# Patient Record
Sex: FEMALE | Race: WHITE | NOT HISPANIC OR LATINO | Employment: FULL TIME | URBAN - METROPOLITAN AREA
[De-identification: names, ages, dates, MRNs, and addresses within clinical notes are randomized per-mention and may not be internally consistent; named-entity substitution may affect disease eponyms.]

---

## 2017-03-27 ENCOUNTER — OFFICE VISIT (OUTPATIENT)
Dept: FAMILY MEDICINE | Facility: CLINIC | Age: 47
End: 2017-03-27
Payer: COMMERCIAL

## 2017-03-27 VITALS
HEIGHT: 63 IN | WEIGHT: 211 LBS | DIASTOLIC BLOOD PRESSURE: 84 MMHG | BODY MASS INDEX: 37.39 KG/M2 | TEMPERATURE: 98 F | HEART RATE: 64 BPM | SYSTOLIC BLOOD PRESSURE: 112 MMHG

## 2017-03-27 DIAGNOSIS — N95.1 PERIMENOPAUSAL SYMPTOMS: ICD-10-CM

## 2017-03-27 DIAGNOSIS — R09.81 CONGESTION OF PARANASAL SINUS: ICD-10-CM

## 2017-03-27 DIAGNOSIS — Z00.00 ENCOUNTER FOR ROUTINE ADULT HEALTH EXAMINATION WITHOUT ABNORMAL FINDINGS: Primary | ICD-10-CM

## 2017-03-27 DIAGNOSIS — G89.29 CHRONIC RIGHT SHOULDER PAIN: ICD-10-CM

## 2017-03-27 DIAGNOSIS — Z13.6 CARDIOVASCULAR SCREENING; LDL GOAL LESS THAN 160: ICD-10-CM

## 2017-03-27 DIAGNOSIS — R45.4 IRRITABILITY: ICD-10-CM

## 2017-03-27 DIAGNOSIS — M25.511 CHRONIC RIGHT SHOULDER PAIN: ICD-10-CM

## 2017-03-27 DIAGNOSIS — T63.441D BEE STING REACTION, ACCIDENTAL OR UNINTENTIONAL, SUBSEQUENT ENCOUNTER: ICD-10-CM

## 2017-03-27 DIAGNOSIS — Z13.1 SCREENING FOR DIABETES MELLITUS: ICD-10-CM

## 2017-03-27 PROCEDURE — 99213 OFFICE O/P EST LOW 20 MIN: CPT | Mod: 25 | Performed by: FAMILY MEDICINE

## 2017-03-27 PROCEDURE — 99396 PREV VISIT EST AGE 40-64: CPT | Performed by: FAMILY MEDICINE

## 2017-03-27 RX ORDER — EPINEPHRINE 0.3 MG/.3ML
0.3 INJECTION SUBCUTANEOUS
Qty: 0.6 ML | Refills: 3 | Status: SHIPPED | OUTPATIENT
Start: 2017-03-27 | End: 2018-03-28

## 2017-03-27 RX ORDER — CETIRIZINE HYDROCHLORIDE, PSEUDOEPHEDRINE HYDROCHLORIDE 5; 120 MG/1; MG/1
1 TABLET, FILM COATED, EXTENDED RELEASE ORAL 2 TIMES DAILY
Qty: 60 TABLET | Refills: 5 | Status: SHIPPED | OUTPATIENT
Start: 2017-03-27 | End: 2018-07-06

## 2017-03-27 NOTE — PATIENT INSTRUCTIONS
Please make a fasting lab visit.    Please see sports medicine due to your chronic right shoulder pain    Thank you for choosing [facility].  You may be receiving a survey in the mail from Aniket Agudelo regarding your visit today.  Please take a few minutes to complete and return the survey to let us know how we are doing.      If you have questions or concerns, please contact us via [employer] or you can contact your care team at [phone].    Our Clinic hours are:  Monday 6:40 am  to 7:00 pm  Tuesday -Friday 6:40 am to 5:00 pm    The [place] outpatient lab hours are:  Monday - Friday 6:10 am to 4:45 pm  Saturdays 7:00 am to 11:00 am  Appointments are required, call [phone]    If you have clinical questions after hours or would like to schedule an appointment,  call the clinic at [phone].    Preventive Health Recommendations  Female Ages 40 to 49    Yearly exam:     See your health care provider every year in order to  1. Review health changes.   2. Discuss preventive care.    3. Review your medicines if your doctor prescribed any.      Get a Pap test every three years (unless you have an abnormal result and your provider advises testing more often).      If you get Pap tests with HPV test, you only need to test every 5 years, unless you have an abnormal result. You do not need a Pap test if your uterus was removed (hysterectomy) and you have not had cancer.      You should be tested each year for STDs (sexually transmitted diseases), if you're at risk.       Ask your doctor if you should have a mammogram.      Have a colonoscopy (test for colon cancer) if someone in your family has had colon cancer or polyps before age 50.       Have a cholesterol test every 5 years.       Have a diabetes test (fasting glucose) after age 45. If you are at risk for diabetes, you should have this test every 3 years.    Shots: Get a flu shot each year. Get a tetanus shot every 10 years.     Nutrition:     Eat at least 5  servings of fruits and vegetables each day.    Eat whole-grain bread, whole-wheat pasta and brown rice instead of white grains and rice.    Talk to your provider about Calcium and Vitamin D.     Lifestyle    Exercise at least 150 minutes a week (an average of 30 minutes a day, 5 days a week). This will help you control your weight and prevent disease.    Limit alcohol to one drink per day.    No smoking.     Wear sunscreen to prevent skin cancer.    See your dentist every six months for an exam and cleaning.

## 2017-03-27 NOTE — PROGRESS NOTES
SUBJECTIVE:     CC: Dakota Berkowitz is an 46 year old woman who presents for preventive health visit.     Healthy Habits:    Do you get at least three servings of calcium containing foods daily (dairy, green leafy vegetables, etc.)? NO    Amount of exercise or daily activities, outside of work: none    Problems taking medications regularly No    Medication side effects: No    Have you had an eye exam in the past two years? yes    Do you see a dentist twice per year? yes    Do you have sleep apnea, excessive snoring or daytime drowsiness?no            Today's PHQ-2 Score:   PHQ-2 ( 1999 [employer]) 3/27/2017 7/19/2016   Q1: Little interest or pleasure in doing things 0 -   Q2: Feeling down, depressed or hopeless 0 -   PHQ-2 Score 0 -   Little interest or pleasure in doing things - More than half the days   Feeling down, depressed or hopeless - Several days   PHQ-2 Score - 3       Abuse: Current or Past(Physical, Sexual or Emotional)- No  Do you feel safe in your environment - Yes    Social History   Substance Use Topics     Smoking status: Never Smoker     Smokeless tobacco: Never Used     Alcohol use Yes      Comment: occassionally on weekends     The patient does not drink >3 drinks per day nor >7 drinks per week.    Recent Labs   Lab Test  03/06/15   0603   CHOL  190   HDL  60   LDL  104   TRIG  129   CHOLHDLRATIO  3.2       Reviewed orders with patient.  Reviewed health maintenance and updated orders accordingly - Yes    Mammo Decision Support:  Mammogram not appropriate for this patient based on age.    Pertinent mammograms are reviewed under the imaging tab.  History of abnormal Pap smear: NO - age 30- 65 PAP every 3 years recommended    Reviewed and updated as needed this visit by clinical staff  Tobacco  Allergies  Meds  Med Hx  Surg Hx  Fam Hx  Soc Hx        Reviewed and updated as needed this visit by Provider            ROS:  Review Of Systems  Skin: negative  Eyes: negative  Ears/Nose/Throat:  "negative  Respiratory: No shortness of breath, dyspnea on exertion, cough, or hemoptysis  Cardiovascular: negative  Gastrointestinal: negative  Genitourinary: negative  Musculoskeletal: right shoulder pain, chronic, works as  for [employer], would like some one to take a closer look, just aches at times.  Neurologic: negative  Psychiatric: negative  Hematologic/Lymphatic/Immunologic: negative  Endocrine: having perimenopausal symptoms, off SSRI, recommend to restart prozac, if not better consider effexor XR.  She was concerned about her weight. Discussed not using pills, recommend diet, exercise, and counseling.  Also discussed if she wants a consult to the [facility] surgical to let me know and I will do referral, per her insurance they will consider if BMI is over 35.        Problem list, Medication list, Allergies, and Medical/Social/Surgical histories reviewed in EPIC and updated as appropriate.  OBJECTIVE:     /84 (Cuff Size: Adult Large)  Pulse 64  Temp 98  F (36.7  C) (Tympanic)  Ht 5' 3\" (1.6 m)  Wt 211 lb (95.7 kg)  BMI 37.38 kg/m2  EXAM:  GENERAL: healthy, alert and no distress  EYES: Eyes grossly normal to inspection, PERRL and conjunctivae and sclerae normal  HENT: ear canals and TM's normal, nose and mouth without ulcers or lesions  NECK: no adenopathy, no asymmetry, masses, or scars and thyroid normal to palpation  RESP: lungs clear to auscultation - no rales, rhonchi or wheezes  BREAST: not examined.  CV: regular rate and rhythm, normal S1 S2, no S3 or S4, no murmur, click or rub, no peripheral edema and peripheral pulses strong  ABDOMEN: soft, nontender, no hepatosplenomegaly, no masses and bowel sounds normal   (female): not examined  MS: no gross musculoskeletal defects noted, no edema  SKIN: no suspicious lesions or rashes  NEURO: Normal strength and tone, mentation intact and speech normal  PSYCH: mentation appears normal, affect normal/bright  LYMPH: anterior " "cervical: no adenopathy  posterior cervical: no adenopathy    ASSESSMENT/PLAN:     (Z00.00) Encounter for routine adult health examination without abnormal findings  (primary encounter diagnosis)  Comment: Discussed healthy lifestyle and preventative cares.    Plan:     (N95.1) Perimenopausal symptoms  Comment: as above and restart medication  Plan: FLUoxetine (PROZAC) 20 MG capsule            (R45.4) Irritability  Comment:   Plan: FLUoxetine (PROZAC) 20 MG capsule            (T63.441D) Bee sting reaction, accidental or unintentional, subsequent encounter  Comment: refilled med  Plan: EPINEPHrine 0.3 MG/0.3ML injection            (R09.81) Congestion of paranasal sinus  Comment: refilled and printed prescription  Plan: cetirizine-psuedoePHEDrine (ZYRTEC-D) 5-120 MG         per 12 hr tablet            (Z13.1) Screening for diabetes mellitus  Comment:   Plan: Glucose            (Z13.6) CARDIOVASCULAR SCREENING; LDL GOAL LESS THAN 160  Comment:   Plan: Lipid panel reflex to direct LDL            (M25.511,  G89.29) Chronic right shoulder pain  Comment: referral done  Plan: ORTHO  REFERRAL              COUNSELING:   Reviewed preventive health counseling, as reflected in patient instructions       Regular exercise       Healthy diet/nutrition       Vision screening       Hearing screening         reports that she has never smoked. She has never used smokeless tobacco.    Estimated body mass index is 37.38 kg/(m^2) as calculated from the following:    Height as of this encounter: 5' 3\" (1.6 m).    Weight as of this encounter: 211 lb (95.7 kg).   Weight management pladiscussed many options todaydiscussed many options today    Counseling Resources:  ATP IV Guidelines  Pooled Cohorts Equation Calculator  Breast Cancer Risk Calculator  FRAX Risk Assessment  ICSI Preventive Guidelines  Dietary Guidelines for Americans, 2010  USDA's MyPlate  ASA Prophylaxis  Lung CA Screening    Chino Rayo MD  [facility] " [place]

## 2017-03-27 NOTE — MR AVS SNAPSHOT
After Visit Summary   3/27/2017    Dakota Berkowitz    MRN: [id number]           Patient Information     Date Of Birth          1970        Visit Information        Provider Department      3/27/2017 1:40 PM Chino Rayo MD [facility]        Today's Diagnoses     Encounter for routine adult health examination without abnormal findings    -  1    Perimenopausal symptoms        Irritability        Bee sting reaction, accidental or unintentional, subsequent encounter        Congestion of paranasal sinus        Screening for diabetes mellitus        CARDIOVASCULAR SCREENING; LDL GOAL LESS THAN 160        Chronic right shoulder pain          Care Instructions    Please make a fasting lab visit.    Please see sports medicine due to your chronic right shoulder pain    Thank you for choosing [facility].  You may be receiving a survey in the mail from [email or web address] regarding your visit today.  Please take a few minutes to complete and return the survey to let us know how we are doing.      If you have questions or concerns, please contact us via [employer] or you can contact your care team at [phone].    Our Clinic hours are:  Monday 6:40 am  to 7:00 pm  Tuesday -Friday 6:40 am to 5:00 pm    The [place] outpatient lab hours are:  Monday - Friday 6:10 am to 4:45 pm  Saturdays 7:00 am to 11:00 am  Appointments are required, call [phone]    If you have clinical questions after hours or would like to schedule an appointment,  call the clinic at [phone].    Preventive Health Recommendations  Female Ages 40 to 49    Yearly exam:     See your health care provider every year in order to  1. Review health changes.   2. Discuss preventive care.    3. Review your medicines if your doctor prescribed any.      Get a Pap test every three years (unless you have an abnormal result and your provider advises testing more often).      If you get Pap tests with HPV test, you only need to  test every 5 years, unless you have an abnormal result. You do not need a Pap test if your uterus was removed (hysterectomy) and you have not had cancer.      You should be tested each year for STDs (sexually transmitted diseases), if you're at risk.       Ask your doctor if you should have a mammogram.      Have a colonoscopy (test for colon cancer) if someone in your family has had colon cancer or polyps before age 50.       Have a cholesterol test every 5 years.       Have a diabetes test (fasting glucose) after age 45. If you are at risk for diabetes, you should have this test every 3 years.    Shots: Get a flu shot each year. Get a tetanus shot every 10 years.     Nutrition:     Eat at least 5 servings of fruits and vegetables each day.    Eat whole-grain bread, whole-wheat pasta and brown rice instead of white grains and rice.    Talk to your provider about Calcium and Vitamin D.     Lifestyle    Exercise at least 150 minutes a week (an average of 30 minutes a day, 5 days a week). This will help you control your weight and prevent disease.    Limit alcohol to one drink per day.    No smoking.     Wear sunscreen to prevent skin cancer.    See your dentist every six months for an exam and cleaning.        Follow-ups after your visit        Additional Services     ORTHO  REFERRAL       [facility] is referring you to the Orthopedic  Services at [facility].       The  Representative will assist you in the coordination of your Orthopedic and Musculoskeletal Care as prescribed by your physician.    The  Representative will call you within 1 business day to help schedule your appointment, or you may contact the  Representative at:    All areas ~ [phone]     Type of Referral : Non Surgical       Timeframe requested: Routine    Coverage of these services is subject to the terms and limitations of your health insurance plan.   Please call member services at your health plan with any benefit or coverage questions.      If X-rays, CT or MRI's have been performed, please contact the facility where they were done to arrange for , prior to your scheduled appointment.  Please bring this referral request to your appointment and present it to your specialist.    During physical patient complained of shoulder pain, wants to see someone in ortho for consult eval and treat.                  Future tests that were ordered for you today     Open Future Orders        Priority Expected Expires Ordered    Glucose Routine  4/27/2017 3/27/2017    Lipid panel reflex to direct LDL Routine  4/27/2017 3/27/2017            Who to contact     If you have questions or need follow up information about today's clinic visit or your schedule please contact [facility] directly at [phone].  Normal or non-critical lab and imaging results will be communicated to you by [employer], letter or phone within 4 business days after the clinic has received the results. If you do not hear from us within 7 days, please contact the clinic through [employer] or phone. If you have a critical or abnormal lab result, we will notify you by phone as soon as possible.  Submit refill requests through [employer] or call your pharmacy and they will forward the refill request to us. Please allow 3 business days for your refill to be completed.          Additional Information About Your Visit        [employer] Information     [employer] gives you secure access to your electronic health record. If you see a primary care provider, you can also send messages to your care team and make appointments. If you have questions, please call your primary care clinic.  If you do not have a primary care provider, please call [phone] and they will assist you.        Care EveryWhere ID     This is your Care EveryWhere ID. This could be used by other organizations to access your [place]  "medical records  [id number]        Your Vitals Were     Pulse Temperature Height BMI (Body Mass Index)          64 98  F (36.7  C) (Tympanic) 5' 3\" (1.6 m) 37.38 kg/m2         Blood Pressure from Last 3 Encounters:   03/27/17 112/84   07/18/16 131/82   03/16/16 144/81    Weight from Last 3 Encounters:   03/27/17 211 lb (95.7 kg)   07/18/16 206 lb (93.4 kg)   03/16/16 212 lb 12.8 oz (96.5 kg)              We Performed the Following     ORTHO  REFERRAL          Today's Medication Changes          These changes are accurate as of: 3/27/17  2:36 PM.  If you have any questions, ask your nurse or doctor.               These medicines have changed or have updated prescriptions.        Dose/Directions    cetirizine-psuedoePHEDrine 5-120 MG per 12 hr tablet   Commonly known as:  zyrTEC-D   This may have changed:  additional instructions   Used for:  Congestion of paranasal sinus   Changed by:  Chino Rayo MD        Dose:  1 tablet   Take 1 tablet by mouth 2 times daily Hold on file until needed   Quantity:  60 tablet   Refills:  5       EPINEPHrine 0.3 MG/0.3ML injection   This may have changed:  additional instructions   Used for:  Bee sting reaction, accidental or unintentional, subsequent encounter   Changed by:  Chino Rayo MD        Dose:  0.3 mg   Inject 0.3 mLs (0.3 mg) into the muscle once as needed for anaphylaxis Hold on file until needed   Quantity:  0.6 mL   Refills:  3       FLUoxetine 20 MG capsule   Commonly known as:  PROzac   This may have changed:  additional instructions   Used for:  Perimenopausal symptoms, Irritability   Changed by:  Chino Rayo MD        Dose:  20 mg   Take 1 capsule (20 mg) by mouth daily Hold on file until needed   Quantity:  90 capsule   Refills:  3         Stop taking these medicines if you haven't already. Please contact your care team if you have questions.     amitriptyline 25 MG tablet   Commonly known as:  ELAVIL   Stopped by:  Girma" Chino DIAZ MD           amoxicillin-clavulanate 875-125 MG per tablet   Commonly known as:  AUGMENTIN   Stopped by:  Chino Rayo MD           Capsaicin 0.1 % Crea   Stopped by:  Chino Rayo MD           oxyCODONE-acetaminophen 5-325 MG per tablet   Commonly known as:  PERCOCET   Stopped by:  Chino Rayo MD           valACYclovir 1000 mg tablet   Commonly known as:  VALTREX   Stopped by:  Chino Rayo MD                Where to get your medicines      These medications were sent to [facility] [id number] [place], [place] [address] [facility] AT [facility] OF [address] [place] [address]  [address] [facility] [address]     Phone:  [phone]     EPINEPHrine 0.3 MG/0.3ML injection    FLUoxetine 20 MG capsule         Some of these will need a paper prescription and others can be bought over the counter.  Ask your nurse if you have questions.     Bring a paper prescription for each of these medications     cetirizine-psuedoePHEDrine 5-120 MG per 12 hr tablet                Primary Care Provider Office Phone # Fax #    Chino Rayo [phone]       [facility] [address] [facility] [address]        Thank you!     Thank you for choosing [facility]  for your care. Our goal is always to provide you with excellent care. Hearing back from our patients is one way we can continue to improve our services. Please take a few minutes to complete the written survey that you may receive in the mail after your visit with us. Thank you!             Your Updated Medication List - Protect others around you: Learn how to safely use, store and throw away your medicines at www.disposemymeds.org.          This list is accurate as of: 3/27/17  2:36 PM.  Always use your most recent med list.                   Brand Name Dispense Instructions for use    cetirizine-psuedoePHEDrine 5-120 MG per 12 hr tablet    zyrTEC-D    60 tablet    Take 1 tablet by mouth 2  times daily Hold on file until needed       EPINEPHrine 0.3 MG/0.3ML injection     0.6 mL    Inject 0.3 mLs (0.3 mg) into the muscle once as needed for anaphylaxis Hold on file until needed       FLUoxetine 20 MG capsule    PROzac    90 capsule    Take 1 capsule (20 mg) by mouth daily Hold on file until needed       ibuprofen 200 MG tablet    ADVIL/MOTRIN    120 tablet    Take 3 tablets (600 mg) by mouth every 6 hours as needed for mild pain       PREDNISONE PO      Take 5 mg by mouth as needed Takes as needed for exposure to Poison [place].

## 2017-04-10 ENCOUNTER — OFFICE VISIT (OUTPATIENT)
Dept: ORTHOPEDICS | Facility: CLINIC | Age: 47
End: 2017-04-10
Payer: COMMERCIAL

## 2017-04-10 ENCOUNTER — RADIANT APPOINTMENT (OUTPATIENT)
Dept: GENERAL RADIOLOGY | Facility: CLINIC | Age: 47
End: 2017-04-10
Attending: PHYSICIAN ASSISTANT
Payer: COMMERCIAL

## 2017-04-10 VITALS
WEIGHT: 211 LBS | DIASTOLIC BLOOD PRESSURE: 86 MMHG | SYSTOLIC BLOOD PRESSURE: 114 MMHG | HEIGHT: 63 IN | HEART RATE: 66 BPM | BODY MASS INDEX: 37.39 KG/M2

## 2017-04-10 DIAGNOSIS — S49.91XA SHOULDER INJURY, RIGHT, INITIAL ENCOUNTER: ICD-10-CM

## 2017-04-10 DIAGNOSIS — G89.29 CHRONIC RIGHT SHOULDER PAIN: Primary | ICD-10-CM

## 2017-04-10 DIAGNOSIS — M25.511 CHRONIC RIGHT SHOULDER PAIN: Primary | ICD-10-CM

## 2017-04-10 PROCEDURE — 99244 OFF/OP CNSLTJ NEW/EST MOD 40: CPT | Performed by: PHYSICIAN ASSISTANT

## 2017-04-10 PROCEDURE — 73030 X-RAY EXAM OF SHOULDER: CPT | Mod: RT

## 2017-04-10 RX ORDER — MELOXICAM 15 MG/1
15 TABLET ORAL DAILY
Qty: 30 TABLET | Refills: 1 | Status: SHIPPED | OUTPATIENT
Start: 2017-04-10 | End: 2017-06-01

## 2017-04-10 RX ORDER — CYCLOBENZAPRINE HCL 5 MG
5-10 TABLET ORAL 3 TIMES DAILY PRN
Qty: 45 TABLET | Refills: 1 | Status: SHIPPED | OUTPATIENT
Start: 2017-04-10 | End: 2018-02-27

## 2017-04-10 NOTE — NURSING NOTE
"Initial /86  Pulse 66  Ht 5' 3\" (1.6 m)  Wt 211 lb (95.7 kg)  BMI 37.38 kg/m2 Estimated body mass index is 37.38 kg/(m^2) as calculated from the following:    Height as of this encounter: 5' 3\" (1.6 m).    Weight as of this encounter: 211 lb (95.7 kg). .      " Maggie  [address]  [phone]               Thank you for choosing us for your health care visit with [address]   We are glad to serve you and happy to provide you with this summa Commonly known as:  PROSCAR           furosemide 40 MG Tabs   Take 40 mg by mouth 2 (two) times daily. Take 40 mg in the am and 20 mg in the afternoon.    Commonly known as:  LASIX           HYDROcodone-acetaminophen 5-325 MG Tabs   Take 1 tablet by mouth e Visit [facility] online at  [email or web address]

## 2017-04-10 NOTE — MR AVS SNAPSHOT
After Visit Summary   4/10/2017    Dakota Berkowitz    MRN: [id number]           Patient Information     Date Of Birth          1970        Visit Information        Provider Department      4/10/2017 8:40 AM Eneida Mane PA-C [facility] [place]        Today's Diagnoses     Chronic right shoulder pain    -  1    Shoulder injury, right, initial encounter           Follow-ups after your visit        Additional Services     PHYSICAL THERAPY REFERRAL       *This therapy referral will be filtered to a centralized scheduling office at [facility] and the patient will receive a call to schedule an appointment at a [facility] location most convenient for them. *     [facility] provides Physical Therapy evaluation and treatment and many specialty services across the [facility] system.  If requesting a specialty program, please choose from the list below.    If you have not heard from the scheduling office within 2 business days, please call [phone] for all locations, with the exception of [place], please call [phone].  Treatment: Evaluation & Treatment  Special Instructions/Modalities: As indicated by physical therapist - muscle energy, joint mobilization, myofacial release - Yassine Tobin    Special Programs: As indicated by physical therapist      Please be aware that coverage of these services is subject to the terms and limitations of your health insurance plan.  Call member services at your health plan with any benefit or coverage questions.      **Note to Provider:  If you are referring outside of [facility] for the therapy appointment, please list the name of the location in the  special instructions  above, print the referral and give to the patient to schedule the appointment.                  Who to contact     If you have questions or need follow up information about today's clinic visit or your schedule please contact  [facility] directly at [phone].  Normal or non-critical lab and imaging results will be communicated to you by MyChart, letter or phone within 4 business days after the clinic has received the results. If you do not hear from us within 7 days, please contact the clinic through [employer] or phone. If you have a critical or abnormal lab result, we will notify you by phone as soon as possible.  Submit refill requests through "[facility] or call your pharmacy and they will forward the refill request to us. Please allow 3 business days for your refill to be completed.          Additional Information About Your Visit        [employer] Information     "[facility] gives you secure access to your electronic health record. If you see a primary care provider, you can also send messages to your care team and make appointments. If you have questions, please call your primary care clinic.  If you do not have a primary care provider, please call [phone] and they will assist you.        Care EveryWhere ID     This is your Care EveryWhere ID. This could be used by other organizations to access your [facility] medical records  [id number]         Blood Pressure from Last 3 Encounters:   03/27/17 112/84   07/18/16 131/82   03/16/16 144/81    Weight from Last 3 Encounters:   03/27/17 211 lb (95.7 kg)   07/18/16 206 lb (93.4 kg)   03/16/16 212 lb 12.8 oz (96.5 kg)              We Performed the Following     PHYSICAL THERAPY REFERRAL     XR Shoulder Right G/E 3 Views          Today's Medication Changes          These changes are accurate as of: 4/10/17  9:18 AM.  If you have any questions, ask your nurse or doctor.               Start taking these medicines.        Dose/Directions    cyclobenzaprine 5 MG tablet   Commonly known as:  FLEXERIL   Used for:  Shoulder injury, right, initial encounter, Chronic right shoulder pain   Started by:  Eneida Mane PA-C        Dose:  5-10 mg   Take 1-2 tablets  (5-10 mg) by mouth 3 times daily as needed for muscle spasms   Quantity:  45 tablet   Refills:  1       meloxicam 15 MG tablet   Commonly known as:  MOBIC   Used for:  Shoulder injury, right, initial encounter, Chronic right shoulder pain   Started by:  Eneida Mane PA-C        Dose:  15 mg   Take 1 tablet (15 mg) by mouth daily   Quantity:  30 tablet   Refills:  1            Where to get your medicines      These medications were sent to [facility] [address] [place] [address] AT [address]  [address] [facility] [address]     Phone:  [phone]     cyclobenzaprine 5 MG tablet    meloxicam 15 MG tablet                Primary Care Provider Office Phone # Fax #    Chino Rayo [phone]       [facility] [address] [facility] [address]        Thank you!     Thank you for choosing [facility]  for your care. Our goal is always to provide you with excellent care. Hearing back from our patients is one way we can continue to improve our services. Please take a few minutes to complete the written survey that you may receive in the mail after your visit with us. Thank you!             Your Updated Medication List - Protect others around you: Learn how to safely use, store and throw away your medicines at www.disposemymeds.org.          This list is accurate as of: 4/10/17  9:18 AM.  Always use your most recent med list.                   Brand Name Dispense Instructions for use    cetirizine-psuedoePHEDrine 5-120 MG per 12 hr tablet    zyrTEC-D    60 tablet    Take 1 tablet by mouth 2 times daily Hold on file until needed       cyclobenzaprine 5 MG tablet    FLEXERIL    45 tablet    Take 1-2 tablets (5-10 mg) by mouth 3 times daily as needed for muscle spasms       EPINEPHrine 0.3 MG/0.3ML injection     0.6 mL    Inject 0.3 mLs (0.3 mg) into the muscle once as needed for anaphylaxis  Hold on file until needed       FLUoxetine 20 MG capsule    PROzac    90 capsule    Take 1 capsule (20 mg) by mouth daily Hold on file until needed       ibuprofen 200 MG tablet    ADVIL/MOTRIN    120 tablet    Take 3 tablets (600 mg) by mouth every 6 hours as needed for mild pain       meloxicam 15 MG tablet    MOBIC    30 tablet    Take 1 tablet (15 mg) by mouth daily       PREDNISONE PO      Take 5 mg by mouth as needed Takes as needed for exposure to Poison [place].

## 2017-04-10 NOTE — PROGRESS NOTES
Sports Medicine Clinic Visit    PCP: Chino Rayo    Dakota Berkowitz is a 46 year old female who is seen  in consultation at the request of Jessica Selby presenting with right shoulder pain.    Injury: Fell on shoulder in Feb 2016    Location of Pain: Right anterior shoulder into neck  Duration of Pain: Feb 2016   Rating of Pain at worst: 9/10  Rating of Pain Currently: 7/10  Symptoms are better with: None  Symptoms are worse with: Sleeping, lifting items, use of arm  Additional Features:   Positive: none  Other evaluation and/or treatments so far consists of: Chiropractor, taping  Prior History of related problems: none    Social History:     Review of Systems  Musculoskeletal: as above  Remainder of review of systems is negative including constitutional, CV, pulmonary, GI, Skin and Neurologic except as noted in HPI or medical history.    Family history, medical history and surgical history have all been discussed with patient and appended to medical chart below.    Past Medical History:   Diagnosis Date     NO ACTIVE PROBLEMS      Past Surgical History:   Procedure Laterality Date     C APPENDECTOMY       COSMETIC SURGERY      breast implants     CYSTOSCOPY, SLING TRANSVAGINAL N/A 2/8/2016    Procedure: CYSTOSCOPY, SLING TRANSVAGINAL;  Surgeon: Sterling Hill MD;  Location: [facility]      REPAIR OF NASAL SEPTUM       SURGICAL HISTORY OF -       laporoscopy due to endometriosis     SURGICAL HISTORY OF -       endometrial ablation     SURGICAL HISTORY OF -       breast augmentation     SURGICAL HISTORY OF -       benign tumor removed from her left thigh     TONSILLECTOMY       TUBAL LIGATION       Family History   Problem Relation Age of Onset     CANCER Mother      ovarian     CANCER Paternal Grandmother      lung     Gynecology Sister      having a partial hysterectomy due to abnormal cells, leaving ovaries and cervix     Gynecology Daughter      endometriosis     Gynecology Sister      " hysterectomy for endometriosis     Objective  /86  Pulse 66  Ht 5' 3\" (1.6 m)  Wt 211 lb (95.7 kg)  BMI 37.38 kg/m2  Constitutional:well-developed, well-nourished, and in no distress.   Cardiovascular: Intact distal pulses.    Neurological: alert. Gait normal  Skin: Skin is warm and dry.   Psychiatric: Mood and affect normal.   Respiratory: unlabored, speaks in full sentences  Hematologic/Lymphatic/Immunologic: neg lymphadenopathy, neg lymphedema    Exam:  Right Shoulder exam    ROM:        Full active and passive ROM with flexion, extension, abduction, internal and external rotation.    Tender:        subacromial space       Subclavius, scalenes, levator, anterior joint line    Non Tender:       remainder of shoulder    Strength:        abduction 5/5       internal rotation 5/5       external rotation 5/5       adduction 4/5    Impingement testing:        neg (-) Neer       neg (-) Murillo       neg (-) empty can       Negative Speed's test    Stability testing:       neg (-) apprehension       neg (-) posterior compression    Skin:        no visible deformities       well perfused       capillary refill brisk    Sensation:        normal sensation over shoulder and upper extremity         Radiology:  Study Result   XR SHOULDER RT G/E 3 VW 4/10/2017 8:51 AM      HISTORY: Pain in right shoulder         IMPRESSION: Negative exam.     SILVERIO MCGEE MD       I have personally reviewed images with patient    Assessment:  1. Chronic right shoulder pain    2. Shoulder injury, right, initial encounter        Plan:  Discussed the assessment with the patient.  Topical Treatments: Ice, Heat or Topical Analgesics  Over the counter medication: Acetaminophen (Tylenol) 1000mg every 6 hours with food (Maximum of 3000mg/day)  Prescription Medication as directed: mobic  MRI of the shoulder may need to be obtained in the future  Observe and monitor symptoms  Activity Modification: as tolerated  Rehab: Physical Therapy: " [facility] - [phone]  Follow up: 6 weeks            Eneida Mane PA-C  [facility]  [facility]      Disclaimer: This note consists of symbols derived from keyboarding, dictation and/or voice recognition software. As a result, there may be errors in the script that have gone undetected. Please consider this when interpreting information found in this chart.

## 2017-06-01 ENCOUNTER — OFFICE VISIT (OUTPATIENT)
Dept: FAMILY MEDICINE | Facility: CLINIC | Age: 47
End: 2017-06-01
Payer: COMMERCIAL

## 2017-06-01 VITALS
BODY MASS INDEX: 37.39 KG/M2 | SYSTOLIC BLOOD PRESSURE: 144 MMHG | WEIGHT: 211 LBS | HEART RATE: 76 BPM | HEIGHT: 63 IN | DIASTOLIC BLOOD PRESSURE: 89 MMHG | TEMPERATURE: 98 F

## 2017-06-01 DIAGNOSIS — H65.193 ACUTE MUCOID OTITIS MEDIA OF BOTH EARS: ICD-10-CM

## 2017-06-01 DIAGNOSIS — J01.90 ACUTE SINUSITIS WITH SYMPTOMS > 10 DAYS: Primary | ICD-10-CM

## 2017-06-01 PROCEDURE — 99214 OFFICE O/P EST MOD 30 MIN: CPT | Performed by: FAMILY MEDICINE

## 2017-06-01 RX ORDER — AZITHROMYCIN 250 MG/1
250 TABLET, FILM COATED ORAL DAILY
Qty: 6 TABLET | Refills: 1 | Status: SHIPPED | OUTPATIENT
Start: 2017-06-01 | End: 2018-02-27

## 2017-06-01 NOTE — PATIENT INSTRUCTIONS
Thank you for choosing [facility].  You may be receiving a survey in the mail from Aniket Agudelo regarding your visit today.  Please take a few minutes to complete and return the survey to let us know how we are doing.      If you have questions or concerns, please contact us via [employer] or you can contact your care team at [phone].    Our Clinic hours are:  Monday 6:40 am  to 7:00 pm  Tuesday -Friday 6:40 am to 5:00 pm    The [place] outpatient lab hours are:  Monday - Friday 6:10 am to 4:45 pm  Saturdays 7:00 am to 11:00 am  Appointments are required, call [phone]    If you have clinical questions after hours or would like to schedule an appointment,  call the clinic at [phone].   If symptoms resolve with the zithromax and then recur on day 6-8 repeat the zithromax.  If symptoms do not improve or do not recur don't repeat the zithromax.  Take only one tab a day if repeating the pac.  Zithromax is an antibiotic and works against bacteria.  If it didn't work then you don't have a bacterial infection, you have a viral infection and zpac or any other antibiotic won't work.  Recommend rest, fluids and other supportive cares so your immune system can clear the viral infection.  If your illness is getting worse see MD.    Symptomatic therapy suggested: push fluids, rest, gargle warm salt water, use vaporizer or mist needed , use acetaminophen, ibuprofen, decongestant of choice as needed and Return office visit if symptoms persist or worsen. Call or return to clinic prn if these symptoms worsen or fail to improve as anticipated.

## 2017-06-01 NOTE — NURSING NOTE
"Chief Complaint   Patient presents with     Sinus Problem     sinus pressure     Ear Problem     bilateral ear pain       Initial /89 (BP Location: Left arm, Patient Position: Chair, Cuff Size: Adult Regular)  Pulse 76  Temp 98  F (36.7  C) (Tympanic)  Ht 5' 3\" (1.6 m)  Wt 211 lb (95.7 kg)  BMI 37.38 kg/m2 Estimated body mass index is 37.38 kg/(m^2) as calculated from the following:    Height as of this encounter: 5' 3\" (1.6 m).    Weight as of this encounter: 211 lb (95.7 kg).  Medication Reconciliation: complete  "

## 2017-06-01 NOTE — MR AVS SNAPSHOT
After Visit Summary   6/1/2017    Dakota Berkowitz    MRN: [id number]           Patient Information     Date Of Birth          1970        Visit Information        Provider Department      6/1/2017 1:20 PM Maribel Cantu MD [facility]        Today's Diagnoses     Acute sinusitis with symptoms > 10 days    -  1    Acute mucoid otitis media of both ears          Care Instructions          Thank you for choosing [facility].  You may be receiving a survey in the mail from [facility] regarding your visit today.  Please take a few minutes to complete and return the survey to let us know how we are doing.      If you have questions or concerns, please contact us via [employer] or you can contact your care team at [phone].    Our Clinic hours are:  Monday 6:40 am  to 7:00 pm  Tuesday -Friday 6:40 am to 5:00 pm    The [place] outpatient lab hours are:  Monday - Friday 6:10 am to 4:45 pm  Saturdays 7:00 am to 11:00 am  Appointments are required, call [phone]    If you have clinical questions after hours or would like to schedule an appointment,  call the clinic at [phone].   If symptoms resolve with the zithromax and then recur on day 6-8 repeat the zithromax.  If symptoms do not improve or do not recur don't repeat the zithromax.  Take only one tab a day if repeating the pac.  Zithromax is an antibiotic and works against bacteria.  If it didn't work then you don't have a bacterial infection, you have a viral infection and zpac or any other antibiotic won't work.  Recommend rest, fluids and other supportive cares so your immune system can clear the viral infection.  If your illness is getting worse see MD.    Symptomatic therapy suggested: push fluids, rest, gargle warm salt water, use vaporizer or mist needed , use acetaminophen, ibuprofen, decongestant of choice as needed and Return office visit if symptoms persist or worsen. Call or return to clinic prn if these  "symptoms worsen or fail to improve as anticipated.            Follow-ups after your visit        Who to contact     If you have questions or need follow up information about today's clinic visit or your schedule please contact [facility] directly at [phone].  Normal or non-critical lab and imaging results will be communicated to you by [employer], letter or phone within 4 business days after the clinic has received the results. If you do not hear from us within 7 days, please contact the clinic through [employer] or phone. If you have a critical or abnormal lab result, we will notify you by phone as soon as possible.  Submit refill requests through [employer] or call your pharmacy and they will forward the refill request to us. Please allow 3 business days for your refill to be completed.          Additional Information About Your Visit        [employer] Information     [employer] gives you secure access to your electronic health record. If you see a primary care provider, you can also send messages to your care team and make appointments. If you have questions, please call your primary care clinic.  If you do not have a primary care provider, please call [phone] and they will assist you.        Care EveryWhere ID     This is your Care EveryWhere ID. This could be used by other organizations to access your [facility] medical records  [id number]        Your Vitals Were     Pulse Temperature Height BMI (Body Mass Index)          76 98  F (36.7  C) (Tympanic) 5' 3\" (1.6 m) 37.38 kg/m2         Blood Pressure from Last 3 Encounters:   06/01/17 144/89   04/10/17 114/86   03/27/17 112/84    Weight from Last 3 Encounters:   06/01/17 211 lb (95.7 kg)   04/10/17 211 lb (95.7 kg)   03/27/17 211 lb (95.7 kg)              Today, you had the following     No orders found for display         Today's Medication Changes          These changes are accurate as of: 6/1/17  1:37 PM.  If you have any questions, ask your nurse or " doctor.               Start taking these medicines.        Dose/Directions    azithromycin 250 MG tablet   Commonly known as:  ZITHROMAX Z-JESSY   Used for:  Acute sinusitis with symptoms > 10 days   Started by:  Maribel Cantu MD        Dose:  250 mg   Take 1 tablet (250 mg) by mouth daily Two tablets first day, then one tablet daily for four days   Quantity:  6 tablet   Refills:  1            Where to get your medicines      These medications were sent to [facility] [place], [place] - [address] [facility]  [address] [facility] [address]     Phone:  [phone]     azithromycin 250 MG tablet                Primary Care Provider Office Phone # Fax #    Chino Rayo [phone]       [facility] [address] [facility] [address]        Thank you!     Thank you for choosing [facility]  for your care. Our goal is always to provide you with excellent care. Hearing back from our patients is one way we can continue to improve our services. Please take a few minutes to complete the written survey that you may receive in the mail after your visit with us. Thank you!             Your Updated Medication List - Protect others around you: Learn how to safely use, store and throw away your medicines at www.disposemymeds.org.          This list is accurate as of: 6/1/17  1:37 PM.  Always use your most recent med list.                   Brand Name Dispense Instructions for use    azithromycin 250 MG tablet    ZITHROMAX Z-JESSY    6 tablet    Take 1 tablet (250 mg) by mouth daily Two tablets first day, then one tablet daily for four days       cetirizine-psuedoePHEDrine 5-120 MG per 12 hr tablet    zyrTEC-D    60 tablet    Take 1 tablet by mouth 2 times daily Hold on file until needed       cyclobenzaprine 5 MG tablet    FLEXERIL    45 tablet    Take 1-2 tablets (5-10 mg) by mouth 3 times daily as needed for muscle spasms       EPINEPHrine 0.3 MG/0.3ML  injection     0.6 mL    Inject 0.3 mLs (0.3 mg) into the muscle once as needed for anaphylaxis Hold on file until needed       ibuprofen 200 MG tablet    ADVIL/MOTRIN    120 tablet    Take 3 tablets (600 mg) by mouth every 6 hours as needed for mild pain

## 2017-06-01 NOTE — PROGRESS NOTES
a  SUBJECTIVE:                                                    Dakota Berkowitz is 46 year old female   Chief Complaint   Patient presents with     Sinus Problem     sinus pressure     Ear Problem     bilateral ear pain     ENT Symptoms             Symptoms: cc Present Absent Comment   Fever/Chills   x    Fatigue   x    Muscle Aches   x    Eye Irritation   x    Sneezing   x    Nasal Micah/Drg  x     Sinus Pressure/Pain x x  A lot of sinus pressure   Loss of smell  x     Dental pain   x    Sore Throat  x  Sore throat   Swollen Glands  x     Ear Pain/Fullness x x     Cough   x    Wheeze   x    Chest Pain   x    Shortness of breath   x    Rash   x    Other  x  headaches     Symptom duration:  2 weeks   Symptom severity:  moderate   Treatments tried:  many OTC medications   Contacts:  couple family members are sick too         Problem list and histories reviewed & adjusted, as indicated.  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    Patient Active Problem List   Diagnosis     Female pelvic pain     Endometriosis     Dysmenorrhea     CARDIOVASCULAR SCREENING; LDL GOAL LESS THAN 160     Breast implant rupture     Pain in breast     Obesity     Perimenopausal symptoms     Weight gain     Irritability     Past Surgical History:   Procedure Laterality Date     C APPENDECTOMY       COSMETIC SURGERY      breast implants     CYSTOSCOPY, SLING TRANSVAGINAL N/A 2/8/2016    Procedure: CYSTOSCOPY, SLING TRANSVAGINAL;  Surgeon: Sterling Hill MD;  Location: [facility]      REPAIR OF NASAL SEPTUM       SURGICAL HISTORY OF -       laporoscopy due to endometriosis     SURGICAL HISTORY OF -       endometrial ablation     SURGICAL HISTORY OF -       breast augmentation     SURGICAL HISTORY OF -       benign tumor removed from her left thigh     TONSILLECTOMY       TUBAL LIGATION         Social History   Substance Use Topics     Smoking status: Never Smoker     Smokeless  tobacco: Never Used     Alcohol use Yes      Comment: occassionally on weekends     Family History   Problem Relation Age of Onset     CANCER Mother      ovarian     CANCER Paternal Grandmother      lung     Gynecology Sister      having a partial hysterectomy due to abnormal cells, leaving ovaries and cervix     Gynecology Daughter      endometriosis     Gynecology Sister      hysterectomy for endometriosis         Current Outpatient Prescriptions   Medication Sig Dispense Refill     azithromycin (ZITHROMAX Z-JESSY) 250 MG tablet Take 1 tablet (250 mg) by mouth daily Two tablets first day, then one tablet daily for four days 6 tablet 1     cyclobenzaprine (FLEXERIL) 5 MG tablet Take 1-2 tablets (5-10 mg) by mouth 3 times daily as needed for muscle spasms 45 tablet 1     EPINEPHrine 0.3 MG/0.3ML injection Inject 0.3 mLs (0.3 mg) into the muscle once as needed for anaphylaxis Hold on file until needed 0.6 mL 3     cetirizine-psuedoePHEDrine (ZYRTEC-D) 5-120 MG per 12 hr tablet Take 1 tablet by mouth 2 times daily Hold on file until needed 60 tablet 5     ibuprofen (ADVIL,MOTRIN) 200 MG tablet Take 3 tablets (600 mg) by mouth every 6 hours as needed for mild pain 120 tablet      Allergies   Allergen Reactions     Clindamycin      Provera [Medroxyprogesterone Acetate]      Recent Labs   Lab Test  10/12/15   1544  03/06/15   0603  04/10/14   1633   LDL   --   104   --    HDL   --   60   --    TRIG   --   129   --    ALT   --   31  24   CR   --   0.82  0.86   GFRESTIMATED   --   75  71   GFRESTBLACK   --   >90   GFR Calc    86   POTASSIUM   --   3.9  4.3   TSH  3.72  3.33  2.03      BP Readings from Last 3 Encounters:   06/01/17 144/89   04/10/17 114/86   03/27/17 112/84    Wt Readings from Last 3 Encounters:   06/01/17 211 lb (95.7 kg)   04/10/17 211 lb (95.7 kg)   03/27/17 211 lb (95.7 kg)         ROS:  Constitutional, HEENT, cardiovascular, pulmonary, gi and gu systems are negative, except as otherwise  "noted.    OBJECTIVE:                                                    /89 (BP Location: Left arm, Patient Position: Chair, Cuff Size: Adult Regular)  Pulse 76  Temp 98  F (36.7  C) (Tympanic)  Ht 5' 3\" (1.6 m)  Wt 211 lb (95.7 kg)  BMI 37.38 kg/m2  GENERAL APPEARANCE ADULT: alert, appears ill, no distress  HENT: right TM abnormal, dull, left TM abnormal, dull, erythematous, throat/mouth:mild erythema, mucous membranes moist,  cobblestoning and thick mucous posterior pharynx.  NECK: No adenopathy,masses or thyromegaly  RESP: lungs clear to auscultation   CV: normal rate, regular rhythm, no murmur or gallop  Diagnostic Test Results:  none      ASSESSMENT/PLAN:                                                    1. Acute sinusitis with symptoms > 10 days  Viral vs bacterial.  Trial of antibiotics but if not effective viral infection and self limiting.  If effective and recurs will repeat, see patient instructions.  - azithromycin (ZITHROMAX Z-JESSY) 250 MG tablet; Take 1 tablet (250 mg) by mouth daily Two tablets first day, then one tablet daily for four days  Dispense: 6 tablet; Refill: 1    2. Acute mucoid otitis media of both ears  as listed above      Maribel Cantu MD  [facility]      "

## 2017-08-16 ENCOUNTER — E-VISIT (OUTPATIENT)
Dept: FAMILY MEDICINE | Facility: CLINIC | Age: 47
End: 2017-08-16
Payer: COMMERCIAL

## 2017-08-16 DIAGNOSIS — G25.81 RESTLESS LEGS SYNDROME (RLS): Primary | ICD-10-CM

## 2017-08-16 PROCEDURE — 99207 ZZC NO BILLABLE SERVICE THIS VISIT: CPT | Performed by: FAMILY MEDICINE

## 2017-08-16 NOTE — TELEPHONE ENCOUNTER
Sobia Duncan,  I have not prescribed the medication you are asking for or have diagnosed you with restless legs.  Please make an appointment.  If you want to I could see you at 11:00 on Friday, they could triple book me however you will likely be seen closer to 11:30 am.  To my nurse please check with her to see if this works.  Sincerely,  Chino Rayo MD

## 2017-08-16 NOTE — TELEPHONE ENCOUNTER
Patient is contacted.  She is sure now that [facility] prescribed the medication for her in the past and she will call them to ask for a refill. Swati SAUCEDA RN

## 2017-09-27 ENCOUNTER — MYC MEDICAL ADVICE (OUTPATIENT)
Dept: FAMILY MEDICINE | Facility: CLINIC | Age: 47
End: 2017-09-27

## 2018-01-15 ENCOUNTER — MYC MEDICAL ADVICE (OUTPATIENT)
Dept: FAMILY MEDICINE | Facility: CLINIC | Age: 48
End: 2018-01-15

## 2018-02-01 NOTE — TELEPHONE ENCOUNTER
APPT INFO    Date /Time: 2/17/18 at 10AM   Reason for Appt: NBS   Ref Provider/Clinic: [facility]   Are there internal records? Yes/No?  IF YES, list clinic names: [facility]   Are there outside records? Yes/No? No   Patient Contact (Y/N) & Call Details: No   Action: Chart reviewed

## 2018-02-17 ENCOUNTER — PRE VISIT (OUTPATIENT)
Dept: SURGERY | Facility: CLINIC | Age: 48
End: 2018-02-17

## 2018-02-27 ENCOUNTER — OFFICE VISIT (OUTPATIENT)
Dept: FAMILY MEDICINE | Facility: CLINIC | Age: 48
End: 2018-02-27
Payer: COMMERCIAL

## 2018-02-27 VITALS
TEMPERATURE: 97.7 F | HEART RATE: 64 BPM | BODY MASS INDEX: 37.21 KG/M2 | DIASTOLIC BLOOD PRESSURE: 72 MMHG | SYSTOLIC BLOOD PRESSURE: 108 MMHG | HEIGHT: 63 IN | WEIGHT: 210 LBS

## 2018-02-27 DIAGNOSIS — M62.89 MUSCLE TIGHTNESS: ICD-10-CM

## 2018-02-27 DIAGNOSIS — M54.50 ACUTE RIGHT-SIDED LOW BACK PAIN WITHOUT SCIATICA: Primary | ICD-10-CM

## 2018-02-27 PROCEDURE — 99213 OFFICE O/P EST LOW 20 MIN: CPT | Performed by: FAMILY MEDICINE

## 2018-02-27 RX ORDER — OXYCODONE AND ACETAMINOPHEN 5; 325 MG/1; MG/1
1-2 TABLET ORAL
Qty: 20 TABLET | Refills: 0 | Status: SHIPPED | OUTPATIENT
Start: 2018-02-27 | End: 2018-04-13

## 2018-02-27 NOTE — NURSING NOTE
"Chief Complaint   Patient presents with     Back Pain       Initial /72 (Cuff Size: Adult Large)  Pulse 64  Temp 97.7  F (36.5  C) (Tympanic)  Ht 5' 3.25\" (1.607 m)  Wt 210 lb (95.3 kg)  BMI 36.91 kg/m2 Estimated body mass index is 36.91 kg/(m^2) as calculated from the following:    Height as of this encounter: 5' 3.25\" (1.607 m).    Weight as of this encounter: 210 lb (95.3 kg).  Medication Reconciliation: complete  "

## 2018-02-27 NOTE — PATIENT INSTRUCTIONS
From you exam you have a right lower lumbar back strain.    Go to physical therapy.    Use the muscle relaxant tizanidine 4 mg tab every 8 hours as needed.    Use the percocet 1-2 tabs 5/325 at night only.    Use ibuprofen during the day.  Use ibuprofen 200mg 3-4 tabs every 8 hours as needed, take with food.    Use ice.    Thank you for choosing [facility].  You may be receiving a survey in the mail from [facility] regarding your visit today.  Please take a few minutes to complete and return the survey to let us know how we are doing.      If you have questions or concerns, please contact us via [employer] or you can contact your care team at [phone].    Our Clinic hours are:  Monday 6:40 am  to 7:00 pm  Tuesday -Friday 6:40 am to 5:00 pm    The [place] outpatient lab hours are:  Monday - Friday 6:10 am to 4:45 pm  Saturdays 7:00 am to 11:00 am  Appointments are required, call [phone]    If you have clinical questions after hours or would like to schedule an appointment,  call the clinic at [phone].

## 2018-02-27 NOTE — MR AVS SNAPSHOT
After Visit Summary   2/27/2018    Dakota Berkowitz    MRN: [id number]           Patient Information     Date Of Birth          1970        Visit Information        Provider Department      2/27/2018 8:00 AM Chino Rayo MD [facility]        Today's Diagnoses     Acute right-sided low back pain without sciatica    -  1    Muscle tightness          Care Instructions    From you exam you have a right lower lumbar back strain.    Go to physical therapy.    Use the muscle relaxant tizanidine 4 mg tab every 8 hours as needed.    Use the percocet 1-2 tabs 5/325 at night only.    Use ibuprofen during the day.  Use ibuprofen 200mg 3-4 tabs every 8 hours as needed, take with food.    Use ice.    Thank you for choosing [facility].  You may be receiving a survey in the mail from [facility] regarding your visit today.  Please take a few minutes to complete and return the survey to let us know how we are doing.      If you have questions or concerns, please contact us via [employer] or you can contact your care team at [phone].    Our Clinic hours are:  Monday 6:40 am  to 7:00 pm  Tuesday -Friday 6:40 am to 5:00 pm    The [place] outpatient lab hours are:  Monday - Friday 6:10 am to 4:45 pm  Saturdays 7:00 am to 11:00 am  Appointments are required, call [phone]    If you have clinical questions after hours or would like to schedule an appointment,  call the clinic at [phone].            Follow-ups after your visit        Additional Services     PHYSICAL THERAPY REFERRAL       *This therapy referral will be filtered to a centralized scheduling office at [facility] and the patient will receive a call to schedule an appointment at a [place] location most convenient for them. *     [facility] provides Physical Therapy evaluation and treatment and many specialty services across the [place] system.  If requesting a specialty  "program, please choose from the list below.    If you have not heard from the scheduling office within 2 business days, please call [phone] for all locations, with the exception of [place], please call [phone] and [facility] [place], please call [phone]  Treatment: Evaluation & Treatment  Special Instructions/Modalities:   Special Programs:     Please be aware that coverage of these services is subject to the terms and limitations of your health insurance plan.  Call member services at your health plan with any benefit or coverage questions.      **Note to Provider:  If you are referring outside of [place] for the therapy appointment, please list the name of the location in the \"special instructions\" above, print the referral and give to the patient to schedule the appointment.     Patient has right lower lumbar back pain that goes to the right buttock.  Please eval and treat                  Who to contact     If you have questions or need follow up information about today's clinic visit or your schedule please contact [facility] directly at [phone].  Normal or non-critical lab and imaging results will be communicated to you by [employer], letter or phone within 4 business days after the clinic has received the results. If you do not hear from us within 7 days, please contact the clinic through [employer] or phone. If you have a critical or abnormal lab result, we will notify you by phone as soon as possible.  Submit refill requests through [employer] or call your pharmacy and they will forward the refill request to us. Please allow 3 business days for your refill to be completed.          Additional Information About Your Visit        [employer] Information     [employer] gives you secure access to your electronic health record. If you see a primary care provider, you can also send messages to your care team and make appointments. If you have questions, please call your primary care clinic.  If you do " "not have a primary care provider, please call [phone] and they will assist you.        Care EveryWhere ID     This is your Care EveryWhere ID. This could be used by other organizations to access your [facility] medical records  [id number]        Your Vitals Were     Pulse Temperature Height BMI (Body Mass Index)          64 97.7  F (36.5  C) (Tympanic) 5' 3.25\" (1.607 m) 36.91 kg/m2         Blood Pressure from Last 3 Encounters:   02/27/18 108/72   06/01/17 144/89   04/10/17 114/86    Weight from Last 3 Encounters:   02/27/18 210 lb (95.3 kg)   06/01/17 211 lb (95.7 kg)   04/10/17 211 lb (95.7 kg)              We Performed the Following     PHYSICAL THERAPY REFERRAL          Today's Medication Changes          These changes are accurate as of 2/27/18  8:35 AM.  If you have any questions, ask your nurse or doctor.               Start taking these medicines.        Dose/Directions    oxyCODONE-acetaminophen 5-325 MG per tablet   Commonly known as:  PERCOCET   Used for:  Acute right-sided low back pain without sciatica, Muscle tightness        Dose:  1-2 tablet   Take 1-2 tablets by mouth nightly as needed for pain maximum 2 tablet(s) per day   Quantity:  20 tablet   Refills:  0       tiZANidine 4 MG tablet   Commonly known as:  ZANAFLEX   Used for:  Acute right-sided low back pain without sciatica, Muscle tightness        Dose:  4 mg   Take 1 tablet (4 mg) by mouth 3 times daily as needed for muscle spasms   Quantity:  30 tablet   Refills:  0            Where to get your medicines      These medications were sent to [facility] [place], [place] - [address]  [address] [address]     Phone:  [phone]     tiZANidine 4 MG tablet         Some of these will need a paper prescription and others can be bought over the counter.  Ask your nurse if you have questions.     Bring a paper prescription for each of these medications     oxyCODONE-acetaminophen 5-325 MG per tablet          "       Primary Care Provider Office Phone # Fax #    Chino Rayo [phone] [address] [address]        Equal Access to Services     COOKIE EARL : Lela young jeanneo Sobob, waaxda luqadaha, qaybta kaalmada tanisha, alison coppola arlenekristel bautista kirill morrow. So [facility] [phone].    ATENCIÓN: Si habla español, tiene a flores disposición servicios gratuitos de asistencia lingüística. Llame al [phone].    We comply with applicable federal civil rights laws and [place] laws. We do not discriminate on the basis of race, color, national origin, age, disability, sex, sexual orientation, or gender identity.            Thank you!     Thank you for choosing [facility]  for your care. Our goal is always to provide you with excellent care. Hearing back from our patients is one way we can continue to improve our services. Please take a few minutes to complete the written survey that you may receive in the mail after your visit with us. Thank you!             Your Updated Medication List - Protect others around you: Learn how to safely use, store and throw away your medicines at www.disposemymeds.org.          This list is accurate as of 2/27/18  8:35 AM.  Always use your most recent med list.                   Brand Name Dispense Instructions for use Diagnosis    cetirizine-psuedoePHEDrine 5-120 MG per 12 hr tablet    zyrTEC-D    60 tablet    Take 1 tablet by mouth 2 times daily Hold on file until needed    Congestion of paranasal sinus       cyclobenzaprine 5 MG tablet    FLEXERIL    45 tablet    Take 1-2 tablets (5-10 mg) by mouth 3 times daily as needed for muscle spasms    Shoulder injury, right, initial encounter, Chronic right shoulder pain       EPINEPHrine 0.3 MG/0.3ML injection 2-pack    EPIPEN/ADRENACLICK/or ANY BX GENERIC EQUIV    0.6 mL    Inject 0.3 mLs (0.3 mg) into the muscle once as needed for anaphylaxis Hold on file until needed    Bee sting  reaction, accidental or unintentional, subsequent encounter       ibuprofen 200 MG tablet    ADVIL/MOTRIN    120 tablet    Take 3 tablets (600 mg) by mouth every 6 hours as needed for mild pain    Urethral hypermobility, Urinary, incontinence, stress female       oxyCODONE-acetaminophen 5-325 MG per tablet    PERCOCET    20 tablet    Take 1-2 tablets by mouth nightly as needed for pain maximum 2 tablet(s) per day    Acute right-sided low back pain without sciatica, Muscle tightness       tiZANidine 4 MG tablet    ZANAFLEX    30 tablet    Take 1 tablet (4 mg) by mouth 3 times daily as needed for muscle spasms    Acute right-sided low back pain without sciatica, Muscle tightness

## 2018-02-27 NOTE — PROGRESS NOTES
SUBJECTIVE:   Dakota Berkowitz is a 47 year old female who presents to clinic today for the following health issues:    She woke up and it was hurting.  Seems to be a sore spot on back of right buttock. Seems to radiate to her side.  No going down the right lower extremity.      She was only walking.  No trauma.  No falls.  No trauma.  She has been using otc meds.    No blood in urine.  No leaking of stool.   No prior back injury.       Back Pain       Duration: 2 weeks        Specific cause: walking    Description:   Location of pain: low to mid back right and waist right  Character of pain: sharp and constant  Pain radiation:none and radiates to the front right abdomin, has an increase in bowels to 3 times a day, loose stools. Less urination noted  New numbness or weakness in legs, not attributed to pain:  no     Intensity: Currently 8/10, At its worst 10/10    History:   Pain interferes with job: YES,   History of back problems: no prior back problems  Any previous MRI or X-rays: None  Sees a specialist for back pain:  No  Therapies tried without relief: cold, heat, muscle relaxants and NSAIDs    Alleviating factors:   Improved by: nothing      Precipitating factors:  Worsened by: Lifting, Bending, Standing, Sitting, Lying Flat and Walking    Functional and Psychosocial Screen (Ricco STarT Back):      Not performed today          Accompanying Signs & Symptoms:  Risk of Fracture:  None  Risk of Cauda Equina:  None  Risk of Infection:  None  Risk of Cancer:  None  Risk of Ankylosing Spondylitis:  Onset at age <35, male, AND morning back stiffness. no                       Problem list and histories reviewed & adjusted, as indicated.  Additional history:         Reviewed and updated as needed this visit by clinical staff       Reviewed and updated as needed this visit by Provider         ROS:  CONSTITUTIONAL:NEGATIVE for fever, chills, change in weight  INTEGUMENTARY/SKIN: NEGATIVE for worrisome rashes, moles or  "lesions  RESP:little cold in upper resp  CV: NEGATIVE for chest pain, palpitations or peripheral edema  GI: as above  MUSCULOSKELETAL: as above  NEURO: as above  PSYCHIATRIC: NEGATIVE for changes in mood or affect    OBJECTIVE:                                                    /72 (Cuff Size: Adult Large)  Pulse 64  Temp 97.7  F (36.5  C) (Tympanic)  Ht 5' 3.25\" (1.607 m)  Wt 210 lb (95.3 kg)  BMI 36.91 kg/m2  Body mass index is 36.91 kg/(m^2).  GENERAL APPEARANCE: alert, no distress and cooperative  HENT: ear canals and TM's normal and nose and mouth without ulcers or lesions  NECK: no adenopathy, no asymmetry, masses, or scars and thyroid normal to palpation  RESP: lungs clear to auscultation - no rales, rhonchi or wheezes  CV: regular rates and rhythm, normal S1 S2, no S3 or S4 and no murmur, click or rub  Patient is pleasant, cooperative and in no acute distress.  Back:  Dakota Berkowitz had minimal difficulty moving from the chair to the exam table.  mild tenderness to palpation in the right lumbar region.  No skin changes to the area.    mild tenderness to the buttock area.  Straight leg raise was neg.  Flexion at hip was 30 degrees. Able to hyperextend and lean from side to side.  Muscle/Skeletal:  Strength was 5/5 of lower extremities.  Neuro: Reflexes were 2/4 bilaterally at patella and Achilles.       ASSESSMENT/PLAN:                                                    (M54.5) Acute right-sided low back pain without sciatica  (primary encounter diagnosis)  Comment: start medications, Symptomatic cares were discussed in details.  Go to pt  Plan: PHYSICAL THERAPY REFERRAL,         oxyCODONE-acetaminophen (PERCOCET) 5-325 MG per        tablet, tiZANidine (ZANAFLEX) 4 MG tablet            (M62.89) Muscle tightness  Comment: changed muscle tightness  Plan: PHYSICAL THERAPY REFERRAL,         oxyCODONE-acetaminophen (PERCOCET) 5-325 MG per        tablet, tiZANidine (ZANAFLEX) 4 MG tablet        "     Noted getting over a mild uri    See Patient Instructions    Chino Rayo MD  [facility]

## 2018-03-07 ENCOUNTER — OFFICE VISIT (OUTPATIENT)
Dept: FAMILY MEDICINE | Facility: CLINIC | Age: 48
End: 2018-03-07
Payer: COMMERCIAL

## 2018-03-07 VITALS
HEIGHT: 63 IN | HEART RATE: 96 BPM | TEMPERATURE: 99.3 F | BODY MASS INDEX: 37.21 KG/M2 | SYSTOLIC BLOOD PRESSURE: 132 MMHG | WEIGHT: 210 LBS | DIASTOLIC BLOOD PRESSURE: 70 MMHG

## 2018-03-07 DIAGNOSIS — J02.9 SORE THROAT: ICD-10-CM

## 2018-03-07 DIAGNOSIS — R05.9 COUGH: ICD-10-CM

## 2018-03-07 DIAGNOSIS — R06.2 WHEEZES: ICD-10-CM

## 2018-03-07 DIAGNOSIS — J10.1 INFLUENZA A: Primary | ICD-10-CM

## 2018-03-07 LAB
DEPRECATED S PYO AG THROAT QL EIA: NORMAL
FLUAV+FLUBV AG SPEC QL: NEGATIVE
FLUAV+FLUBV AG SPEC QL: POSITIVE
SPECIMEN SOURCE: ABNORMAL
SPECIMEN SOURCE: NORMAL

## 2018-03-07 PROCEDURE — 87804 INFLUENZA ASSAY W/OPTIC: CPT | Performed by: FAMILY MEDICINE

## 2018-03-07 PROCEDURE — 87880 STREP A ASSAY W/OPTIC: CPT | Performed by: FAMILY MEDICINE

## 2018-03-07 PROCEDURE — 99213 OFFICE O/P EST LOW 20 MIN: CPT | Performed by: FAMILY MEDICINE

## 2018-03-07 PROCEDURE — 87081 CULTURE SCREEN ONLY: CPT | Performed by: FAMILY MEDICINE

## 2018-03-07 RX ORDER — OSELTAMIVIR PHOSPHATE 75 MG/1
75 CAPSULE ORAL 2 TIMES DAILY
Qty: 10 CAPSULE | Refills: 0 | Status: SHIPPED | OUTPATIENT
Start: 2018-03-07 | End: 2018-03-28

## 2018-03-07 RX ORDER — ALBUTEROL SULFATE 90 UG/1
2 AEROSOL, METERED RESPIRATORY (INHALATION) EVERY 4 HOURS PRN
Qty: 1 INHALER | Refills: 0 | Status: SHIPPED | OUTPATIENT
Start: 2018-03-07 | End: 2018-07-06

## 2018-03-07 NOTE — PROGRESS NOTES
"  SUBJECTIVE:   Dakota Berkowitz is a 47 year old female who presents to clinic today for the following health issues:    Chief Complaint   Patient presents with     URI     hacking cough, tension headache, runny nose. Has been expose to strep and flu in her household.     Pain     insurance would not approve oxycodone for 20 tablets.         RESPIRATORY SYMPTOMS      Duration: 2 days    Description  rhinorrhea, sore throat, cough, wheezing, fever and nausea, no vomiting and occ loose stools    Severity: moderate to severe    Accompanying signs and symptoms: just low back pain worse from coughing    History (predisposing factors):  strep exposure and flu    Precipitating or alleviating factors: None    Therapies tried and outcome:  rest and fluids, OTC NSAIDs, sudafed    Reports intermittent wheeze at times.      Problem list and histories reviewed & adjusted, as indicated.  Additional history:         Reviewed and updated as needed this visit by clinical staff  Allergies  Meds       Reviewed and updated as needed this visit by Provider         ROS:  CONSTITUTIONAL:as above  INTEGUMENTARY/SKIN: clear  ENT/MOUTH: rhinorrhea  RESP:cough, harsh, dry  CV: chest wall pain due to coughing  GI: NEGATIVE for nausea, abdominal pain, heartburn, or change in bowel habits    OBJECTIVE:                                                    /70 (Cuff Size: Adult Large)  Pulse 96  Temp 99.3  F (37.4  C) (Tympanic)  Ht 5' 3.25\" (1.607 m)  Wt 210 lb (95.3 kg)  BMI 36.91 kg/m2  Body mass index is 36.91 kg/(m^2).  GENERAL APPEARANCE: alert, no distress, cooperative and fatigued  HENT: ear canals and TM's normal and nose and mouth without ulcers or lesions  NECK: no adenopathy, no asymmetry, masses, or scars and thyroid normal to palpation  RESP: dry, harsh cough.   CV: regular rates and rhythm, normal S1 S2, no S3 or S4 and no murmur, click or rub  SKIN: no suspicious lesions or rashes  NEURO: Normal strength and tone, " mentation intact and speech normal  PSYCH: mentation appears normal and affect normal/bright    Results for orders placed or performed in visit on 03/07/18   Strep, Rapid Screen   Result Value Ref Range    Specimen Description Throat     Rapid Strep A Screen       NEGATIVE: No Group A streptococcal antigen detected by immunoassay, await culture report.   Influenza A/B antigen   Result Value Ref Range    Influenza A/B Agn Specimen Nasal     Influenza A Positive (A) NEG^Negative    Influenza B Negative NEG^Negative          ASSESSMENT/PLAN:                                                    1. Pharyngitis  Checked for strep and it was negative  - Strep, Rapid Screen  - Beta strep group A culture    2. Cough  Cough is due to influenza A, instructed on med and staying home instructions  - Influenza A/B antigen  - Beta strep group A culture    3. Wheezes  Due to her h/o wheeze sent an inhaler  - albuterol (PROAIR HFA/PROVENTIL HFA/VENTOLIN HFA) 108 (90 BASE) MCG/ACT Inhaler; Inhale 2 puffs into the lungs every 4 hours as needed for shortness of breath / dyspnea  Dispense: 1 Inhaler; Refill: 0  - Beta strep group A culture    4. Influenza A  Treat since it is less than 48 hours.  - oseltamivir (TAMIFLU) 75 MG capsule; Take 1 capsule (75 mg) by mouth 2 times daily  Dispense: 10 capsule; Refill: 0      See Patient Instructions    Chino Raoy MD  [facility]

## 2018-03-07 NOTE — PATIENT INSTRUCTIONS
Results for orders placed or performed in visit on 03/07/18   Strep, Rapid Screen   Result Value Ref Range    Specimen Description Throat     Rapid Strep A Screen       NEGATIVE: No Group A streptococcal antigen detected by immunoassay, await culture report.   Influenza A/B antigen   Result Value Ref Range    Influenza A/B Agn Specimen Nasal     Influenza A Positive (A) NEG^Negative    Influenza B Negative NEG^Negative     You need to take tamiflu for treatment.    You need to stay home, until afebrile for 24 hours without using any medication for fever reduction.    Drink fluids.    Get rest.    If worse return to clinic.          Thank you for choosing [facility].  You may be receiving a survey in the mail from [employer] regarding your visit today.  Please take a few minutes to complete and return the survey to let us know how we are doing.      If you have questions or concerns, please contact us via [employer] or you can contact your care team at [phone].    Our Clinic hours are:  Monday 6:40 am  to 7:00 pm  Tuesday -Friday 6:40 am to 5:00 pm    The [place] outpatient lab hours are:  Monday - Friday 6:10 am to 4:45 pm  Saturdays 7:00 am to 11:00 am  Appointments are required, call [phone]    If you have clinical questions after hours or would like to schedule an appointment,  call the clinic at [phone].

## 2018-03-07 NOTE — NURSING NOTE
"Chief Complaint   Patient presents with     URI     hacking cough, tension headache, runny nose. Has been expose to strep and flu in her household.     Pain     insurance would not approve oxycodone for 20 tablets.       Initial /70 (Cuff Size: Adult Large)  Pulse 96  Temp 99.3  F (37.4  C) (Tympanic)  Ht 5' 3.25\" (1.607 m)  Wt 210 lb (95.3 kg)  BMI 36.91 kg/m2 Estimated body mass index is 36.91 kg/(m^2) as calculated from the following:    Height as of this encounter: 5' 3.25\" (1.607 m).    Weight as of this encounter: 210 lb (95.3 kg).  Medication Reconciliation: complete  "

## 2018-03-07 NOTE — MR AVS SNAPSHOT
After Visit Summary   3/7/2018    Dakota Berkowitz    MRN: [id number]           Patient Information     Date Of Birth          1970        Visit Information        Provider Department      3/7/2018 10:20 AM Chino Rayo MD [facility]        Today's Diagnoses     Pharyngitis    -  1    Cough        Wheezes        Influenza A          Care Instructions    Results for orders placed or performed in visit on 03/07/18   Strep, Rapid Screen   Result Value Ref Range    Specimen Description Throat     Rapid Strep A Screen       NEGATIVE: No Group A streptococcal antigen detected by immunoassay, await culture report.   Influenza A/B antigen   Result Value Ref Range    Influenza A/B Agn Specimen Nasal     Influenza A Positive (A) NEG^Negative    Influenza B Negative NEG^Negative     You need to take tamiflu for treatment.    You need to stay home, until afebrile for 24 hours without using any medication for fever reduction.    Drink fluids.    Get rest.    If worse return to clinic.          Thank you for choosing [facility].  You may be receiving a survey in the mail from [employer] [facility] regarding your visit today.  Please take a few minutes to complete and return the survey to let us know how we are doing.      If you have questions or concerns, please contact us via [employer] or you can contact your care team at [phone].    Our Clinic hours are:  Monday 6:40 am  to 7:00 pm  Tuesday -Friday 6:40 am to 5:00 pm    The [place] outpatient lab hours are:  Monday - Friday 6:10 am to 4:45 pm  Saturdays 7:00 am to 11:00 am  Appointments are required, call [phone]    If you have clinical questions after hours or would like to schedule an appointment,  call the clinic at [phone].            Follow-ups after your visit        Your next 10 appointments already scheduled     Mar 28, 2018  4:15 PM CDT   [facility] OB-GYN Annual Physical with Sterling Hill MD   [facility]  "[place] ([facility])    [address]  [facility] [address]   [phone]              Who to contact     If you have questions or need follow up information about today's clinic visit or your schedule please contact [facility] directly at [phone].  Normal or non-critical lab and imaging results will be communicated to you by MyChart, letter or phone within 4 business days after the clinic has received the results. If you do not hear from us within 7 days, please contact the clinic through [employer] or phone. If you have a critical or abnormal lab result, we will notify you by phone as soon as possible.  Submit refill requests through [employer] or call your pharmacy and they will forward the refill request to us. Please allow 3 business days for your refill to be completed.          Additional Information About Your Visit        [employer] Information     [employer] gives you secure access to your electronic health record. If you see a primary care provider, you can also send messages to your care team and make appointments. If you have questions, please call your primary care clinic.  If you do not have a primary care provider, please call [phone] and they will assist you.        Care EveryWhere ID     This is your Care EveryWhere ID. This could be used by other organizations to access your [place] medical records  [id number]        Your Vitals Were     Pulse Temperature Height BMI (Body Mass Index)          96 99.3  F (37.4  C) (Tympanic) 5' 3.25\" (1.607 m) 36.91 kg/m2         Blood Pressure from Last 3 Encounters:   03/07/18 132/70   02/27/18 108/72   06/01/17 144/89    Weight from Last 3 Encounters:   03/07/18 210 lb (95.3 kg)   02/27/18 210 lb (95.3 kg)   06/01/17 211 lb (95.7 kg)              We Performed the Following     Beta strep group A culture     Influenza A/B antigen     Strep, Rapid Screen          Today's Medication Changes          These changes are " accurate as of 3/7/18 11:28 AM.  If you have any questions, ask your nurse or doctor.               Start taking these medicines.        Dose/Directions    albuterol 108 (90 BASE) MCG/ACT Inhaler   Commonly known as:  PROAIR HFA/PROVENTIL HFA/VENTOLIN HFA   Used for:  Wheezes        Dose:  2 puff   Inhale 2 puffs into the lungs every 4 hours as needed for shortness of breath / dyspnea   Quantity:  1 Inhaler   Refills:  0       oseltamivir 75 MG capsule   Commonly known as:  TAMIFLU   Used for:  Influenza A        Dose:  75 mg   Take 1 capsule (75 mg) by mouth 2 times daily   Quantity:  10 capsule   Refills:  0            Where to get your medicines      These medications were sent to [facility] [address] [facility]  [address] [place] [address]     Phone:  [phone]     albuterol 108 (90 BASE) MCG/ACT Inhaler    oseltamivir 75 MG capsule                Primary Care Provider Office Phone # Fax #    Chino Rayo [phone] [address] [facility] [address]        Equal Access to Services     WILLIE EARL : Hadii romel ku hadasho Soomaali, waaxda luqadaha, qaybta kaalmada adekristelyajackeline, alison morrow. So [facility] [phone].    ATENCIÓN: Si habla español, tiene a flores disposición servicios gratuitos de asistencia lingüística. [facility] [phone].    We comply with applicable federal civil rights laws and [place] laws. We do not discriminate on the basis of race, color, national origin, age, disability, sex, sexual orientation, or gender identity.            Thank you!     Thank you for choosing [facility]  for your care. Our goal is always to provide you with excellent care. Hearing back from our patients is one way we can continue to improve our services. Please take a few minutes to complete the written survey that you may receive in the mail after your visit with us. Thank you!             Your Updated  Medication List - Protect others around you: Learn how to safely use, store and throw away your medicines at www.disposemymeds.org.          This list is accurate as of 3/7/18 11:28 AM.  Always use your most recent med list.                   Brand Name Dispense Instructions for use Diagnosis    albuterol 108 (90 BASE) MCG/ACT Inhaler    PROAIR HFA/PROVENTIL HFA/VENTOLIN HFA    1 Inhaler    Inhale 2 puffs into the lungs every 4 hours as needed for shortness of breath / dyspnea    Wheezes       cetirizine-psuedoePHEDrine 5-120 MG per 12 hr tablet    zyrTEC-D    60 tablet    Take 1 tablet by mouth 2 times daily Hold on file until needed    Congestion of paranasal sinus       EPINEPHrine 0.3 MG/0.3ML injection 2-pack    EPIPEN/ADRENACLICK/or ANY BX GENERIC EQUIV    0.6 mL    Inject 0.3 mLs (0.3 mg) into the muscle once as needed for anaphylaxis Hold on file until needed    Bee sting reaction, accidental or unintentional, subsequent encounter       ibuprofen 200 MG tablet    ADVIL/MOTRIN    120 tablet    Take 3 tablets (600 mg) by mouth every 6 hours as needed for mild pain    Urethral hypermobility, Urinary, incontinence, stress female       oseltamivir 75 MG capsule    TAMIFLU    10 capsule    Take 1 capsule (75 mg) by mouth 2 times daily    Influenza A       oxyCODONE-acetaminophen 5-325 MG per tablet    PERCOCET    20 tablet    Take 1-2 tablets by mouth nightly as needed for pain maximum 2 tablet(s) per day    Acute right-sided low back pain without sciatica, Muscle tightness       tiZANidine 4 MG tablet    ZANAFLEX    30 tablet    Take 1 tablet (4 mg) by mouth 3 times daily as needed for muscle spasms    Acute right-sided low back pain without sciatica, Muscle tightness

## 2018-03-08 LAB
BACTERIA SPEC CULT: NORMAL
SPECIMEN SOURCE: NORMAL

## 2018-03-23 ENCOUNTER — MYC MEDICAL ADVICE (OUTPATIENT)
Dept: FAMILY MEDICINE | Facility: CLINIC | Age: 48
End: 2018-03-23

## 2018-03-23 DIAGNOSIS — J10.1 INFLUENZA A: Primary | ICD-10-CM

## 2018-03-23 RX ORDER — BENZONATATE 200 MG/1
200 CAPSULE ORAL 3 TIMES DAILY PRN
Qty: 21 CAPSULE | Refills: 0 | Status: SHIPPED | OUTPATIENT
Start: 2018-03-23 | End: 2018-03-28

## 2018-03-23 NOTE — TELEPHONE ENCOUNTER
Please see [employer].  Requesting tessalon perles.    OV notes 3-7-18:  Cough  Cough is due to influenza A, instructed on med and staying home instructions  - Influenza A/B antigen  - Beta strep group A culture    Routing to provider.  Dafne MONTOYA RN

## 2018-03-28 ENCOUNTER — OFFICE VISIT (OUTPATIENT)
Dept: OBGYN | Facility: CLINIC | Age: 48
End: 2018-03-28
Payer: COMMERCIAL

## 2018-03-28 ENCOUNTER — MYC REFILL (OUTPATIENT)
Dept: FAMILY MEDICINE | Facility: CLINIC | Age: 48
End: 2018-03-28

## 2018-03-28 VITALS
DIASTOLIC BLOOD PRESSURE: 82 MMHG | HEIGHT: 63 IN | TEMPERATURE: 98.1 F | HEART RATE: 92 BPM | SYSTOLIC BLOOD PRESSURE: 138 MMHG | BODY MASS INDEX: 37.56 KG/M2 | RESPIRATION RATE: 16 BRPM | WEIGHT: 212 LBS

## 2018-03-28 DIAGNOSIS — M54.50 ACUTE RIGHT-SIDED LOW BACK PAIN WITHOUT SCIATICA: ICD-10-CM

## 2018-03-28 DIAGNOSIS — N95.1 PERIMENOPAUSAL SYMPTOMS: ICD-10-CM

## 2018-03-28 DIAGNOSIS — M62.89 MUSCLE TIGHTNESS: ICD-10-CM

## 2018-03-28 DIAGNOSIS — T63.441D BEE STING REACTION, ACCIDENTAL OR UNINTENTIONAL, SUBSEQUENT ENCOUNTER: ICD-10-CM

## 2018-03-28 DIAGNOSIS — Z01.419 ENCOUNTER FOR GYNECOLOGICAL EXAMINATION WITHOUT ABNORMAL FINDING: Primary | ICD-10-CM

## 2018-03-28 PROCEDURE — 99396 PREV VISIT EST AGE 40-64: CPT | Performed by: OBSTETRICS & GYNECOLOGY

## 2018-03-28 PROCEDURE — 87624 HPV HI-RISK TYP POOLED RSLT: CPT | Performed by: OBSTETRICS & GYNECOLOGY

## 2018-03-28 PROCEDURE — G0145 SCR C/V CYTO,THINLAYER,RESCR: HCPCS | Performed by: OBSTETRICS & GYNECOLOGY

## 2018-03-28 RX ORDER — EPINEPHRINE 0.3 MG/.3ML
0.3 INJECTION SUBCUTANEOUS
Qty: 0.6 ML | Refills: 3 | Status: SHIPPED | OUTPATIENT
Start: 2018-03-28 | End: 2018-07-06

## 2018-03-28 NOTE — NURSING NOTE
"Initial /82  Pulse 92  Temp 98.1  F (36.7  C)  Resp 16  Ht 5' 2.75\" (1.594 m)  Wt 212 lb (96.2 kg)  BMI 37.85 kg/m2 Estimated body mass index is 37.85 kg/(m^2) as calculated from the following:    Height as of this encounter: 5' 2.75\" (1.594 m).    Weight as of this encounter: 212 lb (96.2 kg). .      "

## 2018-03-28 NOTE — MR AVS SNAPSHOT
After Visit Summary   3/28/2018    Dakota Berkowitz    MRN: [id number]           Patient Information     Date Of Birth          1970        Visit Information        Provider Department      3/28/2018 4:15 PM Sterling Hill MD [facility]        Today's Diagnoses     Encounter for gynecological examination without abnormal finding    -  1    Perimenopausal symptoms          Care Instructions      Preventive Health Recommendations  Female Ages 40 to 49    Yearly exam:     See your health care provider every year in order to  1. Review health changes.   2. Discuss preventive care.    3. Review your medicines if your doctor prescribed any.      Get a Pap test every three years (unless you have an abnormal result and your provider advises testing more often).      If you get Pap tests with HPV test, you only need to test every 5 years, unless you have an abnormal result. You do not need a Pap test if your uterus was removed (hysterectomy) and you have not had cancer.      You should be tested each year for STDs (sexually transmitted diseases), if you're at risk.       Ask your doctor if you should have a mammogram.      Have a colonoscopy (test for colon cancer) if someone in your family has had colon cancer or polyps before age 50.       Have a cholesterol test every 5 years.       Have a diabetes test (fasting glucose) after age 45. If you are at risk for diabetes, you should have this test every 3 years.    Shots: Get a flu shot each year. Get a tetanus shot every 10 years.     Nutrition:     Eat at least 5 servings of fruits and vegetables each day.    Eat whole-grain bread, whole-wheat pasta and brown rice instead of white grains and rice.    Talk to your provider about Calcium and Vitamin D.     Lifestyle    Exercise at least 150 minutes a week (an average of 30 minutes a day, 5 days a week). This will help you control your weight and prevent disease.    Limit alcohol to one  "drink per day.    No smoking.     Wear sunscreen to prevent skin cancer.    See your dentist every six months for an exam and cleaning.          Follow-ups after your visit        Follow-up notes from your care team     Return in about 1 year (around 3/28/2019).      Future tests that were ordered for you today     Open Future Orders        Priority Expected Expires Ordered    MR Breast Bilateral w Contrast Routine  3/28/2019 3/28/2018            Who to contact     If you have questions or need follow up information about today's clinic visit or your schedule please contact [facility] directly at [phone].  Normal or non-critical lab and imaging results will be communicated to you by [employer], letter or phone within 4 business days after the clinic has received the results. If you do not hear from us within 7 days, please contact the clinic through [employer] or phone. If you have a critical or abnormal lab result, we will notify you by phone as soon as possible.  Submit refill requests through [employer] or call your pharmacy and they will forward the refill request to us. Please allow 3 business days for your refill to be completed.          Additional Information About Your Visit        MyChart Information     [employer] gives you secure access to your electronic health record. If you see a primary care provider, you can also send messages to your care team and make appointments. If you have questions, please call your primary care clinic.  If you do not have a primary care provider, please call [phone] and they will assist you.        Care EveryWhere ID     This is your Care EveryWhere ID. This could be used by other organizations to access your [place] medical records  [id number]        Your Vitals Were     Pulse Temperature Respirations Height BMI (Body Mass Index)       92 98.1  F (36.7  C) 16 5' 2.75\" (1.594 m) 37.85 kg/m2        Blood Pressure from Last 3 Encounters:   03/28/18 138/82 "   03/07/18 132/70   02/27/18 108/72    Weight from Last 3 Encounters:   03/28/18 212 lb (96.2 kg)   03/07/18 210 lb (95.3 kg)   02/27/18 210 lb (95.3 kg)              We Performed the Following     HPV High Risk Types DNA Cervical     Pap imaged thin layer screen with HPV - recommended age 30 - 65 years (select HPV order below)          Today's Medication Changes          These changes are accurate as of 3/28/18  5:05 PM.  If you have any questions, ask your nurse or doctor.               Stop taking these medicines if you haven't already. Please contact your care team if you have questions.     benzonatate 200 MG capsule   Commonly known as:  TESSALON   Stopped by:  Sterling Hill MD           oseltamivir 75 MG capsule   Commonly known as:  TAMIFLU   Stopped by:  Sterling Hill MD                Where to get your medicines      These medications were sent to [facility] [address] [place] [address] AT [facility] OF [address] [place] [id number]     Phone:  [phone]     EPINEPHrine 0.3 MG/0.3ML injection 2-pack    tiZANidine 4 MG tablet                Primary Care Provider Office Phone # Fax #    Chino Rayo [phone] [address] [facility] [address]        Equal Access to Services     COOKIE EARL AH: Hadtanya angelo Sobob, waaxda luqadaha, qaybta kaalmada tanisha, alison morrow. So [facility] [phone].    ATENCIÓN: Si habla español, tiene a flores disposición servicios gratuitos de asistencia lingüística. Anthony dennison [phone].    We comply with applicable federal civil rights laws and [place] laws. We do not discriminate on the basis of race, color, national origin, age, disability, sex, sexual orientation, or gender identity.            Thank you!     Thank you for choosing [facility]  for your care. Our goal is always to provide you with excellent care. Hearing  back from our patients is one way we can continue to improve our services. Please take a few minutes to complete the written survey that you may receive in the mail after your visit with us. Thank you!             Your Updated Medication List - Protect others around you: Learn how to safely use, store and throw away your medicines at www.disposemymeds.org.          This list is accurate as of 3/28/18  5:05 PM.  Always use your most recent med list.                   Brand Name Dispense Instructions for use Diagnosis    albuterol 108 (90 BASE) MCG/ACT Inhaler    PROAIR HFA/PROVENTIL HFA/VENTOLIN HFA    1 Inhaler    Inhale 2 puffs into the lungs every 4 hours as needed for shortness of breath / dyspnea    Wheezes       cetirizine-psuedoePHEDrine 5-120 MG per 12 hr tablet    zyrTEC-D    60 tablet    Take 1 tablet by mouth 2 times daily Hold on file until needed    Congestion of paranasal sinus       EPINEPHrine 0.3 MG/0.3ML injection 2-pack    EPIPEN/ADRENACLICK/or ANY BX GENERIC EQUIV    0.6 mL    Inject 0.3 mLs (0.3 mg) into the muscle once as needed for anaphylaxis Hold on file until needed    Bee sting reaction, accidental or unintentional, subsequent encounter       ibuprofen 200 MG tablet    ADVIL/MOTRIN    120 tablet    Take 3 tablets (600 mg) by mouth every 6 hours as needed for mild pain    Urethral hypermobility, Urinary, incontinence, stress female       oxyCODONE-acetaminophen 5-325 MG per tablet    PERCOCET    20 tablet    Take 1-2 tablets by mouth nightly as needed for pain maximum 2 tablet(s) per day    Acute right-sided low back pain without sciatica, Muscle tightness       tiZANidine 4 MG tablet    ZANAFLEX    30 tablet    Take 1 tablet (4 mg) by mouth 3 times daily as needed for muscle spasms    Acute right-sided low back pain without sciatica, Muscle tightness

## 2018-03-28 NOTE — TELEPHONE ENCOUNTER
Message from MyChart:  Original authorizing provider: MD Dakota Pryor would like a refill of the following medications:  EPINEPHrine 0.3 MG/0.3ML injection [Chino Rayo MD]  tiZANidine (ZANAFLEX) 4 MG tablet [Chino Rayo MD]    Preferred pharmacy: [facility] [address] [place] [address] AT [facility] OF [address] [place] [address]    Comment:  Please call into [facility]- Would like to  today Thanks

## 2018-03-28 NOTE — PROGRESS NOTES
SUBJECTIVE:   CC: Dakota Berkowitz is an 47 year old woman who presents for preventive health visit.     Healthy Habits:    Do you get at least three servings of calcium containing foods daily (dairy, green leafy vegetables, etc.)? yes    Amount of exercise or daily activities, outside of work:  day(s) per week    Problems taking medications regularly No    Medication side effects: No    Have you had an eye exam in the past two years? yes    Do you see a dentist twice per year? yes    Do you have sleep apnea, excessive snoring or daytime drowsiness?yes          Today's PHQ-2 Score:   PHQ-2 ( 1999 [employer]) 3/28/2018 3/27/2017   Q1: Little interest or pleasure in doing things 1 0   Q2: Feeling down, depressed or hopeless 1 0   PHQ-2 Score 2 0   Q1: Little interest or pleasure in doing things - -   Q2: Feeling down, depressed or hopeless - -   PHQ-2 Score - -       Abuse: Current or Past(Physical, Sexual or Emotional)- No  Do you feel safe in your environment - Yes    Social History   Substance Use Topics     Smoking status: Never Smoker     Smokeless tobacco: Never Used     Alcohol use Yes      Comment: occassionally on weekends     If you drink alcohol do you typically have >3 drinks per day or >7 drinks per week? No                     Reviewed orders with patient.  Reviewed health maintenance and updated orders accordingly - Yes  BP Readings from Last 3 Encounters:   03/28/18 138/82   03/07/18 132/70   02/27/18 108/72    Wt Readings from Last 3 Encounters:   03/28/18 212 lb (96.2 kg)   03/07/18 210 lb (95.3 kg)   02/27/18 210 lb (95.3 kg)                  Patient Active Problem List   Diagnosis     Female pelvic pain     CARDIOVASCULAR SCREENING; LDL GOAL LESS THAN 160     Breast implant rupture     Pain in breast     Obesity     Perimenopausal symptoms     Weight gain     Irritability     Past Surgical History:   Procedure Laterality Date     C APPENDECTOMY       COSMETIC SURGERY      breast implants      CYSTOSCOPY, SLING TRANSVAGINAL N/A 2/8/2016    Procedure: CYSTOSCOPY, SLING TRANSVAGINAL;  Surgeon: Sterling Hill MD;  Location: [facility]      REPAIR OF NASAL SEPTUM       SURGICAL HISTORY OF -       laporoscopy due to endometriosis     SURGICAL HISTORY OF -       endometrial ablation     SURGICAL HISTORY OF -       breast augmentation     SURGICAL HISTORY OF -       benign tumor removed from her left thigh     TONSILLECTOMY       TUBAL LIGATION         Social History   Substance Use Topics     Smoking status: Never Smoker     Smokeless tobacco: Never Used     Alcohol use Yes      Comment: occassionally on weekends     Family History   Problem Relation Age of Onset     CANCER Mother      ovarian     CANCER Paternal Grandmother      lung     Gynecology Sister      having a partial hysterectomy due to abnormal cells, leaving ovaries and cervix     Gynecology Daughter      endometriosis     Gynecology Sister      hysterectomy for endometriosis         Current Outpatient Prescriptions   Medication Sig Dispense Refill     cetirizine-psuedoePHEDrine (ZYRTEC-D) 5-120 MG per 12 hr tablet Take 1 tablet by mouth 2 times daily Hold on file until needed 60 tablet 5     ibuprofen (ADVIL,MOTRIN) 200 MG tablet Take 3 tablets (600 mg) by mouth every 6 hours as needed for mild pain 120 tablet      EPINEPHrine (EPIPEN/ADRENACLICK/OR ANY BX GENERIC EQUIV) 0.3 MG/0.3ML injection 2-pack Inject 0.3 mLs (0.3 mg) into the muscle once as needed for anaphylaxis Hold on file until needed 0.6 mL 3     tiZANidine (ZANAFLEX) 4 MG tablet Take 1 tablet (4 mg) by mouth 3 times daily as needed for muscle spasms 30 tablet 0     albuterol (PROAIR HFA/PROVENTIL HFA/VENTOLIN HFA) 108 (90 BASE) MCG/ACT Inhaler Inhale 2 puffs into the lungs every 4 hours as needed for shortness of breath / dyspnea (Patient not taking: Reported on 3/28/2018) 1 Inhaler 0     oxyCODONE-acetaminophen (PERCOCET) 5-325 MG per tablet Take 1-2 tablets by mouth nightly as  needed for pain maximum 2 tablet(s) per day (Patient not taking: Reported on 3/28/2018) 20 tablet 0     Allergies   Allergen Reactions     Clindamycin      Provera [Medroxyprogesterone Acetate]        Patient under age 50, mutual decision reflected in health maintenance.      Pertinent mammograms are reviewed under the imaging tab.  Mammograms are intolerably painful  Alternative Mammogram options are being sought  History of abnormal Pap smear: NO - age 30- 65 PAP every 3 years recommended    Reviewed and updated as needed this visit by clinical staff  Tobacco  Allergies  Meds  Med Hx  Surg Hx  Fam Hx  Soc Hx        Reviewed and updated as needed this visit by Provider        Past Medical History:   Diagnosis Date     NO ACTIVE PROBLEMS       Past Surgical History:   Procedure Laterality Date     C APPENDECTOMY       COSMETIC SURGERY      breast implants     CYSTOSCOPY, SLING TRANSVAGINAL N/A 2/8/2016    Procedure: CYSTOSCOPY, SLING TRANSVAGINAL;  Surgeon: Sterling Hill MD;  Location: [facility]      REPAIR OF NASAL SEPTUM       SURGICAL HISTORY OF -       laporoscopy due to endometriosis     SURGICAL HISTORY OF -       endometrial ablation     SURGICAL HISTORY OF -       breast augmentation     SURGICAL HISTORY OF -       benign tumor removed from her left thigh     TONSILLECTOMY       TUBAL LIGATION       Obstetric History     No data available          ROS:  C: NEGATIVE for fever, chills, change in weight  I: NEGATIVE for worrisome rashes, moles or lesions  E: NEGATIVE for vision changes or irritation  ENT: NEGATIVE for ear, mouth and throat problems  R: NEGATIVE for significant cough or SOB  B: NEGATIVE for masses, tenderness or discharge  CV: NEGATIVE for chest pain, palpitations or peripheral edema  GI: NEGATIVE for nausea, abdominal pain, heartburn, or change in bowel habits  : NEGATIVE for unusual urinary or vaginal symptoms. No vaginal bleeding.  M: NEGATIVE for significant arthralgias or  "myalgia  N: NEGATIVE for weakness, dizziness or paresthesias  E: NEGATIVE for temperature intolerance, skin/hair changes  H: NEGATIVE for bleeding problems  P: NEGATIVE for changes in mood or affect     OBJECTIVE:   /82  Pulse 92  Temp 98.1  F (36.7  C)  Resp 16  Ht 5' 2.75\" (1.594 m)  Wt 212 lb (96.2 kg)  BMI 37.85 kg/m2  EXAM:  GENERAL APPEARANCE: healthy, alert and no distress  EYES: Eyes grossly normal to inspection, PERRL and conjunctivae and sclerae normal  HENT: ear canals and TM's normal, nose and mouth without ulcers or lesions, oropharynx clear and oral mucous membranes moist  NECK: no adenopathy, no asymmetry, masses, or scars and thyroid normal to palpation  RESP: lungs clear to auscultation - no rales, rhonchi or wheezes  BREAST: normal without masses, tenderness or nipple discharge and no palpable axillary masses or adenopathy  CV: regular rate and rhythm, normal S1 S2, no S3 or S4, no murmur, click or rub, no peripheral edema and peripheral pulses strong  ABDOMEN: soft, nontender, no hepatosplenomegaly, no masses and bowel sounds normal   (female): normal female external genitalia, normal urethral meatus, vaginal mucosal atrophy noted, normal cervix, adnexae, and uterus without masses or abnormal discharge  Cervix - friable  MS: no musculoskeletal defects are noted and gait is age appropriate without ataxia  SKIN: no suspicious lesions or rashes  NEURO: Normal strength and tone, sensory exam grossly normal, mentation intact and speech normal  PSYCH: mentation appears normal and affect normal/bright    ASSESSMENT/PLAN:   1. Encounter for gynecological examination without abnormal finding  Friable cervix  - Pap imaged thin layer screen with HPV - recommended age 30 - 65 years (select HPV order below)  - HPV High Risk Types DNA Cervical  - MR Breast Bilateral w Contrast; Future    2. Perimenopausal symptoms  Tolerating the nightsweats      COUNSELING:   Reviewed preventive health " "counseling, as reflected in patient instructions       Regular exercise       Healthy diet/nutrition       Colon cancer screening         reports that she has never smoked. She has never used smokeless tobacco.    Estimated body mass index is 37.85 kg/(m^2) as calculated from the following:    Height as of this encounter: 5' 2.75\" (1.594 m).    Weight as of this encounter: 212 lb (96.2 kg).       Counseling Resources:  ATP IV Guidelines  Pooled Cohorts Equation Calculator  Breast Cancer Risk Calculator  FRAX Risk Assessment  ICSI Preventive Guidelines  Dietary Guidelines for Americans, 2010  USDA's MyPlate  ASA Prophylaxis  Lung CA Screening    Sterling Hill MD  [facility]  "

## 2018-03-30 ENCOUNTER — TELEPHONE (OUTPATIENT)
Dept: FAMILY MEDICINE | Facility: CLINIC | Age: 48
End: 2018-03-30

## 2018-03-30 LAB
COPATH REPORT: NORMAL
PAP: NORMAL

## 2018-03-30 NOTE — TELEPHONE ENCOUNTER
She was seen 3 weeks ago for cough and diagnosed with influenza, she should get better by now. Recommend evaluation by urgent care to rule out possible pneumonia.   I don't feel comfortable just signing prescription for cough medicine without evaluating patient.     FORTINO Lai CNP

## 2018-03-30 NOTE — TELEPHONE ENCOUNTER
Reason for Call:  Other prescription    Detailed comments: pt calling to see if she can get cough syrup called in to the pharmacy. She states she hasn't been sleeping well because of this cough. She has tried other things but nothing helps.    Phone Number Patient can be reached at: Home number on file [phone] (home)    Best Time: any    Can we leave a detailed message on this number? YES    Call taken on 3/30/2018 at 3:54 PM by Susie Christie

## 2018-03-30 NOTE — TELEPHONE ENCOUNTER
Neither the tessalon nor the albuterol helped at all. She gets a headache and abdomen area aches from coughing. Denies sob or wheezing. She has tried everything over the counter. She feels ok . No mucous no blood being coughed up. Denies being tight or heavy in the chest. No congestion in head or chest. Denies fever. No breathing difficulties. Cough comes from throat more than her chest. Elham Kaur RN

## 2018-04-02 ENCOUNTER — HOSPITAL ENCOUNTER (EMERGENCY)
Facility: CLINIC | Age: 48
Discharge: HOME | End: 2018-04-02
Attending: EMERGENCY MEDICINE | Admitting: EMERGENCY MEDICINE
Payer: COMMERCIAL

## 2018-04-02 ENCOUNTER — APPOINTMENT (OUTPATIENT)
Dept: GENERAL RADIOLOGY | Facility: CLINIC | Age: 48
End: 2018-04-02
Attending: EMERGENCY MEDICINE
Payer: COMMERCIAL

## 2018-04-02 VITALS
OXYGEN SATURATION: 96 % | BODY MASS INDEX: 37.87 KG/M2 | RESPIRATION RATE: 16 BRPM | DIASTOLIC BLOOD PRESSURE: 85 MMHG | SYSTOLIC BLOOD PRESSURE: 135 MMHG | TEMPERATURE: 98.2 F | WEIGHT: 212.08 LBS

## 2018-04-02 DIAGNOSIS — J18.9 COMMUNITY ACQUIRED PNEUMONIA OF RIGHT UPPER LOBE OF LUNG: ICD-10-CM

## 2018-04-02 LAB
FINAL DIAGNOSIS: NORMAL
HPV HR 12 DNA CVX QL NAA+PROBE: NEGATIVE
HPV16 DNA SPEC QL NAA+PROBE: NEGATIVE
HPV18 DNA SPEC QL NAA+PROBE: NEGATIVE
SPECIMEN DESCRIPTION: NORMAL
SPECIMEN SOURCE CVX/VAG CYTO: NORMAL

## 2018-04-02 PROCEDURE — 99284 EMERGENCY DEPT VISIT MOD MDM: CPT | Mod: 25 | Performed by: EMERGENCY MEDICINE

## 2018-04-02 PROCEDURE — 93005 ELECTROCARDIOGRAM TRACING: CPT | Performed by: EMERGENCY MEDICINE

## 2018-04-02 PROCEDURE — 71046 X-RAY EXAM CHEST 2 VIEWS: CPT

## 2018-04-02 PROCEDURE — 93010 ELECTROCARDIOGRAM REPORT: CPT | Mod: Z6 | Performed by: EMERGENCY MEDICINE

## 2018-04-02 RX ORDER — FLUCONAZOLE 150 MG/1
TABLET ORAL
Qty: 1 TABLET | Refills: 0 | Status: SHIPPED | OUTPATIENT
Start: 2018-04-02 | End: 2018-04-05

## 2018-04-02 RX ORDER — AZITHROMYCIN 250 MG/1
TABLET, FILM COATED ORAL
Qty: 6 TABLET | Refills: 0 | Status: SHIPPED | OUTPATIENT
Start: 2018-04-02 | End: 2018-04-06

## 2018-04-02 RX ORDER — CODEINE PHOSPHATE AND GUAIFENESIN 10; 100 MG/5ML; MG/5ML
1-2 SOLUTION ORAL EVERY 4 HOURS PRN
Qty: 240 ML | Refills: 0 | Status: SHIPPED | OUTPATIENT
Start: 2018-04-02 | End: 2018-05-30

## 2018-04-02 ASSESSMENT — ENCOUNTER SYMPTOMS
CONSTITUTIONAL NEGATIVE: 1
MUSCULOSKELETAL NEGATIVE: 1
CHEST TIGHTNESS: 1
ABDOMINAL PAIN: 1
SLEEP DISTURBANCE: 1

## 2018-04-02 NOTE — LETTER
April 2, 2018      To Whom It May Concern:      Dakota Berkowitz was seen in our Emergency Department today, Monday 04/02/18.  I expect her condition to improve over the next several days.  She may return to work/school when improved.    Sincerely,        CARMINA Portillo MD

## 2018-04-02 NOTE — ED AVS SNAPSHOT
[facility]    [address] [address]    Phone:  [phone]    Fax:  [phone]                                       Dakota Berkowitz   MRN: [id number]    Department:  [facility]   Date of Visit:  4/2/2018           After Visit Summary Signature Page     I have received my discharge instructions, and my questions have been answered. I have discussed any challenges I see with this plan with the nurse or doctor.    ..........................................................................................................................................  Patient/Patient Representative Signature      ..........................................................................................................................................  Patient Representative Print Name and Relationship to Patient    ..................................................               ................................................  Date                                            Time    ..........................................................................................................................................  Reviewed by Signature/Title    ...................................................              ..............................................  Date                                                            Time

## 2018-04-02 NOTE — DISCHARGE INSTRUCTIONS
What is Pneumonia?    Pneumonia is a serious lung infection. Many cases of pneumonia are caused by bacteria or viruses. Fungi may also cause pneumonia, but this is less common.  You may also get pneumonia after another illness, such as a cold, flu, or bronchitis. Those most at risk include older adults, smokers, and people with long-term (chronic) health problems or weak immune systems.  Healthy lungs    Air travels in and out of the lungs through tubes called airways.    The tubes branch into smaller passages called bronchioles. These end in tiny sacs called alveoli.    Blood vessels surrounding the alveoli take oxygen into the bloodstream. At the same time, the alveoli remove carbon dioxide (a waste gas) from the blood. The carbon dioxide is then exhaled.  When you have pneumonia    Pneumonia causes the bronchioles and the alveoli to fill with excess mucus and become inflamed.    Your body s response may be to cough. This can help clear out the fluid.    The fluid (or mucus) you cough up may appear green or dark yellow.    The excess mucus may make you feel short of breath.    The inflammation and infection may give you a fever.  What are the symptoms?  Symptoms of pneumonia can come without warning. At first, you may think you have a cold or flu. But symptoms may get worse quickly, turning into pneumonia. Symptoms can be different for bacterial and viral pneumonia. Common symptoms may include the following:    Severe cough with green or yellow mucus that doesn't improve or that gets worse    Fever and chills    Nausea, vomiting or diarrhea    Shortness of breath with normal daily activities    Increased heart rate    Chest pain or discomfort when breathing in or coughing    Headache    Excessive sweating and clammy skin  Date Last Reviewed: 12/1/2016 2000-2017 [employer]. [address] [place] [address], [place] [address]. All rights reserved. This information is not intended as a substitute for  professional medical care. Always follow your healthcare professional's instructions.          When You Have Pneumonia  You have been diagnosed with pneumonia. This is a serious lung infection. Most cases of pneumonia are caused by bacteria. Pneumonia most often occurs in older adults, young children, and people with chronic health problems.  Home care    Take your medicine exactly as directed. Don t skip doses. Continue taking your antibiotics as until they are all gone, even if you start to feel better. This will prevent the pneumonia from coming back.    Drink at least 8 glasses of water daily, unless directed otherwise. This helps to loosen and thin secretions so that you can cough them up.    Use a cool-mist humidifier in your bedroom. Be sure to clean the humidifier daily.    Don t use medicines to suppress your cough unless your cough is dry, painful, or interferes with your sleep. Coughing up mucus is normal. You may use an expectorant if your healthcare provider says it s okay.    You can use warm compresses or a heating pad on the lowest setting to relieve chest discomfort. Use several times a day for 15-20 minutes at a time. To prevent injury to your skin, set the temperature to warm, not hot. Don t put the compress or pad directly on your skin. Make certain it has a cover or wrap it in a towel. This is to prevent skin burns.    Get plenty of rest until your fever, shortness of breath, and chest pain go away.    Plan to get a flu shot every year. The flu is a common cause of pneumonia. Getting a flu shot every year can help prevent both the flu and pneumonia.  Getting the pneumococcal vaccine  Talk with your healthcare provider about getting the pneumococcalvaccine. Pneumococcal pneumonia is caused by bacteria that spread from person to person. It can cause minor problems, such as ear infections. But it can also turn into life-threatening illnesses of the lungs (pneumonia), the covering of the brain and  spinal cord (meningitis), and the blood (bacteremia).  Children under 2 years of age, adults over age 65, people with certain health conditions, and smokers are at the highest risk of pneumococcal disease. This vaccine can help prevent pneumococcal disease in both adults and children. Some people should not have the vaccine. Make sure to ask your healthcare provider if you should have the vaccine.   Follow-up care  Make a follow-up appointment as directed by our staff.  When to call your healthcare provider  Call your healthcare provider if you have any of the following:    Fever above 100.4 F (38 C), or as directed by your healthcare provider    Mucus from the lungs (sputum) that s yellow, green, bloody, or smells bad    A large amount of sputum    Vomiting    Symptoms that get worse  When to call 911  Call 911 right away if you have any of the following:    Chest pain    Trouble breathing    Blue lips or fingernails   Date Last Reviewed: 11/1/2016 2000-2017 [employer]. [address] [place] [address]. All rights reserved. This information is not intended as a substitute for professional medical care. Always follow your healthcare professional's instructions.          Pneumonia (Adult)  Pneumonia is an infection deep within the lungs. It is in the small air sacs (alveoli). Pneumonia may be caused by a virus or bacteria. Pneumonia caused by bacteria is usually treated with an antibiotic. Severe cases may need to be treated in the hospital. Milder cases can be treated at home. Symptoms usually start to get better during the first 2 days of treatment.    Home care  Follow these guidelines when caring for yourself at home:    Rest at home for the first 2 to 3 days, or until you feel stronger. Don t let yourself get overly tired when you go back to your activities.    Stay away from cigarette smoke - yours or other people s.    You may use acetaminophen or ibuprofen to control fever or pain,  unless another medicine was prescribed. If you have chronic liver or kidney disease, talk with your healthcare provider before using these medicines. Also talk with your provider if you ve had a stomach ulcer or gastrointestinal bleeding. Don t give aspirin to anyone younger than 18 years of age who is ill with a fever. It may cause severe liver damage.    Your appetite may be poor, so a light diet is fine.    Drink 6 to 8 glasses of fluids every day to make sure you are getting enough fluids. Beverages can include water, sport drinks, sodas without caffeine, juices, tea, or soup. Fluids will help loosen secretions in the lung. This will make it easier for you to cough up the phlegm (sputum). If you also have heart or kidney disease, check with your healthcare provider before you drink extra fluids.    Take antibiotic medicine prescribed until it is all gone, even if you are feeling better after a few days.  Follow-up care  Follow up with your healthcare provider in the next 2 to 3 days, or as advised. This is to be sure the medicine is helping you get better.  If you are 65 or older, you should get a pneumococcal vaccine and a yearly flu (influenza) shot. You should also get these vaccines if you have chronic lung disease like asthma, emphysema, or COPD. Recently, a second type of pneumonia vaccine has become available for everyone over 65 years old. This is in addition to the previous vaccine. Ask your provider about this.  When to seek medical advice  Call your healthcare provider right away if any of these occur:    You don t get better within the first 48 hours of treatment    Shortness of breath gets worse    Rapid breathing (more than 25 breaths per minute)    Coughing up blood    Chest pain gets worse with breathing    Fever of 100.4 F (38 C) or higher that doesn t get better with fever medicine    Weakness, dizziness, or fainting that gets worse    Thirst or dry mouth that gets worse    Sinus pain,  headache, or a stiff neck    Chest pain not caused by coughing  Date Last Reviewed: 1/1/2017 2000-2017 [employer]. [address] [place] [address], [place] [address]. All rights reserved. This information is not intended as a substitute for professional medical care. Always follow your healthcare professional's instructions.

## 2018-04-02 NOTE — ED PROVIDER NOTES
History     Chief Complaint   Patient presents with     Cough     center chest pain. has had flu dx March 6th, coughhas not left,  spastic cough. wheezing     Chest Pain     HPI   History per patient and review of past medical records.  Dakota Berkowitz is a 47 year old female who presents emergency department for persistent cough and central chest discomfort with wheezing which has persistence in she was diagnosed with influenza A on 3/7/18, ~ 1 month ago.  She was treated with Tamiflu and then a short time later she was prescribed an albuterol inhaler by her primary care provider.  Cough has persisted since that time and has caused her to have difficulty sleeping and has been refractory to OTC cough meds.  The cough is nonproductive, dry, hacking, spastic and wheezy.  She has associated anterior chest pain/tightness and lower abdominal pain with coughing, sharp and clearly reproduced with coughing paroxysms.  The chest pain is located in the anterior lower neck and central upper chest and is associated with a sensation of tightness and not being able to get a deep breath with deep inspiration when coughing.  No fever or chills.  She has no leg pain, leg swelling or risk factors for DVT/PE and is a non-smoker and does not use OCPs.  No history of RAD/asthma.  No other acute complaints or concerns or pertinent history.    Problem List:    Patient Active Problem List    Diagnosis Date Noted     Irritability 03/02/2015     Priority: Medium     Perimenopausal symptoms 06/17/2013     Priority: Medium     Weight gain 06/17/2013     Priority: Medium     Obesity 04/25/2011     Priority: Medium     Breast implant rupture 02/08/2011     Priority: Medium     Pain in breast 02/08/2011     Priority: Medium     due to rupture of breast implant       CARDIOVASCULAR SCREENING; LDL GOAL LESS THAN 160 10/31/2010     Priority: Medium     Female pelvic pain 10/13/2009     Priority: Medium     (Problem list name updated by automated  process. Provider to review and confirm.)          Past Medical History:    Past Medical History:   Diagnosis Date     NO ACTIVE PROBLEMS        Past Surgical History:    Past Surgical History:   Procedure Laterality Date     C APPENDECTOMY       COSMETIC SURGERY      breast implants     CYSTOSCOPY, SLING TRANSVAGINAL N/A 2/8/2016    Procedure: CYSTOSCOPY, SLING TRANSVAGINAL;  Surgeon: Sterling Hill MD;  Location: [facility]      REPAIR OF NASAL SEPTUM       SURGICAL HISTORY OF -       laporoscopy due to endometriosis     SURGICAL HISTORY OF -       endometrial ablation     SURGICAL HISTORY OF -       breast augmentation     SURGICAL HISTORY OF -       benign tumor removed from her left thigh     TONSILLECTOMY       TUBAL LIGATION         Family History:    Family History   Problem Relation Age of Onset     CANCER Mother      ovarian     CANCER Paternal Grandmother      lung     Gynecology Sister      having a partial hysterectomy due to abnormal cells, leaving ovaries and cervix     Gynecology Daughter      endometriosis     Gynecology Sister      hysterectomy for endometriosis       Social History:  Marital Status:   [2]  Social History   Substance Use Topics     Smoking status: Never Smoker     Smokeless tobacco: Never Used     Alcohol use Yes      Comment: occassionally on weekends        Medications:      azithromycin (ZITHROMAX Z-JESSY) 250 MG tablet   guaiFENesin-codeine (ROBITUSSIN AC) 100-10 MG/5ML SOLN solution   amoxicillin-clavulanate (AUGMENTIN) 875-125 MG per tablet   fluconazole (DIFLUCAN) 150 MG tablet   tiZANidine (ZANAFLEX) 4 MG tablet   albuterol (PROAIR HFA/PROVENTIL HFA/VENTOLIN HFA) 108 (90 BASE) MCG/ACT Inhaler   oxyCODONE-acetaminophen (PERCOCET) 5-325 MG per tablet   EPINEPHrine (EPIPEN/ADRENACLICK/OR ANY BX GENERIC EQUIV) 0.3 MG/0.3ML injection 2-pack   cetirizine-psuedoePHEDrine (ZYRTEC-D) 5-120 MG per 12 hr tablet   ibuprofen (ADVIL,MOTRIN) 200 MG tablet        Allergies    Allergen Reactions     Clindamycin Rash     Provera [Medroxyprogesterone Acetate] Other (See Comments)     edema       Review of Systems   Constitutional: Negative.    HENT: Negative.    Respiratory: Positive for chest tightness ( Anterior, upper, central.  No pleuritic or peripheral chest pain or thoracic back pain.).    Cardiovascular: Positive for chest pain ( As above). Negative for leg swelling.   Gastrointestinal: Positive for abdominal pain ( lower abd wall tenderness wioth coughing).   Musculoskeletal: Negative.    Skin: Negative.    Psychiatric/Behavioral: Positive for sleep disturbance ( Due to coughing spells).       Physical Exam   BP: (!) 158/104  Heart Rate: 93  Temp: 98.2  F (36.8  C)  Resp: 16  Weight: 96.2 kg (212 lb 1.3 oz)  SpO2: 96 %      Physical Exam   Constitutional: She is oriented to person, place, and time. She appears well-developed and well-nourished. No distress.   HENT:   Head: Normocephalic and atraumatic.   Mouth/Throat: Oropharynx is clear and moist.   Eyes: Conjunctivae and EOM are normal. No scleral icterus.   Neck: Normal range of motion. Neck supple. No JVD present. No tracheal deviation present. No thyromegaly present.   Cardiovascular: Normal rate, regular rhythm and normal heart sounds.  Exam reveals no gallop and no friction rub.    No murmur heard.  Pulmonary/Chest: Effort normal and breath sounds normal. No respiratory distress. She has no wheezes. She has no rales.   Abdominal: Soft. Bowel sounds are normal. She exhibits no distension and no mass. There is no tenderness. There is no rebound and no guarding.   Musculoskeletal: Normal range of motion. She exhibits no edema or tenderness.   No palpable lower extremity cords, edema or tenderness.   Neurological: She is alert and oriented to person, place, and time.   Skin: Skin is warm and dry. No rash noted. She is not diaphoretic. No erythema. No pallor.   Psychiatric: She has a normal mood and affect. Her behavior is  normal.   Nursing note and vitals reviewed.      ED Course     ED Course     Procedures               EKG Interpretation:      Interpreted by Gordon Portillo MD  Time reviewed: 1525 PM  Symptoms at time of EKG: Chest pain  Rhythm: normal sinus   Rate: normal  Axis: normal  Ectopy: none  Conduction: normal  ST Segments/ T Waves: No ST-T wave changes  Q Waves: none  Comparison to prior: Unchanged/no significant change from 2/13/16  Clinical Impression: Unremarkable EKG       I independently reviewed the X-rays: Agree with the Radiologist's interpretation.    Results for orders placed or performed during the hospital encounter of 04/02/18 (from the past 24 hour(s))   XR Chest 2 Views    Narrative    XR CHEST 2 VW 4/2/2018 4:01 PM    HISTORY: Chest pain and shortness of breath.    COMPARISON: None.    FINDINGS: Streaky airspace opacity in the right upper lung. Left lung  is clear. No pleural effusion or pneumothorax. Normal heart size.      Impression    IMPRESSION: Streaky right upper lobe opacity is compatible with  pneumonia.    MARK MORGAN MD       Medications - No data to display    We discussed evaluation for PE and patient elected to defer laboratory evaluation or CT scanning, which is clinically acceptable and appropriate at this time. She appears to be low risk and I doubt PE/DVT.  We discussed signs and symptoms of PE/DVT and indications emergent return for reevaluation if failing to improve with antibiotic therapy over the next 48 hours.    Assessments & Plan (with Medical Decision Making)   Patient with influenza A treated with Tamiflu approximately 1 month ago with persistent dry hacking cough and anterior chest tightness which is probably of benign inflammatory nature from an infectious process.  Doubt atypical ACS, PE/DVT, aneurysm/dissection or emergent GI/ disease process.  Chest x-ray showed streaky right upper lobe opacity compatible with pneumonia.  We discussed performing a CT study and  laboratory evaluation to further evaluate for PE/DVT, but she elected to defer this which is clinically appropriate.  My clinical gestalt for these are very low. Patient was provided instructions for supportive care and will return as needed for worsened condition or worsening symptoms, or new problems or concerns.  Carefully discussed signs and symptoms of DVT/PE and signs and symptoms indicate need for emergent reevaluation.  I will Rx Azithromycin and Augmentin, and prescribed Robitussin-AC cough syrup to use if needed (and diflucan to use if needed).  I recommend a recheck in primary care clinic if not improving over the next 48 hours, or in several weeks for repeat chest x-ray to ensure resolution.      I have reviewed the nursing notes.    I have reviewed the findings, diagnosis, plan and need for follow up with the patient.      New Prescriptions    AMOXICILLIN-CLAVULANATE (AUGMENTIN) 875-125 MG PER TABLET    Take 1 tablet by mouth 2 times daily for 10 days    AZITHROMYCIN (ZITHROMAX Z-JESSY) 250 MG TABLET    Two tablets on the first day, then one tablet daily for the next 4 days    FLUCONAZOLE (DIFLUCAN) 150 MG TABLET    Take one tablet as a single dose for yeast infection    GUAIFENESIN-CODEINE (ROBITUSSIN AC) 100-10 MG/5ML SOLN SOLUTION    Take 5-10 mLs by mouth every 4 hours as needed for cough (cough)       Final diagnoses:   Community acquired pneumonia of right upper lobe of lung (H)       4/2/2018   [facility] EMERGENCY DEPARTMENT            Gordon Portillo MD  04/02/18 [id number]

## 2018-04-02 NOTE — ED AVS SNAPSHOT
[facility] Emergency Department    [address] [place] [address]    Phone:  [phone]    Fax:  [phone]                                       Dakota Berkowitz   MRN: [id number]    Department:  [facility] Emergency Department   Date of Visit:  4/2/2018           Patient Information     Date Of Birth          1970        Your diagnoses for this visit were:     Community acquired pneumonia of right upper lobe of lung (H)        You were seen by Gordon Portillo MD.      Follow-up Information     Schedule an appointment as soon as possible for a visit with Chino Rayo MD.    Specialty:  Family Practice    Why:  For re-evaluation if symptoms not resolving in the next 48 hours, or in several weeks to make sure that this has resolved.    Contact information:    [address] [place] [address]  [phone]          Follow up with [facility] Emergency Department.    Specialty:  EMERGENCY MEDICINE    Why:  If symptoms worsen    Contact information:    [address] [place] [id number] [phone]    Additional information:    The medical center is located at   [address] [place] [address] (between [facility] and   [place] [address] in [place], four miles north   of [place]).        Discharge Instructions         What is Pneumonia?    Pneumonia is a serious lung infection. Many cases of pneumonia are caused by bacteria or viruses. Fungi may also cause pneumonia, but this is less common.  You may also get pneumonia after another illness, such as a cold, flu, or bronchitis. Those most at risk include older adults, smokers, and people with long-term (chronic) health problems or weak immune systems.  Healthy lungs    Air travels in and out of the lungs through tubes called airways.    The tubes branch into smaller passages called bronchioles. These end in tiny sacs called alveoli.    Blood vessels surrounding the alveoli take oxygen into the bloodstream. At the same time, the  alveoli remove carbon dioxide (a waste gas) from the blood. The carbon dioxide is then exhaled.  When you have pneumonia    Pneumonia causes the bronchioles and the alveoli to fill with excess mucus and become inflamed.    Your body s response may be to cough. This can help clear out the fluid.    The fluid (or mucus) you cough up may appear green or dark yellow.    The excess mucus may make you feel short of breath.    The inflammation and infection may give you a fever.  What are the symptoms?  Symptoms of pneumonia can come without warning. At first, you may think you have a cold or flu. But symptoms may get worse quickly, turning into pneumonia. Symptoms can be different for bacterial and viral pneumonia. Common symptoms may include the following:    Severe cough with green or yellow mucus that doesn't improve or that gets worse    Fever and chills    Nausea, vomiting or diarrhea    Shortness of breath with normal daily activities    Increased heart rate    Chest pain or discomfort when breathing in or coughing    Headache    Excessive sweating and clammy skin  Date Last Reviewed: 12/1/2016 2000-2017 [employer]. [address] [place] [address]. All rights reserved. This information is not intended as a substitute for professional medical care. Always follow your healthcare professional's instructions.          When You Have Pneumonia  You have been diagnosed with pneumonia. This is a serious lung infection. Most cases of pneumonia are caused by bacteria. Pneumonia most often occurs in older adults, young children, and people with chronic health problems.  Home care    Take your medicine exactly as directed. Don t skip doses. Continue taking your antibiotics as until they are all gone, even if you start to feel better. This will prevent the pneumonia from coming back.    Drink at least 8 glasses of water daily, unless directed otherwise. This helps to loosen and thin secretions so that  you can cough them up.    Use a cool-mist humidifier in your bedroom. Be sure to clean the humidifier daily.    Don t use medicines to suppress your cough unless your cough is dry, painful, or interferes with your sleep. Coughing up mucus is normal. You may use an expectorant if your healthcare provider says it s okay.    You can use warm compresses or a heating pad on the lowest setting to relieve chest discomfort. Use several times a day for 15-20 minutes at a time. To prevent injury to your skin, set the temperature to warm, not hot. Don t put the compress or pad directly on your skin. Make certain it has a cover or wrap it in a towel. This is to prevent skin burns.    Get plenty of rest until your fever, shortness of breath, and chest pain go away.    Plan to get a flu shot every year. The flu is a common cause of pneumonia. Getting a flu shot every year can help prevent both the flu and pneumonia.  Getting the pneumococcal vaccine  Talk with your healthcare provider about getting the pneumococcalvaccine. Pneumococcal pneumonia is caused by bacteria that spread from person to person. It can cause minor problems, such as ear infections. But it can also turn into life-threatening illnesses of the lungs (pneumonia), the covering of the brain and spinal cord (meningitis), and the blood (bacteremia).  Children under 2 years of age, adults over age 65, people with certain health conditions, and smokers are at the highest risk of pneumococcal disease. This vaccine can help prevent pneumococcal disease in both adults and children. Some people should not have the vaccine. Make sure to ask your healthcare provider if you should have the vaccine.   Follow-up care  Make a follow-up appointment as directed by our staff.  When to call your healthcare provider  Call your healthcare provider if you have any of the following:    Fever above 100.4 F (38 C), or as directed by your healthcare provider    Mucus from the lungs  (sputum) that s yellow, green, bloody, or smells bad    A large amount of sputum    Vomiting    Symptoms that get worse  When to call 911  Call 911 right away if you have any of the following:    Chest pain    Trouble breathing    Blue lips or fingernails   Date Last Reviewed: 11/1/2016 2000-2017 [employer]. [address] [place] [address]. All rights reserved. This information is not intended as a substitute for professional medical care. Always follow your healthcare professional's instructions.          Pneumonia (Adult)  Pneumonia is an infection deep within the lungs. It is in the small air sacs (alveoli). Pneumonia may be caused by a virus or bacteria. Pneumonia caused by bacteria is usually treated with an antibiotic. Severe cases may need to be treated in the hospital. Milder cases can be treated at home. Symptoms usually start to get better during the first 2 days of treatment.    Home care  Follow these guidelines when caring for yourself at home:    Rest at home for the first 2 to 3 days, or until you feel stronger. Don t let yourself get overly tired when you go back to your activities.    Stay away from cigarette smoke - yours or other people s.    You may use acetaminophen or ibuprofen to control fever or pain, unless another medicine was prescribed. If you have chronic liver or kidney disease, talk with your healthcare provider before using these medicines. Also talk with your provider if you ve had a stomach ulcer or gastrointestinal bleeding. Don t give aspirin to anyone younger than 18 years of age who is ill with a fever. It may cause severe liver damage.    Your appetite may be poor, so a light diet is fine.    Drink 6 to 8 glasses of fluids every day to make sure you are getting enough fluids. Beverages can include water, sport drinks, sodas without caffeine, juices, tea, or soup. Fluids will help loosen secretions in the lung. This will make it easier for you to  cough up the phlegm (sputum). If you also have heart or kidney disease, check with your healthcare provider before you drink extra fluids.    Take antibiotic medicine prescribed until it is all gone, even if you are feeling better after a few days.  Follow-up care  Follow up with your healthcare provider in the next 2 to 3 days, or as advised. This is to be sure the medicine is helping you get better.  If you are 65 or older, you should get a pneumococcal vaccine and a yearly flu (influenza) shot. You should also get these vaccines if you have chronic lung disease like asthma, emphysema, or COPD. Recently, a second type of pneumonia vaccine has become available for everyone over 65 years old. This is in addition to the previous vaccine. Ask your provider about this.  When to seek medical advice  Call your healthcare provider right away if any of these occur:    You don t get better within the first 48 hours of treatment    Shortness of breath gets worse    Rapid breathing (more than 25 breaths per minute)    Coughing up blood    Chest pain gets worse with breathing    Fever of 100.4 F (38 C) or higher that doesn t get better with fever medicine    Weakness, dizziness, or fainting that gets worse    Thirst or dry mouth that gets worse    Sinus pain, headache, or a stiff neck    Chest pain not caused by coughing  Date Last Reviewed: 1/1/2017 2000-2017 [employer]. [address] [place] [address]. All rights reserved. This information is not intended as a substitute for professional medical care. Always follow your healthcare professional's instructions.          Your next 10 appointments already scheduled     Apr 03, 2018  7:00 AM CDT   MyChart Short with FORTINO Luna CNP   [facility]    [address] [facility] [address]   [phone]              24 Hour Appointment Hotline       To make an appointment at any  [facility], call [phone] ([phone]). If you don't have a family doctor or clinic, we will help you find one. [facility] clinics are conveniently located to serve the needs of you and your family.             Review of your medicines      START taking        Dose / Directions Last dose taken    azithromycin 250 MG tablet   Commonly known as:  ZITHROMAX Z-JESSY   Quantity:  6 tablet        Two tablets on the first day, then one tablet daily for the next 4 days   Refills:  0        guaiFENesin-codeine 100-10 MG/5ML Soln solution   Commonly known as:  ROBITUSSIN AC   Dose:  1-2 tsp.   Quantity:  240 mL        Take 5-10 mLs by mouth every 4 hours as needed for cough (cough)   Refills:  0          Our records show that you are taking the medicines listed below. If these are incorrect, please call your family doctor or clinic.        Dose / Directions Last dose taken    albuterol 108 (90 BASE) MCG/ACT Inhaler   Commonly known as:  PROAIR HFA/PROVENTIL HFA/VENTOLIN HFA   Dose:  2 puff   Quantity:  1 Inhaler        Inhale 2 puffs into the lungs every 4 hours as needed for shortness of breath / dyspnea   Refills:  0        cetirizine-psuedoePHEDrine 5-120 MG per 12 hr tablet   Commonly known as:  zyrTEC-D   Dose:  1 tablet   Quantity:  60 tablet        Take 1 tablet by mouth 2 times daily Hold on file until needed   Refills:  5        EPINEPHrine 0.3 MG/0.3ML injection 2-pack   Commonly known as:  EPIPEN/ADRENACLICK/or ANY BX GENERIC EQUIV   Dose:  0.3 mg   Quantity:  0.6 mL        Inject 0.3 mLs (0.3 mg) into the muscle once as needed for anaphylaxis Hold on file until needed   Refills:  3        ibuprofen 200 MG tablet   Commonly known as:  ADVIL/MOTRIN   Dose:  600 mg   Quantity:  120 tablet        Take 3 tablets (600 mg) by mouth every 6 hours as needed for mild pain   Refills:  0        oxyCODONE-acetaminophen 5-325 MG per tablet   Commonly known as:  PERCOCET   Dose:  1-2 tablet   Quantity:  20 tablet         Take 1-2 tablets by mouth nightly as needed for pain maximum 2 tablet(s) per day   Refills:  0        tiZANidine 4 MG tablet   Commonly known as:  ZANAFLEX   Dose:  4 mg   Quantity:  30 tablet        Take 1 tablet (4 mg) by mouth 3 times daily as needed for muscle spasms   Refills:  0                Prescriptions were sent or printed at these locations (2 Prescriptions)                   Other Prescriptions                Printed at Department/Unit printer (2 of 2)         azithromycin (ZITHROMAX Z-JESSY) 250 MG tablet               guaiFENesin-codeine (ROBITUSSIN AC) 100-10 MG/5ML SOLN solution                Procedures and tests performed during your visit     EKG 12-lead, tracing only    XR Chest 2 Views      Orders Needing Specimen Collection     None      Pending Results     No orders found from 3/31/2018 to 4/3/2018.            Pending Culture Results     No orders found from 3/31/2018 to 4/3/2018.            Pending Results Instructions     If you had any lab results that were not finalized at the time of your Discharge, you can call the ED Lab Result RN at [phone]. You will be contacted by this team for any positive Lab results or changes in treatment. The nurses are available 7 days a week from 10A to 6:30P.  You can leave a message 24 hours per day and they will return your call.        Test Results From Your Hospital Stay        4/2/2018  4:10 PM      Narrative     XR CHEST 2 VW 4/2/2018 4:01 PM    HISTORY: Chest pain and shortness of breath.    COMPARISON: None.    FINDINGS: Streaky airspace opacity in the right upper lung. Left lung  is clear. No pleural effusion or pneumothorax. Normal heart size.        Impression     IMPRESSION: Streaky right upper lobe opacity is compatible with  pneumonia.    MARK MORGAN MD                Thank you for choosing [facility]       Thank you for choosing [facility] for your care. Our goal is always to provide you with excellent care. Hearing back from our patients  is one way we can continue to improve our services. Please take a few minutes to complete the written survey that you may receive in the mail after you visit with us. Thank you!        [employer] Information     [employer] ([employer]) gives you secure access to your electronic health record. If you see a primary care provider, you can also send messages to your care team and make appointments. If you have questions, please call your primary care clinic.  If you do not have a primary care provider, please call [phone] and they will assist you.        Care EveryWhere ID     This is your Care EveryWhere ID. This could be used by other organizations to access your [facility] medical records  [id number]        Equal Access to Services     COKOIE EARL : Lela Lobato, kelvin sanabria, roberta garay, alison morrow. So [facility] [phone].    ATENCIÓN: Si habla español, tiene a flores disposición servicios gratuitos de asistencia lingüística. Llame al [phone].    We comply with applicable federal civil rights laws and [place] laws. We do not discriminate on the basis of race, color, national origin, age, disability, sex, sexual orientation, or gender identity.            After Visit Summary       This is your record. Keep this with you and show to your community pharmacist(s) and doctor(s) at your next visit.

## 2018-04-06 ENCOUNTER — OFFICE VISIT (OUTPATIENT)
Dept: FAMILY MEDICINE | Facility: CLINIC | Age: 48
End: 2018-04-06
Payer: COMMERCIAL

## 2018-04-06 ENCOUNTER — MYC MEDICAL ADVICE (OUTPATIENT)
Dept: FAMILY MEDICINE | Facility: CLINIC | Age: 48
End: 2018-04-06

## 2018-04-06 ENCOUNTER — HOSPITAL ENCOUNTER (OUTPATIENT)
Dept: CT IMAGING | Facility: CLINIC | Age: 48
Discharge: HOME | End: 2018-04-06
Attending: INTERNAL MEDICINE | Admitting: INTERNAL MEDICINE
Payer: COMMERCIAL

## 2018-04-06 VITALS
HEART RATE: 86 BPM | TEMPERATURE: 98.8 F | DIASTOLIC BLOOD PRESSURE: 88 MMHG | SYSTOLIC BLOOD PRESSURE: 136 MMHG | OXYGEN SATURATION: 98 %

## 2018-04-06 DIAGNOSIS — R05.9 COUGH: ICD-10-CM

## 2018-04-06 DIAGNOSIS — J18.9 PNEUMONIA OF BOTH LUNGS DUE TO INFECTIOUS ORGANISM, UNSPECIFIED PART OF LUNG: Primary | ICD-10-CM

## 2018-04-06 DIAGNOSIS — Z12.31 VISIT FOR SCREENING MAMMOGRAM: ICD-10-CM

## 2018-04-06 LAB
FLUAV+FLUBV AG SPEC QL: NEGATIVE
FLUAV+FLUBV AG SPEC QL: NEGATIVE
SPECIMEN SOURCE: NORMAL

## 2018-04-06 PROCEDURE — 87804 INFLUENZA ASSAY W/OPTIC: CPT | Performed by: INTERNAL MEDICINE

## 2018-04-06 PROCEDURE — 71260 CT THORAX DX C+: CPT

## 2018-04-06 PROCEDURE — 25000128 H RX IP 250 OP 636: Performed by: INTERNAL MEDICINE

## 2018-04-06 PROCEDURE — 87801 DETECT AGNT MULT DNA AMPLI: CPT | Performed by: INTERNAL MEDICINE

## 2018-04-06 PROCEDURE — 25000125 ZZHC RX 250: Performed by: INTERNAL MEDICINE

## 2018-04-06 PROCEDURE — 99214 OFFICE O/P EST MOD 30 MIN: CPT | Performed by: INTERNAL MEDICINE

## 2018-04-06 RX ORDER — DOXYCYCLINE HYCLATE 100 MG
100 TABLET ORAL 2 TIMES DAILY
Qty: 14 TABLET | Refills: 0 | Status: SHIPPED | OUTPATIENT
Start: 2018-04-06 | End: 2018-04-13

## 2018-04-06 RX ORDER — LEVOFLOXACIN 750 MG/1
750 TABLET, FILM COATED ORAL DAILY
Qty: 7 TABLET | Refills: 0 | Status: SHIPPED | OUTPATIENT
Start: 2018-04-06 | End: 2018-04-13

## 2018-04-06 RX ORDER — IOPAMIDOL 755 MG/ML
80 INJECTION, SOLUTION INTRAVASCULAR ONCE
Status: COMPLETED | OUTPATIENT
Start: 2018-04-06 | End: 2018-04-06

## 2018-04-06 RX ADMIN — SODIUM CHLORIDE 80 ML: 9 INJECTION, SOLUTION INTRAVENOUS at 16:01

## 2018-04-06 RX ADMIN — IOPAMIDOL 80 ML: 755 INJECTION, SOLUTION INTRAVENOUS at 16:00

## 2018-04-06 NOTE — PATIENT INSTRUCTIONS
1. You are due for a mammogram.  Please call [phone] to schedule.  2. You have pneumonia in both lungs.  3. Stop current antibiotic  4. Start new antibiotic.  X 1 week  5. Take Probiotic pill at lunch.  Keep eating yogurt, consider Activa  6. Continue with 'self care' as you have been doing  7. If not any improved over the weekend, may need hospitalization?  Further testing.

## 2018-04-06 NOTE — PROGRESS NOTES
SUBJECTIVE:   Dakota Berkowitz is a 47 year old female who presents to clinic today for the following health issues:    Pt was dx with Infuenza A on 3/7/2018      ED/UC Followup:    Facility:  [facility]  Date of visit: 4/2/2018  Reason for visit: Community acquired pneumonia of right upper lobe of lung (H)   Current Status: Still coughing - not getting better       Acute Illness   Acute illness concerns: Community acquired pneumonia of right upper lobe of lung (H)   Onset: Follow up E.D above    Fever: no    Chills/Sweats: YES- Sweats    Headache (location?): YES    Sinus Pressure:no    Conjunctivitis:  no    Ear Pain: no    Rhinorrhea: no    Congestion: no    Sore Throat: no     Cough: YES    Wheeze: YES    Decreased Appetite: no    Nausea: no    Vomiting: no    Diarrhea:  YES    Dysuria/Freq.: no     Fatigue/Achiness: YES    Sick/Strep Exposure: no     Therapies Tried and outcome: na      3/7 in clinic - influenza A positive, given Tamiflu. Also albuterol inhaler.  In ER on 4/2 and CXR showed RUL pneumonia.  Given zpak and augmentin, robutissen with codeine.    Reports cough is still severe.  Talking, deep breathing makes cough worse.  She has been resting, increased fluids.  She doesn't feel like she is any better since starting antibiotic.  About 1-2 weeks after diagnosis of flu, the cough markedly improved, but never totally resolved.  Then thinks cough got markedly worse about 1 week ago.  Reports breaking out in 'cold sweats' with walking across the room.  Has constant substernal chest pain, worsens with coughing, not with exertion.  No pleuritic chest pain. Cough is non-productive, no hemoptysis.    No recent travel, long car rides.        Current Outpatient Prescriptions   Medication Sig Dispense Refill     azithromycin (ZITHROMAX Z-JESSY) 250 MG tablet Two tablets on the first day, then one tablet daily for the next 4 days 6 tablet 0     guaiFENesin-codeine (ROBITUSSIN AC) 100-10 MG/5ML SOLN solution  Take 5-10 mLs by mouth every 4 hours as needed for cough (cough) 240 mL 0     amoxicillin-clavulanate (AUGMENTIN) 875-125 MG per tablet Take 1 tablet by mouth 2 times daily for 10 days 20 tablet 0     tiZANidine (ZANAFLEX) 4 MG tablet Take 1 tablet (4 mg) by mouth 3 times daily as needed for muscle spasms 30 tablet 0     albuterol (PROAIR HFA/PROVENTIL HFA/VENTOLIN HFA) 108 (90 BASE) MCG/ACT Inhaler Inhale 2 puffs into the lungs every 4 hours as needed for shortness of breath / dyspnea 1 Inhaler 0     ibuprofen (ADVIL,MOTRIN) 200 MG tablet Take 3 tablets (600 mg) by mouth every 6 hours as needed for mild pain 120 tablet      EPINEPHrine (EPIPEN/ADRENACLICK/OR ANY BX GENERIC EQUIV) 0.3 MG/0.3ML injection 2-pack Inject 0.3 mLs (0.3 mg) into the muscle once as needed for anaphylaxis Hold on file until needed (Patient not taking: Reported on 4/6/2018) 0.6 mL 3     oxyCODONE-acetaminophen (PERCOCET) 5-325 MG per tablet Take 1-2 tablets by mouth nightly as needed for pain maximum 2 tablet(s) per day (Patient not taking: Reported on 4/6/2018) 20 tablet 0     cetirizine-psuedoePHEDrine (ZYRTEC-D) 5-120 MG per 12 hr tablet Take 1 tablet by mouth 2 times daily Hold on file until needed (Patient not taking: Reported on 4/6/2018) 60 tablet 5       Reviewed and updated as needed this visit by clinical staff  Tobacco  Allergies  Meds  Problems  Med Hx  Surg Hx  Fam Hx  Soc Hx        Reviewed and updated as needed this visit by Provider  Allergies  Meds  Problems         ROS:  Constitutional, HEENT, cardiovascular, pulmonary, gi and gu systems are negative, except as otherwise noted.    OBJECTIVE:     /88 (BP Location: Right arm, Patient Position: Chair, Cuff Size: Adult Large)  Pulse 86  Temp 98.8  F (37.1  C) (Tympanic)  SpO2 98%  Breastfeeding? No  There is no height or weight on file to calculate BMI.  GENERAL APPEARANCE: alert, no distress, fatigued and mildly ill appearing  EYES: Eyes grossly normal to  inspection, PERRL and conjunctivae and sclerae normal  HENT: ear canals and TM's normal and nose and mouth without ulcers or lesions  NECK: no adenopathy, no asymmetry, masses, or scars and thyroid normal to palpation  RESP: severe dry hacking cough with audible wheezing.  Rhonchi in right mid lung field  CV: regular rates and rhythm, normal S1 S2, no S3 or S4 and no murmur, click or rub    Diagnostic Test Results:  Results for orders placed or performed in visit on 04/06/18 (from the past 24 hour(s))   Influenza A/B antigen   Result Value Ref Range    Influenza A/B Agn Specimen Nasal     Influenza A Negative NEG^Negative    Influenza B Negative NEG^Negative   CT Chest w Contrast    Narrative    CT CHEST W CONTRAST 4/6/2018 4:26 PM    HISTORY: Cough.    CONTRAST:  80 mL Isovue-370.    TECHNIQUE: Routine CT of the chest is performed with IV contrast.    Routine assessed structures include the lungs, mediastinal structures,  pleura, chest wall, and upper visualized abdomen.    Radiation dose for this scan is reduced using automated exposure  control, adjustment of the mA and/or kV according to patient size, or  iterative reconstructive technique.    COMPARISON: 2/3/2011.    FINDINGS: There is new patchy and nodular infiltrate in the right  upper lobe. Similar findings are seen medially in right lower lobe.  There also is a small area in the left lower lobe on image #37.  Subcentimeter mediastinal lymph nodes are not discretely enlarged by  CT criteria. The thoracic aorta is normal in caliber. The upper  visualized portions of the abdomen show fatty infiltration of the  liver. This was seen before.       Impression    IMPRESSION:  New bilateral lung abnormalities are consistent with an  inflammatory/infectious etiology.    AGUEDA FREY MD     Results for orders placed or performed during the hospital encounter of 04/02/18   XR Chest 2 Views    Narrative    XR CHEST 2 VW 4/2/2018 4:01 PM    HISTORY: Chest pain and  shortness of breath.    COMPARISON: None.    FINDINGS: Streaky airspace opacity in the right upper lung. Left lung  is clear. No pleural effusion or pneumothorax. Normal heart size.      Impression    IMPRESSION: Streaky right upper lobe opacity is compatible with  pneumonia.    MARK MORGAN MD       ASSESSMENT/PLAN:         ICD-10-CM    1. Pneumonia of both lungs due to infectious organism, unspecified part of lung J18.9 doxycycline (VIBRA-TABS) 100 MG tablet     levofloxacin (LEVAQUIN) 750 MG tablet   2. Cough R05 Bordetella pert parapert DNA pcr     CT Chest w Contrast     Influenza A/B antigen     CT Chest w Contrast     CANCELED: Influenza A/B antigen   3. Visit for screening mammogram Z12.31 [place] Screen Bilateral w/Louie     Bilateral pneumonia after influenza - concern for MRSA, which is known to occur particularly after influenza.  And recent antibiotic did not improve her symptoms at all.      1. You are due for a mammogram.  Please call [phone] to schedule.  2. You have pneumonia in both lungs.  3. Stop current antibiotic  4. Start new antibiotic.  X 1 week  5. Take Probiotic pill at lunch.  Keep eating yogurt, consider Activa  6. Continue with 'self care' as you have been doing  7. If not any improved over the weekend, may need hospitalization?  Further testing.      Lisa Lee, DO  [facility]

## 2018-04-06 NOTE — LETTER
April 6, 2018      Dakota Berkowitz  [address] [place] [address]        To Whom It May Concern:    Dakota Berkowitz was seen in our clinic today. She has an acute illness that has worsened and is required to stay home from work.  She can return to work on 4/11/18 or sooner if she is feeling better.    Sincerely,        Lisa Lee, DO

## 2018-04-06 NOTE — MR AVS SNAPSHOT
After Visit Summary   4/6/2018    Dakota Berkowitz    MRN: [id number]           Patient Information     Date Of Birth          1970        Visit Information        Provider Department      4/6/2018 3:20 PM Lisa Lee, DO [facility]        Today's Diagnoses     Pneumonia of both lungs due to infectious organism, unspecified part of lung    -  1    Cough        Visit for screening mammogram          Care Instructions    1. You are due for a mammogram.  Please call [phone] to schedule.  2. You have pneumonia in both lungs.  3. Stop current antibiotic  4. Start new antibiotic.  X 1 week  5. Take Probiotic pill at lunch.  Keep eating yogurt, consider Activa  6. Continue with 'self care' as you have been doing  7. If not any improved over the weekend, may need hospitalization?  Further testing.          Follow-ups after your visit        Future tests that were ordered for you today     Open Future Orders        Priority Expected Expires Ordered    MA Screen Bilateral w/Louie Routine  4/6/2019 4/6/2018            Who to contact     If you have questions or need follow up information about today's clinic visit or your schedule please contact [facility] directly at [phone].  Normal or non-critical lab and imaging results will be communicated to you by Guanrihart, letter or phone within 4 business days after the clinic has received the results. If you do not hear from us within 7 days, please contact the clinic through Guanrihart or phone. If you have a critical or abnormal lab result, we will notify you by phone as soon as possible.  Submit refill requests through Limundo or call your pharmacy and they will forward the refill request to us. Please allow 3 business days for your refill to be completed.          Additional Information About Your Visit        Guanrihart Information     Limundo gives you secure access to your electronic health record. If you see a primary  care provider, you can also send messages to your care team and make appointments. If you have questions, please call your primary care clinic.  If you do not have a primary care provider, please call [phone] and they will assist you.        Care EveryWhere ID     This is your Care EveryWhere ID. This could be used by other organizations to access your [place] medical records  [id number]        Your Vitals Were     Pulse Temperature Pulse Oximetry Breastfeeding?          86 98.8  F (37.1  C) (Tympanic) 98% No         Blood Pressure from Last 3 Encounters:   04/06/18 136/88   04/02/18 135/85   03/28/18 138/82    Weight from Last 3 Encounters:   04/02/18 212 lb 1.3 oz (96.2 kg)   03/28/18 212 lb (96.2 kg)   03/07/18 210 lb (95.3 kg)              We Performed the Following     Bordetella pert parapert DNA pcr     CT Chest w Contrast     CT Chest w Contrast     Influenza A/B antigen          Today's Medication Changes          These changes are accurate as of 4/6/18  4:56 PM.  If you have any questions, ask your nurse or doctor.               Start taking these medicines.        Dose/Directions    doxycycline 100 MG tablet   Commonly known as:  VIBRA-TABS   Used for:  Pneumonia of both lungs due to infectious organism, unspecified part of lung   Started by:  Lisa Lee DO        Dose:  100 mg   Take 1 tablet (100 mg) by mouth 2 times daily   Quantity:  14 tablet   Refills:  0       levofloxacin 750 MG tablet   Commonly known as:  LEVAQUIN   Used for:  Pneumonia of both lungs due to infectious organism, unspecified part of lung   Started by:  Lisa Lee DO        Dose:  750 mg   Take 1 tablet (750 mg) by mouth daily   Quantity:  7 tablet   Refills:  0         Stop taking these medicines if you haven't already. Please contact your care team if you have questions.     amoxicillin-clavulanate 875-125 MG per tablet   Commonly known as:  AUGMENTIN   Stopped by:  Lisa Lee DO            azithromycin 250 MG tablet   Commonly known as:  ZITHROMAX Z-JESSY   Stopped by:  Lisa Lee, DO                Where to get your medicines      These medications were sent to [facility] - [place], [place] - [address]  [address] [address]     Phone:  [phone]     doxycycline 100 MG tablet    levofloxacin 750 MG tablet                Primary Care Provider Office Phone # Fax #    Chino Rayo [phone] [address] [facility] [address]        Equal Access to Services     [facility]: Hadii aad ku hadasho Soomaali, waaxda luqadaha, qaybta kaalmada adeegyada, waxay mauricioin hayaan nathan roy . So [facility] [phone].    ATENCIÓN: Si habla español, tiene a flores disposición servicios gratuitos de asistencia lingüística. [facility] [phone].    We comply with applicable federal civil rights laws and [place] laws. We do not discriminate on the basis of race, color, national origin, age, disability, sex, sexual orientation, or gender identity.            Thank you!     Thank you for choosing [facility]  for your care. Our goal is always to provide you with excellent care. Hearing back from our patients is one way we can continue to improve our services. Please take a few minutes to complete the written survey that you may receive in the mail after your visit with us. Thank you!             Your Updated Medication List - Protect others around you: Learn how to safely use, store and throw away your medicines at www.disposemymeds.org.          This list is accurate as of 4/6/18  4:56 PM.  Always use your most recent med list.                   Brand Name Dispense Instructions for use Diagnosis    albuterol 108 (90 BASE) MCG/ACT Inhaler    PROAIR HFA/PROVENTIL HFA/VENTOLIN HFA    1 Inhaler    Inhale 2 puffs into the lungs every 4 hours as needed for shortness of breath / dyspnea    Wheezes       cetirizine-psuedoePHEDrine  5-120 MG per 12 hr tablet    zyrTEC-D    60 tablet    Take 1 tablet by mouth 2 times daily Hold on file until needed    Congestion of paranasal sinus       doxycycline 100 MG tablet    VIBRA-TABS    14 tablet    Take 1 tablet (100 mg) by mouth 2 times daily    Pneumonia of both lungs due to infectious organism, unspecified part of lung       EPINEPHrine 0.3 MG/0.3ML injection 2-pack    EPIPEN/ADRENACLICK/or ANY BX GENERIC EQUIV    0.6 mL    Inject 0.3 mLs (0.3 mg) into the muscle once as needed for anaphylaxis Hold on file until needed    Bee sting reaction, accidental or unintentional, subsequent encounter       guaiFENesin-codeine 100-10 MG/5ML Soln solution    ROBITUSSIN AC    240 mL    Take 5-10 mLs by mouth every 4 hours as needed for cough (cough)        ibuprofen 200 MG tablet    ADVIL/MOTRIN    120 tablet    Take 3 tablets (600 mg) by mouth every 6 hours as needed for mild pain    Urethral hypermobility, Urinary, incontinence, stress female       levofloxacin 750 MG tablet    LEVAQUIN    7 tablet    Take 1 tablet (750 mg) by mouth daily    Pneumonia of both lungs due to infectious organism, unspecified part of lung       oxyCODONE-acetaminophen 5-325 MG per tablet    PERCOCET    20 tablet    Take 1-2 tablets by mouth nightly as needed for pain maximum 2 tablet(s) per day    Acute right-sided low back pain without sciatica, Muscle tightness       tiZANidine 4 MG tablet    ZANAFLEX    30 tablet    Take 1 tablet (4 mg) by mouth 3 times daily as needed for muscle spasms    Acute right-sided low back pain without sciatica, Muscle tightness

## 2018-04-06 NOTE — NURSING NOTE
"Chief Complaint   Patient presents with     ER F/U     [facility] - 4/2/2018 - Reason:  Community acquired pneumonia of right upper lobe of lung (H)      URI     Follow up E.D.- Above       Initial /88 (BP Location: Right arm, Patient Position: Chair, Cuff Size: Adult Large)  Pulse 86  Temp 98.8  F (37.1  C) (Tympanic)  SpO2 98%  Breastfeeding? No Estimated body mass index is 37.87 kg/(m^2) as calculated from the following:    Height as of 3/28/18: 5' 2.75\" (1.594 m).    Weight as of 4/2/18: 212 lb 1.3 oz (96.2 kg).  Medication Reconciliation: complete   Kristen Joiner CMA (BECKIE)   (aka: Betsy Joiner)      "

## 2018-04-06 NOTE — NURSING NOTE
"Chief Complaint   Patient presents with     ER F/U     [facility] - 4/2/2018 - Reason:  Community acquired pneumonia of right upper lobe of lung (H)      URI     Follow up E.D.- Above       Initial /88 (BP Location: Right arm, Patient Position: Chair, Cuff Size: Adult Large)  Pulse 86  Temp 98.8  F (37.1  C) (Tympanic)  SpO2 98%  Breastfeeding? No Estimated body mass index is 37.87 kg/(m^2) as calculated from the following:    Height as of 3/28/18: 5' 2.75\" (1.594 m).    Weight as of 4/2/18: 212 lb 1.3 oz (96.2 kg).  Medication Reconciliation: complete   Kristen Joiner CMA (BECKIE)   (aka: Betsy Joiner)      "

## 2018-04-06 NOTE — NURSING NOTE
Pt returned to clinic after her CT.  Peripheral IV removed without difficulty and intact.  Pt tolerated well.  Mayela Reyes RN

## 2018-04-07 LAB
B PERT+PARAPERT DNA PNL SPEC NAA+PROBE: NEGATIVE
SPECIMEN SOURCE: NORMAL

## 2018-04-10 ENCOUNTER — MYC MEDICAL ADVICE (OUTPATIENT)
Dept: FAMILY MEDICINE | Facility: CLINIC | Age: 48
End: 2018-04-10

## 2018-04-10 ENCOUNTER — TELEPHONE (OUTPATIENT)
Dept: FAMILY MEDICINE | Facility: CLINIC | Age: 48
End: 2018-04-10

## 2018-04-10 DIAGNOSIS — J18.9 PNEUMONIA OF BOTH LUNGS DUE TO INFECTIOUS ORGANISM, UNSPECIFIED PART OF LUNG: Primary | ICD-10-CM

## 2018-04-10 RX ORDER — ALBUTEROL SULFATE 0.83 MG/ML
1 SOLUTION RESPIRATORY (INHALATION) EVERY 6 HOURS PRN
Qty: 25 VIAL | Refills: 0 | Status: SHIPPED | OUTPATIENT
Start: 2018-04-10 | End: 2019-11-26

## 2018-04-10 RX ORDER — ALBUTEROL SULFATE 0.83 MG/ML
1 SOLUTION RESPIRATORY (INHALATION) EVERY 6 HOURS PRN
Qty: 25 VIAL | Refills: 0 | Status: SHIPPED | OUTPATIENT
Start: 2018-04-10 | End: 2018-04-10

## 2018-04-10 NOTE — TELEPHONE ENCOUNTER
"  Fever: \"I don't think I have fever\"    Chills/Sweats: \"hot flashes\"    Headache (location?): yes, thinks it's from coughing    Sinus Pressure:no    Ear Pain: More popping than pain, sounds like congestion     Rhinorrhea: Occasionally but not consistent     Congestion: yes    Sore Throat: yes  -likely due to all the coughing. Cough drops don't help. Has tried    Cough: YES    Wheeze: Occasionally     Decreased Appetite: not hungry, I eat mor for substance than because I want to.\"    Nausea: no    Vomiting: no    Diarrhea: \"looser than normal but not to that extent\"     Fatigue/Achiness: YES    Sick/Strep Exposure: no    Chest pain, \"sometimes\" a \"sharp stabbing on left side\" sporadic, not always when she is coughing. \"heavy, I feel like someone is sitting on me.\"       S-(situation): Pt sent [employer] message requesting nebulizer. Writer called to assess.    B-(background): Seen 4/6/18 - bilateral pneumonia after influenza. Currently on levofloxacin and doxycycline.    A-(assessment): See above.   \"I think it's not getting any worse.\"   Gets \"worn down really quickly.\"  Robitussin-AC does help at night.      R-(recommendations): Routing to DANIKA Mcbride CNP for review/recommendation.     Autumn OBREGON RN        "

## 2018-04-10 NOTE — TELEPHONE ENCOUNTER
Order placed for albuterol nebulizer medicine. Nebs aren't always helpful for pneumonia.  I would expect at least some improvement with cough and breathing by Thursday, if not she should be seen

## 2018-04-10 NOTE — TELEPHONE ENCOUNTER
Please forward to Dr. Lee- appears she saw patient on Friday 4/6/18- I never saw patient for this.

## 2018-04-10 NOTE — TELEPHONE ENCOUNTER
Rerouted nebulizer medication to [facility] in [place].  Reprinted DME order and signed.  Faxed to [facility] per pt request.      Pt aware of need to be seen in clinic if not improved by Thursday.      Jenn DIAZ RN

## 2018-04-11 NOTE — TELEPHONE ENCOUNTER
Prescription for DME faxed to pharmacy. DANIEL for pt.    [facility]

## 2018-04-12 ENCOUNTER — MYC MEDICAL ADVICE (OUTPATIENT)
Dept: FAMILY MEDICINE | Facility: CLINIC | Age: 48
End: 2018-04-12

## 2018-04-12 NOTE — TELEPHONE ENCOUNTER
Spoke with patient. Discussed that due to lack of improvement on 2 courses of 2 broad spectrum antibiotic, she will likely need hospital admission.  Her symptoms are worse with cough, fatigue.  Shortness of breath with coughing fits.  Advised ER visit with likely hospitalization but she wants to give it 1 more night.  Discussed risk/benefits w/pt.

## 2018-04-13 ENCOUNTER — APPOINTMENT (OUTPATIENT)
Dept: CT IMAGING | Facility: CLINIC | Age: 48
End: 2018-04-13
Attending: FAMILY MEDICINE
Payer: COMMERCIAL

## 2018-04-13 ENCOUNTER — MYC MEDICAL ADVICE (OUTPATIENT)
Dept: FAMILY MEDICINE | Facility: CLINIC | Age: 48
End: 2018-04-13

## 2018-04-13 ENCOUNTER — HOSPITAL ENCOUNTER (EMERGENCY)
Facility: CLINIC | Age: 48
Discharge: HOME | End: 2018-04-13
Attending: FAMILY MEDICINE | Admitting: FAMILY MEDICINE
Payer: COMMERCIAL

## 2018-04-13 VITALS
BODY MASS INDEX: 37.68 KG/M2 | TEMPERATURE: 97.7 F | DIASTOLIC BLOOD PRESSURE: 96 MMHG | HEART RATE: 81 BPM | OXYGEN SATURATION: 100 % | WEIGHT: 211 LBS | SYSTOLIC BLOOD PRESSURE: 159 MMHG | RESPIRATION RATE: 16 BRPM

## 2018-04-13 DIAGNOSIS — J18.9 COMMUNITY ACQUIRED PNEUMONIA OF RIGHT LUNG, UNSPECIFIED PART OF LUNG: ICD-10-CM

## 2018-04-13 LAB
ANION GAP SERPL CALCULATED.3IONS-SCNC: 6 MMOL/L (ref 3–14)
BASOPHILS # BLD AUTO: 0 10E9/L (ref 0–0.2)
BASOPHILS NFR BLD AUTO: 0.3 %
BUN SERPL-MCNC: 10 MG/DL (ref 7–30)
CALCIUM SERPL-MCNC: 8.4 MG/DL (ref 8.5–10.1)
CHLORIDE SERPL-SCNC: 110 MMOL/L (ref 94–109)
CO2 SERPL-SCNC: 25 MMOL/L (ref 20–32)
CREAT SERPL-MCNC: 0.66 MG/DL (ref 0.52–1.04)
CRP SERPL-MCNC: 4.8 MG/L (ref 0–8)
DIFFERENTIAL METHOD BLD: NORMAL
EOSINOPHIL # BLD AUTO: 0.1 10E9/L (ref 0–0.7)
EOSINOPHIL NFR BLD AUTO: 1.2 %
ERYTHROCYTE [DISTWIDTH] IN BLOOD BY AUTOMATED COUNT: 13.2 % (ref 10–15)
GFR SERPL CREATININE-BSD FRML MDRD: >90 ML/MIN/1.7M2
GLUCOSE SERPL-MCNC: 82 MG/DL (ref 70–99)
HCT VFR BLD AUTO: 42.2 % (ref 35–47)
HGB BLD-MCNC: 14.4 G/DL (ref 11.7–15.7)
IMM GRANULOCYTES # BLD: 0 10E9/L (ref 0–0.4)
IMM GRANULOCYTES NFR BLD: 0.1 %
LYMPHOCYTES # BLD AUTO: 3.3 10E9/L (ref 0.8–5.3)
LYMPHOCYTES NFR BLD AUTO: 35.1 %
MCH RBC QN AUTO: 31.6 PG (ref 26.5–33)
MCHC RBC AUTO-ENTMCNC: 34.1 G/DL (ref 31.5–36.5)
MCV RBC AUTO: 93 FL (ref 78–100)
MONOCYTES # BLD AUTO: 0.5 10E9/L (ref 0–1.3)
MONOCYTES NFR BLD AUTO: 5.3 %
NEUTROPHILS # BLD AUTO: 5.4 10E9/L (ref 1.6–8.3)
NEUTROPHILS NFR BLD AUTO: 58 %
PLATELET # BLD AUTO: 191 10E9/L (ref 150–450)
POTASSIUM SERPL-SCNC: 3.8 MMOL/L (ref 3.4–5.3)
RBC # BLD AUTO: 4.55 10E12/L (ref 3.8–5.2)
SODIUM SERPL-SCNC: 141 MMOL/L (ref 133–144)
WBC # BLD AUTO: 9.3 10E9/L (ref 4–11)

## 2018-04-13 PROCEDURE — 86140 C-REACTIVE PROTEIN: CPT | Performed by: FAMILY MEDICINE

## 2018-04-13 PROCEDURE — 71250 CT THORAX DX C-: CPT

## 2018-04-13 PROCEDURE — 86606 ASPERGILLUS ANTIBODY: CPT | Performed by: FAMILY MEDICINE

## 2018-04-13 PROCEDURE — 86698 HISTOPLASMA ANTIBODY: CPT | Performed by: FAMILY MEDICINE

## 2018-04-13 PROCEDURE — 85025 COMPLETE CBC W/AUTO DIFF WBC: CPT | Performed by: FAMILY MEDICINE

## 2018-04-13 PROCEDURE — 86612 BLASTOMYCES ANTIBODY: CPT | Performed by: FAMILY MEDICINE

## 2018-04-13 PROCEDURE — 86635 COCCIDIOIDES ANTIBODY: CPT | Performed by: FAMILY MEDICINE

## 2018-04-13 PROCEDURE — 80048 BASIC METABOLIC PNL TOTAL CA: CPT | Performed by: FAMILY MEDICINE

## 2018-04-13 PROCEDURE — 25000132 ZZH RX MED GY IP 250 OP 250 PS 637: Performed by: FAMILY MEDICINE

## 2018-04-13 PROCEDURE — 99284 EMERGENCY DEPT VISIT MOD MDM: CPT | Mod: 25 | Performed by: FAMILY MEDICINE

## 2018-04-13 PROCEDURE — 99284 EMERGENCY DEPT VISIT MOD MDM: CPT | Mod: Z6 | Performed by: FAMILY MEDICINE

## 2018-04-13 RX ORDER — KETOROLAC TROMETHAMINE 30 MG/ML
15 INJECTION, SOLUTION INTRAMUSCULAR; INTRAVENOUS ONCE
Status: DISCONTINUED | OUTPATIENT
Start: 2018-04-13 | End: 2018-04-13 | Stop reason: HOSPADM

## 2018-04-13 RX ORDER — PREDNISONE 20 MG/1
30 TABLET ORAL 2 TIMES DAILY
Qty: 21 TABLET | Refills: 0 | Status: SHIPPED | OUTPATIENT
Start: 2018-04-13 | End: 2018-04-20

## 2018-04-13 RX ORDER — IPRATROPIUM BROMIDE AND ALBUTEROL SULFATE 2.5; .5 MG/3ML; MG/3ML
3 SOLUTION RESPIRATORY (INHALATION) ONCE
Status: DISCONTINUED | OUTPATIENT
Start: 2018-04-13 | End: 2018-04-13 | Stop reason: HOSPADM

## 2018-04-13 RX ORDER — ACETAMINOPHEN 500 MG
1000 TABLET ORAL ONCE
Status: COMPLETED | OUTPATIENT
Start: 2018-04-13 | End: 2018-04-13

## 2018-04-13 RX ADMIN — ACETAMINOPHEN 1000 MG: 500 TABLET ORAL at 19:08

## 2018-04-13 NOTE — ED AVS SNAPSHOT
[facility] Emergency Department    [address] [address]    Phone:  [phone]    Fax:  [phone]                                       Dakota Berkowitz   MRN: [id number]    Department:  [facility] Emergency Department   Date of Visit:  4/13/2018           Patient Information     Date Of Birth          1970        Your diagnoses for this visit were:     Community acquired pneumonia of right lung, unspecified part of lung        You were seen by Nile Alvarenga MD.      Follow-up Information     Follow up with Pulmonology, [facility].        Discharge Instructions       Return to the Emergency Room if the following occurs:     Worsened breathing, dehydration, or for any concern at anytime.    Or, follow-up with the following provider as we discussed:     Return to your primary doctor in 7-10 days for reevaluation.  If you are not improved at that time, the recommendation is that you follow up with pulmonology.  I would call the pulmonology service at the [facility] and schedule an appointment to have it in place.  I would try to make both phone calls (your primary doctor and the pulmonologist) on Monday.  See contact information for pulmonology.    Medications discussed:    Prednisone 30 mg twice a day for 7 days.    If you received pain-relieving or sedating medication during your time in the ER, avoid alcohol, driving automobiles, or working with machinery.  Also, a responsible adult must stay with you.        Call the Nurse Advice Line at [phone] or [phone] for any concern at anytime.      24 Hour Appointment Hotline       To make an appointment at any [facility], call [phone] ([phone]). If you don't have a family doctor or clinic, we will help you find one. [place] clinics are conveniently located to serve the needs of you and your family.             Review of your medicines      START taking        Dose / Directions Last dose  taken    predniSONE 20 MG tablet   Commonly known as:  DELTASONE   Dose:  30 mg   Quantity:  21 tablet        Take 1.5 tablets (30 mg) by mouth 2 times daily for 7 days   Refills:  0          Our records show that you are taking the medicines listed below. If these are incorrect, please call your family doctor or clinic.        Dose / Directions Last dose taken    * albuterol 108 (90 Base) MCG/ACT Inhaler   Commonly known as:  PROAIR HFA/PROVENTIL HFA/VENTOLIN HFA   Dose:  2 puff   Quantity:  1 Inhaler        Inhale 2 puffs into the lungs every 4 hours as needed for shortness of breath / dyspnea   Refills:  0        * albuterol (2.5 MG/3ML) 0.083% neb solution   Dose:  1 vial   Quantity:  25 vial        Take 1 vial (2.5 mg) by nebulization every 6 hours as needed for shortness of breath / dyspnea or wheezing   Refills:  0        cetirizine-psuedoePHEDrine 5-120 MG per 12 hr tablet   Commonly known as:  zyrTEC-D   Dose:  1 tablet   Quantity:  60 tablet        Take 1 tablet by mouth 2 times daily Hold on file until needed   Refills:  5        EPINEPHrine 0.3 MG/0.3ML injection 2-pack   Commonly known as:  EPIPEN/ADRENACLICK/or ANY BX GENERIC EQUIV   Dose:  0.3 mg   Quantity:  0.6 mL        Inject 0.3 mLs (0.3 mg) into the muscle once as needed for anaphylaxis Hold on file until needed   Refills:  3        guaiFENesin-codeine 100-10 MG/5ML Soln solution   Commonly known as:  ROBITUSSIN AC   Dose:  1-2 tsp.   Quantity:  240 mL        Take 5-10 mLs by mouth every 4 hours as needed for cough (cough)   Refills:  0        ibuprofen 200 MG tablet   Commonly known as:  ADVIL/MOTRIN   Dose:  600 mg   Quantity:  120 tablet        Take 3 tablets (600 mg) by mouth every 6 hours as needed for mild pain   Refills:  0        order for DME   Quantity:  1 each        Equipment being ordered: Nebulizer   Refills:  0        tiZANidine 4 MG tablet   Commonly known as:  ZANAFLEX   Dose:  4 mg   Quantity:  30 tablet        Take 1 tablet (4  mg) by mouth 3 times daily as needed for muscle spasms   Refills:  0        * Notice:  This list has 2 medication(s) that are the same as other medications prescribed for you. Read the directions carefully, and ask your doctor or other care provider to review them with you.            Prescriptions were sent or printed at these locations (1 Prescription)                   [facility] - [place], [place] - [address] AT [facility] [address] [facility] [address]   [address] [address]    Telephone:  [phone]   Fax:  [phone]   Hours:                  Printed at Department/Unit printer (1 of 1)         predniSONE (DELTASONE) 20 MG tablet                Procedures and tests performed during your visit     Basic metabolic panel    CBC with platelets, differential    CRP Inflammation    CT Chest w/o Contrast    Fungal antibodies      Orders Needing Specimen Collection     None      Pending Results     Date and Time Order Name Status Description    4/13/2018 1830 Fungal antibodies In process     4/13/2018 1830 Basic metabolic panel In process     4/13/2018 1830 CRP Inflammation In process     4/13/2018 1830 CBC with platelets, differential In process     4/13/2018 1527 CT Chest w/o Contrast Preliminary             Pending Culture Results     No orders found from 4/11/2018 to 4/14/2018.            Pending Results Instructions     If you had any lab results that were not finalized at the time of your Discharge, you can call the ED Lab Result RN at [phone]. You will be contacted by this team for any positive Lab results or changes in treatment. The nurses are available 7 days a week from 10A to 6:30P.  You can leave a message 24 hours per day and they will return your call.        Test Results From Your Hospital Stay        4/13/2018  5:13 PM      Narrative     CT CHEST WITHOUT CONTRAST 4/13/2018 4:59 PM     HISTORY: Persistent cough after treated with antibiotics  for  pneumonia.    TECHNIQUE: Helical axial scans from lung apices through lung bases  without contrast. Radiation dose for this scan was reduced using  automated exposure control, adjustment of the mA and/or kV according  to patient size, or iterative reconstruction technique.    COMPARISON: 4/6/2018.     FINDINGS: The mediastinal and hilar structures without contrast are  unremarkable and unchanged. The previously seen ill-defined nodular  infiltrate in the right upper lobe has mildly improved but has not  resolved. Minimal nodular infiltrate in the medial right lower lobe on  the prior exam has almost completely resolved. A small focus of  possible infiltrate or atelectasis in the left lower lobe  posterolaterally on the prior study has completely resolved. There are  no new infiltrates or mass lesions. Visualized upper abdomen shows no  acute abnormality or significant changes.        Impression     IMPRESSION:  1. Some improvement of right upper lobe and right lower lobe mild  patchy nodular infiltrates and resolution of minimal infiltrate or  atelectasis in the left lower lobe since the prior exam. No new  infiltrates.  2. No other significant abnormalities or changes.         4/13/2018  6:58 PM         4/13/2018  6:58 PM         4/13/2018  6:58 PM         4/13/2018  6:58 PM                Thank you for choosing [facility]       Thank you for choosing [facility] for your care. Our goal is always to provide you with excellent care. Hearing back from our patients is one way we can continue to improve our services. Please take a few minutes to complete the written survey that you may receive in the mail after you visit with us. Thank you!        ZEturfhart Information     [employer] gives you secure access to your electronic health record. If you see a primary care provider, you can also send messages to your care team and make appointments. If you have questions, please call your primary care clinic.  If you do not have a  primary care provider, please call [phone] and they will assist you.        Care EveryWhere ID     This is your Care EveryWhere ID. This could be used by other organizations to access your [place] medical records  [id number]        Equal Access to Services     COOKIE EARL : Lela Lobato, kelvin sanabria, roberta kaalsantino garay, alison morrow. So [facility] [phone].    ATENCIÓN: Si habla español, tiene a flores disposición servicios gratuitos de asistencia lingüística. Llame al [phone].    We comply with applicable federal civil rights laws and [place] laws. We do not discriminate on the basis of race, color, national origin, age, disability, sex, sexual orientation, or gender identity.            After Visit Summary       This is your record. Keep this with you and show to your community pharmacist(s) and doctor(s) at your next visit.

## 2018-04-13 NOTE — ED NOTES
Pt has been on 4 different abx without resolution. Pt present to ED for admission per primary care. Pt having coughing, SOA, and mild WOB noted at rest. Hx in the last 2 mos of influenza. Pt denies taking steroids.

## 2018-04-13 NOTE — ED PROVIDER NOTES
"HPI  Is a 47-year-old female presenting with persistent cough and chest pressure.  She was seen on 4/2 in the ED and diagnosed with right-sided pneumonia by chest x-ray.  She was given azithromycin and fluconazole and Augmentin.  She was seen again on 4/6 and CT scan without contrast was performed.  There was worsened bilateral pneumonia seen.  She started Levaquin and doxycycline.  She finished these 2 antibiotics about 2 days ago.  She has been using beta agonist neb at night.  She has not received steroid medication.  No history of asthma or COPD.  Known history of influenza diagnosed on 3/7.  Not a smoker.  No drugs.  Occasional alcohol, none recently.  No recent travel.    The patient describes a persistent, dry cough.  She has midline chest pressure.  She has a fever occasionally, most recently on Wednesday 2 days ago.  She describes shortness of breath with walking.  She describes wheezing.  No headache.  No eye pain.  No visual changes.  No neck pain.  No sore throat.  No nasal congestion or rhinorrhea.  No facial pressure or pain.  No back pain.  No abdominal pain.  No nausea.  She did throw up with coughing.  Loose stools described but \"not diarrhea.\"  No hematochezia or melena.  No urinary symptoms.  No vaginal symptoms.  No skin rash.  No trauma or injury.    ROS: All other review of systems are negative other than that noted above.     Past Medical History:   Diagnosis Date     NO ACTIVE PROBLEMS      Past Surgical History:   Procedure Laterality Date     C APPENDECTOMY       COSMETIC SURGERY      breast implants     CYSTOSCOPY, SLING TRANSVAGINAL N/A 2/8/2016    Procedure: CYSTOSCOPY, SLING TRANSVAGINAL;  Surgeon: Sterling Hill MD;  Location: [facility]     EYE SURGERY       [facility] REPAIR OF NASAL SEPTUM       SURGICAL HISTORY OF -       laporoscopy due to endometriosis     SURGICAL HISTORY OF -       endometrial ablation     SURGICAL HISTORY OF -       breast augmentation     SURGICAL HISTORY OF -    "    benign tumor removed from her left thigh     TONSILLECTOMY       TUBAL LIGATION       Family History   Problem Relation Age of Onset     CANCER Mother      ovarian     CANCER Paternal Grandmother      lung     Gynecology Sister      having a partial hysterectomy due to abnormal cells, leaving ovaries and cervix     Gynecology Daughter      endometriosis     Gynecology Sister      hysterectomy for endometriosis     Social History   Substance Use Topics     Smoking status: Never Smoker     Smokeless tobacco: Never Used     Alcohol use Yes      Comment: occassionally on weekends         PHYSICAL  BP (!) 159/96  Pulse 81  Temp 97.7  F (36.5  C) (Oral)  Resp 16  Wt 95.7 kg (211 lb)  SpO2 100%  BMI 37.68 kg/m2  General: Patient is alert and in moderate to severe distress.  Coughing incessantly.  She has difficulty talking because of it.  Dry, hacking cough.  Wheeze with coughing.  Neurological: Alert.  Moving upper and lower extremities equally, bilaterally.  Head / Neck: Atraumatic.  Ears: Not done.  Eyes: Pupils are equal, round, and reactive.  Normal conjunctiva.  Nose: Midline.  No epistaxis.  Mouth / Throat: No ulcerations or lesions.  Upper pharynx is not erythematous.  Moist.  Respiratory: No respiratory distress septum with coughing. Wheeze.  Cardiovascular: Regular rhythm.  Peripheral extremities are warm.  No edema.  No calf tenderness.  Abdomen / Pelvis: Not tender.  No distention.  Soft throughout.  Genitalia: Not done.  Musculoskeletal: No tenderness over major muscles and joints.  Skin: No evidence of rash or trauma.        ED COURSE  1531.  Patient has been on 5 antibiotics since 4/2.  She recently finished Levaquin and doxycycline.  Repeat CT scan today.  IV lock established.  Lab values pending.    Labs Ordered and Resulted from Time of ED Arrival Up to the Time of Departure from the ED - No data to display    IMAGING  Images reviewed by me.  Radiology report also reviewed.  CT Chest w/o Contrast    Preliminary Result   IMPRESSION:   1. Some improvement of right upper lobe and right lower lobe mild   patchy nodular infiltrates and resolution of minimal infiltrate or   atelectasis in the left lower lobe since the prior exam. No new   infiltrates.   2. No other significant abnormalities or changes.        1639.  Patient has not had her scan performed yet.  We are quite busy in the department and I updated the patient on this situation.  She seems frustrated but excepting.  Toradol ordered.    1833.  I spoke with Dr. Spence who is with pulmonology at the [facility].  I reviewed the patient's history, physical, and CT imaging.  She was able to look at the imaging herself as well.  Recommendation was that we not to provide further antibiotics at this time.  She believes the patient will improve best with steroid medication.  Prednisone 30 mg twice a day for 7 days was prescribed.  Close follow up with her primary doctor in 7-10 days recommended.  If not improving at that time, follow-up with pulmonology would be warranted.  I did provide contact information for this.  Also, the pulmonologist recommended no repeat CT imaging until about 1 month after today's visit.  I reviewed all of this with the patient and she agrees with the plan.  Also, basic lab values including fungal antibodies were ordered prior to discharge.    Medications   ipratropium - albuterol 0.5 mg/2.5 mg/3 mL (DUONEB) neb solution 3 mL (not administered)   ketorolac (TORADOL) injection 15 mg (not administered)   acetaminophen (TYLENOL) tablet 1,000 mg (not administered)       IMPRESSION    ICD-10-CM    1. Community acquired pneumonia of right lung, unspecified part of lung J18.9            Critical Care time:  none                    Nile Alvarenga MD  04/13/18 [id number]

## 2018-04-13 NOTE — TELEPHONE ENCOUNTER
"\"When I talked to her yesterday, she said I had to go to the ER. If I come in and then admit me, will I have to be transferred? They said they had no beds yesterday and I don't want to be transferred..\"     \"I have Pneumonia and have been on 4 different antibiotics, so she wants me to be admitted for IV antibiotcis. If they send me to the ER, am I going to have to start over with a Dr who will have to start from the beginning?..\"     \"I don't want to be in the ER for 3 hours, get IV antibiotics and then they tell me I can go home..\"     I did call Med Surg and charge Nurse stated we do have at least 5 open beds as of now and ICU overflow can accommodate patients as well.     l did check with Dr Lee who stated she cannot do a Direct admission, so yes, patient will have to tell her whole story to ER MD, but this would be benificial as Provider can look at her case with a fresh set of eyes and may think of something that hasn't been checked.     Patient verbalized understanding. I also called ER Charge Nurse to let her know of patient's concerns and if possible, expect patient around 3:30 pm as that is what time patient felt she could get to ER. If ER MD's have time, patient requested ER review her chart prior to coming in. Yanira Lagos RN    "

## 2018-04-13 NOTE — ED AVS SNAPSHOT
[facility] Emergency Department    [address] [address]    Phone:  [phone]    Fax:  [phone]                                       Dakota Berkowitz   MRN: [id number]    Department:  [facility] Emergency Department   Date of Visit:  4/13/2018           After Visit Summary Signature Page     I have received my discharge instructions, and my questions have been answered. I have discussed any challenges I see with this plan with the nurse or doctor.    ..........................................................................................................................................  Patient/Patient Representative Signature      ..........................................................................................................................................  Patient Representative Print Name and Relationship to Patient    ..................................................               ................................................  Date                                            Time    ..........................................................................................................................................  Reviewed by Signature/Title    ...................................................              ..............................................  Date                                                            Time

## 2018-04-13 NOTE — DISCHARGE INSTRUCTIONS
Return to the Emergency Room if the following occurs:     Worsened breathing, dehydration, or for any concern at anytime.    Or, follow-up with the following provider as we discussed:     Return to your primary doctor in 7-10 days for reevaluation.  If you are not improved at that time, the recommendation is that you follow up with pulmonology.  I would call the pulmonology service at the [facility] and schedule an appointment to have it in place.  I would try to make both phone calls (your primary doctor and the pulmonologist) on Monday.  See contact information for pulmonology.    Medications discussed:    Prednisone 30 mg twice a day for 7 days.    If you received pain-relieving or sedating medication during your time in the ER, avoid alcohol, driving automobiles, or working with machinery.  Also, a responsible adult must stay with you.        Call the Nurse Advice Line at [phone] or [phone] for any concern at anytime.

## 2018-04-17 ENCOUNTER — MYC MEDICAL ADVICE (OUTPATIENT)
Dept: FAMILY MEDICINE | Facility: CLINIC | Age: 48
End: 2018-04-17

## 2018-04-17 DIAGNOSIS — K13.70 ORAL LESION: Primary | ICD-10-CM

## 2018-04-20 ENCOUNTER — MYC MEDICAL ADVICE (OUTPATIENT)
Dept: FAMILY MEDICINE | Facility: CLINIC | Age: 48
End: 2018-04-20

## 2018-04-20 NOTE — TELEPHONE ENCOUNTER
Dr. Rayo,    Please review patient's MyChart message. It looks like the CT chest has final results and the fungal antibodies is still in process at this time. Thank you.     Padma Kauffman RN

## 2018-04-20 NOTE — TELEPHONE ENCOUNTER
The best thing for Dakota to do is follow up with me, 10-14 days after being in the ED per the discharge note which was recommended.  The imaging for the CT scan is not released on EPIC until after 30 days.  We can review when I see her in clinic.    This was the result:  1. Some improvement of right upper lobe and right lower lobe mild  patchy nodular infiltrates and resolution of minimal infiltrate or  atelectasis in the left lower lobe since the prior exam. No new  infiltrates.  2. No other significant abnormalities or changes.

## 2018-05-04 ENCOUNTER — OFFICE VISIT (OUTPATIENT)
Dept: FAMILY MEDICINE | Facility: CLINIC | Age: 48
End: 2018-05-04
Payer: COMMERCIAL

## 2018-05-04 ENCOUNTER — HOSPITAL ENCOUNTER (OUTPATIENT)
Dept: MRI IMAGING | Facility: CLINIC | Age: 48
Discharge: HOME | End: 2018-05-04
Attending: OBSTETRICS & GYNECOLOGY | Admitting: OBSTETRICS & GYNECOLOGY
Payer: COMMERCIAL

## 2018-05-04 VITALS
DIASTOLIC BLOOD PRESSURE: 77 MMHG | OXYGEN SATURATION: 97 % | WEIGHT: 212.8 LBS | HEIGHT: 63 IN | TEMPERATURE: 97.9 F | BODY MASS INDEX: 37.7 KG/M2 | SYSTOLIC BLOOD PRESSURE: 111 MMHG | HEART RATE: 76 BPM

## 2018-05-04 DIAGNOSIS — Z01.419 ENCOUNTER FOR GYNECOLOGICAL EXAMINATION WITHOUT ABNORMAL FINDING: ICD-10-CM

## 2018-05-04 DIAGNOSIS — R09.81 NASAL CONGESTION: Primary | ICD-10-CM

## 2018-05-04 PROCEDURE — 77059 MR BREAST BILATERAL W/O & W CONTRAST: CPT

## 2018-05-04 PROCEDURE — A9585 GADOBUTROL INJECTION: HCPCS | Performed by: OBSTETRICS & GYNECOLOGY

## 2018-05-04 PROCEDURE — 25000128 H RX IP 250 OP 636: Performed by: OBSTETRICS & GYNECOLOGY

## 2018-05-04 PROCEDURE — 99213 OFFICE O/P EST LOW 20 MIN: CPT | Performed by: FAMILY MEDICINE

## 2018-05-04 RX ORDER — FLUTICASONE PROPIONATE 50 MCG
2 SPRAY, SUSPENSION (ML) NASAL DAILY
Qty: 1 BOTTLE | Refills: 5 | Status: SHIPPED | OUTPATIENT
Start: 2018-05-04 | End: 2019-06-21

## 2018-05-04 RX ORDER — GADOBUTROL 604.72 MG/ML
10 INJECTION INTRAVENOUS ONCE
Status: COMPLETED | OUTPATIENT
Start: 2018-05-04 | End: 2018-05-04

## 2018-05-04 RX ORDER — ACYCLOVIR 200 MG/1
60 CAPSULE ORAL ONCE
Status: DISCONTINUED | OUTPATIENT
Start: 2018-05-04 | End: 2018-05-04 | Stop reason: DRUGHIGH

## 2018-05-04 RX ADMIN — GADOBUTROL 8 ML: 604.72 INJECTION INTRAVENOUS at 09:36

## 2018-05-04 NOTE — MR AVS SNAPSHOT
After Visit Summary   5/4/2018    Dakota Berkowitz    MRN: [id number]           Patient Information     Date Of Birth          1970        Visit Information        Provider Department      5/4/2018 9:20 AM Chino Rayo MD [facility]        Today's Diagnoses     Nasal congestion    -  1      Care Instructions    Please try the nasal steroid, Flonase taking 2 sprays each nostril once a day.    Drink fluids.          Thank you for choosing [facility]  You may be receiving a survey in the mail from [employer]ud[employer] regarding your visit today.  Please take a few minutes to complete and return the survey to let us know how we are doing.      If you have questions or concerns, please contact us via [employer] or you can contact your care team at [phone].    Our Clinic hours are:  Monday 6:40 am  to 7:00 pm  Tuesday -Friday 6:40 am to 5:00 pm    The [place] outpatient lab hours are:  Monday - Friday 6:10 am to 4:45 pm  Saturdays 7:00 am to 11:00 am  Appointments are required, call [phone]    If you have clinical questions after hours or would like to schedule an appointment,  call the clinic at [phone].            Follow-ups after your visit        Your next 10 appointments already scheduled     May 15, 2018  4:20 PM CDT   New Visit with Socorro Way PA-C   [facility]    [address] [address] [phone]              Future tests that were ordered for you today     Open Future Orders        Priority Expected Expires Ordered    MR Breast Bilateral w/o & w Contrast Routine  3/28/2019 3/28/2018            Who to contact     If you have questions or need follow up information about today's clinic visit or your schedule please contact [facility] directly at [phone].  Normal or non-critical lab and imaging results will be communicated to you by MyChart, letter or phone within 4  "business days after the clinic has received the results. If you do not hear from us within 7 days, please contact the clinic through [employer] or phone. If you have a critical or abnormal lab result, we will notify you by phone as soon as possible.  Submit refill requests through [employer] or call your pharmacy and they will forward the refill request to us. Please allow 3 business days for your refill to be completed.          Additional Information About Your Visit        [employer] Information     [employer] gives you secure access to your electronic health record. If you see a primary care provider, you can also send messages to your care team and make appointments. If you have questions, please call your primary care clinic.  If you do not have a primary care provider, please call [phone] and they will assist you.        Care EveryWhere ID     This is your Care EveryWhere ID. This could be used by other organizations to access your [facility] medical records  [id number]        Your Vitals Were     Pulse Temperature Height Pulse Oximetry BMI (Body Mass Index)       76 97.9  F (36.6  C) (Tympanic) 5' 2.75\" (1.594 m) 97% 38 kg/m2        Blood Pressure from Last 3 Encounters:   05/04/18 111/77   04/13/18 (!) 159/96   04/06/18 136/88    Weight from Last 3 Encounters:   05/04/18 212 lb 12.8 oz (96.5 kg)   04/13/18 211 lb (95.7 kg)   04/02/18 212 lb 1.3 oz (96.2 kg)              Today, you had the following     No orders found for display         Today's Medication Changes          These changes are accurate as of 5/4/18 10:09 AM.  If you have any questions, ask your nurse or doctor.               Start taking these medicines.        Dose/Directions    fluticasone 50 MCG/ACT spray   Commonly known as:  FLONASE   Used for:  Nasal congestion   Started by:  Chino Rayo MD        Dose:  2 spray   Spray 2 sprays into both nostrils daily   Quantity:  1 Bottle   Refills:  5            Where to get your medicines    "   These medications were sent to [id number] - [place], [place] - [address]  [address], [facility] [address]     Phone:  [phone]     fluticasone 50 MCG/ACT spray                Primary Care Provider Office Phone # Fax #    Chino Rayo [phone] [address] [address]        Equal Access to Services     COOKIE EARL : Hadii aad ku hadasho Soomaali, waaxda luqadaha, qaybta kaalmada adeegyada, waxay idiin hayaan adeeg kharash lajustice morrow. So [facility] [phone].    ATENCIÓN: Si habla español, tiene a flores disposición servicios gratuitos de asistencia lingüística. [facility] [phone].    We comply with applicable federal civil rights laws and [place] laws. We do not discriminate on the basis of race, color, national origin, age, disability, sex, sexual orientation, or gender identity.            Thank you!     Thank you for choosing [facility]  for your care. Our goal is always to provide you with excellent care. Hearing back from our patients is one way we can continue to improve our services. Please take a few minutes to complete the written survey that you may receive in the mail after your visit with us. Thank you!             Your Updated Medication List - Protect others around you: Learn how to safely use, store and throw away your medicines at www.disposemymeds.org.          This list is accurate as of 5/4/18 10:09 AM.  Always use your most recent med list.                   Brand Name Dispense Instructions for use Diagnosis    * albuterol 108 (90 Base) MCG/ACT Inhaler    PROAIR HFA/PROVENTIL HFA/VENTOLIN HFA    1 Inhaler    Inhale 2 puffs into the lungs every 4 hours as needed for shortness of breath / dyspnea    Wheezes       * albuterol (2.5 MG/3ML) 0.083% neb solution     25 vial    Take 1 vial (2.5 mg) by nebulization every 6 hours as needed for shortness of breath / dyspnea or  wheezing    Pneumonia of both lungs due to infectious organism, unspecified part of lung       cetirizine-psuedoePHEDrine 5-120 MG per 12 hr tablet    zyrTEC-D    60 tablet    Take 1 tablet by mouth 2 times daily Hold on file until needed    Congestion of paranasal sinus       EPINEPHrine 0.3 MG/0.3ML injection 2-pack    EPIPEN/ADRENACLICK/or ANY BX GENERIC EQUIV    0.6 mL    Inject 0.3 mLs (0.3 mg) into the muscle once as needed for anaphylaxis Hold on file until needed    Bee sting reaction, accidental or unintentional, subsequent encounter       fluticasone 50 MCG/ACT spray    FLONASE    1 Bottle    Spray 2 sprays into both nostrils daily    Nasal congestion       guaiFENesin-codeine 100-10 MG/5ML Soln solution    ROBITUSSIN AC    240 mL    Take 5-10 mLs by mouth every 4 hours as needed for cough (cough)        ibuprofen 200 MG tablet    ADVIL/MOTRIN    120 tablet    Take 3 tablets (600 mg) by mouth every 6 hours as needed for mild pain    Urethral hypermobility, Urinary, incontinence, stress female       order for DME     1 each    Equipment being ordered: Nebulizer    Pneumonia of both lungs due to infectious organism, unspecified part of lung       tiZANidine 4 MG tablet    ZANAFLEX    30 tablet    Take 1 tablet (4 mg) by mouth 3 times daily as needed for muscle spasms    Acute right-sided low back pain without sciatica, Muscle tightness       * Notice:  This list has 2 medication(s) that are the same as other medications prescribed for you. Read the directions carefully, and ask your doctor or other care provider to review them with you.

## 2018-05-04 NOTE — PROGRESS NOTES
"  SUBJECTIVE:   Dakota Berkowitz is a 47 year old female who presents to clinic today for the following health issues:    Chief Complaint   Patient presents with     Hospital F/U     E/D follow up      Health Maintenance     Mammogram was done today          ED/UC Followup:    Facility:  [facility]   Date of visit: 4/2 and 4/13   Reason for visit: pneumonia of right upper lobe of lung   Current Status: Feeling congested, shortness of breath if she over  exerts herself      Has no fever or chills.  Has congestion in the face.  occasional  Cough. She is wondering about her office causing her respiratory illness. Wondering about mold.  She has in the office since August 2017.  She seems to have recurrent respiratory problems.  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          Problem list and histories reviewed & adjusted, as indicated.  Additional history: as documented        Reviewed and updated as needed this visit by clinical staff  Tobacco  Allergies  Med Hx  Surg Hx  Fam Hx  Soc Hx      Reviewed and updated as needed this visit by Provider         ROS:  CONSTITUTIONAL:NEGATIVE for fever, chills, change in weight  INTEGUMENTARY/SKIN: NEGATIVE for worrisome rashes, moles or lesions  ENT/MOUTH: as above  RESP:NEGATIVE for significant cough or SOB  CV: NEGATIVE for chest pain, palpitations or peripheral edema    OBJECTIVE:                                                    /77  Pulse 76  Temp 97.9  F (36.6  C) (Tympanic)  Ht 5' 2.75\" (1.594 m)  Wt 212 lb 12.8 oz (96.5 kg)  SpO2 97%  BMI 38 kg/m2  Body mass index is 38 kg/(m^2).  GENERAL APPEARANCE: healthy, alert and no distress  HENT: sounds nasally congestion, some clear pnd noted otherwise negative  NECK: no adenopathy, no asymmetry, masses, or scars and thyroid normal to palpation  RESP: lungs clear to auscultation - no rales, rhonchi or wheezes  CV: regular rates and rhythm, normal S1 S2, no S3 or S4 and no murmur, click or rub  SKIN: no suspicious lesions " or rashes  PSYCH: mentation appears normal and affect normal/bright         ASSESSMENT/PLAN:                                                    1. Nasal congestion  Could be due to allergies, trial of nasal steroid, monitor her symptoms regarding when she is in the office, if worse at work, talk with employer for further eval.  - fluticasone (FLONASE) 50 MCG/ACT spray; Spray 2 sprays into both nostrils daily  Dispense: 1 Bottle; Refill: 5      See Patient Instructions    Chino Rayo MD  [facility]

## 2018-05-04 NOTE — PATIENT INSTRUCTIONS
Please try the nasal steroid, Flonase taking 2 sprays each nostril once a day.    Drink fluids.          Thank you for choosing [facility].  You may be receiving a survey in the mail from [employer]ud[employer] regarding your visit today.  Please take a few minutes to complete and return the survey to let us know how we are doing.      If you have questions or concerns, please contact us via [employer] or you can contact your care team at [phone].    Our Clinic hours are:  Monday 6:40 am  to 7:00 pm  Tuesday -Friday 6:40 am to 5:00 pm    The [place] outpatient lab hours are:  Monday - Friday 6:10 am to 4:45 pm  Saturdays 7:00 am to 11:00 am  Appointments are required, call [phone]    If you have clinical questions after hours or would like to schedule an appointment,  call the clinic at [phone].

## 2018-05-08 ENCOUNTER — TELEPHONE (OUTPATIENT)
Dept: OBGYN | Facility: CLINIC | Age: 48
End: 2018-05-08

## 2018-05-08 NOTE — TELEPHONE ENCOUNTER
Emily from [facility] called asking that a RN/MD call the patient to let her know the results from her mammogram on 5/4/18 and to let her know that she needs additional imaging.  She can call [phone] to schedule.    Please advise - Dakota's # [phone]      [facility]  Clinic Station

## 2018-05-09 ENCOUNTER — MYC MEDICAL ADVICE (OUTPATIENT)
Dept: OBGYN | Facility: CLINIC | Age: 48
End: 2018-05-09

## 2018-05-10 NOTE — TELEPHONE ENCOUNTER
Reason for Call:  Other call back    Detailed comments: Pt states she received a call from Dr. Hill and she missed his call yesterday.  Wondering if he can call her back? Wants to know why she needs more imagining.    Phone Number Patient can be reached at: Home number on file [phone] (home)    Best Time: Will have her phone on her all day    Can we leave a detailed message on this number? YES    Call taken on 5/10/2018 at 11:21 AM by Raegan Hoang

## 2018-05-10 NOTE — TELEPHONE ENCOUNTER
Return call to patient.  Spoke with patient on the phone.  Patient advised of very small lesion noted on the MRI which radiologist is requesting additional images of.  Patient reports understanding.  Patient agreeable to to Breast US, does not want the Diagnostic Mammogram which is why she had the MRI to begin with.  Patient did schedule appointment.  Patient appreciative and thankful for the information.  Patient will still speak with Dr. Hill if he will call her back tomorrow.    Vanessa Fernández   Ob[facility]Gyn Clinic  RN

## 2018-05-15 ENCOUNTER — OFFICE VISIT (OUTPATIENT)
Dept: DERMATOLOGY | Facility: CLINIC | Age: 48
End: 2018-05-15
Payer: COMMERCIAL

## 2018-05-15 VITALS — OXYGEN SATURATION: 100 % | DIASTOLIC BLOOD PRESSURE: 82 MMHG | HEART RATE: 92 BPM | SYSTOLIC BLOOD PRESSURE: 132 MMHG

## 2018-05-15 DIAGNOSIS — D48.5 NEOPLASM OF UNCERTAIN BEHAVIOR OF SKIN: Primary | ICD-10-CM

## 2018-05-15 PROCEDURE — 88305 TISSUE EXAM BY PATHOLOGIST: CPT | Performed by: PHYSICIAN ASSISTANT

## 2018-05-15 PROCEDURE — 40490 BIOPSY OF LIP: CPT | Mod: 25 | Performed by: PHYSICIAN ASSISTANT

## 2018-05-15 NOTE — MR AVS SNAPSHOT
"              After Visit Summary   5/15/2018    Dakota Berkowitz    MRN: [id number]           Patient Information     Date Of Birth          1970        Visit Information        Provider Department      5/15/2018 4:20 PM [address] PA-C [facility]         Follow-ups after your visit        Your next 10 appointments already scheduled     May 24, 2018  8:00 AM CDT   MA DIAGNOSTIC BILATERAL W/ ANA with [facility]   [facility] ([facility])    [address] [address]   [phone]           Do not use any powder, lotion or deodorant under your arms or on your breast. If you do, we will ask you to remove it before your exam.  Wear comfortable, two-piece clothing.  If you have any allergies, tell your care team.  Bring any previous mammograms from other facilities or have them mailed to the breast center.  Three-dimensional (3D) mammograms are available at [place] locations in [facility], [facility], [place], [place], and [place]. [facility] locations include [place] and [facility] in [place]. Benefits of 3D mammograms include: - Improved rate of cancer detection - Decreases your chance of having to go back for more tests, which means fewer: - \"False-positive\" results (This means that there is an abnormal area but it isn't cancer.) - Invasive testing procedures, such as a biopsy or surgery - Can provide clearer images of the breast if you have dense breast tissue. 3D mammography is an optional exam that anyone can have with a 2D mammogram. It doesn't replace or take the place of a 2D mammogram. 2D mammograms remain an effective screening test for all women.  Not all insurance companies cover the cost of a 3D mammogram. Check with your insurance.            May 24, 2018  8:30 AM CDT   US BREAST RIGHT LIMITED 1-3 QUAD with [facility]   [facility] ([facility])    [address] [place]  [facility] [address]   [phone]           Please bring a list of your medicines (including vitamins, minerals and over-the-counter drugs). Also, tell your doctor about any allergies you may have. Wear comfortable clothes and leave your valuables at home.  You do not need to do anything special to prepare for your exam.  Please call the Imaging Department at your exam site with any questions.              Who to contact     If you have questions or need follow up information about today's clinic visit or your schedule please contact [facility] directly at [phone].  Normal or non-critical lab and imaging results will be communicated to you by Rainhart, letter or phone within 4 business days after the clinic has received the results. If you do not hear from us within 7 days, please contact the clinic through Inquirlyt or phone. If you have a critical or abnormal lab result, we will notify you by phone as soon as possible.  Submit refill requests through LigerTail or call your pharmacy and they will forward the refill request to us. Please allow 3 business days for your refill to be completed.          Additional Information About Your Visit        MyChart Information     LigerTail gives you secure access to your electronic health record. If you see a primary care provider, you can also send messages to your care team and make appointments. If you have questions, please call your primary care clinic.  If you do not have a primary care provider, please call [phone] and they will assist you.        Care EveryWhere ID     This is your Care EveryWhere ID. This could be used by other organizations to access your [facility] medical records  [id number]        Your Vitals Were     Pulse Pulse Oximetry                92 100%           Blood Pressure from Last 3 Encounters:   05/15/18 132/82   05/04/18 111/77   04/13/18 (!) 159/96    Weight from Last 3 Encounters:   05/04/18 96.5 kg (212  lb 12.8 oz)   04/13/18 95.7 kg (211 lb)   04/02/18 96.2 kg (212 lb 1.3 oz)              Today, you had the following     No orders found for display       Primary Care Provider Office Phone # Fax #    Chino Rayo [phone] [address] [address]        Equal Access to Services     COOKIE EARL : Hadii aad ku hadasho Soomaali, waaxda luqadaha, qaybta kaalmada adeegyada, waxay idiin hayaan adeeg kharash la'aan ah. So [facility] [phone].    ATENCIÓN: Si habla español, tiene a flores disposición servicios gratuitos de asistencia lingüística. Llame al [phone].    We comply with applicable federal civil rights laws and [place] laws. We do not discriminate on the basis of race, color, national origin, age, disability, sex, sexual orientation, or gender identity.            Thank you!     Thank you for choosing [facility]  for your care. Our goal is always to provide you with excellent care. Hearing back from our patients is one way we can continue to improve our services. Please take a few minutes to complete the written survey that you may receive in the mail after your visit with us. Thank you!             Your Updated Medication List - Protect others around you: Learn how to safely use, store and throw away your medicines at www.disposemymeds.org.          This list is accurate as of 5/15/18  4:43 PM.  Always use your most recent med list.                   Brand Name Dispense Instructions for use Diagnosis    * albuterol 108 (90 Base) MCG/ACT Inhaler    PROAIR HFA/PROVENTIL HFA/VENTOLIN HFA    1 Inhaler    Inhale 2 puffs into the lungs every 4 hours as needed for shortness of breath / dyspnea    Wheezes       * albuterol (2.5 MG/3ML) 0.083% neb solution     25 vial    Take 1 vial (2.5 mg) by nebulization every 6 hours as needed for shortness of breath / dyspnea or wheezing    Pneumonia of both lungs due to infectious organism, unspecified part of lung        cetirizine-psuedoePHEDrine 5-120 MG per 12 hr tablet    zyrTEC-D    60 tablet    Take 1 tablet by mouth 2 times daily Hold on file until needed    Congestion of paranasal sinus       EPINEPHrine 0.3 MG/0.3ML injection 2-pack    EPIPEN/ADRENACLICK/or ANY BX GENERIC EQUIV    0.6 mL    Inject 0.3 mLs (0.3 mg) into the muscle once as needed for anaphylaxis Hold on file until needed    Bee sting reaction, accidental or unintentional, subsequent encounter       fluticasone 50 MCG/ACT spray    FLONASE    1 Bottle    Spray 2 sprays into both nostrils daily    Nasal congestion       guaiFENesin-codeine 100-10 MG/5ML Soln solution    ROBITUSSIN AC    240 mL    Take 5-10 mLs by mouth every 4 hours as needed for cough (cough)        ibuprofen 200 MG tablet    ADVIL/MOTRIN    120 tablet    Take 3 tablets (600 mg) by mouth every 6 hours as needed for mild pain    Urethral hypermobility, Urinary, incontinence, stress female       order for DME     1 each    Equipment being ordered: Nebulizer    Pneumonia of both lungs due to infectious organism, unspecified part of lung       tiZANidine 4 MG tablet    ZANAFLEX    30 tablet    Take 1 tablet (4 mg) by mouth 3 times daily as needed for muscle spasms    Acute right-sided low back pain without sciatica, Muscle tightness       * Notice:  This list has 2 medication(s) that are the same as other medications prescribed for you. Read the directions carefully, and ask your doctor or other care provider to review them with you.

## 2018-05-15 NOTE — LETTER
5/15/2018         RE: Dakota Berkowitz  [address] [place] [address]        Dear Colleague,    Thank you for referring your patient, Dakota Berkowitz, to the [facility]. Please see a copy of my visit note below.    Dakota Berkowitz is a 47 year old year old female patient here today for  Consult of growth on lip by Dr. Rayo.  Patient states this has been present for 10-15 years.  Patient reports the following symptoms:  Painful when she bites on spot .  Patient reports the following previous treatments none.  Patient reports the following modifying factors none.  Associated symptoms: none.  Patient has no other skin complaints today.  Remainder of the HPI, Meds, PMH, Allergies, FH, and SH was reviewed in chart.   Past Medical History:   Diagnosis Date     NO ACTIVE PROBLEMS        Past Surgical History:   Procedure Laterality Date     C APPENDECTOMY       COSMETIC SURGERY      breast implants     CYSTOSCOPY, SLING TRANSVAGINAL N/A 2/8/2016    Procedure: CYSTOSCOPY, SLING TRANSVAGINAL;  Surgeon: Sterling Hill MD;  Location: [facility] OR     EYE SURGERY       HC REPAIR OF NASAL SEPTUM       SURGICAL HISTORY OF -       laporoscopy due to endometriosis     SURGICAL HISTORY OF -       endometrial ablation     SURGICAL HISTORY OF -       breast augmentation     SURGICAL HISTORY OF -       benign tumor removed from her left thigh     TONSILLECTOMY       TUBAL LIGATION          Family History   Problem Relation Age of Onset     CANCER Mother      ovarian     CANCER Paternal Grandmother      lung     Gynecology Sister      having a partial hysterectomy due to abnormal cells, leaving ovaries and cervix     Gynecology Daughter      endometriosis     Gynecology Sister      hysterectomy for endometriosis       Social History     Social History     Marital status:      Spouse name: N/A     Number of children: 1     Years of education: N/A     Occupational History      [employer]      Social History Main Topics     Smoking status: Never Smoker     Smokeless tobacco: Never Used     Alcohol use Yes      Comment: occassionally on weekends     Drug use: No     Sexual activity: Yes     Partners: Male     Birth control/ protection: Female Surgical      Comment: Tubal     Other Topics Concern     Parent/Sibling W/ Cabg, Mi Or Angioplasty Before 65f 55m? No     Social History Narrative       Outpatient Encounter Prescriptions as of 5/15/2018   Medication Sig Dispense Refill     cetirizine-psuedoePHEDrine (ZYRTEC-D) 5-120 MG per 12 hr tablet Take 1 tablet by mouth 2 times daily Hold on file until needed 60 tablet 5     fluticasone (FLONASE) 50 MCG/ACT spray Spray 2 sprays into both nostrils daily 1 Bottle 5     guaiFENesin-codeine (ROBITUSSIN AC) 100-10 MG/5ML SOLN solution Take 5-10 mLs by mouth every 4 hours as needed for cough (cough) 240 mL 0     order for DME Equipment being ordered: Nebulizer 1 each 0     tiZANidine (ZANAFLEX) 4 MG tablet Take 1 tablet (4 mg) by mouth 3 times daily as needed for muscle spasms 30 tablet 0     albuterol (2.5 MG/3ML) 0.083% neb solution Take 1 vial (2.5 mg) by nebulization every 6 hours as needed for shortness of breath / dyspnea or wheezing (Patient not taking: Reported on 5/4/2018) 25 vial 0     albuterol (PROAIR HFA/PROVENTIL HFA/VENTOLIN HFA) 108 (90 BASE) MCG/ACT Inhaler Inhale 2 puffs into the lungs every 4 hours as needed for shortness of breath / dyspnea (Patient not taking: Reported on 5/4/2018) 1 Inhaler 0     EPINEPHrine (EPIPEN/ADRENACLICK/OR ANY BX GENERIC EQUIV) 0.3 MG/0.3ML injection 2-pack Inject 0.3 mLs (0.3 mg) into the muscle once as needed for anaphylaxis Hold on file until needed (Patient not taking: Reported on 5/4/2018) 0.6 mL 3     ibuprofen (ADVIL,MOTRIN) 200 MG tablet Take 3 tablets (600 mg) by mouth every 6 hours as needed for mild pain (Patient not taking: Reported on 5/4/2018) 120 tablet      No facility-administered encounter  medications on file as of 5/15/2018.              Review Of Systems  Skin: As above  Eyes: negative  Ears/Nose/Throat: negative  Respiratory: No shortness of breath, dyspnea on exertion, cough, or hemoptysis  Cardiovascular: negative  Gastrointestinal: negative  Genitourinary: negative  Musculoskeletal: negative  Neurologic: negative  Psychiatric: negative  Hematologic/Lymphatic/Immunologic: negative  Endocrine: negative      O:   NAD, WDWN, Alert & Oriented, Mood & Affect wnl, Vitals stable   Here today alone   /82  Pulse 92  SpO2 100%   General appearance normal   Vitals stable   Alert, oriented and in no acute distress     0.4 cm pink papule on lower mucosal lip       Eyes: Conjunctivae/lids:Normal     ENT: Lips: as above     MSK:Normal    Pulm: Breathing Normal    Neuro/Psych: Orientation:Normal; Mood/Affect:Normal  A/P:  1. R/O Traumatic Fibroma   TANGENTIAL BIOPSY SENT OUT:  After consent, anesthesia with LEC and prep, tangential excision performed, cauterized and specimen sent out for permanent section histology.  No complications and routine wound care. Patient told to call our office in 1-2 weeks for result.          Again, thank you for allowing me to participate in the care of your patient.        Sincerely,        Socorro Guerrero PA-C

## 2018-05-15 NOTE — PROGRESS NOTES
Dakota Berkowitz is a 47 year old year old female patient here today for  Consult of growth on lip by Dr. Rayo.  Patient states this has been present for 10-15 years.  Patient reports the following symptoms:  Painful when she bites on spot .  Patient reports the following previous treatments none.  Patient reports the following modifying factors none.  Associated symptoms: none.  Patient has no other skin complaints today.  Remainder of the HPI, Meds, PMH, Allergies, FH, and SH was reviewed in chart.   Past Medical History:   Diagnosis Date     NO ACTIVE PROBLEMS        Past Surgical History:   Procedure Laterality Date     C APPENDECTOMY       COSMETIC SURGERY      breast implants     CYSTOSCOPY, SLING TRANSVAGINAL N/A 2/8/2016    Procedure: CYSTOSCOPY, SLING TRANSVAGINAL;  Surgeon: Sterling Hill MD;  Location: [facility] OR     EYE SURGERY       HC REPAIR OF NASAL SEPTUM       SURGICAL HISTORY OF -       laporoscopy due to endometriosis     SURGICAL HISTORY OF -       endometrial ablation     SURGICAL HISTORY OF -       breast augmentation     SURGICAL HISTORY OF -       benign tumor removed from her left thigh     TONSILLECTOMY       TUBAL LIGATION          Family History   Problem Relation Age of Onset     CANCER Mother      ovarian     CANCER Paternal Grandmother      lung     Gynecology Sister      having a partial hysterectomy due to abnormal cells, leaving ovaries and cervix     Gynecology Daughter      endometriosis     Gynecology Sister      hysterectomy for endometriosis       Social History     Social History     Marital status:      Spouse name: N/A     Number of children: 1     Years of education: N/A     Occupational History      [employer]     Social History Main Topics     Smoking status: Never Smoker     Smokeless tobacco: Never Used     Alcohol use Yes      Comment: occassionally on weekends     Drug use: No     Sexual activity: Yes     Partners: Male     Birth control/ protection:  Female Surgical      Comment: Tubal     Other Topics Concern     Parent/Sibling W/ Cabg, Mi Or Angioplasty Before 65f 55m? No     Social History Narrative       Outpatient Encounter Prescriptions as of 5/15/2018   Medication Sig Dispense Refill     cetirizine-psuedoePHEDrine (ZYRTEC-D) 5-120 MG per 12 hr tablet Take 1 tablet by mouth 2 times daily Hold on file until needed 60 tablet 5     fluticasone (FLONASE) 50 MCG/ACT spray Spray 2 sprays into both nostrils daily 1 Bottle 5     guaiFENesin-codeine (ROBITUSSIN AC) 100-10 MG/5ML SOLN solution Take 5-10 mLs by mouth every 4 hours as needed for cough (cough) 240 mL 0     order for DME Equipment being ordered: Nebulizer 1 each 0     tiZANidine (ZANAFLEX) 4 MG tablet Take 1 tablet (4 mg) by mouth 3 times daily as needed for muscle spasms 30 tablet 0     albuterol (2.5 MG/3ML) 0.083% neb solution Take 1 vial (2.5 mg) by nebulization every 6 hours as needed for shortness of breath / dyspnea or wheezing (Patient not taking: Reported on 5/4/2018) 25 vial 0     albuterol (PROAIR HFA/PROVENTIL HFA/VENTOLIN HFA) 108 (90 BASE) MCG/ACT Inhaler Inhale 2 puffs into the lungs every 4 hours as needed for shortness of breath / dyspnea (Patient not taking: Reported on 5/4/2018) 1 Inhaler 0     EPINEPHrine (EPIPEN/ADRENACLICK/OR ANY BX GENERIC EQUIV) 0.3 MG/0.3ML injection 2-pack Inject 0.3 mLs (0.3 mg) into the muscle once as needed for anaphylaxis Hold on file until needed (Patient not taking: Reported on 5/4/2018) 0.6 mL 3     ibuprofen (ADVIL,MOTRIN) 200 MG tablet Take 3 tablets (600 mg) by mouth every 6 hours as needed for mild pain (Patient not taking: Reported on 5/4/2018) 120 tablet      No facility-administered encounter medications on file as of 5/15/2018.              Review Of Systems  Skin: As above  Eyes: negative  Ears/Nose/Throat: negative  Respiratory: No shortness of breath, dyspnea on exertion, cough, or hemoptysis  Cardiovascular: negative  Gastrointestinal:  negative  Genitourinary: negative  Musculoskeletal: negative  Neurologic: negative  Psychiatric: negative  Hematologic/Lymphatic/Immunologic: negative  Endocrine: negative      O:   NAD, WDWN, Alert & Oriented, Mood & Affect wnl, Vitals stable   Here today alone   /82  Pulse 92  SpO2 100%   General appearance normal   Vitals stable   Alert, oriented and in no acute distress     0.4 cm pink papule on lower mucosal lip       Eyes: Conjunctivae/lids:Normal     ENT: Lips: as above     MSK:Normal    Pulm: Breathing Normal    Neuro/Psych: Orientation:Normal; Mood/Affect:Normal  A/P:  1. R/O Traumatic Fibroma   TANGENTIAL BIOPSY SENT OUT:  After consent, anesthesia with LEC and prep, tangential excision performed, cauterized and specimen sent out for permanent section histology.  No complications and routine wound care. Patient told to call our office in 1-2 weeks for result.

## 2018-05-20 LAB — COPATH REPORT: NORMAL

## 2018-05-24 ENCOUNTER — HOSPITAL ENCOUNTER (OUTPATIENT)
Dept: ULTRASOUND IMAGING | Facility: CLINIC | Age: 48
End: 2018-05-24
Attending: OBSTETRICS & GYNECOLOGY
Payer: COMMERCIAL

## 2018-05-24 DIAGNOSIS — N64.9 BREAST LESION: Primary | ICD-10-CM

## 2018-05-24 DIAGNOSIS — R92.8 ABNORMAL MAMMOGRAM: ICD-10-CM

## 2018-05-24 PROCEDURE — 76642 ULTRASOUND BREAST LIMITED: CPT | Mod: RT

## 2018-05-29 ENCOUNTER — TELEPHONE (OUTPATIENT)
Dept: OBGYN | Facility: CLINIC | Age: 48
End: 2018-05-29

## 2018-05-29 DIAGNOSIS — N64.9 DISORDER OF BREAST: ICD-10-CM

## 2018-05-29 DIAGNOSIS — N64.9 BREAST LESION: ICD-10-CM

## 2018-05-29 DIAGNOSIS — D23.5 BENIGN NEOPLASM OF SKIN OF TRUNK, EXCEPT SCROTUM: Primary | ICD-10-CM

## 2018-05-29 NOTE — TELEPHONE ENCOUNTER
Reason for Call: Request for an order or referral:    Order or referral being requested:  MR Breast Percutaneous Needle Core Bx    Date needed: as soon as possible    Has the patient been seen by the PCP for this problem? Not Applicable    Additional comments: imaging called stating this order needs to be signed by the MD not by an MA. Please have order redone as pt is scheduled on 6/1    Phone number Patient can be reached at:      Best Time:      Can we leave a detailed message on this number?      Call taken on 5/29/2018 at 2:05 PM by Mary Lanier

## 2018-05-30 ENCOUNTER — HOSPITAL ENCOUNTER (EMERGENCY)
Facility: CLINIC | Age: 48
Discharge: HOME | End: 2018-05-31
Attending: EMERGENCY MEDICINE | Admitting: EMERGENCY MEDICINE
Payer: COMMERCIAL

## 2018-05-30 ENCOUNTER — APPOINTMENT (OUTPATIENT)
Dept: ULTRASOUND IMAGING | Facility: CLINIC | Age: 48
End: 2018-05-30
Attending: EMERGENCY MEDICINE
Payer: COMMERCIAL

## 2018-05-30 ENCOUNTER — APPOINTMENT (OUTPATIENT)
Dept: CT IMAGING | Facility: CLINIC | Age: 48
End: 2018-05-30
Attending: EMERGENCY MEDICINE
Payer: COMMERCIAL

## 2018-05-30 DIAGNOSIS — R10.13 ABDOMINAL PAIN, EPIGASTRIC: ICD-10-CM

## 2018-05-30 LAB
ALBUMIN SERPL-MCNC: 3.1 G/DL (ref 3.4–5)
ALBUMIN UR-MCNC: NEGATIVE MG/DL
ALP SERPL-CCNC: 79 U/L (ref 40–150)
ALT SERPL W P-5'-P-CCNC: 36 U/L (ref 0–50)
ANION GAP SERPL CALCULATED.3IONS-SCNC: 6 MMOL/L (ref 3–14)
APPEARANCE UR: ABNORMAL
AST SERPL W P-5'-P-CCNC: 16 U/L (ref 0–45)
BASOPHILS # BLD AUTO: 0 10E9/L (ref 0–0.2)
BASOPHILS NFR BLD AUTO: 0.2 %
BILIRUB SERPL-MCNC: 0.5 MG/DL (ref 0.2–1.3)
BILIRUB UR QL STRIP: NEGATIVE
BUN SERPL-MCNC: 9 MG/DL (ref 7–30)
CALCIUM SERPL-MCNC: 7.6 MG/DL (ref 8.5–10.1)
CHLORIDE SERPL-SCNC: 112 MMOL/L (ref 94–109)
CO2 SERPL-SCNC: 23 MMOL/L (ref 20–32)
COLOR UR AUTO: YELLOW
CREAT SERPL-MCNC: 0.72 MG/DL (ref 0.52–1.04)
DIFFERENTIAL METHOD BLD: NORMAL
EOSINOPHIL # BLD AUTO: 0.1 10E9/L (ref 0–0.7)
EOSINOPHIL NFR BLD AUTO: 1.7 %
ERYTHROCYTE [DISTWIDTH] IN BLOOD BY AUTOMATED COUNT: 13.1 % (ref 10–15)
GFR SERPL CREATININE-BSD FRML MDRD: 85 ML/MIN/1.7M2
GLUCOSE SERPL-MCNC: 99 MG/DL (ref 70–99)
GLUCOSE UR STRIP-MCNC: NEGATIVE MG/DL
HCT VFR BLD AUTO: 41.4 % (ref 35–47)
HGB BLD-MCNC: 14 G/DL (ref 11.7–15.7)
HGB UR QL STRIP: ABNORMAL
IMM GRANULOCYTES # BLD: 0 10E9/L (ref 0–0.4)
IMM GRANULOCYTES NFR BLD: 0 %
KETONES UR STRIP-MCNC: NEGATIVE MG/DL
LEUKOCYTE ESTERASE UR QL STRIP: ABNORMAL
LIPASE SERPL-CCNC: 122 U/L (ref 73–393)
LYMPHOCYTES # BLD AUTO: 1.5 10E9/L (ref 0.8–5.3)
LYMPHOCYTES NFR BLD AUTO: 26.4 %
MCH RBC QN AUTO: 31.3 PG (ref 26.5–33)
MCHC RBC AUTO-ENTMCNC: 33.8 G/DL (ref 31.5–36.5)
MCV RBC AUTO: 93 FL (ref 78–100)
MONOCYTES # BLD AUTO: 0.6 10E9/L (ref 0–1.3)
MONOCYTES NFR BLD AUTO: 9.9 %
MUCOUS THREADS #/AREA URNS LPF: PRESENT /LPF
NEUTROPHILS # BLD AUTO: 3.6 10E9/L (ref 1.6–8.3)
NEUTROPHILS NFR BLD AUTO: 61.8 %
NITRATE UR QL: NEGATIVE
PH UR STRIP: 5 PH (ref 5–7)
PLATELET # BLD AUTO: 163 10E9/L (ref 150–450)
POTASSIUM SERPL-SCNC: 3.6 MMOL/L (ref 3.4–5.3)
PROT SERPL-MCNC: 6.6 G/DL (ref 6.8–8.8)
RBC # BLD AUTO: 4.47 10E12/L (ref 3.8–5.2)
RBC #/AREA URNS AUTO: 1 /HPF (ref 0–2)
SODIUM SERPL-SCNC: 141 MMOL/L (ref 133–144)
SOURCE: ABNORMAL
SP GR UR STRIP: 1.01 (ref 1–1.03)
SQUAMOUS #/AREA URNS AUTO: 6 /HPF (ref 0–1)
UROBILINOGEN UR STRIP-MCNC: 0 MG/DL (ref 0–2)
WBC # BLD AUTO: 5.8 10E9/L (ref 4–11)
WBC #/AREA URNS AUTO: 2 /HPF (ref 0–5)

## 2018-05-30 PROCEDURE — 83690 ASSAY OF LIPASE: CPT | Performed by: EMERGENCY MEDICINE

## 2018-05-30 PROCEDURE — 85025 COMPLETE CBC W/AUTO DIFF WBC: CPT | Performed by: EMERGENCY MEDICINE

## 2018-05-30 PROCEDURE — 99285 EMERGENCY DEPT VISIT HI MDM: CPT | Mod: 25 | Performed by: EMERGENCY MEDICINE

## 2018-05-30 PROCEDURE — 96374 THER/PROPH/DIAG INJ IV PUSH: CPT | Performed by: EMERGENCY MEDICINE

## 2018-05-30 PROCEDURE — 74176 CT ABD & PELVIS W/O CONTRAST: CPT

## 2018-05-30 PROCEDURE — 25000128 H RX IP 250 OP 636: Performed by: EMERGENCY MEDICINE

## 2018-05-30 PROCEDURE — 96361 HYDRATE IV INFUSION ADD-ON: CPT | Performed by: EMERGENCY MEDICINE

## 2018-05-30 PROCEDURE — 81001 URINALYSIS AUTO W/SCOPE: CPT | Performed by: EMERGENCY MEDICINE

## 2018-05-30 PROCEDURE — 80053 COMPREHEN METABOLIC PANEL: CPT | Performed by: EMERGENCY MEDICINE

## 2018-05-30 PROCEDURE — 96375 TX/PRO/DX INJ NEW DRUG ADDON: CPT | Performed by: EMERGENCY MEDICINE

## 2018-05-30 PROCEDURE — 96376 TX/PRO/DX INJ SAME DRUG ADON: CPT | Performed by: EMERGENCY MEDICINE

## 2018-05-30 PROCEDURE — 99285 EMERGENCY DEPT VISIT HI MDM: CPT | Mod: Z6 | Performed by: EMERGENCY MEDICINE

## 2018-05-30 PROCEDURE — 76705 ECHO EXAM OF ABDOMEN: CPT

## 2018-05-30 RX ORDER — HYDROMORPHONE HYDROCHLORIDE 1 MG/ML
0.5 INJECTION, SOLUTION INTRAMUSCULAR; INTRAVENOUS; SUBCUTANEOUS ONCE
Status: COMPLETED | OUTPATIENT
Start: 2018-05-30 | End: 2018-05-30

## 2018-05-30 RX ORDER — ONDANSETRON 4 MG/1
4 TABLET, ORALLY DISINTEGRATING ORAL EVERY 6 HOURS PRN
Qty: 10 TABLET | Refills: 0 | Status: SHIPPED | OUTPATIENT
Start: 2018-05-30 | End: 2018-06-02

## 2018-05-30 RX ORDER — KETOROLAC TROMETHAMINE 15 MG/ML
15 INJECTION, SOLUTION INTRAMUSCULAR; INTRAVENOUS ONCE
Status: COMPLETED | OUTPATIENT
Start: 2018-05-30 | End: 2018-05-30

## 2018-05-30 RX ORDER — SODIUM CHLORIDE, SODIUM LACTATE, POTASSIUM CHLORIDE, CALCIUM CHLORIDE 600; 310; 30; 20 MG/100ML; MG/100ML; MG/100ML; MG/100ML
1000 INJECTION, SOLUTION INTRAVENOUS CONTINUOUS
Status: DISCONTINUED | OUTPATIENT
Start: 2018-05-30 | End: 2018-05-31 | Stop reason: HOSPADM

## 2018-05-30 RX ORDER — ONDANSETRON 2 MG/ML
4 INJECTION INTRAMUSCULAR; INTRAVENOUS EVERY 30 MIN PRN
Status: DISCONTINUED | OUTPATIENT
Start: 2018-05-30 | End: 2018-05-31 | Stop reason: HOSPADM

## 2018-05-30 RX ADMIN — ONDANSETRON 4 MG: 2 INJECTION INTRAMUSCULAR; INTRAVENOUS at 19:07

## 2018-05-30 RX ADMIN — SODIUM CHLORIDE, POTASSIUM CHLORIDE, SODIUM LACTATE AND CALCIUM CHLORIDE 1000 ML: 600; 310; 30; 20 INJECTION, SOLUTION INTRAVENOUS at 19:08

## 2018-05-30 RX ADMIN — KETOROLAC TROMETHAMINE 15 MG: 15 INJECTION, SOLUTION INTRAMUSCULAR; INTRAVENOUS at 19:09

## 2018-05-30 RX ADMIN — HYDROMORPHONE HYDROCHLORIDE 0.5 MG: 1 INJECTION, SOLUTION INTRAMUSCULAR; INTRAVENOUS; SUBCUTANEOUS at 20:27

## 2018-05-30 RX ADMIN — HYDROMORPHONE HYDROCHLORIDE 0.5 MG: 1 INJECTION, SOLUTION INTRAMUSCULAR; INTRAVENOUS; SUBCUTANEOUS at 21:38

## 2018-05-30 RX ADMIN — SODIUM CHLORIDE, POTASSIUM CHLORIDE, SODIUM LACTATE AND CALCIUM CHLORIDE 1000 ML: 600; 310; 30; 20 INJECTION, SOLUTION INTRAVENOUS at 20:54

## 2018-05-30 NOTE — ED PROVIDER NOTES
History     Chief Complaint   Patient presents with     Abdominal Pain     upper abdominal pain that started yesterday at 11am. Into right flank area. Dull ache, with flares of pain. Decreased appetitie.      HPI  Dakota Berkowitz is a 47 year old female who has a history of kidney stones, obesity, ovarian cysts, and endometriosis who presents to the ED for evaluation of abdominal pain. Patient reports onset of upper abdominal pain that radiates into her ribs at of 11:00 in the morning yesterday. Her pain is constant with intermittent sharp pains. She reports nausea, vomiting, and loose stools. She has had 2 loose stools today and 2 episodes of vomiting. Her pain is aggravated by laying on her right side. Motrin does not alleviate her pain. She does not take ibuprofen often. Patient is unsure of family history of gallbladder disease.    She has a significant surgical history of appendectomy, laparoscopy for endometriosis, tubal ligation, and unilateral oophorectomy. She has no history of ulcer, liver disease, gallbladder disease, or pancreatitis. She does not have daily medications. She does not use alcohol or smoking. She denies recent illness, spoiled food intake, fever, sore throat, cough, trouble breathing, dysuria, and hematuria.    Problem List:    Patient Active Problem List    Diagnosis Date Noted     Irritability 03/02/2015     Priority: Medium     Perimenopausal symptoms 06/17/2013     Priority: Medium     Weight gain 06/17/2013     Priority: Medium     Obesity 04/25/2011     Priority: Medium     Breast implant rupture 02/08/2011     Priority: Medium     Pain in breast 02/08/2011     Priority: Medium     due to rupture of breast implant       CARDIOVASCULAR SCREENING; LDL GOAL LESS THAN 160 10/31/2010     Priority: Medium     Female pelvic pain 10/13/2009     Priority: Medium     (Problem list name updated by automated process. Provider to review and confirm.)          Past Medical History:    Past  Medical History:   Diagnosis Date     NO ACTIVE PROBLEMS        Past Surgical History:    Past Surgical History:   Procedure Laterality Date     C APPENDECTOMY       COSMETIC SURGERY      breast implants     CYSTOSCOPY, SLING TRANSVAGINAL N/A 2/8/2016    Procedure: CYSTOSCOPY, SLING TRANSVAGINAL;  Surgeon: Sterling Hill MD;  Location: [facility]     EYE SURGERY       HC REPAIR OF NASAL SEPTUM       SURGICAL HISTORY OF -       laporoscopy due to endometriosis     SURGICAL HISTORY OF -       endometrial ablation     SURGICAL HISTORY OF -       breast augmentation     SURGICAL HISTORY OF -       benign tumor removed from her left thigh     TONSILLECTOMY       TUBAL LIGATION         Family History:    Family History   Problem Relation Age of Onset     CANCER Mother      ovarian     CANCER Paternal Grandmother      lung     Gynecology Sister      having a partial hysterectomy due to abnormal cells, leaving ovaries and cervix     Gynecology Daughter      endometriosis     Gynecology Sister      hysterectomy for endometriosis       Social History:  Marital Status:   [2]  Social History   Substance Use Topics     Smoking status: Never Smoker     Smokeless tobacco: Never Used     Alcohol use Yes      Comment: occassionally on weekends        Medications:      fluticasone (FLONASE) 50 MCG/ACT spray   ibuprofen (ADVIL,MOTRIN) 200 MG tablet   ondansetron (ZOFRAN ODT) 4 MG ODT tab   tiZANidine (ZANAFLEX) 4 MG tablet   albuterol (2.5 MG/3ML) 0.083% neb solution   albuterol (PROAIR HFA/PROVENTIL HFA/VENTOLIN HFA) 108 (90 BASE) MCG/ACT Inhaler   cetirizine-psuedoePHEDrine (ZYRTEC-D) 5-120 MG per 12 hr tablet   EPINEPHrine (EPIPEN/ADRENACLICK/OR ANY BX GENERIC EQUIV) 0.3 MG/0.3ML injection 2-pack   order for DME         Review of Systems    All other systems are reviewed and are negative.    Physical Exam   BP: 124/81 (Simultaneous filing. User may not have seen previous data.)  Pulse: 89  Temp: 98.6  F (37  C)  Resp:  16  Weight: 95.3 kg (210 lb)  SpO2: 96 %      Physical Exam  Nontoxic appearing no respiratory distress alert and oriented ×3,   Head atraumatic normocephalic  TMs/EACs unremarkable, conjunctiva noninjected, oropharynx moist without lesions or erythema  No cervical adenopathy   Neck supple full active painless range of motion  Lungs clear to auscultation  Heart regular no murmur  Abdomen soft moderate epigastric/RUQ tenderness without guarding or rebound bowel sounds positive no masses or HSM  Strength and sensation grossly intact throughout the extremities, gait and station normal  Speech is fluent, good eye contact, thought processes are rational  Lower extremities without swelling, redness or tenderness  Pedal pulses symmetrical and strong    ED Course     ED Course     Procedures               Critical Care time:  none               Results for orders placed or performed during the hospital encounter of 05/30/18 (from the past 24 hour(s))   CBC with platelets differential   Result Value Ref Range    WBC 5.8 4.0 - 11.0 10e9/L    RBC Count 4.47 3.8 - 5.2 10e12/L    Hemoglobin 14.0 11.7 - 15.7 g/dL    Hematocrit 41.4 35.0 - 47.0 %    MCV 93 78 - 100 fl    MCH 31.3 26.5 - 33.0 pg    MCHC 33.8 31.5 - 36.5 g/dL    RDW 13.1 10.0 - 15.0 %    Platelet Count 163 150 - 450 10e9/L    Diff Method Automated Method     % Neutrophils 61.8 %    % Lymphocytes 26.4 %    % Monocytes 9.9 %    % Eosinophils 1.7 %    % Basophils 0.2 %    % Immature Granulocytes 0.0 %    Absolute Neutrophil 3.6 1.6 - 8.3 10e9/L    Absolute Lymphocytes 1.5 0.8 - 5.3 10e9/L    Absolute Monocytes 0.6 0.0 - 1.3 10e9/L    Absolute Eosinophils 0.1 0.0 - 0.7 10e9/L    Absolute Basophils 0.0 0.0 - 0.2 10e9/L    Abs Immature Granulocytes 0.0 0 - 0.4 10e9/L   Comprehensive metabolic panel   Result Value Ref Range    Sodium 141 133 - 144 mmol/L    Potassium 3.6 3.4 - 5.3 mmol/L    Chloride 112 (H) 94 - 109 mmol/L    Carbon Dioxide 23 20 - 32 mmol/L    Anion Gap 6  3 - 14 mmol/L    Glucose 99 70 - 99 mg/dL    Urea Nitrogen 9 7 - 30 mg/dL    Creatinine 0.72 0.52 - 1.04 mg/dL    GFR Estimate 85 >60 mL/min/1.7m2    GFR Estimate If Black >90 >60 mL/min/1.7m2    Calcium 7.6 (L) 8.5 - 10.1 mg/dL    Bilirubin Total 0.5 0.2 - 1.3 mg/dL    Albumin 3.1 (L) 3.4 - 5.0 g/dL    Protein Total 6.6 (L) 6.8 - 8.8 g/dL    Alkaline Phosphatase 79 40 - 150 U/L    ALT 36 0 - 50 U/L    AST 16 0 - 45 U/L   Lipase   Result Value Ref Range    Lipase 122 73 - 393 U/L   Abdomen US, limited (RUQ only)    Narrative    LIMITED ABDOMINAL (RIGHT UPPER QUADRANT) ULTRASOUND  5/30/2018 7:38 PM    HISTORY: Right upper quadrant pain.    COMPARISON: None.    FINDINGS: The liver is normal in size. It demonstrates diffuse  increased echogenicity without focal lesions. The gallbladder is  normal without stones or sludge. No gallbladder wall thickening or  pericholecystic fluid is seen. The common bile duct is normal  measuring 0.32 cm in diameter. The visualized portion of the pancreas  is normal. The right kidney is normal with no hydronephrosis or  abnormal mass.      Impression    IMPRESSION: Diffuse fatty infiltration of the liver.    STALIN GOMES MD   UA reflex to Microscopic   Result Value Ref Range    Color Urine Yellow     Appearance Urine Slightly Cloudy     Glucose Urine Negative NEG^Negative mg/dL    Bilirubin Urine Negative NEG^Negative    Ketones Urine Negative NEG^Negative mg/dL    Specific Gravity Urine 1.014 1.003 - 1.035    Blood Urine Small (A) NEG^Negative    pH Urine 5.0 5.0 - 7.0 pH    Protein Albumin Urine Negative NEG^Negative mg/dL    Urobilinogen mg/dL 0.0 0.0 - 2.0 mg/dL    Nitrite Urine Negative NEG^Negative    Leukocyte Esterase Urine Trace (A) NEG^Negative    Source Midstream Urine     RBC Urine 1 0 - 2 /HPF    WBC Urine 2 0 - 5 /HPF    Squamous Epithelial /HPF Urine 6 (H) 0 - 1 /HPF    Mucous Urine Present (A) NEG^Negative /LPF   Abd/pelvis CT - no contrast - Stone Protocol     Narrative    CT ABDOMEN AND PELVIS WITHOUT CONTRAST  5/30/2018 9:49 PM    HISTORY: Right upper quadrant pain. The concern is for a urinary tract  calculus.    COMPARISON: A CT on 9/17/2009. Also, a right upper quadrant ultrasound  earlier this evening.    TECHNIQUE: Routine transverse CT imaging of the abdomen and pelvis was  performed without contrast. Radiation dose for this scan was reduced  using automated exposure control, adjustment of the mA and/or kV  according to patient size, or iterative reconstruction technique.    FINDINGS: There is mild atelectasis in both lung bases. The kidneys,  ureters, and bladder are unremarkable. Specifically, no calculus,  hydronephrosis, or other evidence of obstruction is demonstrated. The  liver, spleen, pancreas, gallbladder, and adrenal glands are normal  allowing for the absence of contrast. No enlarged lymph node or other  abnormal mass is demonstrated. No free fluid is seen. No free  intraperitoneal gas is identified. The gastrointestinal tract is  unremarkable. The appendix is not identified. There is no additional  evidence of appendicitis. Again seen are small areas of marked  increased density adjacent to the cecum. This may be due to previous  surgery. Chronic calcification could also have this appearance. This  is unchanged. No vascular abnormality is seen. The osseous structures  are unremarkable. No abdominal or pelvic wall pathology is  demonstrated.       Impression    IMPRESSION: Unchanged examination with no acute abnormality seen.  Specifically, no urinary tract calculus or evidence of obstruction is  demonstrated.    STALIN GOMES MD       Medications   lactated ringers BOLUS 1,000 mL (0 mLs Intravenous Stopped 5/30/18 2028)     Followed by   lactated ringers infusion (0 mLs Intravenous Stopped 5/30/18 2245)   ondansetron (ZOFRAN) injection 4 mg (4 mg Intravenous Given 5/30/18 1907)   ketorolac (TORADOL) injection 15 mg (15 mg Intravenous Given  5/30/18 1909)   HYDROmorphone (PF) (DILAUDID) injection 0.5 mg (0.5 mg Intravenous Given 5/30/18 2027)   HYDROmorphone (PF) (DILAUDID) injection 0.5 mg (0.5 mg Intravenous Given 5/30/18 2138)     Patient is reevaluated 3 times during her prolonged stay in the emergency department.  She has persistent epigastric right upper quadrant tenderness.  Moderate relief with Toradol and 2 doses Dilaudid.  No vomiting during stay.  No diarrhea.    6:46 PM Patient assessed.    Assessments & Plan (with Medical Decision Making)  47-year-old female epigastric right upper quadrant pain and tenderness.  Details per HPI.  Broad differential considered including but not limited to peptic ulcer disease, reflux, gastritis, pancreatitis, biliary colic, bowel obstruction, perforated bowel, inflammatory bowel disease, kidney stone, pyelonephritis versus other.  Workup is essentially unremarkable including CT scan abdomen pelvis, right upper quadrant ultrasound, CBC, CMP and urinalysis and lipase.  Ultimately this likely represents a gastroenteritis.  Discussed admission, patient preferred to be admitted here, no beds available, she declined transfer to another hospital.  She is discharged home with prescription for Zofran.  Return criteria reviewed.  Patient expressed understanding and agreement.  She requested pain medication for home.  I declined to prescribe further pain medication, recommend Tylenol ibuprofen.  Return here if pain is worsening.     I have reviewed the nursing notes.    I have reviewed the findings, diagnosis, plan and need for follow up with the patient.       New Prescriptions    ONDANSETRON (ZOFRAN ODT) 4 MG ODT TAB    Take 1 tablet (4 mg) by mouth every 6 hours as needed for nausea       Final diagnoses:   Abdominal pain, epigastric     This document serves as a record of the services and decisions personally performed and made by Reggie Contreras MD. It was created on HIS/HER behalf by   kleber Chaidez  trained medical scribe. The creation of this document is based the provider's statements to the medical scribe.  Nicki Gianluca 6:46 PM 5/30/2018    Provider:   The information in this document, created by the medical scribe for me, accurately reflects the services I personally performed and the decisions made by me. I have reviewed and approved this document for accuracy prior to leaving the patient care area.  Reggie Contreras MD 6:46 PM 5/30/2018 5/30/2018   [facility] EMERGENCY DEPARTMENT     Reggie Contreras MD  05/31/18 0012

## 2018-05-30 NOTE — ED AVS SNAPSHOT
[facility] Emergency Department    [address] [address]    Phone:  [phone]    Fax:  [phone]                                       Dakota Berkowitz   MRN: [id number]    Department:  [facility] Emergency Department   Date of Visit:  5/30/2018           After Visit Summary Signature Page     I have received my discharge instructions, and my questions have been answered. I have discussed any challenges I see with this plan with the nurse or doctor.    ..........................................................................................................................................  Patient/Patient Representative Signature      ..........................................................................................................................................  Patient Representative Print Name and Relationship to Patient    ..................................................               ................................................  Date                                            Time    ..........................................................................................................................................  Reviewed by Signature/Title    ...................................................              ..............................................  Date                                                            Time

## 2018-05-30 NOTE — TELEPHONE ENCOUNTER
Dr Hill, per 1 st note below. Order cued needs to be signed by an MD per Imaging dept...    Thanks, Yanira Lagos RN

## 2018-05-30 NOTE — ED NOTES
"Pt arrived via triage, in acute pain.  Pt states pain in upper abd started on Monday and has stayed \"constant\" for over 24 hrs.  Pt states she vomited yesterday and last night and again this afternoon.  Pt is not eating since first time vomiting, she is able to drink water and keep it down.       Pt laying on cot, on L side, states pain in abd is worse is she lays on back or R side.  Pt rates pain as 7/10 at rest.  She has taken ibuprofen at home which \"knocks it down a bit\".        PMH + for appy, and a \" couple of  Laparoscopic\" surgeries.  Pt denies h/o gallbladder, pancrease, small bowl, or bowel issues in the past.    Abd: Obese, soft.  + pain at rest epigastric area, wrapping around to back.  Increased greatly with slight pressure in the epigastric area.  PLAN:  Will start IV and draw labs.  Pt informed of time for lab return, and wait for MD.  Discuss potential XR's or CT's after MD assessment. Pt verbalizes understanding.    "

## 2018-05-30 NOTE — TELEPHONE ENCOUNTER
I checked with our Diagnostics clinic as I was not sure who called us for the order and there is an order signed by Dr Hill in the chart and the patient is scheduled at the correct Diagnostics clinic for said procedure on 6-1-18.     Yanira Lagos RN

## 2018-05-30 NOTE — ED AVS SNAPSHOT
[facility] Emergency Department    [address] [facility] [address]    Phone:  [phone]    Fax:  [phone]                                       Dakota Berkowitz   MRN: [id number]    Department:  [facility] Emergency Department   Date of Visit:  5/30/2018           Patient Information     Date Of Birth          1970        Your diagnoses for this visit were:     Abdominal pain, epigastric        You were seen by Reggie Contreras MD.      Follow-up Information     Follow up with Chino Rayo MD.    Specialty:  Family Practice    Contact information:    [address] [facility] [address]  [phone]          Discharge Instructions         Epigastric Pain (Uncertain Cause)     Epigastric pain can be a sign of disease in the upper abdomen. Common causes include:    Acid reflux (stomach acid flowing up into the esophagus)    Gastritis (irritation of the stomach lining)    Peptic Ulcer Disease    Inflammation of the pancreas    Gallstone    Infection in the gallbladder  Pain may be dull or burning. It may spread upward to the chest or to the back. There may be other symptoms such as belching, bloating, cramps or hunger pains. There may be weight loss or poor appetite, nausea or vomiting.  Since the diagnosis of your pain is not certain yet, further tests may sometimes be needed. Sometimes the doctor will treat you for the most likely condition to see if there is improvement before doing further tests.  Home care  Medicines    Antacids help neutralize the normal acids in your stomach. Examples are Maalox, Mylanta, Rolaids, and Tums. If you don t like the liquid, you can also try a chewable one. You may find one works better than another for you. Overuse can cause diarrhea or constipation.    Acid blockers (H2 blockers) decrease acid production. Examples are cimetidine (Tagamet), famotidine (Pepcid) and ranitidine (Zantac).    Acid inhibitors (PPIs) decrease acid production in a  different way than the blockers. You may find they work better, but can take a little longer to take effect.  Examples are omeprazole (Prilosec), lansoprazole (Prevacid), pantoprazole (Protonix), rabeprazole (Aciphex), and esomeprazole (Nexium).    Take an antacid 30-60 minutes after eating and at bedtime, but not at the same time as an acid blocker.    Try not to take NSAIDs. Aspirin may also cause problems, but if taking it for your heart or other medical reasons, talk to your doctor before stopping it; you do not want to cause a worse problem, like a heart attack or stroke.  Diet    If certain foods seem to cause your spasm, try to avoid them.     Eat slowly and chew food well before swallowing. Symptoms of gastritis can be worsened by certain foods. Limit or avoid fatty, fried, and spicy foods, as well as coffee, chocolate, mint, and foods with high acid content such as tomatoes and citrus fruit and juices (orange, grapefruit, lemon).    Avoid alcohol, caffeine, and tobacco, which can delay healing and worsen your problem.    Try eating smaller meals with snacks in between  Follow-up care  Follow up with your healthcare provider or as advised.  When to seek medical advice  Call your healthcare provider right away if any of the following occur:    Stomach pain worsens or moves to the right lower part of the abdomen    Chest pain appears, or if it worsens or spreads to the chest, back, neck, shoulder, or arm    Frequent vomiting (can t keep down liquids)    Blood in the stool or vomit (red or black color)    Feeling weak or dizzy, fainting, or having trouble breathing    Fever of 100.4 F (38 C) or higher, or as directed by your healthcare provider    Abdominal swelling  Date Last Reviewed: 9/25/2015 2000-2017 [employer]. [address] [place] [address], [place] [address]. All rights reserved. This information is not intended as a substitute for professional medical care. Always follow your healthcare  professional's instructions.          Your next 10 appointments already scheduled     Jun 01, 2018  9:30 AM CDT   MR BREAST PERCUTANEOUS NEEDLE CORE BX with HARSH, DANIELLE BREAST NURSE, DANIELLE BREAST RAD   [facility] MRI ([facility])    [address] [place] [address] [phone]           Take your medicines as usual, unless your doctor tells you not to. Bring a list of your current medicines to your exam (including vitamins, minerals and over-the-counter drugs).  Have a blood test (creatinine test) within 30 days of your exam. Go to your clinic or Diagnostic Imaging Department for this test.  The MRI machine uses a strong magnet. Please wear clothes without metal (snaps, zippers). A sweatsuit works well, or we may give you a hospital gown.  Please remove any body piercings and hair extensions before you arrive. You will also remove watches, jewelry, hairpins, wallets, dentures, partial dental plates and hearing aids. You may wear contact lenses, and you may be able to wear your rings. We have a safe place to keep your personal items, but it is safer to leave them at home.  **IMPORTANT** THE INSTRUCTIONS BELOW ARE ONLY FOR THOSE PATIENTS WHO HAVE BEEN PRESCRIBED SEDATION OR GENERAL ANESTHESIA DURING THEIR MRI PROCEDURE:  IF YOUR DOCTOR PRESCRIBED ORAL SEDATION (take medicine to help you relax during your exam):   You must get the medicine from your doctor (oral medication) before you arrive. Bring the medicine to the exam. Do not take it at home. You ll be told when to take it upon arriving for your exam.   Arrive one hour early. Bring someone who can take you home after the test. Your medicine will make you sleepy. After the exam, you may not drive, take a bus or take a taxi by yourself.  IF YOUR DOCTOR PRESCRIBED IV SEDATION:   Arrive one hour early. Bring someone who can take you home after the test. Your medicine will make you sleepy. After the exam, you may not drive, take a  bus or take a taxi by yourself.   No eating 6 hours before your exam. You may have clear liquids up until 4 hours before your exam. (Clear liquids include water, clear tea, black coffee and fruit juice without pulp.)  IF YOUR DOCTOR PRESCRIBED ANESTHESIA (be asleep for your exam):   Arrive 1 1/2 hours early. Bring someone who can take you home after the test. You may not drive, take a bus or take a taxi by yourself.   No eating 8 hours before your exam. You may have clear liquids up until 4 hours before your exam. (Clear liquids include water, clear tea, black coffee and fruit juice without pulp.)   You will spend four to five hours in the recovery room.  If you have any questions, please contact your Imaging Department exam site.            Jun 01, 2018 11:10 AM CDT   MA POST PROCEDURE RIGHT with [id number]   [facility] [place] ([facility])    [address] [place] [address], [address] [place] [address] [phone]           Do not use any powder, lotion or deodorant under your arms or on your breast. If you do, we will ask you to remove it before your exam.  Wear comfortable, two-piece clothing.  If you have any allergies, tell your care team.  Bring any previous mammograms from other facilities or have them mailed to the breast center.              24 Hour Appointment Hotline       To make an appointment at any [facility] clinic, call [phone] ([phone]). If you don't have a family doctor or clinic, we will help you find one. [facility] clinics are conveniently located to serve the needs of you and your family.             Review of your medicines      START taking        Dose / Directions Last dose taken    ondansetron 4 MG ODT tab   Commonly known as:  ZOFRAN ODT   Dose:  4 mg   Quantity:  10 tablet        Take 1 tablet (4 mg) by mouth every 6 hours as needed for nausea   Refills:  0          Our records show that you are taking the medicines listed below. If these are  incorrect, please call your family doctor or clinic.        Dose / Directions Last dose taken    * albuterol 108 (90 Base) MCG/ACT Inhaler   Commonly known as:  PROAIR HFA/PROVENTIL HFA/VENTOLIN HFA   Dose:  2 puff   Quantity:  1 Inhaler        Inhale 2 puffs into the lungs every 4 hours as needed for shortness of breath / dyspnea   Refills:  0        * albuterol (2.5 MG/3ML) 0.083% neb solution   Dose:  1 vial   Quantity:  25 vial        Take 1 vial (2.5 mg) by nebulization every 6 hours as needed for shortness of breath / dyspnea or wheezing   Refills:  0        cetirizine-psuedoePHEDrine 5-120 MG per 12 hr tablet   Commonly known as:  zyrTEC-D   Dose:  1 tablet   Quantity:  60 tablet        Take 1 tablet by mouth 2 times daily Hold on file until needed   Refills:  5        EPINEPHrine 0.3 MG/0.3ML injection 2-pack   Commonly known as:  EPIPEN/ADRENACLICK/or ANY BX GENERIC EQUIV   Dose:  0.3 mg   Quantity:  0.6 mL        Inject 0.3 mLs (0.3 mg) into the muscle once as needed for anaphylaxis Hold on file until needed   Refills:  3        fluticasone 50 MCG/ACT spray   Commonly known as:  FLONASE   Dose:  2 spray   Quantity:  1 Bottle        Spray 2 sprays into both nostrils daily   Refills:  5        ibuprofen 200 MG tablet   Commonly known as:  ADVIL/MOTRIN   Dose:  600 mg   Quantity:  120 tablet        Take 3 tablets (600 mg) by mouth every 6 hours as needed for mild pain   Refills:  0        order for DME   Quantity:  1 each        Equipment being ordered: Nebulizer   Refills:  0        tiZANidine 4 MG tablet   Commonly known as:  ZANAFLEX   Dose:  4 mg   Quantity:  30 tablet        Take 1 tablet (4 mg) by mouth 3 times daily as needed for muscle spasms   Refills:  0        * Notice:  This list has 2 medication(s) that are the same as other medications prescribed for you. Read the directions carefully, and ask your doctor or other care provider to review them with you.            Prescriptions were sent or  printed at these locations (1 Prescription)                   [facility] [place] - [address]   [address] [facility], [place] [address]    Telephone:  [phone]   Fax:  [phone]   Hours:                  Printed at Department/Unit printer (1 of 1)         ondansetron (ZOFRAN ODT) 4 MG ODT tab                Procedures and tests performed during your visit     Abd/pelvis CT - no contrast - Stone Protocol    Abdomen US, limited (RUQ only)    CBC with platelets differential    Comprehensive metabolic panel    Lipase    UA reflex to Microscopic      Orders Needing Specimen Collection     None      Pending Results     No orders found for last 3 day(s).            Pending Culture Results     No orders found for last 3 day(s).            Pending Results Instructions     If you had any lab results that were not finalized at the time of your Discharge, you can call the ED Lab Result RN at [phone]. You will be contacted by this team for any positive Lab results or changes in treatment. The nurses are available 7 days a week from 10A to 6:30P.  You can leave a message 24 hours per day and they will return your call.        Test Results From Your Hospital Stay        5/30/2018  7:12 PM      Component Results     Component Value Ref Range & Units Status    WBC 5.8 4.0 - 11.0 10e9/L Final    RBC Count 4.47 3.8 - 5.2 10e12/L Final    Hemoglobin 14.0 11.7 - 15.7 g/dL Final    Hematocrit 41.4 35.0 - 47.0 % Final    MCV 93 78 - 100 fl Final    MCH 31.3 26.5 - 33.0 pg Final    MCHC 33.8 31.5 - 36.5 g/dL Final    RDW 13.1 10.0 - 15.0 % Final    Platelet Count 163 150 - 450 10e9/L Final    Diff Method Automated Method  Final    % Neutrophils 61.8 % Final    % Lymphocytes 26.4 % Final    % Monocytes 9.9 % Final    % Eosinophils 1.7 % Final    % Basophils 0.2 % Final    % Immature Granulocytes 0.0 % Final    Absolute Neutrophil 3.6 1.6 - 8.3 10e9/L Final    Absolute Lymphocytes 1.5 0.8 - 5.3 10e9/L  Final    Absolute Monocytes 0.6 0.0 - 1.3 10e9/L Final    Absolute Eosinophils 0.1 0.0 - 0.7 10e9/L Final    Absolute Basophils 0.0 0.0 - 0.2 10e9/L Final    Abs Immature Granulocytes 0.0 0 - 0.4 10e9/L Final         5/30/2018  7:26 PM      Component Results     Component Value Ref Range & Units Status    Sodium 141 133 - 144 mmol/L Final    Potassium 3.6 3.4 - 5.3 mmol/L Final    Chloride 112 (H) 94 - 109 mmol/L Final    Carbon Dioxide 23 20 - 32 mmol/L Final    Anion Gap 6 3 - 14 mmol/L Final    Glucose 99 70 - 99 mg/dL Final    Urea Nitrogen 9 7 - 30 mg/dL Final    Creatinine 0.72 0.52 - 1.04 mg/dL Final    GFR Estimate 85 >60 mL/min/1.7m2 Final    Non  GFR Calc    GFR Estimate If Black >90 >60 mL/min/1.7m2 Final    African American GFR Calc    Calcium 7.6 (L) 8.5 - 10.1 mg/dL Final    Bilirubin Total 0.5 0.2 - 1.3 mg/dL Final    Albumin 3.1 (L) 3.4 - 5.0 g/dL Final    Protein Total 6.6 (L) 6.8 - 8.8 g/dL Final    Alkaline Phosphatase 79 40 - 150 U/L Final    ALT 36 0 - 50 U/L Final    AST 16 0 - 45 U/L Final         5/30/2018  7:25 PM      Component Results     Component Value Ref Range & Units Status    Lipase 122 73 - 393 U/L Final         5/30/2018  8:51 PM      Component Results     Component Value Ref Range & Units Status    Color Urine Yellow  Final    Appearance Urine Slightly Cloudy  Final    Glucose Urine Negative NEG^Negative mg/dL Final    Bilirubin Urine Negative NEG^Negative Final    Ketones Urine Negative NEG^Negative mg/dL Final    Specific Gravity Urine 1.014 1.003 - 1.035 Final    Blood Urine Small (A) NEG^Negative Final    pH Urine 5.0 5.0 - 7.0 pH Final    Protein Albumin Urine Negative NEG^Negative mg/dL Final    Urobilinogen mg/dL 0.0 0.0 - 2.0 mg/dL Final    Nitrite Urine Negative NEG^Negative Final    Leukocyte Esterase Urine Trace (A) NEG^Negative Final    Source Midstream Urine  Final    RBC Urine 1 0 - 2 /HPF Final    WBC Urine 2 0 - 5 /HPF Final    Squamous Epithelial  /HPF Urine 6 (H) 0 - 1 /HPF Final    Mucous Urine Present (A) NEG^Negative /LPF Final         5/30/2018  7:55 PM      Narrative     LIMITED ABDOMINAL (RIGHT UPPER QUADRANT) ULTRASOUND  5/30/2018 7:38 PM    HISTORY: Right upper quadrant pain.    COMPARISON: None.    FINDINGS: The liver is normal in size. It demonstrates diffuse  increased echogenicity without focal lesions. The gallbladder is  normal without stones or sludge. No gallbladder wall thickening or  pericholecystic fluid is seen. The common bile duct is normal  measuring 0.32 cm in diameter. The visualized portion of the pancreas  is normal. The right kidney is normal with no hydronephrosis or  abnormal mass.        Impression     IMPRESSION: Diffuse fatty infiltration of the liver.    STALIN GOMES MD         5/30/2018 10:11 PM      Narrative     CT ABDOMEN AND PELVIS WITHOUT CONTRAST  5/30/2018 9:49 PM    HISTORY: Right upper quadrant pain. The concern is for a urinary tract  calculus.    COMPARISON: A CT on 9/17/2009. Also, a right upper quadrant ultrasound  earlier this evening.    TECHNIQUE: Routine transverse CT imaging of the abdomen and pelvis was  performed without contrast. Radiation dose for this scan was reduced  using automated exposure control, adjustment of the mA and/or kV  according to patient size, or iterative reconstruction technique.    FINDINGS: There is mild atelectasis in both lung bases. The kidneys,  ureters, and bladder are unremarkable. Specifically, no calculus,  hydronephrosis, or other evidence of obstruction is demonstrated. The  liver, spleen, pancreas, gallbladder, and adrenal glands are normal  allowing for the absence of contrast. No enlarged lymph node or other  abnormal mass is demonstrated. No free fluid is seen. No free  intraperitoneal gas is identified. The gastrointestinal tract is  unremarkable. The appendix is not identified. There is no additional  evidence of appendicitis. Again seen are small areas of  marked  increased density adjacent to the cecum. This may be due to previous  surgery. Chronic calcification could also have this appearance. This  is unchanged. No vascular abnormality is seen. The osseous structures  are unremarkable. No abdominal or pelvic wall pathology is  demonstrated.         Impression     IMPRESSION: Unchanged examination with no acute abnormality seen.  Specifically, no urinary tract calculus or evidence of obstruction is  demonstrated.    STALIN GOMES MD                Thank you for choosing [facility]       Thank you for choosing [facility] for your care. Our goal is always to provide you with excellent care. Hearing back from our patients is one way we can continue to improve our services. Please take a few minutes to complete the written survey that you may receive in the mail after you visit with us. Thank you!        [employer] Information     [employer] gives you secure access to your electronic health record. If you see a primary care provider, you can also send messages to your care team and make appointments. If you have questions, please call your primary care clinic.  If you do not have a primary care provider, please call [phone] and they will assist you.        Care EveryWhere ID     This is your Care EveryWhere ID. This could be used by other organizations to access your [facility] medical records  [id number]        Equal Access to Services     COOKIE EARL : Lela Lobato, kelvin sanabria, roberta garay, alison morrow. So [facility] [phone].    ATENCIÓN: Si habla español, tiene a flores disposición servicios gratuitos de asistencia lingüística. Anthony al [phone].    We comply with applicable federal civil rights laws and [place] laws. We do not discriminate on the basis of race, color, national origin, age, disability, sex, sexual orientation, or gender identity.            After Visit Summary       This is your  record. Keep this with you and show to your community pharmacist(s) and doctor(s) at your next visit.

## 2018-05-31 ENCOUNTER — MYC MEDICAL ADVICE (OUTPATIENT)
Dept: FAMILY MEDICINE | Facility: CLINIC | Age: 48
End: 2018-05-31

## 2018-05-31 ENCOUNTER — TELEPHONE (OUTPATIENT)
Dept: MAMMOGRAPHY | Facility: CLINIC | Age: 48
End: 2018-05-31

## 2018-05-31 VITALS
TEMPERATURE: 98.6 F | BODY MASS INDEX: 37.5 KG/M2 | OXYGEN SATURATION: 97 % | RESPIRATION RATE: 16 BRPM | DIASTOLIC BLOOD PRESSURE: 77 MMHG | WEIGHT: 210 LBS | HEART RATE: 89 BPM | SYSTOLIC BLOOD PRESSURE: 127 MMHG

## 2018-05-31 DIAGNOSIS — F40.240 CLAUSTROPHOBIA: Primary | ICD-10-CM

## 2018-05-31 RX ORDER — DIAZEPAM 5 MG
TABLET ORAL
Qty: 1 TABLET | Refills: 0 | Status: SHIPPED | OUTPATIENT
Start: 2018-05-31 | End: 2018-07-06

## 2018-05-31 NOTE — TELEPHONE ENCOUNTER
Ms. Berkowitz was called and given upcoming 6/1/2018 Right MRI guided Breast Biopsy procedural information. Appointment time and location were verified. Ms. Bekrowitz was concerned about MRI Biopsy.  She reports that for her Breast MRI, she felt nausea, anxious and had significant back pain while lying in prone position for Breast MRI.  I encouraged her to call her PMD and discuss the possibility of getting something oral to take for tomorrow's MRI Breast Biopsy. If she is given an RX, she will have a  and wait to take the medication until after she has signed the procedural consent at the [facility].

## 2018-05-31 NOTE — DISCHARGE INSTRUCTIONS
Epigastric Pain (Uncertain Cause)     Epigastric pain can be a sign of disease in the upper abdomen. Common causes include:    Acid reflux (stomach acid flowing up into the esophagus)    Gastritis (irritation of the stomach lining)    Peptic Ulcer Disease    Inflammation of the pancreas    Gallstone    Infection in the gallbladder  Pain may be dull or burning. It may spread upward to the chest or to the back. There may be other symptoms such as belching, bloating, cramps or hunger pains. There may be weight loss or poor appetite, nausea or vomiting.  Since the diagnosis of your pain is not certain yet, further tests may sometimes be needed. Sometimes the doctor will treat you for the most likely condition to see if there is improvement before doing further tests.  Home care  Medicines    Antacids help neutralize the normal acids in your stomach. Examples are Maalox, Mylanta, Rolaids, and Tums. If you don t like the liquid, you can also try a chewable one. You may find one works better than another for you. Overuse can cause diarrhea or constipation.    Acid blockers (H2 blockers) decrease acid production. Examples are cimetidine (Tagamet), famotidine (Pepcid) and ranitidine (Zantac).    Acid inhibitors (PPIs) decrease acid production in a different way than the blockers. You may find they work better, but can take a little longer to take effect.  Examples are omeprazole (Prilosec), lansoprazole (Prevacid), pantoprazole (Protonix), rabeprazole (Aciphex), and esomeprazole (Nexium).    Take an antacid 30-60 minutes after eating and at bedtime, but not at the same time as an acid blocker.    Try not to take NSAIDs. Aspirin may also cause problems, but if taking it for your heart or other medical reasons, talk to your doctor before stopping it; you do not want to cause a worse problem, like a heart attack or stroke.  Diet    If certain foods seem to cause your spasm, try to avoid them.     Eat slowly and chew food well  before swallowing. Symptoms of gastritis can be worsened by certain foods. Limit or avoid fatty, fried, and spicy foods, as well as coffee, chocolate, mint, and foods with high acid content such as tomatoes and citrus fruit and juices (orange, grapefruit, lemon).    Avoid alcohol, caffeine, and tobacco, which can delay healing and worsen your problem.    Try eating smaller meals with snacks in between  Follow-up care  Follow up with your healthcare provider or as advised.  When to seek medical advice  Call your healthcare provider right away if any of the following occur:    Stomach pain worsens or moves to the right lower part of the abdomen    Chest pain appears, or if it worsens or spreads to the chest, back, neck, shoulder, or arm    Frequent vomiting (can t keep down liquids)    Blood in the stool or vomit (red or black color)    Feeling weak or dizzy, fainting, or having trouble breathing    Fever of 100.4 F (38 C) or higher, or as directed by your healthcare provider    Abdominal swelling  Date Last Reviewed: 9/25/2015 2000-2017 [employer]. [address] [place] [address], [place] [address]. All rights reserved. This information is not intended as a substitute for professional medical care. Always follow your healthcare professional's instructions.

## 2018-06-01 ENCOUNTER — HOSPITAL ENCOUNTER (OUTPATIENT)
Dept: MRI IMAGING | Facility: CLINIC | Age: 48
Discharge: HOME | End: 2018-06-01
Attending: OBSTETRICS & GYNECOLOGY | Admitting: OBSTETRICS & GYNECOLOGY
Payer: COMMERCIAL

## 2018-06-01 ENCOUNTER — HOSPITAL ENCOUNTER (OUTPATIENT)
Dept: MAMMOGRAPHY | Facility: CLINIC | Age: 48
End: 2018-06-01
Attending: OBSTETRICS & GYNECOLOGY
Payer: COMMERCIAL

## 2018-06-01 DIAGNOSIS — N64.9 BREAST LESION: ICD-10-CM

## 2018-06-01 DIAGNOSIS — D23.5 BENIGN NEOPLASM OF SKIN OF TRUNK, EXCEPT SCROTUM: ICD-10-CM

## 2018-06-01 PROCEDURE — 88305 TISSUE EXAM BY PATHOLOGIST: CPT | Mod: 26 | Performed by: OBSTETRICS & GYNECOLOGY

## 2018-06-01 PROCEDURE — 88305 TISSUE EXAM BY PATHOLOGIST: CPT | Performed by: OBSTETRICS & GYNECOLOGY

## 2018-06-01 PROCEDURE — 25000128 H RX IP 250 OP 636: Performed by: OBSTETRICS & GYNECOLOGY

## 2018-06-01 PROCEDURE — 27210135 MR BREAST PERCUTANEOUS CORE NEEDLE BX, FIRST LESION

## 2018-06-01 PROCEDURE — A9585 GADOBUTROL INJECTION: HCPCS | Performed by: OBSTETRICS & GYNECOLOGY

## 2018-06-01 PROCEDURE — 40000986 MA POST PROCEDURE RIGHT

## 2018-06-01 PROCEDURE — 27210995 ZZH RX 272: Performed by: OBSTETRICS & GYNECOLOGY

## 2018-06-01 RX ORDER — GADOBUTROL 604.72 MG/ML
10 INJECTION INTRAVENOUS ONCE
Status: COMPLETED | OUTPATIENT
Start: 2018-06-01 | End: 2018-06-01

## 2018-06-01 RX ORDER — ACYCLOVIR 200 MG/1
30 CAPSULE ORAL ONCE
Status: COMPLETED | OUTPATIENT
Start: 2018-06-01 | End: 2018-06-01

## 2018-06-01 RX ADMIN — SODIUM CHLORIDE 30 ML: 9 INJECTION, SOLUTION INTRAMUSCULAR; INTRAVENOUS; SUBCUTANEOUS at 11:02

## 2018-06-01 RX ADMIN — GADOBUTROL 10 ML: 604.72 INJECTION INTRAVENOUS at 11:02

## 2018-06-01 NOTE — DISCHARGE INSTRUCTIONS
After Your Breast Biopsy    Bleeding or bruising: Slight bruising is normal.  If you bleed through the bandage, put direct pressure on the breast.  If you are still bleeding after 20 minutes, call the doctor who ordered the exam.    Bandages: Keep your bandage in place until tomorrow morning.  Do not get it wet.  Leave the tape in place for two days.  On the second day, cover it with a Band-Aid.    Activity: You may shower the morning after the exam.  No heavy activity (lifting, vacuuming) for 24 hours.    Discomfort: Wear your bra overnight to support the breast.  You may take Tylenol (acetaminophen) for pain.  If you had a stereotactic of MR-directed biopsy, you may take aspirin or ibuprofen (Advil, Motrin) the morning after your biopsy, unless your doctor tells you not to.    Infection: Infection is rare.  Symptoms include fever, redness, increasing pain and fluid draining from the biopsy site.  If you have any of these symptoms, please call the doctor who ordered your exam.    Results: Results may take up to three business days.  If you have not heard your results in three days, call the Breast Center Nurse at [phone] or [phone].  In rare cases, we may need to do another biopsy.    Call the doctor who ordered your exam if:    You have bleeding that lasts more than 20 minutes.    You have pain that cannot be controlled.    You have signs of infection (fever, redness, drainage or other signs).    You have not had your results within three days.    Nurse navigator: Our nurse navigator is here to answer your questions and help you set up future clinic visits.  Please call [phone].    Thank you for choosing [facility].  Please call us if you have questions or concerns about your biopsy.

## 2018-06-04 LAB — COPATH REPORT: NORMAL

## 2018-06-05 NOTE — PROGRESS NOTES
After PATHOLOGY review by Breast Center Radiologist, Dr. Dmitri Rao, Ms. 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  Biopsy site is without issues.  I encouraged her to perform monthly breast self exams and to contact her doctor with any further breast changes or concerns.

## 2018-06-05 NOTE — ADDENDUM NOTE
Encounter addended by: Lisa Roldan RN on: 6/5/2018  1:06 PM<BR>     Actions taken: Visit Navigator Flowsheet section accepted, Sign clinical note

## 2018-06-12 LAB — LAB SCANNED RESULT: NORMAL

## 2018-07-06 ENCOUNTER — OFFICE VISIT (OUTPATIENT)
Dept: FAMILY MEDICINE | Facility: CLINIC | Age: 48
End: 2018-07-06
Payer: COMMERCIAL

## 2018-07-06 VITALS
OXYGEN SATURATION: 96 % | WEIGHT: 209 LBS | DIASTOLIC BLOOD PRESSURE: 88 MMHG | RESPIRATION RATE: 16 BRPM | HEIGHT: 63 IN | TEMPERATURE: 97.9 F | SYSTOLIC BLOOD PRESSURE: 120 MMHG | HEART RATE: 76 BPM | BODY MASS INDEX: 37.03 KG/M2

## 2018-07-06 DIAGNOSIS — Z13.1 SCREENING FOR DIABETES MELLITUS: ICD-10-CM

## 2018-07-06 DIAGNOSIS — Z12.11 SPECIAL SCREENING FOR MALIGNANT NEOPLASMS, COLON: ICD-10-CM

## 2018-07-06 DIAGNOSIS — Z00.00 ENCOUNTER FOR ROUTINE ADULT HEALTH EXAMINATION WITHOUT ABNORMAL FINDINGS: Primary | ICD-10-CM

## 2018-07-06 DIAGNOSIS — E66.09 CLASS 2 OBESITY DUE TO EXCESS CALORIES WITHOUT SERIOUS COMORBIDITY WITH BODY MASS INDEX (BMI) OF 37.0 TO 37.9 IN ADULT: ICD-10-CM

## 2018-07-06 DIAGNOSIS — T63.441D BEE STING REACTION, ACCIDENTAL OR UNINTENTIONAL, SUBSEQUENT ENCOUNTER: ICD-10-CM

## 2018-07-06 DIAGNOSIS — Z13.6 CARDIOVASCULAR SCREENING; LDL GOAL LESS THAN 100: ICD-10-CM

## 2018-07-06 DIAGNOSIS — R06.2 WHEEZES: ICD-10-CM

## 2018-07-06 DIAGNOSIS — E66.812 CLASS 2 OBESITY DUE TO EXCESS CALORIES WITHOUT SERIOUS COMORBIDITY WITH BODY MASS INDEX (BMI) OF 37.0 TO 37.9 IN ADULT: ICD-10-CM

## 2018-07-06 LAB
CHOLEST SERPL-MCNC: 192 MG/DL
GLUCOSE SERPL-MCNC: 89 MG/DL (ref 70–99)
HDLC SERPL-MCNC: 50 MG/DL
LDLC SERPL CALC-MCNC: 117 MG/DL
NONHDLC SERPL-MCNC: 142 MG/DL
TRIGL SERPL-MCNC: 125 MG/DL

## 2018-07-06 PROCEDURE — 82947 ASSAY GLUCOSE BLOOD QUANT: CPT | Performed by: FAMILY MEDICINE

## 2018-07-06 PROCEDURE — 99396 PREV VISIT EST AGE 40-64: CPT | Performed by: FAMILY MEDICINE

## 2018-07-06 PROCEDURE — 80061 LIPID PANEL: CPT | Performed by: FAMILY MEDICINE

## 2018-07-06 PROCEDURE — 36415 COLL VENOUS BLD VENIPUNCTURE: CPT | Performed by: FAMILY MEDICINE

## 2018-07-06 RX ORDER — ALBUTEROL SULFATE 90 UG/1
2 AEROSOL, METERED RESPIRATORY (INHALATION) EVERY 4 HOURS PRN
Qty: 1 INHALER | Refills: 0 | Status: SHIPPED | OUTPATIENT
Start: 2018-07-06 | End: 2019-06-21

## 2018-07-06 RX ORDER — EPINEPHRINE 0.3 MG/.3ML
0.3 INJECTION SUBCUTANEOUS
Qty: 0.6 ML | Refills: 3 | Status: SHIPPED | OUTPATIENT
Start: 2018-07-06 | End: 2019-06-21

## 2018-07-06 NOTE — PROGRESS NOTES
SUBJECTIVE:   CC: Dakota Berkowitz is an 47 year old woman who presents for preventive health visit.     Healthy Habits:    Do you get at least three servings of calcium containing foods daily (dairy, green leafy vegetables, etc.)? yes    Amount of exercise or daily activities, outside of work: 2-3 day(s) per week    Problems taking medications regularly No    Medication side effects: No    Have you had an eye exam in the past two years? yes    Do you see a dentist twice per year? yes    Do you have sleep apnea, excessive snoring or daytime drowsiness?no          Today's PHQ-2 Score:   PHQ-2 ( 1999 [employer]) 7/6/2018 3/28/2018   Q1: Little interest or pleasure in doing things 0 1   Q2: Feeling down, depressed or hopeless 0 1   PHQ-2 Score 0 2   Q1: Little interest or pleasure in doing things - -   Q2: Feeling down, depressed or hopeless - -   PHQ-2 Score - -       Abuse: Current or Past(Physical, Sexual or Emotional)- No  Do you feel safe in your environment - Yes    Social History   Substance Use Topics     Smoking status: Never Smoker     Smokeless tobacco: Never Used     Alcohol use Yes      Comment: occassionally on weekends     If you drink alcohol do you typically have >3 drinks per day or >7 drinks per week? No                     Reviewed orders with patient.  Reviewed health maintenance and updated orders accordingly - Yes      Patient over age 50, mutual decision to screen reflected in health maintenance.    Pertinent mammograms are reviewed under the imaging tab.  History of abnormal Pap smear: NO - age 30- 65 PAP every 3 years recommended  PAP / HPV Latest Ref Rng & Units 3/28/2018 3/2/2015 9/28/2009   PAP - NIL NIL NIL   HPV 16 DNA NEG:Negative Negative - -   HPV 18 DNA NEG:Negative Negative - -   OTHER HR HPV NEG:Negative Negative - -     Reviewed and updated as needed this visit by clinical staff  Tobacco  Allergies  Meds  Med Hx  Surg Hx  Fam Hx  Soc Hx        Reviewed and updated as needed  "this visit by Provider            ROS:  Review Of Systems  Skin: negative  Eyes: negative  Ears/Nose/Throat: negative  Respiratory: No shortness of breath, dyspnea on exertion, cough, or hemoptysis  Cardiovascular: negative  Gastrointestinal: negative  Genitourinary: negative  Musculoskeletal: negative  Neurologic: negative  Psychiatric: negative  Hematologic/Lymphatic/Immunologic: negative  Endocrine: negative      OBJECTIVE:   /88 (BP Location: Right arm, Patient Position: Chair, Cuff Size: Adult Large)  Pulse 76  Temp 97.9  F (36.6  C) (Tympanic)  Resp 16  Ht 5' 3\" (1.6 m)  Wt 209 lb (94.8 kg)  SpO2 96%  BMI 37.02 kg/m2  EXAM:  GENERAL: healthy, alert and no distress  EYES: Eyes grossly normal to inspection, PERRL and conjunctivae and sclerae normal  HENT: ear canals and TM's normal, nose and mouth without ulcers or lesions  NECK: no adenopathy, no asymmetry, masses, or scars and thyroid normal to palpation  RESP: lungs clear to auscultation - no rales, rhonchi or wheezes  BREAST: NE, seeing the breast center at [facility], has implants  CV: regular rate and rhythm, normal S1 S2, no S3 or S4, no murmur, click or rub, no peripheral edema and peripheral pulses strong  ABDOMEN: soft, nontender, no hepatosplenomegaly, no masses and bowel sounds normal   (female): NE seeing GYN  MS: no gross musculoskeletal defects noted, no edema  SKIN: no suspicious lesions or rashes  NEURO: Normal strength and tone, mentation intact and speech normal  PSYCH: mentation appears normal, affect normal/bright  LYMPH: anterior cervical: no adenopathy  posterior cervical: no adenopathy        ASSESSMENT/PLAN:   (Z00.00) Encounter for routine adult health examination without abnormal findings  (primary encounter diagnosis)  Comment: Discussed healthy lifestyle and preventative cares.    Plan:     (Z13.6) CARDIOVASCULAR SCREENING; LDL GOAL LESS THAN 100  Comment:   Plan: Lipid panel reflex to direct LDL Fasting        " "    (Z13.1) Screening for diabetes mellitus  Comment:   Plan: Glucose            (T63.441D) Bee sting reaction, accidental or unintentional, subsequent encounter  Comment:   Plan: EPINEPHrine (EPIPEN/ADRENACLICK/OR ANY BX         GENERIC EQUIV) 0.3 MG/0.3ML injection 2-pack        Refilled med    (R06.2) Wheezes  Comment: refilled med incase  Plan: albuterol (PROAIR HFA/PROVENTIL HFA/VENTOLIN         HFA) 108 (90 Base) MCG/ACT Inhaler            (Z12.11) Special screening for malignant neoplasms, colon  Comment:   Plan: GASTROENTEROLOGY ADULT REF PROCEDURE ONLY        Recommend to get done    (E66.09,  Z68.37) Class 2 obesity due to excess calories without serious comorbidity with body mass index (BMI) of 37.0 to 37.9 in adult  Comment: work on weight  Plan:     COUNSELING:   Reviewed preventive health counseling, as reflected in patient instructions       Regular exercise       Healthy diet/nutrition       Vision screening       Hearing screening       Colon cancer screening    BP Readings from Last 1 Encounters:   07/06/18 120/88     Estimated body mass index is 37.02 kg/(m^2) as calculated from the following:    Height as of this encounter: 5' 3\" (1.6 m).    Weight as of this encounter: 209 lb (94.8 kg).      Weight management plan: diet     reports that she has never smoked. She has never used smokeless tobacco.      Counseling Resources:  ATP IV Guidelines  Pooled Cohorts Equation Calculator  Breast Cancer Risk Calculator  FRAX Risk Assessment  ICSI Preventive Guidelines  Dietary Guidelines for Americans, 2010  USDA's MyPlate  ASA Prophylaxis  Lung CA Screening    Chino Rayo MD  [facility]  "

## 2018-07-06 NOTE — PATIENT INSTRUCTIONS
Please go to lab.    Please get your colonoscopy done.    Refilled your epi pens.          Thank you for choosing [facility].  You may be receiving a survey in the mail from Aniket Agudelo regarding your visit today.  Please take a few minutes to complete and return the survey to let us know how we are doing.      If you have questions or concerns, please contact us via Spotted or you can contact your care team at [phone].    Our Clinic hours are:  Monday 6:40 am  to 7:00 pm  Tuesday -Friday 6:40 am to 5:00 pm    The [place] outpatient lab hours are:  Monday - Friday 6:10 am to 4:45 pm  Saturdays 7:00 am to 11:00 am  Appointments are required, call [phone]    If you have clinical questions after hours or would like to schedule an appointment,  call the clinic at [phone].    Preventive Health Recommendations  Female Ages 40 to 49    Yearly exam:     See your health care provider every year in order to  1. Review health changes.   2. Discuss preventive care.    3. Review your medicines if your doctor prescribed any.      Get a Pap test every three years (unless you have an abnormal result and your provider advises testing more often).      If you get Pap tests with HPV test, you only need to test every 5 years, unless you have an abnormal result. You do not need a Pap test if your uterus was removed (hysterectomy) and you have not had cancer.      You should be tested each year for STDs (sexually transmitted diseases), if you're at risk.     Ask your doctor if you should have a mammogram.      Have a colonoscopy (test for colon cancer) if someone in your family has had colon cancer or polyps before age 50.       Have a cholesterol test every 5 years.       Have a diabetes test (fasting glucose) after age 45. If you are at risk for diabetes, you should have this test every 3 years.    Shots: Get a flu shot each year. Get a tetanus shot every 10 years.     Nutrition:     Eat at least 5 servings of  fruits and vegetables each day.    Eat whole-grain bread, whole-wheat pasta and brown rice instead of white grains and rice.    Get adequate Calcium and Vitamin D.      Lifestyle    Exercise at least 150 minutes a week (an average of 30 minutes a day, 5 days a week). This will help you control your weight and prevent disease.    Limit alcohol to one drink per day.    No smoking.     Wear sunscreen to prevent skin cancer.    See your dentist every six months for an exam and cleaning.

## 2018-07-06 NOTE — MR AVS SNAPSHOT
After Visit Summary   7/6/2018    Dakota Berkowitz    MRN: [id number]           Patient Information     Date Of Birth          1970        Visit Information        Provider Department      7/6/2018 9:40 AM Chino Rayo MD [facility]        Today's Diagnoses     Encounter for routine adult health examination without abnormal findings    -  1    CARDIOVASCULAR SCREENING; LDL GOAL LESS THAN 100        Screening for diabetes mellitus        Bee sting reaction, accidental or unintentional, subsequent encounter        Wheezes        Special screening for malignant neoplasms, colon          Care Instructions    Please go to lab.    Please get your colonoscopy done.    Refilled your epi pens.          Thank you for choosing [facility].  You may be receiving a survey in the mail from [place] regarding your visit today.  Please take a few minutes to complete and return the survey to let us know how we are doing.      If you have questions or concerns, please contact us via DishOpinion or you can contact your care team at [phone].    Our Clinic hours are:  Monday 6:40 am  to 7:00 pm  Tuesday -Friday 6:40 am to 5:00 pm    The [place] outpatient lab hours are:  Monday - Friday 6:10 am to 4:45 pm  Saturdays 7:00 am to 11:00 am  Appointments are required, call [phone]    If you have clinical questions after hours or would like to schedule an appointment,  call the clinic at [phone].    Preventive Health Recommendations  Female Ages 40 to 49    Yearly exam:     See your health care provider every year in order to  1. Review health changes.   2. Discuss preventive care.    3. Review your medicines if your doctor prescribed any.      Get a Pap test every three years (unless you have an abnormal result and your provider advises testing more often).      If you get Pap tests with HPV test, you only need to test every 5 years, unless you have an abnormal result. You do not  need a Pap test if your uterus was removed (hysterectomy) and you have not had cancer.      You should be tested each year for STDs (sexually transmitted diseases), if you're at risk.     Ask your doctor if you should have a mammogram.      Have a colonoscopy (test for colon cancer) if someone in your family has had colon cancer or polyps before age 50.       Have a cholesterol test every 5 years.       Have a diabetes test (fasting glucose) after age 45. If you are at risk for diabetes, you should have this test every 3 years.    Shots: Get a flu shot each year. Get a tetanus shot every 10 years.     Nutrition:     Eat at least 5 servings of fruits and vegetables each day.    Eat whole-grain bread, whole-wheat pasta and brown rice instead of white grains and rice.    Get adequate Calcium and Vitamin D.      Lifestyle    Exercise at least 150 minutes a week (an average of 30 minutes a day, 5 days a week). This will help you control your weight and prevent disease.    Limit alcohol to one drink per day.    No smoking.     Wear sunscreen to prevent skin cancer.    See your dentist every six months for an exam and cleaning.          Follow-ups after your visit        Additional Services     GASTROENTEROLOGY ADULT REF PROCEDURE ONLY       Last Lab Result: Creatinine (mg/dL)       Date                     Value                 05/30/2018               0.72             ----------  Body mass index is 37.02 kg/(m^2).     Needed:  No  Language:  English    Patient will be contacted to schedule procedure.     Please be aware that coverage of these services is subject to the terms and limitations of your health insurance plan.  Call member services at your health plan with any benefit or coverage questions.  Any procedures must be performed at a [place] facility OR coordinated by your clinic's referral office.    Please bring the following with you to your appointment:    (1) Any X-Rays, CTs or MRIs which have been  "performed.  Contact the facility where they were done to arrange for  prior to your scheduled appointment.    (2) List of current medications   (3) This referral request   (4) Any documents/labs given to you for this referral                  Who to contact     If you have questions or need follow up information about today's clinic visit or your schedule please contact [facility] directly at [phone].  Normal or non-critical lab and imaging results will be communicated to you by Oshiboreehart, letter or phone within 4 business days after the clinic has received the results. If you do not hear from us within 7 days, please contact the clinic through PlayyOnt or phone. If you have a critical or abnormal lab result, we will notify you by phone as soon as possible.  Submit refill requests through [email or web address] or call your pharmacy and they will forward the refill request to us. Please allow 3 business days for your refill to be completed.          Additional Information About Your Visit        OshiboreeharEndosee Information     [email or web address] gives you secure access to your electronic health record. If you see a primary care provider, you can also send messages to your care team and make appointments. If you have questions, please call your primary care clinic.  If you do not have a primary care provider, please call [phone] and they will assist you.        Care EveryWhere ID     This is your Care EveryWhere ID. This could be used by other organizations to access your [place] medical records  [id number]        Your Vitals Were     Pulse Temperature Respirations Height Pulse Oximetry BMI (Body Mass Index)    76 97.9  F (36.6  C) (Tympanic) 16 5' 3\" (1.6 m) 96% 37.02 kg/m2       Blood Pressure from Last 3 Encounters:   07/06/18 120/88   05/31/18 127/77   05/15/18 132/82    Weight from Last 3 Encounters:   07/06/18 209 lb (94.8 kg)   05/30/18 210 lb (95.3 kg)   05/04/18 212 lb 12.8 oz (96.5 kg)              We " Performed the Following     GASTROENTEROLOGY ADULT REF PROCEDURE ONLY     Glucose     Lipid panel reflex to direct LDL Fasting          Today's Medication Changes          These changes are accurate as of 7/6/18 10:11 AM.  If you have any questions, ask your nurse or doctor.               These medicines have changed or have updated prescriptions.        Dose/Directions    * albuterol (2.5 MG/3ML) 0.083% neb solution   This may have changed:  Another medication with the same name was changed. Make sure you understand how and when to take each.   Used for:  Pneumonia of both lungs due to infectious organism, unspecified part of lung   Changed by:  Chino Rayo MD        Dose:  1 vial   Take 1 vial (2.5 mg) by nebulization every 6 hours as needed for shortness of breath / dyspnea or wheezing   Quantity:  25 vial   Refills:  0       * albuterol 108 (90 Base) MCG/ACT Inhaler   Commonly known as:  PROAIR HFA/PROVENTIL HFA/VENTOLIN HFA   This may have changed:  additional instructions   Used for:  Wheezes   Changed by:  Chino Rayo MD        Dose:  2 puff   Inhale 2 puffs into the lungs every 4 hours as needed for shortness of breath / dyspnea Hold on file until needed   Quantity:  1 Inhaler   Refills:  0       EPINEPHrine 0.3 MG/0.3ML injection 2-pack   Commonly known as:  EPIPEN/ADRENACLICK/or ANY BX GENERIC EQUIV   This may have changed:  additional instructions   Used for:  Bee sting reaction, accidental or unintentional, subsequent encounter   Changed by:  Chino Rayo MD        Dose:  0.3 mg   Inject 0.3 mLs (0.3 mg) into the muscle once as needed for anaphylaxis   Quantity:  0.6 mL   Refills:  3       fluticasone 50 MCG/ACT spray   Commonly known as:  FLONASE   This may have changed:    - when to take this  - reasons to take this   Used for:  Nasal congestion        Dose:  2 spray   Spray 2 sprays into both nostrils daily   Quantity:  1 Bottle   Refills:  5       * Notice:  This list has 2  medication(s) that are the same as other medications prescribed for you. Read the directions carefully, and ask your doctor or other care provider to review them with you.         Where to get your medicines      These medications were sent to [id number] - [facility] [address] [facility] [address]  [address] [address]     Phone:  [phone]     albuterol 108 (90 Base) MCG/ACT Inhaler    EPINEPHrine 0.3 MG/0.3ML injection 2-pack                Primary Care Provider Office Phone # Fax #    Chino Rayo [phone] [address] [facility] [address]        Equal Access to Services     COOKIE EARL : Hadii romel angelo Sobob, waaxda luqadaha, qaybta kaalmada adeegyada, alison roy . So [facility] [phone].    ATENCIÓN: Si habla español, tiene a flores disposición servicios gratuitos de asistencia lingüística. [facility] [phone].    We comply with applicable federal civil rights laws and [place] laws. We do not discriminate on the basis of race, color, national origin, age, disability, sex, sexual orientation, or gender identity.            Thank you!     Thank you for choosing [facility]  for your care. Our goal is always to provide you with excellent care. Hearing back from our patients is one way we can continue to improve our services. Please take a few minutes to complete the written survey that you may receive in the mail after your visit with us. Thank you!             Your Updated Medication List - Protect others around you: Learn how to safely use, store and throw away your medicines at www.disposemymeds.org.          This list is accurate as of 7/6/18 10:11 AM.  Always use your most recent med list.                   Brand Name Dispense Instructions for use Diagnosis    * albuterol (2.5 MG/3ML) 0.083% neb solution     25 vial    Take 1 vial (2.5 mg) by nebulization  every 6 hours as needed for shortness of breath / dyspnea or wheezing    Pneumonia of both lungs due to infectious organism, unspecified part of lung       * albuterol 108 (90 Base) MCG/ACT Inhaler    PROAIR HFA/PROVENTIL HFA/VENTOLIN HFA    1 Inhaler    Inhale 2 puffs into the lungs every 4 hours as needed for shortness of breath / dyspnea Hold on file until needed    Wheezes       EPINEPHrine 0.3 MG/0.3ML injection 2-pack    EPIPEN/ADRENACLICK/or ANY BX GENERIC EQUIV    0.6 mL    Inject 0.3 mLs (0.3 mg) into the muscle once as needed for anaphylaxis    Bee sting reaction, accidental or unintentional, subsequent encounter       fluticasone 50 MCG/ACT spray    FLONASE    1 Bottle    Spray 2 sprays into both nostrils daily    Nasal congestion       ibuprofen 200 MG tablet    ADVIL/MOTRIN    120 tablet    Take 3 tablets (600 mg) by mouth every 6 hours as needed for mild pain    Urethral hypermobility, Urinary, incontinence, stress female       order for DME     1 each    Equipment being ordered: Nebulizer    Pneumonia of both lungs due to infectious organism, unspecified part of lung       tiZANidine 4 MG tablet    ZANAFLEX    30 tablet    Take 1 tablet (4 mg) by mouth 3 times daily as needed for muscle spasms    Acute right-sided low back pain without sciatica, Muscle tightness       * Notice:  This list has 2 medication(s) that are the same as other medications prescribed for you. Read the directions carefully, and ask your doctor or other care provider to review them with you.

## 2018-08-23 ENCOUNTER — MYC MEDICAL ADVICE (OUTPATIENT)
Dept: OBGYN | Facility: CLINIC | Age: 48
End: 2018-08-23

## 2018-08-23 NOTE — LETTER
[facility]  [address] [place] [address]  [phone]    2018    Fax # [phone],     RE: Claim number [id number]   DATE OF SERVICE 2018    Member Id  [id number]          To Whom It May Concern:      My patient Dakota Berkowitz ( 1970) was in need of a screening test for breast cancer.  She indicated to me that she did not tolerate mammograms due to the pain of the procedure.  At her urging, I ordered a MRI of the breast for screening.  At that exam, she was found to have a lesion that was subsequently biopsied (with MRI guidance).  She has been recommended to have a follow up MRI of the breast in 6 months.  I hope that this clarifies the situation.        Sincerely,      Sterling Hill MD

## 2018-08-23 NOTE — TELEPHONE ENCOUNTER
Dr. Hill,    Please provide letter or statement that I can copy and paste into a letter with medical necissity for MRI.  We can try for an appeal for this pt.    Thanks-    -Raegan Hoang  [facility]       To whom it may concern:  My patient Dakota Berkowitz was in need of a screening test for breast cancer.  She indicated to me that she did not tolerate mammograms due to the pain of the procedure.  At her urging, I ordered a MRI of the breast for screening.  At that exam, she was found to have a lesion that was subsequently biopsied (with MRI guidance).  She has been recommended to have a follow up MRI of the breast in 6 months.  I hope that this clarifies the situation.  Sterling Hill

## 2018-10-31 ENCOUNTER — OFFICE VISIT (OUTPATIENT)
Dept: FAMILY MEDICINE | Facility: CLINIC | Age: 48
End: 2018-10-31
Payer: COMMERCIAL

## 2018-10-31 VITALS
BODY MASS INDEX: 38.98 KG/M2 | WEIGHT: 220 LBS | HEART RATE: 80 BPM | RESPIRATION RATE: 16 BRPM | DIASTOLIC BLOOD PRESSURE: 80 MMHG | OXYGEN SATURATION: 98 % | HEIGHT: 63 IN | SYSTOLIC BLOOD PRESSURE: 128 MMHG | TEMPERATURE: 98.7 F

## 2018-10-31 DIAGNOSIS — G89.29 CHRONIC RIGHT-SIDED LOW BACK PAIN WITH RIGHT-SIDED SCIATICA: Primary | ICD-10-CM

## 2018-10-31 DIAGNOSIS — M25.50 POLYARTHRALGIA: ICD-10-CM

## 2018-10-31 DIAGNOSIS — R63.5 WEIGHT GAIN: ICD-10-CM

## 2018-10-31 DIAGNOSIS — M54.41 CHRONIC RIGHT-SIDED LOW BACK PAIN WITH RIGHT-SIDED SCIATICA: Primary | ICD-10-CM

## 2018-10-31 PROCEDURE — 99214 OFFICE O/P EST MOD 30 MIN: CPT | Performed by: FAMILY MEDICINE

## 2018-10-31 RX ORDER — PREDNISOLONE ACETATE 1 %
1-2 SUSPENSION, DROPS(FINAL DOSAGE FORM)(ML) OPHTHALMIC (EYE) 3 TIMES DAILY
COMMUNITY

## 2018-10-31 NOTE — PATIENT INSTRUCTIONS
Please see go to physical therapy.    Please use the muscle relaxant at night at a higher dose.    Get your labs done to check thyroid and other autoimmune diseases.          Thank you for choosing [facility].  You may be receiving a survey in the mail from Aniket Agudelo regarding your visit today.  Please take a few minutes to complete and return the survey to let us know how we are doing.      If you have questions or concerns, please contact us via [employer] or you can contact your care team at [phone].    Our Clinic hours are:  Monday 6:40 am  to 7:00 pm  Tuesday -Friday 6:40 am to 5:00 pm    The [place] outpatient lab hours are:  Monday - Friday 6:10 am to 4:45 pm  Saturdays 7:00 am to 11:00 am  Appointments are required, call [phone]    If you have clinical questions after hours or would like to schedule an appointment,  call the clinic at [phone].

## 2018-10-31 NOTE — PROGRESS NOTES
SUBJECTIVE:   Dakota Berkowitz is a 48 year old female who presents to clinic today for the following health issues:  Chief Complaint   Patient presents with     Hip Pain     Health Maintenance     is due for colonoscopy         Musculoskeletal problem/pain      Duration: since last February. Had walked about 8 miles and then sat through a convention. Pain is worsening    Description  Location: right hip    Intensity:  variable    Accompanying signs and symptoms: knee pain and ankle pain    History  Previous similar problem: no   Previous evaluation:  none    Precipitating or alleviating factors:  Trauma or overuse: YES- see comments above  Aggravating factors include: prolonged sitting    Therapies tried and outcome: heat helps      Patient has been having right hip pain. She points to right buttock and goes into right thigh.  Sitting makes it worse.  She has not done any PT.  She went to massage and chiropractic care.  No help.  She uses a muscle relaxant at night PRN which is tizanidine 4 mg.  She also has intermittent right knee pain medially and some right ankle pain intermittently.  She reports that she thought it would get better but no real change.  No back pain.  No GI or  symptoms.  No radicular symptoms to her right foot.       She also brought up the fact she has had weight gain.  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  Her eye doctor was going to get a consult with an eye specialist when she was having a flare up.  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     Problem list and histories reviewed & adjusted, as indicated.  Additional history: as documented        Reviewed and updated as needed this visit by clinical staff  Tobacco  Allergies  Meds  Problems  Med Hx  Surg Hx  Fam Hx  Soc Hx        Reviewed and updated as needed this visit by Provider  Allergies  Meds  Problems         ROS:  CONSTITUTIONAL:NEGATIVE for fever, chills,  "change in weight  INTEGUMENTARY/SKIN: NEGATIVE for worrisome rashes, moles or lesions  RESP:NEGATIVE for significant cough or SOB  CV: NEGATIVE for chest pain, palpitations or peripheral edema  GI: as above  : as above  MUSCULOSKELETAL: as above  NEURO: as above    OBJECTIVE:                                                    /80 (Cuff Size: Adult Large)  Pulse 80  Temp 98.7  F (37.1  C) (Tympanic)  Resp 16  Ht 5' 3\" (1.6 m)  Wt 220 lb (99.8 kg)  LMP 03/02/2018  SpO2 98%  BMI 38.97 kg/m2  Body mass index is 38.97 kg/(m^2).  Patient is pleasant, cooperative and in no acute distress.  Back:  Dakota Berkowitz had mild difficulty moving from the chair to the exam table.  no tenderness to palpation in the lumbar region.  No skin changes to the area.    mild tenderness to the right buttock area.  Straight leg raise was neg.  Flexion at hip was 80 degrees. Able to hyperextend and lean from side to side.  Muscle/Skeletal:  Strength was 5/5 of lower extremities.  Neuro: Reflexes were 2/4 bilaterally at patella and Achilles.           ASSESSMENT/PLAN:                                                    1. Chronic right-sided low back pain with right-sided sciatica  Will do PT, increase muscle relaxant, if not better will get hip xray and possible lumbar back MRI. I am holding off right now since conservative therapy has not been done  - tiZANidine (ZANAFLEX) 4 MG tablet; Take 2 tablets (8 mg) by mouth every evening  Dispense: 40 tablet; Refill: 2  - PHYSICAL THERAPY REFERRAL; Future    2. Weight gain  Get lab  - TSH with free T4 reflex; Future    3. Polyarthralgia  Unclear, recheck some labs from 2 years ago, had RF at 22 with normal less than 20.   - CBC with platelets differential; Future  - Anti Nuclear Candace IgG by IFA with Reflex; Future  - Rheumatoid factor; Future  - Erythrocyte sedimentation rate auto; Future  - CRP inflammation; Future      See Patient Instructions    Chino Rayo MD  [facility]

## 2018-10-31 NOTE — MR AVS SNAPSHOT
After Visit Summary   10/31/2018    Dakota Berkowitz    MRN: [id number]           Patient Information     Date Of Birth          1970        Visit Information        Provider Department      10/31/2018 4:00 PM Chino Rayo MD [facility]        Today's Diagnoses     Chronic right-sided low back pain with right-sided sciatica    -  1    Weight gain        Polyarthralgia          Care Instructions    Please see go to physical therapy.    Please use the muscle relaxant at night at a higher dose.    Get your labs done to check thyroid and other autoimmune diseases.          Thank you for choosing [facility].  You may be receiving a survey in the mail from [employer] regarding your visit today.  Please take a few minutes to complete and return the survey to let us know how we are doing.      If you have questions or concerns, please contact us via [employer] or you can contact your care team at [phone].    Our Clinic hours are:  Monday 6:40 am  to 7:00 pm  Tuesday -Friday 6:40 am to 5:00 pm    The [place] outpatient lab hours are:  Monday - Friday 6:10 am to 4:45 pm  Saturdays 7:00 am to 11:00 am  Appointments are required, call [phone]    If you have clinical questions after hours or would like to schedule an appointment,  call the clinic at [phone].            Follow-ups after your visit        Additional Services     PHYSICAL THERAPY REFERRAL       If you have not heard from the scheduling office within 2 business days, please call [phone] for all locations, with the exception of [place], please call [phone] and [place], please call [phone].    Please be aware that coverage of these services is subject to the terms and limitations of your health insurance plan.  Call member services at your health plan with any benefit or coverage questions.                  Follow-up notes from your care team     Return in about 4 weeks (around 11/28/2018).       Future tests that were ordered for you today     Open Future Orders        Priority Expected Expires Ordered    CBC with platelets differential Routine  1/31/2019 10/31/2018    TSH with free T4 reflex Routine  1/31/2019 10/31/2018    Anti Nuclear Candace IgG by IFA with Reflex Routine  1/31/2019 10/31/2018    Rheumatoid factor Routine  1/31/2019 10/31/2018    Erythrocyte sedimentation rate auto Routine  1/31/2019 10/31/2018    CRP inflammation Routine  1/31/2019 10/31/2018    PHYSICAL THERAPY REFERRAL Routine  10/31/2019 10/31/2018            Who to contact     If you have questions or need follow up information about today's clinic visit or your schedule please contact [facility] directly at [phone].  Normal or non-critical lab and imaging results will be communicated to you by MyChart, letter or phone within 4 business days after the clinic has received the results. If you do not hear from us within 7 days, please contact the clinic through [employer] or phone. If you have a critical or abnormal lab result, we will notify you by phone as soon as possible.  Submit refill requests through [employer] or call your pharmacy and they will forward the refill request to us. Please allow 3 business days for your refill to be completed.          Additional Information About Your Visit        [employer] Information     [employer] gives you secure access to your electronic health record. If you see a primary care provider, you can also send messages to your care team and make appointments. If you have questions, please call your primary care clinic.  If you do not have a primary care provider, please call [phone] and they will assist you.        Care EveryWhere ID     This is your Care EveryWhere ID. This could be used by other organizations to access your [place] medical records  [id number]        Your Vitals Were     Pulse Temperature Respirations Height Last Period Pulse Oximetry    80 98.7  F (37.1  C)  "(Tympanic) 16 5' 3\" (1.6 m) 03/02/2018 98%    BMI (Body Mass Index)                   38.97 kg/m2            Blood Pressure from Last 3 Encounters:   10/31/18 128/80   07/06/18 120/88   05/31/18 127/77    Weight from Last 3 Encounters:   10/31/18 220 lb (99.8 kg)   07/06/18 209 lb (94.8 kg)   05/30/18 210 lb (95.3 kg)                 Today's Medication Changes          These changes are accurate as of 10/31/18  5:08 PM.  If you have any questions, ask your nurse or doctor.               These medicines have changed or have updated prescriptions.        Dose/Directions    fluticasone 50 MCG/ACT spray   Commonly known as:  FLONASE   This may have changed:    - when to take this  - reasons to take this   Used for:  Nasal congestion        Dose:  2 spray   Spray 2 sprays into both nostrils daily   Quantity:  1 Bottle   Refills:  5       * tiZANidine 4 MG tablet   Commonly known as:  ZANAFLEX   This may have changed:  Another medication with the same name was added. Make sure you understand how and when to take each.   Used for:  Acute right-sided low back pain without sciatica, Muscle tightness   Changed by:  Chino Rayo MD        Dose:  4 mg   Take 1 tablet (4 mg) by mouth 3 times daily as needed for muscle spasms   Quantity:  30 tablet   Refills:  0       * tiZANidine 4 MG tablet   Commonly known as:  ZANAFLEX   This may have changed:  You were already taking a medication with the same name, and this prescription was added. Make sure you understand how and when to take each.   Used for:  Chronic right-sided low back pain with right-sided sciatica   Changed by:  Chino Rayo MD        Dose:  8 mg   Take 2 tablets (8 mg) by mouth every evening   Quantity:  40 tablet   Refills:  2       * Notice:  This list has 2 medication(s) that are the same as other medications prescribed for you. Read the directions carefully, and ask your doctor or other care provider to review them with you.         Where to get " your medicines      [id number] - [place], [place] - [address]  [address] [place] [address]     Phone:  [phone]     tiZANidine 4 MG tablet                Primary Care Provider Office Phone # Fax #    Chino Rayo [phone] [address] [facility] [address]        Equal Access to Services     COOKIE EARL : Hadii aad ku hadasho Soomaali, waaxda luqadaha, qaybta kaalmada adeegyada, waxay idiin hayaan adeeg kharash lajustice morrow. So [facility] [phone].    ATENCIÓN: Si habla español, tiene a flores disposición servicios gratuitos de asistencia lingüística. [facility] [phone].    We comply with applicable federal civil rights laws and [place] laws. We do not discriminate on the basis of race, color, national origin, age, disability, sex, sexual orientation, or gender identity.            Thank you!     Thank you for choosing [facility]  for your care. Our goal is always to provide you with excellent care. Hearing back from our patients is one way we can continue to improve our services. Please take a few minutes to complete the written survey that you may receive in the mail after your visit with us. Thank you!             Your Updated Medication List - Protect others around you: Learn how to safely use, store and throw away your medicines at www.disposemymeds.org.          This list is accurate as of 10/31/18  5:08 PM.  Always use your most recent med list.                   Brand Name Dispense Instructions for use Diagnosis    * albuterol (2.5 MG/3ML) 0.083% neb solution     25 vial    Take 1 vial (2.5 mg) by nebulization every 6 hours as needed for shortness of breath / dyspnea or wheezing    Pneumonia of both lungs due to infectious organism, unspecified part of lung       * albuterol 108 (90 Base) MCG/ACT inhaler    PROAIR HFA/PROVENTIL HFA/VENTOLIN HFA    1 Inhaler     Inhale 2 puffs into the lungs every 4 hours as needed for shortness of breath / dyspnea Hold on file until needed    Wheezes       EPINEPHrine 0.3 MG/0.3ML injection 2-pack    EPIPEN/ADRENACLICK/or ANY BX GENERIC EQUIV    0.6 mL    Inject 0.3 mLs (0.3 mg) into the muscle once as needed for anaphylaxis    Bee sting reaction, accidental or unintentional, subsequent encounter       fluticasone 50 MCG/ACT spray    FLONASE    1 Bottle    Spray 2 sprays into both nostrils daily    Nasal congestion       ibuprofen 200 MG tablet    ADVIL/MOTRIN    120 tablet    Take 3 tablets (600 mg) by mouth every 6 hours as needed for mild pain    Urethral hypermobility, Urinary, incontinence, stress female       order for DME     1 each    Equipment being ordered: Nebulizer    Pneumonia of both lungs due to infectious organism, unspecified part of lung       study - prednisoLONE acetate ([facility]) 1 % ophthalmic susp    IDS# [id number]     Place 1-2 drops into both eyes 3 times daily        * tiZANidine 4 MG tablet    ZANAFLEX    30 tablet    Take 1 tablet (4 mg) by mouth 3 times daily as needed for muscle spasms    Acute right-sided low back pain without sciatica, Muscle tightness       * tiZANidine 4 MG tablet    ZANAFLEX    40 tablet    Take 2 tablets (8 mg) by mouth every evening    Chronic right-sided low back pain with right-sided sciatica       * Notice:  This list has 4 medication(s) that are the same as other medications prescribed for you. Read the directions carefully, and ask your doctor or other care provider to review them with you.

## 2018-11-02 DIAGNOSIS — M25.50 POLYARTHRALGIA: ICD-10-CM

## 2018-11-02 DIAGNOSIS — R63.5 WEIGHT GAIN: ICD-10-CM

## 2018-11-02 LAB
BASOPHILS # BLD AUTO: 0 10E9/L (ref 0–0.2)
BASOPHILS NFR BLD AUTO: 0.4 %
CRP SERPL-MCNC: 8.7 MG/L (ref 0–8)
DIFFERENTIAL METHOD BLD: NORMAL
EOSINOPHIL # BLD AUTO: 0.1 10E9/L (ref 0–0.7)
EOSINOPHIL NFR BLD AUTO: 1.3 %
ERYTHROCYTE [DISTWIDTH] IN BLOOD BY AUTOMATED COUNT: 12.8 % (ref 10–15)
ERYTHROCYTE [SEDIMENTATION RATE] IN BLOOD BY WESTERGREN METHOD: 9 MM/H (ref 0–30)
HCT VFR BLD AUTO: 43.3 % (ref 35–47)
HGB BLD-MCNC: 14.1 G/DL (ref 11.7–15.7)
LYMPHOCYTES # BLD AUTO: 2 10E9/L (ref 0.8–5.3)
LYMPHOCYTES NFR BLD AUTO: 26.4 %
MCH RBC QN AUTO: 31.9 PG (ref 26.5–33)
MCHC RBC AUTO-ENTMCNC: 32.6 G/DL (ref 31.5–36.5)
MCV RBC AUTO: 98 FL (ref 78–100)
MONOCYTES # BLD AUTO: 0.5 10E9/L (ref 0–1.3)
MONOCYTES NFR BLD AUTO: 6.6 %
NEUTROPHILS # BLD AUTO: 4.9 10E9/L (ref 1.6–8.3)
NEUTROPHILS NFR BLD AUTO: 65.3 %
PLATELET # BLD AUTO: 171 10E9/L (ref 150–450)
RBC # BLD AUTO: 4.42 10E12/L (ref 3.8–5.2)
TSH SERPL DL<=0.005 MIU/L-ACNC: 1.84 MU/L (ref 0.4–4)
WBC # BLD AUTO: 7.5 10E9/L (ref 4–11)

## 2018-11-02 PROCEDURE — 36415 COLL VENOUS BLD VENIPUNCTURE: CPT | Performed by: FAMILY MEDICINE

## 2018-11-02 PROCEDURE — 85652 RBC SED RATE AUTOMATED: CPT | Performed by: FAMILY MEDICINE

## 2018-11-02 PROCEDURE — 86140 C-REACTIVE PROTEIN: CPT | Performed by: FAMILY MEDICINE

## 2018-11-02 PROCEDURE — 85025 COMPLETE CBC W/AUTO DIFF WBC: CPT | Performed by: FAMILY MEDICINE

## 2018-11-02 PROCEDURE — 84443 ASSAY THYROID STIM HORMONE: CPT | Performed by: FAMILY MEDICINE

## 2018-11-02 PROCEDURE — 86038 ANTINUCLEAR ANTIBODIES: CPT | Performed by: FAMILY MEDICINE

## 2018-11-02 PROCEDURE — 86431 RHEUMATOID FACTOR QUANT: CPT | Performed by: FAMILY MEDICINE

## 2018-11-05 LAB — RHEUMATOID FACT SER NEPH-ACNC: 20 IU/ML (ref 0–20)

## 2018-11-05 NOTE — MR AVS SNAPSHOT
After Visit Summary   11/5/2018    Gerard Manuel    MRN: [id number]           Patient Information     Date Of Birth          1938        Visit Information        Provider Department      11/5/2018 7:30 AM Hiren Serrano MD [facility] Dermatologic Surgery        Care Instructions    For the First 48 hours After Surgery:  1. Leave the pressure bandage on and keep it dry. If it should come loose, you may retape it, but do not take it off.  2. Relax and take it easy. Do not do any vigorous exercise, heavy lifting, or bending forward. This could cause the wound to bleed.  3. Post-operative pain is usually mild. You may take plain or extra strength Tylenol every 4 hours as needed (do not take more than 4,000mg in one day). Do not take any medicine that contains aspirin, ibuprofen or motrin unless you have been recommended these by a doctor.  Avoid alcohol and vitamin E as these may increase your tendency to bleed.  4. You may put an ice pack around the bandaged area for 20 minutes every 2-3 hours. This may help reduce swelling, bruising, and pain. Make sure the ice pack is waterproof so that the pressure bandage does not get wet.   5. You may see a small amount of drainage or blood on your pressure bandage. This is normal. However, if drainage or bleeding continues or saturates the bandage, you will need to apply firm pressure over the bandage with a washcloth for 15 minutes. If bleeding continues after applying pressure for 15 minutes then go to the nearest emergency room.  48 Hours After Surgery  Carefully remove the bandage and start daily wound care and dressing changes. You may also now shower and get the wound wet. Wash wound with a mild soap and water.  Use caution when washing the wound. Be gentle and do not let the forceful shower stream hit the wound directly.  PAT dry.  Daily Wound Care:  1. Wash wound with a mild soap and water.  Use caution when washing the wound, be gentle and do not let the                After Visit Summary   8/16/2017    Dakota Berkowitz    MRN: [id number]           Patient Information     Date Of Birth          1970        Visit Information        Provider Department      8/16/2017 2:35 PM Chino Rayo MD [facility]        Today's Diagnoses     Restless legs syndrome (RLS)    -  1       Follow-ups after your visit        Who to contact     If you have questions or need follow up information about today's clinic visit or your schedule please contact [facility] directly at [phone].  Normal or non-critical lab and imaging results will be communicated to you by [employer], letter or phone within 4 business days after the clinic has received the results. If you do not hear from us within 7 days, please contact the clinic through [employer] or phone. If you have a critical or abnormal lab result, we will notify you by phone as soon as possible.  Submit refill requests through [employer] or call your pharmacy and they will forward the refill request to us. Please allow 3 business days for your refill to be completed.          Additional Information About Your Visit        MyChart Information     [employer] gives you secure access to your electronic health record. If you see a primary care provider, you can also send messages to your care team and make appointments. If you have questions, please call your primary care clinic.  If you do not have a primary care provider, please call [phone] and they will assist you.        Care EveryWhere ID     This is your Care EveryWhere ID. This could be used by other organizations to access your [place] medical records  [id number]         Blood Pressure from Last 3 Encounters:   06/01/17 144/89   04/10/17 114/86   03/27/17 112/84    Weight from Last 3 Encounters:   06/01/17 211 lb (95.7 kg)   04/10/17 211 lb (95.7 kg)   03/27/17 211 lb (95.7 kg)              Today, you had the following     No orders found for  display       Primary Care Provider Office Phone # Fax #    Chino Rayo [phone] [address] [facility] [address]        Equal Access to Services     COOKIE EARL : Hadii aad ku hadnel Lobato, waaxda luqadaha, qaybta kaalmada tanisha, alison bautista laSoltoshia morrow. So [facility] [phone].    ATENCIÓN: Si habla español, tiene a flores disposición servicios gratuitos de asistencia lingüística. Llame al [phone].    We comply with applicable federal civil rights laws and [place] laws. We do not discriminate on the basis of race, color, national origin, age, disability sex, sexual orientation or gender identity.            Thank you!     Thank you for choosing [facility]  for your care. Our goal is always to provide you with excellent care. Hearing back from our patients is one way we can continue to improve our services. Please take a few minutes to complete the written survey that you may receive in the mail after your visit with us. Thank you!             Your Updated Medication List - Protect others around you: Learn how to safely use, store and throw away your medicines at www.disposemymeds.org.          This list is accurate as of: 8/16/17  3:31 PM.  Always use your most recent med list.                   Brand Name Dispense Instructions for use Diagnosis    azithromycin 250 MG tablet    ZITHROMAX Z-JESSY    6 tablet    Take 1 tablet (250 mg) by mouth daily Two tablets first day, then one tablet daily for four days    Acute sinusitis with symptoms > 10 days       cetirizine-psuedoePHEDrine 5-120 MG per 12 hr tablet    zyrTEC-D    60 tablet    Take 1 tablet by mouth 2 times daily Hold on file until needed    Congestion of paranasal sinus       cyclobenzaprine 5 MG tablet    FLEXERIL    45 tablet    Take 1-2 tablets (5-10 mg) by mouth 3 times daily as needed for muscle spasms    Shoulder injury, right, initial encounter, Chronic right shoulder pain     forceful shower stream hit the wound directly.  2. PAT DRY.  3. Apply Vaseline (from a new container or tube) over the suture line with a Q-tip. It is very important to keep the wound continuously moist, as wounds heal best in a moist environment.  4.  Keep the site covered until sutures are removed, you can cover it with a Telfa (non-stick) dressing and tape or a band-aid.    5. If you are unable to keep wound covered, you must apply Vaseline every 2 - 3 hours (while awake) to ensure it is being kept moist for optimal healing. A dressing overnight is recommended to keep the area moist.   Call Us If:  1. You have pain that is not controlled with Tylenol.  2. You have signs or symptoms of an infection, such as: fever over 100 degrees F, redness, warmth, or foul-smelling or yellow/creamy drainage from the wound.  Who should I call with questions?    [facility]: [phone]     [facility]: [phone]    For urgent needs outside of business hours call the [facility] at [phone] and ask for the dermatology resident on call              Follow-ups after your visit        Your next 10 appointments already scheduled     Feb 06, 2019  1:00 PM CST   Nurse Visit with  Derm Surg Nurse   [facility] Dermatologic Surgery ([facility] [place])    [address] [place] [id number] [phone]              Who to contact     Please call your clinic at [phone] to:    Ask questions about your health    Make or cancel appointments    Discuss your medicines    Learn about your test results    Speak to your doctor            Additional Information About Your Visit        MyChart Information     [employer] gives you secure access to your electronic health record. If you see a primary care provider, you can also send messages to your care team and make appointments. If you have questions, please call your primary care     EPINEPHrine 0.3 MG/0.3ML injection 2-pack    EPIPEN/ADRENACLICK/or ANY BX GENERIC EQUIV    0.6 mL    Inject 0.3 mLs (0.3 mg) into the muscle once as needed for anaphylaxis Hold on file until needed    Bee sting reaction, accidental or unintentional, subsequent encounter       ibuprofen 200 MG tablet    ADVIL/MOTRIN    120 tablet    Take 3 tablets (600 mg) by mouth every 6 hours as needed for mild pain    Urethral hypermobility, Urinary, incontinence, stress female          clinic.  If you do not have a primary care provider, please call [phone] and they will assist you.      [employer] is an electronic gateway that provides easy, online access to your medical records. With [employer], you can request a clinic appointment, read your test results, renew a prescription or communicate with your care team.     To access your existing account, please contact your [facility] or call [phone] for assistance.        Care EveryWhere ID     This is your Care EveryWhere ID. This could be used by other organizations to access your [place] medical records  [id number]        Your Vitals Were     Pulse                   93            Blood Pressure from Last 3 Encounters:   11/05/18 136/88   07/23/18 130/77   07/09/18 113/69    Weight from Last 3 Encounters:   07/02/18 77.6 kg (171 lb)              Today, you had the following     No orders found for display       Primary Care Provider Office Phone # Fax #    Shelton Palomino [phone]       [facility] [address] [facility] [address]        Equal Access to Services     JOYA RED : Hadii aad ku hadasho Soomaali, waaxda luqadaha, qaybta kaalmada adeegyada, waxay idiin haymauro manley . So [facility] [phone].    ATENCIÓN: Si habla español, tiene a clark disposición servicios gratuitos de asistencia lingüística. [facility] [phone].    We comply with applicable federal civil rights laws and [place] laws. We do not discriminate on the basis of race, color, national origin, age, disability, sex, sexual orientation, or gender identity.            Thank you!     Thank you for choosing [facility] DERMATOLOGIC SURGERY  for your care. Our goal is always to provide you with excellent care. Hearing back from our patients is one way we can continue to improve our services. Please take a few minutes to complete the written survey that you may receive in the mail after your visit with  us. Thank you!             Your Updated Medication List - Protect others around you: Learn how to safely use, store and throw away your medicines at www.disposemymeds.org.          This list is accurate as of 11/5/18  8:26 AM.  Always use your most recent med list.                   Brand Name Dispense Instructions for use Diagnosis    aspirin 325 MG tablet      Take 81 mg by mouth daily        atenolol 25 MG tablet    TENORMIN     Take 50 mg by mouth daily        econazole nitrate 1 % cream     85 g    Apply thin layer to bottom of feet twice daily after washing the feet. Don't forget to apply between the toes.    Neoplasm of uncertain behavior of skin       ketorolac 0.5 % ophthalmic solution    ACULAR    1 Bottle    Place 1 drop Into the left eye 4 times daily    Nuclear senile cataract of left eye       ofloxacin 0.3 % ophthalmic solution    OCUFLOX    1 Bottle    Place 1 drop Into the left eye 4 times daily    Nuclear senile cataract of left eye       prednisoLONE acetate 1 % ophthalmic susp    PRED FORTE    1 Bottle    Place 1 drop Into the left eye 4 times daily    Nuclear senile cataract of left eye       rivaroxaban ANTICOAGULANT 20 MG Tabs tablet    XARELTO     Take 20 mg by mouth daily (with dinner)        simvastatin 40 MG tablet    ZOCOR     Take by mouth At Bedtime

## 2018-11-06 LAB — ANA SER QL IF: NEGATIVE

## 2018-11-15 ENCOUNTER — OFFICE VISIT (OUTPATIENT)
Dept: FAMILY MEDICINE | Facility: CLINIC | Age: 48
End: 2018-11-15
Payer: COMMERCIAL

## 2018-11-15 ENCOUNTER — RADIANT APPOINTMENT (OUTPATIENT)
Dept: GENERAL RADIOLOGY | Facility: CLINIC | Age: 48
End: 2018-11-15
Attending: FAMILY MEDICINE
Payer: COMMERCIAL

## 2018-11-15 VITALS
SYSTOLIC BLOOD PRESSURE: 120 MMHG | DIASTOLIC BLOOD PRESSURE: 80 MMHG | WEIGHT: 220 LBS | TEMPERATURE: 99.6 F | OXYGEN SATURATION: 96 % | HEIGHT: 63 IN | BODY MASS INDEX: 38.98 KG/M2 | HEART RATE: 80 BPM

## 2018-11-15 DIAGNOSIS — B96.89 ACUTE BACTERIAL BRONCHITIS: Primary | ICD-10-CM

## 2018-11-15 DIAGNOSIS — R05.9 COUGH: ICD-10-CM

## 2018-11-15 DIAGNOSIS — J20.8 ACUTE BACTERIAL BRONCHITIS: Primary | ICD-10-CM

## 2018-11-15 PROCEDURE — 99213 OFFICE O/P EST LOW 20 MIN: CPT | Performed by: FAMILY MEDICINE

## 2018-11-15 PROCEDURE — 71046 X-RAY EXAM CHEST 2 VIEWS: CPT | Mod: FY

## 2018-11-15 RX ORDER — AZITHROMYCIN 250 MG/1
TABLET, FILM COATED ORAL
Qty: 6 TABLET | Refills: 0 | Status: SHIPPED | OUTPATIENT
Start: 2018-11-15 | End: 2019-03-19

## 2018-11-15 RX ORDER — GUAIFENESIN/DEXTROMETHORPHAN 100-10MG/5
5 SYRUP ORAL EVERY 4 HOURS PRN
Qty: 560 ML | COMMUNITY
Start: 2018-11-15 | End: 2019-03-19

## 2018-11-15 NOTE — PATIENT INSTRUCTIONS
Bronchitis, Antibiotic Treatment (Adult)    Bronchitis is an infection of the air passages (bronchial tubes) in your lungs. It often occurs when you have a cold. This illness is contagious during the first few days and is spread through the air by coughing and sneezing, or by direct contact (touching the sick person and then touching your own eyes, nose, or mouth).  Symptoms of bronchitis include cough with mucus (phlegm) and low-grade fever. Bronchitis usually lasts 7 to 14 days. Mild cases can be treated with simple home remedies. More severe infection is treated with an antibiotic.  Home care  Follow these guidelines when caring for yourself at home:    If your symptoms are severe, rest at home for the first 2 to 3 days. When you go back to your usual activities, don't let yourself get too tired.    Don't smoke. Also stay away from secondhand smoke.    You may use over-the-counter medicines to control fever or pain, unless another medicine was prescribed. If you have chronic liver or kidney disease or have ever had a stomach ulcer or gastrointestinal bleeding, talk with your healthcare provider before using these medicines. Also talk to your provider if you are taking medicine to prevent blood clots. Aspirin should never be given to anyone younger than 18 who is ill with a viral infection or fever. It may cause severe liver or brain damage.    Your appetite may be low, so a light diet is fine. Stay well hydrated by drinking 6 to 8 glasses of fluids per day. This includes water, soft drinks, sports drinks, juices, tea, or soup. Extra fluids will help loosen mucus in your nose and lungs.    Over-the-counter cough, cold, and sore-throat medicines will not shorten the length of the illness, but they may be helpful to reduce your symptoms. Don't use decongestants if you have high blood pressure.    Finish all antibiotic medicine. Do this even if you are feeling better after only a few days.  Follow-up care  Follow  up with your healthcare provider, or as advised. If you had an X-ray or ECG (electrocardiogram), a specialist will review it. You will be told of any new test results that may affect your care.  If you are age 65 or older, if you smoke, or if you have a chronic lung disease or condition that affects your immune system, ask your healthcare provider about getting a pneumococcal vaccine and a yearly flu shot (influenza vaccine).  When to seek medical advice  Call your healthcare provider right away if any of these occur:    Fever of 100.4 F (38 C) or higher, or as directed by your healthcare provider    Coughing up more sputum    Weakness, drowsiness, headache, facial pain, ear pain, or a stiff neck     Call 911  Call 911 if any of these occur.    Coughing up blood    Weakness, drowsiness, headache, or stiff neck that get worse    Trouble breathing, wheezing, or pain with breathing   Date Last Reviewed: 6/1/2018 2000-2018 [employer]. [address] [place] [address], [place] [id number]. All rights reserved. This information is not intended as a substitute for professional medical care. Always follow your healthcare professional's instructions.

## 2018-11-15 NOTE — PROGRESS NOTES
SUBJECTIVE:   Dakota Berkowitz is a 48 year old female who presents to clinic today for the following health issues:  Chief Complaint   Patient presents with     Cough     Pt here for bad cough, sinus, ears.     ENT Symptoms             Symptoms: cc Present Absent Comment   Fever/Chills  x     Fatigue   x    Muscle Aches   x    Eye Irritation   x    Sneezing  x     Nasal Micah/Drg  x  both   Sinus Pressure/Pain  x  Just started this morning   Loss of smell  x     Dental pain   x    Sore Throat  x     Swollen Glands   x    Ear Pain/Fullness  x  crackling   Cough x      Wheeze  x  little   Chest Pain  x  pressure   Shortness of breath  x     Rash   x    Other  x  Headache from cough     Symptom duration:  4 days   Symptom severity:  severe with cough   Treatments tried:  mucinex, cough meds, ibuprofen (taken 1.5 hrs ago)   Contacts:  grandson, daughter both sick     Verified above history with patient.    Tobacco: denies use or exposure  Seasonal allergies: denies  Travel: denies recent   Occupation: customer service desk  Pulm hx: denies asthma or chronic lung disease    Problem list and histories reviewed & adjusted, as indicated.  Additional history: as documented    Patient Active Problem List   Diagnosis     Female pelvic pain     Breast implant rupture     Pain in breast     Obesity     Perimenopausal symptoms     Weight gain     Irritability     CARDIOVASCULAR SCREENING; LDL GOAL LESS THAN 100     Past Surgical History:   Procedure Laterality Date     C APPENDECTOMY       COSMETIC SURGERY      breast implants     CYSTOSCOPY, SLING TRANSVAGINAL N/A 2/8/2016    Procedure: CYSTOSCOPY, SLING TRANSVAGINAL;  Surgeon: Sterling Hill MD;  Location: [facility]     EYE SURGERY       HC REPAIR OF NASAL SEPTUM       SURGICAL HISTORY OF -       laporoscopy due to endometriosis     SURGICAL HISTORY OF -       endometrial ablation     SURGICAL HISTORY OF -       breast augmentation     SURGICAL HISTORY OF -       benign  tumor removed from her left thigh     TONSILLECTOMY       TUBAL LIGATION         Social History   Substance Use Topics     Smoking status: Never Smoker     Smokeless tobacco: Never Used     Alcohol use Yes      Comment: occassionally on weekends     Family History   Problem Relation Age of Onset     Cancer Mother      ovarian     Cancer Paternal Grandmother      lung     Gynecology Sister      having a partial hysterectomy due to abnormal cells, leaving ovaries and cervix     Gynecology Daughter      endometriosis     Gynecology Sister      hysterectomy for endometriosis         Current Outpatient Prescriptions   Medication Sig Dispense Refill     albuterol (PROAIR HFA/PROVENTIL HFA/VENTOLIN HFA) 108 (90 Base) MCG/ACT Inhaler Inhale 2 puffs into the lungs every 4 hours as needed for shortness of breath / dyspnea Hold on file until needed 1 Inhaler 0     azithromycin (ZITHROMAX) 250 MG tablet Two tablets orally once a day on first day, then one tablet daily for four days. 6 tablet 0     guaiFENesin-dextromethorphan (ROBITUSSIN DM) 100-10 MG/5ML syrup Take 5 mLs by mouth every 4 hours as needed for cough 560 mL      tiZANidine (ZANAFLEX) 4 MG tablet Take 2 tablets (8 mg) by mouth every evening 40 tablet 2     albuterol (2.5 MG/3ML) 0.083% neb solution Take 1 vial (2.5 mg) by nebulization every 6 hours as needed for shortness of breath / dyspnea or wheezing (Patient not taking: Reported on 11/15/2018) 25 vial 0     EPINEPHrine (EPIPEN/ADRENACLICK/OR ANY BX GENERIC EQUIV) 0.3 MG/0.3ML injection 2-pack Inject 0.3 mLs (0.3 mg) into the muscle once as needed for anaphylaxis 0.6 mL 3     fluticasone (FLONASE) 50 MCG/ACT spray Spray 2 sprays into both nostrils daily (Patient not taking: Reported on 11/15/2018) 1 Bottle 5     ibuprofen (ADVIL,MOTRIN) 200 MG tablet Take 3 tablets (600 mg) by mouth every 6 hours as needed for mild pain 120 tablet      order for DME Equipment being ordered: Nebulizer 1 each 0     study -  "[facility]) 1 % ophthalmic susp Place 1-2 drops into both eyes 3 times daily       Allergies   Allergen Reactions     Bees Anaphylaxis     Onion Anaphylaxis     Clindamycin Rash     Provera [Medroxyprogesterone Acetate] Other (See Comments)     edema       Reviewed and updated as needed this visit by clinical staff  Tobacco  Allergies  Meds  Problems  Med Hx  Surg Hx  Fam Hx  Soc Hx        Reviewed and updated as needed this visit by Provider  Allergies  Problems         ROS:  C: NEGATIVE for fever, chills, change in weight  I: NEGATIVE for worrisome rashes, moles or lesions  E: NEGATIVE for vision changes or irritation  ENT/MOUTH: see above  RESP:as above  CV: NEGATIVE for chest pain, palpitations or peripheral edema  GI: NEGATIVE for nausea, abdominal pain, heartburn, or change in bowel habits  M: NEGATIVE for significant arthralgias or myalgia    OBJECTIVE:                                                    /80  Pulse 80  Temp 99.6  F (37.6  C) (Tympanic)  Ht 5' 3\" (1.6 m)  Wt 220 lb (99.8 kg)  SpO2 96%  BMI 38.97 kg/m2  Body mass index is 38.97 kg/(m^2).  GENERAL:  alert and no distress  EYES: pink conjunctivae, no icterus  NECK: mild cervical adenopathy, nontender  HEENT: tympanic membrane intact and pearly bilaterally, nose with mild congestion, no sinus tenderness, throat mildly erythematous, no exudates, no oral ulcers  RESP: fiar to good air entry; bronchial breath sounds extend to right peripheral middle to lower lungs; no rales, no crackles; no rhonchi, no wheezes  CV: regular rates and rhythm, normal S1 S2, no S3 or S4 and no murmur  SKIN:  Good turgor, no rashes    Diagnostic test results:  Diagnostic Test Results:  No results found for this or any previous visit (from the past 24 hour(s)).     ASSESSMENT/PLAN:                                                        ICD-10-CM    1. Acute bacterial bronchitis J20.8 azithromycin (ZITHROMAX) 250 MG tablet "    B96.89 No distress.  Due to severity of symptoms and findings on exam, antibiotics given. Patient is suspect for fever - she just took an antipyretic less than 2 hours to visit and her temp is as above.  Symptomatic measures: push oral fluids, antitussives, humidify air  Return precautions given.     2. Cough R05 XR Chest 2 Views - obtained due to bronchial breath sounds - rule out pneumonia     guaiFENesin-dextromethorphan (ROBITUSSIN DM) 100-10 MG/5ML syrup  Patient was advised of CXR images; final report pending.         Follow up with Provider - 2-3 days if worsening   Patient Instructions       Bronchitis, Antibiotic Treatment (Adult)    Bronchitis is an infection of the air passages (bronchial tubes) in your lungs. It often occurs when you have a cold. This illness is contagious during the first few days and is spread through the air by coughing and sneezing, or by direct contact (touching the sick person and then touching your own eyes, nose, or mouth).  Symptoms of bronchitis include cough with mucus (phlegm) and low-grade fever. Bronchitis usually lasts 7 to 14 days. Mild cases can be treated with simple home remedies. More severe infection is treated with an antibiotic.  Home care  Follow these guidelines when caring for yourself at home:    If your symptoms are severe, rest at home for the first 2 to 3 days. When you go back to your usual activities, don't let yourself get too tired.    Don't smoke. Also stay away from secondhand smoke.    You may use over-the-counter medicines to control fever or pain, unless another medicine was prescribed. If you have chronic liver or kidney disease or have ever had a stomach ulcer or gastrointestinal bleeding, talk with your healthcare provider before using these medicines. Also talk to your provider if you are taking medicine to prevent blood clots. Aspirin should never be given to anyone younger than 18 who is ill with a viral infection or fever. It may cause  severe liver or brain damage.    Your appetite may be low, so a light diet is fine. Stay well hydrated by drinking 6 to 8 glasses of fluids per day. This includes water, soft drinks, sports drinks, juices, tea, or soup. Extra fluids will help loosen mucus in your nose and lungs.    Over-the-counter cough, cold, and sore-throat medicines will not shorten the length of the illness, but they may be helpful to reduce your symptoms. Don't use decongestants if you have high blood pressure.    Finish all antibiotic medicine. Do this even if you are feeling better after only a few days.  Follow-up care  Follow up with your healthcare provider, or as advised. If you had an X-ray or ECG (electrocardiogram), a specialist will review it. You will be told of any new test results that may affect your care.  If you are age 65 or older, if you smoke, or if you have a chronic lung disease or condition that affects your immune system, ask your healthcare provider about getting a pneumococcal vaccine and a yearly flu shot (influenza vaccine).  When to seek medical advice  Call your healthcare provider right away if any of these occur:    Fever of 100.4 F (38 C) or higher, or as directed by your healthcare provider    Coughing up more sputum    Weakness, drowsiness, headache, facial pain, ear pain, or a stiff neck     Call 911  Call 911 if any of these occur.    Coughing up blood    Weakness, drowsiness, headache, or stiff neck that get worse    Trouble breathing, wheezing, or pain with breathing   Date Last Reviewed: 6/1/2018 2000-2018 [employer]. [address] [place] [address], [place] [address]. All rights reserved. This information is not intended as a substitute for professional medical care. Always follow your healthcare professional's instructions.            Martínez Tavares MD  [facility]   EOAE (evoked otoacoustic emission)

## 2018-11-15 NOTE — MR AVS SNAPSHOT
After Visit Summary   11/15/2018    Dakota Berkowitz    MRN: [id number]           Patient Information     Date Of Birth          1970        Visit Information        Provider Department      11/15/2018 9:40 AM Martínez Tavares MD [facility]        Today's Diagnoses     Acute bacterial bronchitis    -  1    Cough          Care Instructions      Bronchitis, Antibiotic Treatment (Adult)    Bronchitis is an infection of the air passages (bronchial tubes) in your lungs. It often occurs when you have a cold. This illness is contagious during the first few days and is spread through the air by coughing and sneezing, or by direct contact (touching the sick person and then touching your own eyes, nose, or mouth).  Symptoms of bronchitis include cough with mucus (phlegm) and low-grade fever. Bronchitis usually lasts 7 to 14 days. Mild cases can be treated with simple home remedies. More severe infection is treated with an antibiotic.  Home care  Follow these guidelines when caring for yourself at home:    If your symptoms are severe, rest at home for the first 2 to 3 days. When you go back to your usual activities, don't let yourself get too tired.    Don't smoke. Also stay away from secondhand smoke.    You may use over-the-counter medicines to control fever or pain, unless another medicine was prescribed. If you have chronic liver or kidney disease or have ever had a stomach ulcer or gastrointestinal bleeding, talk with your healthcare provider before using these medicines. Also talk to your provider if you are taking medicine to prevent blood clots. Aspirin should never be given to anyone younger than 18 who is ill with a viral infection or fever. It may cause severe liver or brain damage.    Your appetite may be low, so a light diet is fine. Stay well hydrated by drinking 6 to 8 glasses of fluids per day. This includes water, soft drinks, sports drinks, juices, tea, or soup.  Extra fluids will help loosen mucus in your nose and lungs.    Over-the-counter cough, cold, and sore-throat medicines will not shorten the length of the illness, but they may be helpful to reduce your symptoms. Don't use decongestants if you have high blood pressure.    Finish all antibiotic medicine. Do this even if you are feeling better after only a few days.  Follow-up care  Follow up with your healthcare provider, or as advised. If you had an X-ray or ECG (electrocardiogram), a specialist will review it. You will be told of any new test results that may affect your care.  If you are age 65 or older, if you smoke, or if you have a chronic lung disease or condition that affects your immune system, ask your healthcare provider about getting a pneumococcal vaccine and a yearly flu shot (influenza vaccine).  When to seek medical advice  Call your healthcare provider right away if any of these occur:    Fever of 100.4 F (38 C) or higher, or as directed by your healthcare provider    Coughing up more sputum    Weakness, drowsiness, headache, facial pain, ear pain, or a stiff neck     Call 911  Call 911 if any of these occur.    Coughing up blood    Weakness, drowsiness, headache, or stiff neck that get worse    Trouble breathing, wheezing, or pain with breathing   Date Last Reviewed: 6/1/2018 2000-2018 [employer]. [address] [place] [address]. All rights reserved. This information is not intended as a substitute for professional medical care. Always follow your healthcare professional's instructions.                Follow-ups after your visit        Follow-up notes from your care team     Return in about 3 days (around 11/18/2018), or if symptoms worsen or fail to improve.      Who to contact     If you have questions or need follow up information about today's clinic visit or your schedule please contact [facility] directly at [phone].  Normal or non-critical lab and  "imaging results will be communicated to you by MyChart, letter or phone within 4 business days after the clinic has received the results. If you do not hear from us within 7 days, please contact the clinic through [employer] or phone. If you have a critical or abnormal lab result, we will notify you by phone as soon as possible.  Submit refill requests through [employer] or call your pharmacy and they will forward the refill request to us. Please allow 3 business days for your refill to be completed.          Additional Information About Your Visit        [employer] Information     [employer] gives you secure access to your electronic health record. If you see a primary care provider, you can also send messages to your care team and make appointments. If you have questions, please call your primary care clinic.  If you do not have a primary care provider, please call [phone] and they will assist you.        Care EveryWhere ID     This is your Care EveryWhere ID. This could be used by other organizations to access your [facility] medical records  [id number]        Your Vitals Were     Pulse Temperature Height Pulse Oximetry BMI (Body Mass Index)       80 99.6  F (37.6  C) (Tympanic) 5' 3\" (1.6 m) 96% 38.97 kg/m2        Blood Pressure from Last 3 Encounters:   11/15/18 120/80   10/31/18 128/80   07/06/18 120/88    Weight from Last 3 Encounters:   11/15/18 220 lb (99.8 kg)   10/31/18 220 lb (99.8 kg)   07/06/18 209 lb (94.8 kg)              We Performed the Following     XR Chest 2 Views          Today's Medication Changes          These changes are accurate as of 11/15/18 10:20 AM.  If you have any questions, ask your nurse or doctor.               Start taking these medicines.        Dose/Directions    azithromycin 250 MG tablet   Commonly known as:  ZITHROMAX   Used for:  Acute bacterial bronchitis   Started by:  Martínez Tavares MD        Two tablets orally once a day on first day, then one tablet daily for four " days.   Quantity:  6 tablet   Refills:  0            Where to get your medicines      These medications were sent to [id number] - [facility] [address] AT [facility] OF [facility] & [place]  [address] [facility] [address]     Phone:  [phone]     azithromycin 250 MG tablet                Primary Care Provider Office Phone # Fax #    Chino Rayo [phone] [address] [facility] [address]        Equal Access to Services     COOKIE EARL : Hadii aad ku hadasho Soomaali, waaxda luqadaha, qaybta kaalmada adeegyada, waxay idiin hayfelixn nathan roy . So [facility] [phone].    ATENCIÓN: Si habla español, tiene a flores disposición servicios gratuitos de asistencia lingüística. Llame al [phone].    We comply with applicable federal civil rights laws and [place] laws. We do not discriminate on the basis of race, color, national origin, age, disability, sex, sexual orientation, or gender identity.            Thank you!     Thank you for choosing [facility]  for your care. Our goal is always to provide you with excellent care. Hearing back from our patients is one way we can continue to improve our services. Please take a few minutes to complete the written survey that you may receive in the mail after your visit with us. Thank you!             Your Updated Medication List - Protect others around you: Learn how to safely use, store and throw away your medicines at www.disposemymeds.org.          This list is accurate as of 11/15/18 10:20 AM.  Always use your most recent med list.                   Brand Name Dispense Instructions for use Diagnosis    * albuterol (2.5 MG/3ML) 0.083% neb solution     25 vial    Take 1 vial (2.5 mg) by nebulization every 6 hours as needed for shortness of breath / dyspnea or wheezing    Pneumonia of both lungs due to infectious organism, unspecified part of lung       * albuterol 108 (90 Base)  MCG/ACT inhaler    PROAIR HFA/PROVENTIL HFA/VENTOLIN HFA    1 Inhaler    Inhale 2 puffs into the lungs every 4 hours as needed for shortness of breath / dyspnea Hold on file until needed    Wheezes       azithromycin 250 MG tablet    ZITHROMAX    6 tablet    Two tablets orally once a day on first day, then one tablet daily for four days.    Acute bacterial bronchitis       EPINEPHrine 0.3 MG/0.3ML injection 2-pack    EPIPEN/ADRENACLICK/or ANY BX GENERIC EQUIV    0.6 mL    Inject 0.3 mLs (0.3 mg) into the muscle once as needed for anaphylaxis    Bee sting reaction, accidental or unintentional, subsequent encounter       fluticasone 50 MCG/ACT spray    FLONASE    1 Bottle    Spray 2 sprays into both nostrils daily    Nasal congestion       guaiFENesin-dextromethorphan 100-10 MG/5ML syrup    ROBITUSSIN DM    560 mL    Take 5 mLs by mouth every 4 hours as needed for cough    Cough       ibuprofen 200 MG tablet    ADVIL/MOTRIN    120 tablet    Take 3 tablets (600 mg) by mouth every 6 hours as needed for mild pain    Urethral hypermobility, Urinary, incontinence, stress female       order for DME     1 each    Equipment being ordered: Nebulizer    Pneumonia of both lungs due to infectious organism, unspecified part of lung       study - prednisoLONE acetate ([facility]) 1 % ophthalmic susp    IDS# 5081     Place 1-2 drops into both eyes 3 times daily        tiZANidine 4 MG tablet    ZANAFLEX    40 tablet    Take 2 tablets (8 mg) by mouth every evening    Chronic right-sided low back pain with right-sided sciatica       * Notice:  This list has 2 medication(s) that are the same as other medications prescribed for you. Read the directions carefully, and ask your doctor or other care provider to review them with you.

## 2018-12-03 NOTE — TELEPHONE ENCOUNTER
Patient is requesting medication for Valium for MRI.     Padma MINA RN    
Please fax prescription for Valium, take 1 tablet 30 minutes before procedure.    FORTINO Lai CNP, covering for PCP  
Rx for Val[facility] faxed to [facility].  Message left for patient.  
pain, knee/c/o pain in his left knee

## 2019-01-07 ENCOUNTER — MYC MEDICAL ADVICE (OUTPATIENT)
Dept: FAMILY MEDICINE | Facility: CLINIC | Age: 49
End: 2019-01-07

## 2019-01-07 DIAGNOSIS — E66.812 OBESITY, CLASS II, BMI 35-39.9, ISOLATED (SEE ACTUAL BMI): Primary | ICD-10-CM

## 2019-01-07 NOTE — TELEPHONE ENCOUNTER
Please see patient's MyChart note regarding referral for lap band surgery. Bariatric surgery referral pended for review.     ALBERT DuffN, RN

## 2019-01-07 NOTE — TELEPHONE ENCOUNTER
Referral done.  All of the bariatric programs are under the same program so I referred her to the closest, former [facility] location in [place].  Chino Rayo MD  Family Medicine

## 2019-02-26 ENCOUNTER — COMMUNICATION - HEALTHEAST (OUTPATIENT)
Dept: SURGERY | Facility: CLINIC | Age: 49
End: 2019-02-26

## 2019-03-19 ENCOUNTER — OFFICE VISIT (OUTPATIENT)
Dept: FAMILY MEDICINE | Facility: CLINIC | Age: 49
End: 2019-03-19
Payer: COMMERCIAL

## 2019-03-19 VITALS
SYSTOLIC BLOOD PRESSURE: 118 MMHG | TEMPERATURE: 97.2 F | HEIGHT: 63 IN | WEIGHT: 225 LBS | OXYGEN SATURATION: 98 % | BODY MASS INDEX: 39.87 KG/M2 | HEART RATE: 71 BPM | DIASTOLIC BLOOD PRESSURE: 86 MMHG

## 2019-03-19 DIAGNOSIS — H60.393 INFECTIVE OTITIS EXTERNA, BILATERAL: Primary | ICD-10-CM

## 2019-03-19 PROCEDURE — 99213 OFFICE O/P EST LOW 20 MIN: CPT | Performed by: NURSE PRACTITIONER

## 2019-03-19 RX ORDER — CIPROFLOXACIN AND DEXAMETHASONE 3; 1 MG/ML; MG/ML
4 SUSPENSION/ DROPS AURICULAR (OTIC) 2 TIMES DAILY
Qty: 2.8 ML | Refills: 0 | Status: SHIPPED | OUTPATIENT
Start: 2019-03-19 | End: 2019-06-21

## 2019-03-19 ASSESSMENT — MIFFLIN-ST. JEOR: SCORE: 1609.72

## 2019-03-19 NOTE — PROGRESS NOTES
SUBJECTIVE:   Dakota Berkowitz is a 48 year old female who presents to clinic today for the following health issues:    ENT Symptoms             Symptoms: cc Present Absent Comment   Fever/Chills   x    Fatigue  x     Muscle Aches   x    Eye Irritation  x  Dry eyes    Sneezing   x    Nasal Micah/Drg   x    Sinus Pressure/Pain   x    Loss of smell   x    Dental pain  x  Jaw pain on the left side    Sore Throat   x    Swollen Glands  x  Left side    Ear Pain/Fullness x x  Left ear, fullness, pain, pressure   Cough   x    Wheeze   x    Chest Pain   x    Shortness of breath   x    Rash   x    Other   x      Symptom duration:  3 days    Symptom severity:  moderate   Treatments tried:  heating pad on ear and jaw   Contacts:  None     Patient has history of otitis externa and feels that this is the symptoms that are occurring now.  Denies any symptoms in right ear.  Does use q-tips to clean ears out.    Problem list and histories reviewed & adjusted, as indicated.  Additional history: as documented    Patient Active Problem List   Diagnosis     Female pelvic pain     Breast implant rupture     Pain in breast     Obesity     Perimenopausal symptoms     Weight gain     Irritability     CARDIOVASCULAR SCREENING; LDL GOAL LESS THAN 100     Past Surgical History:   Procedure Laterality Date     C APPENDECTOMY       COSMETIC SURGERY      breast implants     CYSTOSCOPY, SLING TRANSVAGINAL N/A 2/8/2016    Procedure: CYSTOSCOPY, SLING TRANSVAGINAL;  Surgeon: Sterling Hill MD;  Location: [facility]     EYE SURGERY       HC REPAIR OF NASAL SEPTUM       SURGICAL HISTORY OF -       laporoscopy due to endometriosis     SURGICAL HISTORY OF -       endometrial ablation     SURGICAL HISTORY OF -       breast augmentation     SURGICAL HISTORY OF -       benign tumor removed from her left thigh     TONSILLECTOMY       TUBAL LIGATION         Social History     Tobacco Use     Smoking status: Never Smoker     Smokeless tobacco: Never Used    Substance Use Topics     Alcohol use: Yes     Comment: occassionally on weekends     Family History   Problem Relation Age of Onset     Cancer Mother         ovarian     Cancer Paternal Grandmother         lung     Gynecology Sister         having a partial hysterectomy due to abnormal cells, leaving ovaries and cervix     Gynecology Daughter         endometriosis     Gynecology Sister         hysterectomy for endometriosis         Current Outpatient Medications   Medication Sig Dispense Refill     albuterol (2.5 MG/3ML) 0.083% neb solution Take 1 vial (2.5 mg) by nebulization every 6 hours as needed for shortness of breath / dyspnea or wheezing 25 vial 0     albuterol (PROAIR HFA/PROVENTIL HFA/VENTOLIN HFA) 108 (90 Base) MCG/ACT Inhaler Inhale 2 puffs into the lungs every 4 hours as needed for shortness of breath / dyspnea Hold on file until needed 1 Inhaler 0     ciprofloxacin-dexamethasone (CIPRODEX) 0.3-0.1 % otic suspension Place 4 drops into both ears 2 times daily for 7 days 2.8 mL 0     fluticasone (FLONASE) 50 MCG/ACT spray Spray 2 sprays into both nostrils daily (Patient taking differently: Spray 2 sprays into both nostrils as needed ) 1 Bottle 5     ibuprofen (ADVIL,MOTRIN) 200 MG tablet Take 3 tablets (600 mg) by mouth every 6 hours as needed for mild pain 120 tablet      order for DME Equipment being ordered: Nebulizer 1 each 0     study - prednisoLONE acetate, MMCORC, (IDS# 5081) 1 % ophthalmic susp Place 1-2 drops into both eyes 3 times daily       tiZANidine (ZANAFLEX) 4 MG tablet Take 2 tablets (8 mg) by mouth every evening 40 tablet 2     EPINEPHrine (EPIPEN/ADRENACLICK/OR ANY BX GENERIC EQUIV) 0.3 MG/0.3ML injection 2-pack Inject 0.3 mLs (0.3 mg) into the muscle once as needed for anaphylaxis (Patient not taking: Reported on 3/19/2019) 0.6 mL 3     Allergies   Allergen Reactions     Bees Anaphylaxis     Onion Anaphylaxis     Clindamycin Rash     Provera [Medroxyprogesterone Acetate] Other (See  "Comments)     edema       Reviewed and updated as needed this visit by clinical staff  Tobacco  Allergies  Meds  Problems  Med Hx  Surg Hx  Fam Hx  Soc Hx        Reviewed and updated as needed this visit by Provider  Tobacco  Allergies  Meds  Problems  Med Hx  Surg Hx  Fam Hx         ROS:  CONSTITUTIONAL: NEGATIVE for fever, chills, change in weight  ENT/MOUTH: POSITIVE for ear pain left  RESP: NEGATIVE for significant cough or SOB  CV: NEGATIVE for chest pain, palpitations or peripheral edema  PSYCHIATRIC: NEGATIVE for changes in mood or affect  ROS otherwise negative    OBJECTIVE:     /86   Pulse 71   Temp 97.2  F (36.2  C) (Tympanic)   Ht 1.6 m (5' 3\")   Wt 102.1 kg (225 lb)   SpO2 98%   BMI 39.86 kg/m    Body mass index is 39.86 kg/m .  GENERAL: healthy, alert and no distress  HENT: normal cephalic/atraumatic, right ear: red and boggy canal and left ear: red and boggy canal  RESP: lungs clear to auscultation - no rales, rhonchi or wheezes  CV: regular rate and rhythm, normal S1 S2, no S3 or S4, no murmur, click or rub, no peripheral edema and peripheral pulses strong  PSYCH: mentation appears normal, affect normal/bright    Diagnostic Test Results:  none     ASSESSMENT/PLAN:     1. Infective otitis externa, bilateral  Treating with Ciprodex for 7 days.  If symptoms persist or worsen, recommend f/u for re-evaluation.  Information given on symptom management including no water in ears and no q-tips in ear canal during treatment until symptoms resolve.  Follow-up if any persistent or worsening symptoms in 1 week.  - ciprofloxacin-dexamethasone (CIPRODEX) 0.3-0.1 % otic suspension; Place 4 drops into both ears 2 times daily for 7 days  Dispense: 2.8 mL; Refill: 0    See Patient Instructions    Vandana Rosales NP  [facility] [place]  "

## 2019-03-19 NOTE — PATIENT INSTRUCTIONS
1.  Use drops as directed for 7 days.  2.  Keep water out of the ear canals.  3.  No q-tips in the ear canal.    External Ear Infection (Adult)    External otitis (also called  swimmer s ear ) is an infection in the ear canal. It is often caused by bacteria or fungus. It can occur a few days after water gets trapped in the ear canal (from swimming or bathing). It can also occur after cleaning too deeply in the ear canal with a cotton swab or other object. Sometimes, hair care products get into the ear canal and cause this problem.  Symptoms can include pain, fever, itching, redness, drainage, or swelling of the ear canal. Temporary hearing loss may also occur.  Home care    Do not try to clean the ear canal. This can push pus and bacteria deeper into the canal.    Use prescribed ear drops as directed. These help reduce swelling and fight the infection. If an ear wick was placed in the ear canal, apply drops right onto the end of the wick. The wick will draw the medicine into the ear canal even if it is swollen closed.    A cotton ball may be loosely placed in the outer ear to absorb any drainage.    You may use acetaminophen or ibuprofen to control pain, unless another medicine was prescribed. Note: If you have chronic liver or kidney disease or ever had a stomach ulcer or GI bleeding, talk to your healthcare provider before taking any of these medicines.    Do not allow water to get into your ear when bathing. Also, don't swim until the infection has cleared.  Prevention    Keep your ears dry. This helps lower the risk of infection. Dry your ears with a towel or hair dryer after getting wet. Also, use ear plugs when swimming.    Do not stick any objects in the ear to remove wax.  Follow-up care  Follow up with your healthcare provider in 1 week, or as advised.  When to seek medical advice  Call your healthcare provider right away if any of these occur:    Ear pain becomes worse or doesn t improve after 3 days of  treatment    Redness or swelling of the outer ear occurs or gets worse    Headache    Painful or stiff neck    Drowsiness or confusion    Fever of 100.4 F (38 C) or higher, or as directed by your healthcare provider    Seizure  Date Last Reviewed: 10/1/2017    1714-6883 [employer]. [address] [place] [address]. All rights reserved. This information is not intended as a substitute for professional medical care. Always follow your healthcare professional's instructions.

## 2019-03-28 ENCOUNTER — AMBULATORY - HEALTHEAST (OUTPATIENT)
Dept: SURGERY | Facility: CLINIC | Age: 49
End: 2019-03-28

## 2019-03-28 ENCOUNTER — OFFICE VISIT - HEALTHEAST (OUTPATIENT)
Dept: SURGERY | Facility: CLINIC | Age: 49
End: 2019-03-28

## 2019-03-28 ENCOUNTER — AMBULATORY - HEALTHEAST (OUTPATIENT)
Dept: SLEEP MEDICINE | Facility: CLINIC | Age: 49
End: 2019-03-28

## 2019-03-28 ENCOUNTER — AMBULATORY - HEALTHEAST (OUTPATIENT)
Dept: LAB | Facility: CLINIC | Age: 49
End: 2019-03-28

## 2019-03-28 ENCOUNTER — TRANSFERRED RECORDS (OUTPATIENT)
Dept: HEALTH INFORMATION MANAGEMENT | Facility: CLINIC | Age: 49
End: 2019-03-28

## 2019-03-28 DIAGNOSIS — M19.90 ARTHRITIS: ICD-10-CM

## 2019-03-28 DIAGNOSIS — E66.01 MORBID OBESITY WITH BMI OF 40.0-44.9, ADULT (H): ICD-10-CM

## 2019-03-28 DIAGNOSIS — R12 HEARTBURN: ICD-10-CM

## 2019-03-28 DIAGNOSIS — M25.579 ANKLE PAIN, UNSPECIFIED CHRONICITY, UNSPECIFIED LATERALITY: ICD-10-CM

## 2019-03-28 DIAGNOSIS — M25.569 KNEE PAIN, UNSPECIFIED CHRONICITY, UNSPECIFIED LATERALITY: ICD-10-CM

## 2019-03-28 LAB
ALBUMIN SERPL-MCNC: 4.1 G/DL (ref 3.5–5)
ALP SERPL-CCNC: 100 U/L (ref 45–120)
ALT SERPL W P-5'-P-CCNC: 43 U/L (ref 0–45)
ANION GAP SERPL CALCULATED.3IONS-SCNC: 13 MMOL/L (ref 5–18)
AST SERPL W P-5'-P-CCNC: 24 U/L (ref 0–40)
BILIRUB SERPL-MCNC: 0.5 MG/DL (ref 0–1)
BUN SERPL-MCNC: 16 MG/DL (ref 8–22)
CALCIUM SERPL-MCNC: 9.5 MG/DL (ref 8.5–10.5)
CHLORIDE BLD-SCNC: 109 MMOL/L (ref 98–107)
CO2 SERPL-SCNC: 21 MMOL/L (ref 22–31)
CREAT SERPL-MCNC: 0.8 MG/DL (ref 0.6–1.1)
FERRITIN SERPL-MCNC: 88 NG/ML (ref 10–130)
FOLATE SERPL-MCNC: 9.2 NG/ML
GFR SERPL CREATININE-BSD FRML MDRD: >60 ML/MIN/1.73M2
GLUCOSE BLD-MCNC: 83 MG/DL (ref 70–125)
HBA1C MFR BLD: 5.1 % (ref 3.5–6)
POTASSIUM BLD-SCNC: 4.4 MMOL/L (ref 3.5–5)
PROT SERPL-MCNC: 7.5 G/DL (ref 6–8)
PTH-INTACT SERPL-MCNC: 72 PG/ML (ref 10–86)
SODIUM SERPL-SCNC: 143 MMOL/L (ref 136–145)
VIT B12 SERPL-MCNC: 331 PG/ML (ref 213–816)

## 2019-03-28 ASSESSMENT — MIFFLIN-ST. JEOR: SCORE: 1565.7

## 2019-03-29 LAB — 25(OH)D3 SERPL-MCNC: 21.7 NG/ML (ref 30–80)

## 2019-03-30 LAB — VIT B1 PYROPHOSHATE BLD-SCNC: 132 NMOL/L (ref 70–180)

## 2019-03-31 LAB
ANNOTATION COMMENT IMP: NORMAL
VIT A SERPL-MCNC: 0.63 MG/L (ref 0.3–1.2)
VITAMIN A (RETINYL PALMITATE): <0.02 MG/L (ref 0–0.1)
ZINC SERPL-MCNC: 86 UG/DL (ref 60–120)

## 2019-04-03 ENCOUNTER — MYC MEDICAL ADVICE (OUTPATIENT)
Dept: FAMILY MEDICINE | Facility: CLINIC | Age: 49
End: 2019-04-03

## 2019-04-03 DIAGNOSIS — Z12.11 SPECIAL SCREENING FOR MALIGNANT NEOPLASMS, COLON: Primary | ICD-10-CM

## 2019-04-03 NOTE — TELEPHONE ENCOUNTER
Patient would like order for colonoscopy. Referral is pended for signature.      Padma MINA BSN, RN

## 2019-04-07 ENCOUNTER — COMMUNICATION - HEALTHEAST (OUTPATIENT)
Dept: SURGERY | Facility: CLINIC | Age: 49
End: 2019-04-07

## 2019-04-23 ENCOUNTER — TRANSFERRED RECORDS (OUTPATIENT)
Dept: HEALTH INFORMATION MANAGEMENT | Facility: CLINIC | Age: 49
End: 2019-04-23

## 2019-04-23 ENCOUNTER — OFFICE VISIT - HEALTHEAST (OUTPATIENT)
Dept: SURGERY | Facility: CLINIC | Age: 49
End: 2019-04-23

## 2019-04-23 DIAGNOSIS — Z71.3 NUTRITIONAL COUNSELING: ICD-10-CM

## 2019-04-23 DIAGNOSIS — E66.01 MORBID OBESITY WITH BMI OF 40.0-44.9, ADULT (H): ICD-10-CM

## 2019-04-23 DIAGNOSIS — E66.01 OBESITY, MORBID, BMI 40.0-49.9 (H): ICD-10-CM

## 2019-04-23 DIAGNOSIS — R12 HEARTBURN: ICD-10-CM

## 2019-04-23 ASSESSMENT — MIFFLIN-ST. JEOR: SCORE: 1597.45

## 2019-05-10 ENCOUNTER — RECORDS - HEALTHEAST (OUTPATIENT)
Dept: SLEEP MEDICINE | Facility: CLINIC | Age: 49
End: 2019-05-10

## 2019-05-10 DIAGNOSIS — M19.90 UNSPECIFIED OSTEOARTHRITIS, UNSPECIFIED SITE: ICD-10-CM

## 2019-05-10 DIAGNOSIS — R12 HEARTBURN: ICD-10-CM

## 2019-05-10 DIAGNOSIS — E66.01 MORBID (SEVERE) OBESITY DUE TO EXCESS CALORIES (H): ICD-10-CM

## 2019-05-19 ENCOUNTER — COMMUNICATION - HEALTHEAST (OUTPATIENT)
Dept: SLEEP MEDICINE | Facility: CLINIC | Age: 49
End: 2019-05-19

## 2019-05-19 DIAGNOSIS — G47.33 OSA (OBSTRUCTIVE SLEEP APNEA): ICD-10-CM

## 2019-05-20 ENCOUNTER — TRANSFERRED RECORDS (OUTPATIENT)
Dept: HEALTH INFORMATION MANAGEMENT | Facility: CLINIC | Age: 49
End: 2019-05-20

## 2019-05-20 ENCOUNTER — AMBULATORY - HEALTHEAST (OUTPATIENT)
Dept: SURGERY | Facility: CLINIC | Age: 49
End: 2019-05-20

## 2019-05-21 ENCOUNTER — COMMUNICATION - HEALTHEAST (OUTPATIENT)
Dept: SURGERY | Facility: CLINIC | Age: 49
End: 2019-05-21

## 2019-05-21 ENCOUNTER — MYC MEDICAL ADVICE (OUTPATIENT)
Dept: FAMILY MEDICINE | Facility: CLINIC | Age: 49
End: 2019-05-21

## 2019-05-21 ENCOUNTER — AMBULATORY - HEALTHEAST (OUTPATIENT)
Dept: SLEEP MEDICINE | Facility: CLINIC | Age: 49
End: 2019-05-21

## 2019-05-23 ENCOUNTER — HOSPITAL ENCOUNTER (EMERGENCY)
Facility: CLINIC | Age: 49
Discharge: HOME | End: 2019-05-23
Attending: FAMILY MEDICINE | Admitting: FAMILY MEDICINE
Payer: COMMERCIAL

## 2019-05-23 ENCOUNTER — TELEPHONE (OUTPATIENT)
Dept: FAMILY MEDICINE | Facility: CLINIC | Age: 49
End: 2019-05-23

## 2019-05-23 ENCOUNTER — APPOINTMENT (OUTPATIENT)
Dept: CT IMAGING | Facility: CLINIC | Age: 49
End: 2019-05-23
Attending: PHYSICIAN ASSISTANT
Payer: COMMERCIAL

## 2019-05-23 VITALS
TEMPERATURE: 99.2 F | HEART RATE: 110 BPM | BODY MASS INDEX: 40.39 KG/M2 | OXYGEN SATURATION: 100 % | RESPIRATION RATE: 18 BRPM | SYSTOLIC BLOOD PRESSURE: 146 MMHG | DIASTOLIC BLOOD PRESSURE: 95 MMHG | WEIGHT: 228 LBS

## 2019-05-23 DIAGNOSIS — R10.10 UPPER ABDOMINAL PAIN: ICD-10-CM

## 2019-05-23 DIAGNOSIS — M54.6 ACUTE BILATERAL THORACIC BACK PAIN: ICD-10-CM

## 2019-05-23 LAB
ALBUMIN SERPL-MCNC: 3.8 G/DL (ref 3.4–5)
ALBUMIN UR-MCNC: NEGATIVE MG/DL
ALP SERPL-CCNC: 107 U/L (ref 40–150)
ALT SERPL W P-5'-P-CCNC: 59 U/L (ref 0–50)
ANION GAP SERPL CALCULATED.3IONS-SCNC: 3 MMOL/L (ref 3–14)
APPEARANCE UR: CLEAR
AST SERPL W P-5'-P-CCNC: 25 U/L (ref 0–45)
BASOPHILS # BLD AUTO: 0.1 10E9/L (ref 0–0.2)
BASOPHILS NFR BLD AUTO: 0.5 %
BILIRUB SERPL-MCNC: 0.6 MG/DL (ref 0.2–1.3)
BILIRUB UR QL STRIP: NEGATIVE
BUN SERPL-MCNC: 13 MG/DL (ref 7–30)
CALCIUM SERPL-MCNC: 9.1 MG/DL (ref 8.5–10.1)
CHLORIDE SERPL-SCNC: 106 MMOL/L (ref 94–109)
CO2 SERPL-SCNC: 31 MMOL/L (ref 20–32)
COLOR UR AUTO: YELLOW
CREAT SERPL-MCNC: 0.84 MG/DL (ref 0.52–1.04)
D DIMER PPP FEU-MCNC: 0.3 UG/ML FEU (ref 0–0.5)
DIFFERENTIAL METHOD BLD: NORMAL
EOSINOPHIL # BLD AUTO: 0.1 10E9/L (ref 0–0.7)
EOSINOPHIL NFR BLD AUTO: 0.7 %
ERYTHROCYTE [DISTWIDTH] IN BLOOD BY AUTOMATED COUNT: 12.9 % (ref 10–15)
GFR SERPL CREATININE-BSD FRML MDRD: 81 ML/MIN/{1.73_M2}
GLUCOSE SERPL-MCNC: 90 MG/DL (ref 70–99)
GLUCOSE UR STRIP-MCNC: NEGATIVE MG/DL
HCT VFR BLD AUTO: 42.7 % (ref 35–47)
HGB BLD-MCNC: 13.9 G/DL (ref 11.7–15.7)
HGB UR QL STRIP: NEGATIVE
IMM GRANULOCYTES # BLD: 0 10E9/L (ref 0–0.4)
IMM GRANULOCYTES NFR BLD: 0.3 %
KETONES UR STRIP-MCNC: NEGATIVE MG/DL
LEUKOCYTE ESTERASE UR QL STRIP: NEGATIVE
LIPASE SERPL-CCNC: 151 U/L (ref 73–393)
LYMPHOCYTES # BLD AUTO: 1.8 10E9/L (ref 0.8–5.3)
LYMPHOCYTES NFR BLD AUTO: 18.5 %
MCH RBC QN AUTO: 30.6 PG (ref 26.5–33)
MCHC RBC AUTO-ENTMCNC: 32.6 G/DL (ref 31.5–36.5)
MCV RBC AUTO: 94 FL (ref 78–100)
MONOCYTES # BLD AUTO: 0.7 10E9/L (ref 0–1.3)
MONOCYTES NFR BLD AUTO: 7 %
NEUTROPHILS # BLD AUTO: 7.2 10E9/L (ref 1.6–8.3)
NEUTROPHILS NFR BLD AUTO: 73 %
NITRATE UR QL: NEGATIVE
NRBC # BLD AUTO: 0 10*3/UL
NRBC BLD AUTO-RTO: 0 /100
PH UR STRIP: 7 PH (ref 5–7)
PLATELET # BLD AUTO: 205 10E9/L (ref 150–450)
POTASSIUM SERPL-SCNC: 3.8 MMOL/L (ref 3.4–5.3)
PROT SERPL-MCNC: 8.2 G/DL (ref 6.8–8.8)
RBC # BLD AUTO: 4.54 10E12/L (ref 3.8–5.2)
SODIUM SERPL-SCNC: 140 MMOL/L (ref 133–144)
SOURCE: NORMAL
SP GR UR STRIP: 1.01 (ref 1–1.03)
TROPONIN I SERPL-MCNC: <0.015 UG/L (ref 0–0.04)
UROBILINOGEN UR STRIP-MCNC: 0 MG/DL (ref 0–2)
WBC # BLD AUTO: 9.9 10E9/L (ref 4–11)

## 2019-05-23 PROCEDURE — 25000128 H RX IP 250 OP 636: Performed by: PHYSICIAN ASSISTANT

## 2019-05-23 PROCEDURE — 83690 ASSAY OF LIPASE: CPT | Performed by: FAMILY MEDICINE

## 2019-05-23 PROCEDURE — 81003 URINALYSIS AUTO W/O SCOPE: CPT | Performed by: PHYSICIAN ASSISTANT

## 2019-05-23 PROCEDURE — 85379 FIBRIN DEGRADATION QUANT: CPT | Performed by: PHYSICIAN ASSISTANT

## 2019-05-23 PROCEDURE — 25000125 ZZHC RX 250: Performed by: PHYSICIAN ASSISTANT

## 2019-05-23 PROCEDURE — 99285 EMERGENCY DEPT VISIT HI MDM: CPT | Mod: Z6 | Performed by: PHYSICIAN ASSISTANT

## 2019-05-23 PROCEDURE — 85025 COMPLETE CBC W/AUTO DIFF WBC: CPT | Performed by: FAMILY MEDICINE

## 2019-05-23 PROCEDURE — 71260 CT THORAX DX C+: CPT

## 2019-05-23 PROCEDURE — 74177 CT ABD & PELVIS W/CONTRAST: CPT

## 2019-05-23 PROCEDURE — 99285 EMERGENCY DEPT VISIT HI MDM: CPT | Mod: 25 | Performed by: PHYSICIAN ASSISTANT

## 2019-05-23 PROCEDURE — 84484 ASSAY OF TROPONIN QUANT: CPT | Performed by: FAMILY MEDICINE

## 2019-05-23 PROCEDURE — 80053 COMPREHEN METABOLIC PANEL: CPT | Performed by: FAMILY MEDICINE

## 2019-05-23 RX ORDER — KETOROLAC TROMETHAMINE 15 MG/ML
15 INJECTION, SOLUTION INTRAMUSCULAR; INTRAVENOUS ONCE
Status: COMPLETED | OUTPATIENT
Start: 2019-05-23 | End: 2019-05-23

## 2019-05-23 RX ORDER — IOPAMIDOL 755 MG/ML
91 INJECTION, SOLUTION INTRAVASCULAR ONCE
Status: COMPLETED | OUTPATIENT
Start: 2019-05-23 | End: 2019-05-23

## 2019-05-23 RX ORDER — HYDROMORPHONE HYDROCHLORIDE 1 MG/ML
0.5 INJECTION, SOLUTION INTRAMUSCULAR; INTRAVENOUS; SUBCUTANEOUS
Status: COMPLETED | OUTPATIENT
Start: 2019-05-23 | End: 2019-05-23

## 2019-05-23 RX ORDER — ONDANSETRON 2 MG/ML
4 INJECTION INTRAMUSCULAR; INTRAVENOUS ONCE
Status: DISCONTINUED | OUTPATIENT
Start: 2019-05-23 | End: 2019-05-23 | Stop reason: HOSPADM

## 2019-05-23 RX ORDER — ONDANSETRON 2 MG/ML
INJECTION INTRAMUSCULAR; INTRAVENOUS
Status: DISCONTINUED
Start: 2019-05-23 | End: 2019-05-23 | Stop reason: WASHOUT

## 2019-05-23 RX ORDER — OXYCODONE AND ACETAMINOPHEN 5; 325 MG/1; MG/1
1-2 TABLET ORAL EVERY 6 HOURS PRN
Qty: 8 TABLET | Refills: 0 | Status: SHIPPED | OUTPATIENT
Start: 2019-05-23 | End: 2019-05-31

## 2019-05-23 RX ADMIN — IOPAMIDOL 91 ML: 755 INJECTION, SOLUTION INTRAVENOUS at 16:17

## 2019-05-23 RX ADMIN — KETOROLAC TROMETHAMINE 15 MG: 15 INJECTION, SOLUTION INTRAMUSCULAR; INTRAVENOUS at 14:56

## 2019-05-23 RX ADMIN — Medication 0.5 MG: at 16:35

## 2019-05-23 RX ADMIN — SODIUM CHLORIDE 100 ML: 9 INJECTION, SOLUTION INTRAVENOUS at 16:17

## 2019-05-23 ASSESSMENT — ENCOUNTER SYMPTOMS
GASTROINTESTINAL NEGATIVE: 1
NEUROLOGICAL NEGATIVE: 1
BACK PAIN: 1
RESPIRATORY NEGATIVE: 1
CARDIOVASCULAR NEGATIVE: 1
CONSTITUTIONAL NEGATIVE: 1

## 2019-05-23 NOTE — ED AVS SNAPSHOT
[facility] Emergency Department  [address] [facility] [address] [address]  Phone:  [phone]  Fax:  [phone]                                    Dakota Berkowitz   MRN: [id number]    Department:  [facility] Emergency Department   Date of Visit:  5/23/2019           After Visit Summary Signature Page    I have received my discharge instructions, and my questions have been answered. I have discussed any challenges I see with this plan with the nurse or doctor.    ..........................................................................................................................................  Patient/Patient Representative Signature      ..........................................................................................................................................  Patient Representative Print Name and Relationship to Patient    ..................................................               ................................................  Date                                   Time    ..........................................................................................................................................  Reviewed by Signature/Title    ...................................................              ..............................................  Date                                               Time          22EPIC Rev 08/18

## 2019-05-23 NOTE — LETTER
May 23, 2019      To Whom It May Concern:      Dakota Berkowitz was seen in our Emergency Department today, 05/23/19.  Please excuse from work tomorrow.  Thank you.      Sincerely,        Janna Valenzuela PA-C

## 2019-05-23 NOTE — TELEPHONE ENCOUNTER
Reason for Call:  Back pain    Detailed comments: patient is calling and stating that 5 days ago she woke up with terrible back pain from shoulder down to low back, and it radiates to her ribs. She did get an appt for tomorrow at 8 am, and the appt. Desk sent her back to the care team. Please advise. If she can wait or be seen sooner. She also states that she can't stand to be touched in those areas, it hurts so bad. Please advise.    Phone Number Patient can be reached at: Home number on file [phone] (home)    Best Time: any    Can we leave a detailed message on this number? YES   Ronna Lei  [facility]  [facility]      Call taken on 5/23/2019 at 10:59 AM by Ronna Lei

## 2019-05-23 NOTE — TELEPHONE ENCOUNTER
I called pt.  She is in the ED right now.  Agree that severe pain such as she is describes warrants acute evaluation.    Mayela Reyes RN

## 2019-05-23 NOTE — ED PROVIDER NOTES
History     Chief Complaint   Patient presents with     Back Pain     back pain for 5 days  no injury     Abdominal Pain     abd pain no emesis or diarrhea     HPI  Dakota Berkowitz is a 48 year old female who presents with complaints of bilateral back pain for the past 4 days.  Patient states her pain is constant and extending bilaterally from her shoulder blades down her back and around across her lower ribs.  Patient also complains of associated fleeting right upper quadrant abdominal pain, bloating, and nausea as well.  The abdominal pain does not seem to be related to eating.  Patient states her back pain is pleuritic and tender to even soft palpation.  She has not noticed a rash in the area.  Denies fevers, chills, cough, vomiting, diarrhea, chest pain, shortness of breath, urinary symptoms, or leg pain/swelling.  Patient has history of appendectomy.  Denies preceding injury or trauma to her back.  Denies saddle anesthesia, bowel or bladder incontinence, or lower extremity numbness/tingling/weakness.  Gait is steady.  Patient has history of appendectomy and tubal ligation.  Last period was over a year ago.  Patient has history of shingles and states this pain feels different.      Allergies:  Allergies   Allergen Reactions     Bees Anaphylaxis     Onion Anaphylaxis     Clindamycin Rash     Provera [Medroxyprogesterone Acetate] Other (See Comments)     edema       Problem List:    Patient Active Problem List    Diagnosis Date Noted     CARDIOVASCULAR SCREENING; LDL GOAL LESS THAN 100 07/06/2018     Priority: Medium     Irritability 03/02/2015     Priority: Medium     Perimenopausal symptoms 06/17/2013     Priority: Medium     Weight gain 06/17/2013     Priority: Medium     Obesity 04/25/2011     Priority: Medium     Breast implant rupture 02/08/2011     Priority: Medium     Pain in breast 02/08/2011     Priority: Medium     due to rupture of breast implant       Female pelvic pain 10/13/2009     Priority:  Medium     (Problem list name updated by automated process. Provider to review and confirm.)          Past Medical History:    Past Medical History:   Diagnosis Date     NO ACTIVE PROBLEMS        Past Surgical History:    Past Surgical History:   Procedure Laterality Date     C APPENDECTOMY       COSMETIC SURGERY      breast implants     CYSTOSCOPY, SLING TRANSVAGINAL N/A 2/8/2016    Procedure: CYSTOSCOPY, SLING TRANSVAGINAL;  Surgeon: Sterling Hill MD;  Location: [facility] OR     EYE SURGERY       HC REPAIR OF NASAL SEPTUM       SURGICAL HISTORY OF -       laporoscopy due to endometriosis     SURGICAL HISTORY OF -       endometrial ablation     SURGICAL HISTORY OF -       breast augmentation     SURGICAL HISTORY OF -       benign tumor removed from her left thigh     TONSILLECTOMY       TUBAL LIGATION         Family History:    Family History   Problem Relation Age of Onset     Cancer Mother         ovarian     Cancer Paternal Grandmother         lung     Gynecology Sister         having a partial hysterectomy due to abnormal cells, leaving ovaries and cervix     Gynecology Daughter         endometriosis     Gynecology Sister         hysterectomy for endometriosis       Social History:  Marital Status:   [2]  Social History     Tobacco Use     Smoking status: Never Smoker     Smokeless tobacco: Never Used   Substance Use Topics     Alcohol use: Yes     Comment: occassionally on weekends     Drug use: No        Medications:      oxyCODONE-acetaminophen (PERCOCET) 5-325 MG tablet   albuterol (2.5 MG/3ML) 0.083% neb solution   albuterol (PROAIR HFA/PROVENTIL HFA/VENTOLIN HFA) 108 (90 Base) MCG/ACT Inhaler   EPINEPHrine (EPIPEN/ADRENACLICK/OR ANY BX GENERIC EQUIV) 0.3 MG/0.3ML injection 2-pack   fluticasone (FLONASE) 50 MCG/ACT spray   ibuprofen (ADVIL,MOTRIN) 200 MG tablet   order for DME   study - prednisoLONE acetate, MMCORC, (IDS# 5081) 1 % ophthalmic susp   tiZANidine (ZANAFLEX) 4 MG tablet         Review  of Systems   Constitutional: Negative.    HENT: Negative.    Respiratory: Negative.    Cardiovascular: Negative.    Gastrointestinal: Negative.    Genitourinary: Negative.    Musculoskeletal: Positive for back pain.   Skin: Negative.    Neurological: Negative.    All other systems reviewed and are negative.      Physical Exam   BP: 167/89  Pulse: 98  Heart Rate: 112  Temp: 99.2  F (37.3  C)  Resp: 18  Weight: 103.4 kg (228 lb)  SpO2: 99 %      Physical Exam   Constitutional: She is oriented to person, place, and time. She appears well-developed and well-nourished.  Non-toxic appearance. No distress.   HENT:   Head: Normocephalic and atraumatic.   Nose: Nose normal.   Mouth/Throat: Mucous membranes are normal.   Eyes: Pupils are equal, round, and reactive to light. Conjunctivae and EOM are normal.   Neck: Normal range of motion. Neck supple. No neck rigidity.   Cardiovascular: Normal rate, regular rhythm and normal heart sounds.   Pulmonary/Chest: Effort normal and breath sounds normal. No stridor. No respiratory distress. She has no decreased breath sounds. She has no wheezes. She has no rhonchi. She has no rales.   Abdominal: Soft. She exhibits no distension and no mass. There is generalized tenderness. There is no rigidity, no rebound, no guarding and no CVA tenderness. No hernia.   Musculoskeletal: Normal range of motion. She exhibits no edema or deformity.        Cervical back: Normal. She exhibits normal range of motion, no tenderness, no bony tenderness and no swelling.        Thoracic back: She exhibits tenderness and pain. She exhibits normal range of motion, no swelling, no edema, no deformity, no laceration and normal pulse.        Lumbar back: Normal. She exhibits normal range of motion, no tenderness, no bony tenderness and no swelling.        Back:    Diffuse tenderness to even light palpation across thoracic spine.  No overlying skin changes or rash noted.   Lymphadenopathy:     She has no cervical  adenopathy.   Neurological: She is alert and oriented to person, place, and time. She has normal strength. No cranial nerve deficit or sensory deficit. Gait normal.   Skin: Skin is warm and dry. No rash noted.       ED Course        Procedures      Results for orders placed or performed during the hospital encounter of 05/23/19 (from the past 24 hour(s))   D dimer quantitative   Result Value Ref Range    D Dimer 0.3 0.0 - 0.50 ug/ml FEU   CBC with platelets differential   Result Value Ref Range    WBC 9.9 4.0 - 11.0 10e9/L    RBC Count 4.54 3.8 - 5.2 10e12/L    Hemoglobin 13.9 11.7 - 15.7 g/dL    Hematocrit 42.7 35.0 - 47.0 %    MCV 94 78 - 100 fl    MCH 30.6 26.5 - 33.0 pg    MCHC 32.6 31.5 - 36.5 g/dL    RDW 12.9 10.0 - 15.0 %    Platelet Count 205 150 - 450 10e9/L    Diff Method Automated Method     % Neutrophils 73.0 %    % Lymphocytes 18.5 %    % Monocytes 7.0 %    % Eosinophils 0.7 %    % Basophils 0.5 %    % Immature Granulocytes 0.3 %    Nucleated RBCs 0 0 /100    Absolute Neutrophil 7.2 1.6 - 8.3 10e9/L    Absolute Lymphocytes 1.8 0.8 - 5.3 10e9/L    Absolute Monocytes 0.7 0.0 - 1.3 10e9/L    Absolute Eosinophils 0.1 0.0 - 0.7 10e9/L    Absolute Basophils 0.1 0.0 - 0.2 10e9/L    Abs Immature Granulocytes 0.0 0 - 0.4 10e9/L    Absolute Nucleated RBC 0.0    Comprehensive metabolic panel   Result Value Ref Range    Sodium 140 133 - 144 mmol/L    Potassium 3.8 3.4 - 5.3 mmol/L    Chloride 106 94 - 109 mmol/L    Carbon Dioxide 31 20 - 32 mmol/L    Anion Gap 3 3 - 14 mmol/L    Glucose 90 70 - 99 mg/dL    Urea Nitrogen 13 7 - 30 mg/dL    Creatinine 0.84 0.52 - 1.04 mg/dL    GFR Estimate 81 >60 mL/min/[1.73_m2]    GFR Estimate If Black >90 >60 mL/min/[1.73_m2]    Calcium 9.1 8.5 - 10.1 mg/dL    Bilirubin Total 0.6 0.2 - 1.3 mg/dL    Albumin 3.8 3.4 - 5.0 g/dL    Protein Total 8.2 6.8 - 8.8 g/dL    Alkaline Phosphatase 107 40 - 150 U/L    ALT 59 (H) 0 - 50 U/L    AST 25 0 - 45 U/L   Lipase   Result Value Ref Range     Lipase 151 73 - 393 U/L   Troponin I   Result Value Ref Range    Troponin I ES <0.015 0.000 - 0.045 ug/L   UA reflex to Microscopic   Result Value Ref Range    Color Urine Yellow     Appearance Urine Clear     Glucose Urine Negative NEG^Negative mg/dL    Bilirubin Urine Negative NEG^Negative    Ketones Urine Negative NEG^Negative mg/dL    Specific Gravity Urine 1.013 1.003 - 1.035    Blood Urine Negative NEG^Negative    pH Urine 7.0 5.0 - 7.0 pH    Protein Albumin Urine Negative NEG^Negative mg/dL    Urobilinogen mg/dL 0.0 0.0 - 2.0 mg/dL    Nitrite Urine Negative NEG^Negative    Leukocyte Esterase Urine Negative NEG^Negative    Source Midstream Urine    CT Chest (PE) Abdomen Pelvis w Contrast    Narrative    CT CHEST PE ABDOMEN/PELVIS WITH CONTRAST   5/23/2019 4:31 PM     HISTORY: Pleuritic bilateral back pain radiating around to lower  ribs/chest.    TECHNIQUE: 91 mL Isovue -370. CT images of the chest, abdomen, and  pelvis after nonionic intravenous contrast. Radiation dose for this  scan was reduced using automated exposure control, adjustment of the  mA and/or kV according to patient size, or iterative reconstruction  technique.    COMPARISON: 5/30/2018, 4/6/2018    FINDINGS:     Chest: No evidence of pulmonary embolism or acute thoracic aortic  abnormality. No pneumothorax. There are small bilateral pleural  effusions. No pericardial effusion. No pathologically enlarged  thoracic or axillary lymph nodes. No pulmonary nodule or mass.    Abdomen and pelvis: There is diffuse low-density of the liver. No  focal liver lesions or biliary dilation. The gallbladder, spleen,  pancreas, and adrenal glands are unremarkable. No hydronephrosis or  solid renal mass. No abnormal bowel distention, free air, or ascites.  Lobular appearance of the uterus likely related to fibroids. No  enlarged abdominal, retroperitoneal, or pelvic lymph nodes.    No suspicious bone lesions demonstrated.      Impression    IMPRESSION:  1. No  evidence of pulmonary embolism.  2. Very small bilateral pleural effusions.  3. Fatty infiltration of the liver.    GISELE BROWN MD       Medications   ondansetron (ZOFRAN) injection 4 mg (4 mg Intravenous Not Given 5/23/19 1641)   ketorolac (TORADOL) injection 15 mg (15 mg Intravenous Given 5/23/19 1456)   HYDROmorphone (PF) (DILAUDID) injection 0.5 mg (0.5 mg Intravenous Given 5/23/19 1635)   iopamidol (ISOVUE-370) solution 91 mL (91 mLs Intravenous Given 5/23/19 1617)   Saline Flush (100 mLs Intravenous Given 5/23/19 1617)       Assessments & Plan (with Medical Decision Making)     Pt is a 48 year old female who presents with complaints of bilateral back pain for the past 4 days.  Patient states her pain is constant and extending bilaterally from her shoulder blades down her back and around across her lower ribs.  Patient also complains of associated fleeting right upper quadrant abdominal pain, bloating, and nausea as well.  The abdominal pain does not seem to be related to eating.  Patient states her back pain is pleuritic and tender to even soft palpation.  She has not noticed a rash in the area.  Denies fevers, chills, cough, vomiting, diarrhea, chest pain, shortness of breath, urinary symptoms, or leg pain/swelling.  Patient has history of appendectomy.  Denies preceding injury or trauma to her back.  Denies saddle anesthesia, bowel or bladder incontinence, or lower extremity numbness/tingling/weakness.  Gait is steady.  Pt is afebrile on arrival.  Exam as above.  Troponin is undetectable.  CBC, comprehensive metabolic panel, and lipase are undetectable.  Urinalysis was negative.  D-dimer is not elevated.  CT PE and CT of the abdomen and pelvis were negative for PE or acute pathology.  Discussed results with patient.  Encouraged patient to monitor her symptoms for the development of a rash in the case of shingles.  Return precautions were reviewed.  Hand-outs were provided.    Patient was sent with  Percocet for severe pain and was instructed to follow-up with PCP as scheduled for follow-up and for continued care and management or sooner if new or worsening symptoms.  She is to return to the ED for persistent and/or worsening symptoms.  Patient expressed understanding of the diagnosis and plan and was discharged home in good condition.    I have reviewed the nursing notes.    I have reviewed the findings, diagnosis, plan and need for follow up with the patient.       Medication List      Started    oxyCODONE-acetaminophen 5-325 MG tablet  Commonly known as:  PERCOCET  1-2 tablets, Oral, EVERY 6 HOURS PRN            Final diagnoses:   Acute bilateral thoracic back pain   Upper abdominal pain       5/23/2019   [facility] EMERGENCY DEPARTMENT      Disclaimer:  This note consists of symbols derived from keyboarding, dictation and/or voice recognition software.  As a result, there may be errors in the script that have gone undetected.  Please consider this when interpreting information found in this chart.     Janna Valenzuela PA-C  05/23/19 1800

## 2019-05-24 ENCOUNTER — OFFICE VISIT (OUTPATIENT)
Dept: FAMILY MEDICINE | Facility: CLINIC | Age: 49
End: 2019-05-24
Payer: COMMERCIAL

## 2019-05-24 VITALS
HEIGHT: 63 IN | OXYGEN SATURATION: 97 % | WEIGHT: 227.5 LBS | DIASTOLIC BLOOD PRESSURE: 84 MMHG | SYSTOLIC BLOOD PRESSURE: 122 MMHG | TEMPERATURE: 98.8 F | BODY MASS INDEX: 40.31 KG/M2 | HEART RATE: 109 BPM | RESPIRATION RATE: 18 BRPM

## 2019-05-24 DIAGNOSIS — M54.6 ACUTE BILATERAL THORACIC BACK PAIN: Primary | ICD-10-CM

## 2019-05-24 DIAGNOSIS — M35.3 PMR (POLYMYALGIA RHEUMATICA) (H): ICD-10-CM

## 2019-05-24 DIAGNOSIS — N95.1 MENOPAUSAL HOT FLUSHES: ICD-10-CM

## 2019-05-24 DIAGNOSIS — M25.50 ARTHRALGIA, UNSPECIFIED JOINT: ICD-10-CM

## 2019-05-24 LAB
CRP SERPL-MCNC: 103 MG/L (ref 0–8)
ERYTHROCYTE [SEDIMENTATION RATE] IN BLOOD BY WESTERGREN METHOD: 33 MM/H (ref 0–30)
TSH SERPL DL<=0.005 MIU/L-ACNC: 2.06 MU/L (ref 0.4–4)

## 2019-05-24 PROCEDURE — 99214 OFFICE O/P EST MOD 30 MIN: CPT | Performed by: NURSE PRACTITIONER

## 2019-05-24 PROCEDURE — 86140 C-REACTIVE PROTEIN: CPT | Performed by: NURSE PRACTITIONER

## 2019-05-24 PROCEDURE — 86431 RHEUMATOID FACTOR QUANT: CPT | Performed by: NURSE PRACTITIONER

## 2019-05-24 PROCEDURE — 36415 COLL VENOUS BLD VENIPUNCTURE: CPT | Performed by: NURSE PRACTITIONER

## 2019-05-24 PROCEDURE — 86038 ANTINUCLEAR ANTIBODIES: CPT | Performed by: NURSE PRACTITIONER

## 2019-05-24 PROCEDURE — 84443 ASSAY THYROID STIM HORMONE: CPT | Performed by: NURSE PRACTITIONER

## 2019-05-24 PROCEDURE — 85652 RBC SED RATE AUTOMATED: CPT | Performed by: NURSE PRACTITIONER

## 2019-05-24 RX ORDER — GABAPENTIN 100 MG/1
100 CAPSULE ORAL 3 TIMES DAILY
Qty: 90 CAPSULE | Refills: 3 | Status: SHIPPED | OUTPATIENT
Start: 2019-05-24 | End: 2019-06-21

## 2019-05-24 RX ORDER — PREDNISONE 20 MG/1
TABLET ORAL
Qty: 15 TABLET | Refills: 0 | Status: SHIPPED | OUTPATIENT
Start: 2019-05-24 | End: 2019-05-31

## 2019-05-24 RX ORDER — PREDNISONE 20 MG/1
20 TABLET ORAL 2 TIMES DAILY
Qty: 10 TABLET | Refills: 0 | Status: SHIPPED | OUTPATIENT
Start: 2019-05-24 | End: 2019-05-24

## 2019-05-24 ASSESSMENT — MIFFLIN-ST. JEOR: SCORE: 1621.06

## 2019-05-24 NOTE — PATIENT INSTRUCTIONS
Possible RA, recheck labs  Try Prednisone 20 mg twice daily for 5 days   Zanaflex 4 mg 3 times daily as needed for pain  Percocet 1 tablet twice daily as needed for pain     Try Gabapentin 100 mg 3 times daily for back pain and hot flushes

## 2019-05-24 NOTE — PROGRESS NOTES
"Subjective     Dakota Berkowitz is a 48 year old female who presents to clinic today for the following health issues: complains of middle and upper bilateral pain, describes it as \"crushing\" pain across her lower ribs, pain radiates to the bilateral lower chest area and upper abdomen. Denies nausea, vomiting, fevers and chills. Was seen in ED had reassuring labs and chest/abdominal CT was negative for acute pathology. No recent injury, or trauma. Denies diarrhea, dysuria, hematuria. Denies cough, chest pain and shortness of breath. States her pain is very tender to even soft palpation. No skin rashes in the area of pain. She has history of borderline positive rheumatoid factor and slightly elevated inflammatory markers, she never followed up with rheumatologist as her PCP recommended in the past.   Also, complains of hot flushes for few years, asking for medication to help to manage hot flushes.     HPI   ED/UC Followup:    Facility:  [facility]   Date of visit: 5/23/19   Reason for visit: Acute bilateral low back pain, upper abdominal pain   Current Status: Still having back and abdominal pain         Reviewed and updated as needed this visit by Provider         Review of Systems   ROS COMP: Constitutional, HEENT, cardiovascular, pulmonary, gi and gu systems are negative, except as otherwise noted.      Objective    /84 (BP Location: Left arm, Patient Position: Sitting, Cuff Size: Adult Large)   Pulse 109   Temp 98.8  F (37.1  C) (Tympanic)   Resp 18   Ht 1.6 m (5' 3\")   Wt 103.2 kg (227 lb 8 oz)   SpO2 97%   BMI 40.30 kg/m    Body mass index is 40.3 kg/m .  Physical Exam   GENERAL: healthy, alert and no distress  MS: no gross musculoskeletal defects noted, no edema. Bilateral upper and middle  to even slight palpation. There is no edema, no rashes, thoracic spine non-tender  Abdomen: soft non-tender   SKIN: no suspicious lesions or rashes  NEURO: Normal strength and tone, mentation intact and " speech normal  PSYCH: mentation appears normal, affect normal/bright    Diagnostic Test Results:  Labs reviewed in Epic  Results for orders placed or performed in visit on 05/24/19 (from the past 24 hour(s))   CRP, inflammation   Result Value Ref Range    CRP Inflammation 103.0 (H) 0.0 - 8.0 mg/L   Erythrocyte sedimentation rate auto   Result Value Ref Range    Sed Rate 33 (H) 0 - 30 mm/h   TSH with free T4 reflex   Result Value Ref Range    TSH 2.06 0.40 - 4.00 mU/L           Assessment & Plan     1. Acute bilateral thoracic back pain  - Anti Nuclear Candace IgG by IFA with Reflex  - Rheumatoid factor  - CRP, inflammation  - Erythrocyte sedimentation rate auto  - TSH with free T4 reflex  - gabapentin (NEURONTIN) 100 MG capsule; Take 1 capsule (100 mg) by mouth 3 times daily  Dispense: 90 capsule; Refill: 3  - predniSONE (DELTASONE) 20 MG tablet; 40 mg daily for 5 days, than reduce to 20 mg daily for 5 days and follow up  Dispense: 15 tablet; Refill: 0    2. Arthralgia, unspecified joint  -history of slightly elevated RF in 2011, the patient was referred to rheumatologist but did not schedule her appointment   - Anti Nuclear Candace IgG by IFA with Reflex  - Rheumatoid factor  - CRP, inflammation  - Erythrocyte sedimentation rate auto  - TSH with free T4 reflex  - predniSONE (DELTASONE) 20 MG tablet; 40 mg daily for 5 days, than reduce to 20 mg daily for 5 days and follow up  Dispense: 15 tablet; Refill: 0  - RHEUMATOLOGY REFERRAL    3. PMR (polymyalgia rheumatica) (H)  - CRP, inflammation; Future  - predniSONE (DELTASONE) 20 MG tablet; 40 mg daily for 5 days, than reduce to 20 mg daily for 5 days and follow up  Dispense: 15 tablet; Refill: 0  -repeat CRP in 1 week  -schedule with rheumatologist   - RHEUMATOLOGY REFERRAL     4. Menopausal hot flushes  -will try Gabapentin 100 mg 3 times daily     See Patient Instructions    Return in about 1 week (around 5/31/2019) for Lab Work.    FORTINO Lai [facility]  [facility]

## 2019-05-26 ENCOUNTER — ANESTHESIA EVENT (OUTPATIENT)
Dept: GASTROENTEROLOGY | Facility: CLINIC | Age: 49
End: 2019-05-26
Payer: COMMERCIAL

## 2019-05-26 LAB — RHEUMATOID FACT SER NEPH-ACNC: <20 IU/ML (ref 0–20)

## 2019-05-27 NOTE — ANESTHESIA PREPROCEDURE EVALUATION
Anesthesia Pre-Procedure Evaluation    Patient: Dakota Berkowitz   MRN: [id number] : 1970          Preoperative Diagnosis: screening    Procedure(s):  COLONOSCOPY    Past Medical History:   Diagnosis Date     NO ACTIVE PROBLEMS      Past Surgical History:   Procedure Laterality Date     C APPENDECTOMY       COSMETIC SURGERY      breast implants     CYSTOSCOPY, SLING TRANSVAGINAL N/A 2016    Procedure: CYSTOSCOPY, SLING TRANSVAGINAL;  Surgeon: Sterling Hill MD;  Location: [facility] OR     EYE SURGERY       HC REPAIR OF NASAL SEPTUM       SURGICAL HISTORY OF -       laporoscopy due to endometriosis     SURGICAL HISTORY OF -       endometrial ablation     SURGICAL HISTORY OF -       breast augmentation     SURGICAL HISTORY OF -       benign tumor removed from her left thigh     TONSILLECTOMY       TUBAL LIGATION         Anesthesia Evaluation     . Pt has had prior anesthetic.     No history of anesthetic complications          ROS/MED HX    ENT/Pulmonary: Comment: Laryngeal spasm 2006    Deviated nasal septum    (+)sleep apnea, doesn't use CPAP , . .    Neurologic:  - neg neurologic ROS     Cardiovascular:  - neg cardiovascular ROS   (+) ----. : . . . :. . Previous cardiac testing date:results:date: results:ECG reviewed date:18 results:Sinus Rhythm   -RSR(V1) -nondiagnostic.     PROBABLY NORMAL date: results:          METS/Exercise Tolerance:  >4 METS   Hematologic:  - neg hematologic  ROS       Musculoskeletal: Comment: Knee and ankle pain  (+) arthritis,  -       GI/Hepatic:     (+) GERD       Renal/Genitourinary:  - ROS Renal section negative       Endo:     (+) Obesity, .      Psychiatric:  - neg psychiatric ROS       Infectious Disease:         Malignancy:      - no malignancy   Other: Comment: Female pelvic pain  Breast implant rupture  Pain in breast  Perimenopausal symptoms  Weight gain  Irritability  Endometriosis, site unspecified   B12 deficiency                                     "    Physical Exam  Normal systems: cardiovascular, pulmonary and dental    Airway   Mallampati: I  TM distance: >3 FB  Neck ROM: full    Dental     Cardiovascular       Pulmonary             Lab Results   Component Value Date    WBC 9.9 05/23/2019    HGB 13.9 05/23/2019    HCT 42.7 05/23/2019     05/23/2019    .0 (H) 05/24/2019    SED 33 (H) 05/24/2019     05/23/2019    POTASSIUM 3.8 05/23/2019    CHLORIDE 106 05/23/2019    CO2 31 05/23/2019    BUN 13 05/23/2019    CR 0.84 05/23/2019    GLC 90 05/23/2019    KEVIN 9.1 05/23/2019    ALBUMIN 3.8 05/23/2019    PROTTOTAL 8.2 05/23/2019    ALT 59 (H) 05/23/2019    AST 25 05/23/2019    ALKPHOS 107 05/23/2019    BILITOTAL 0.6 05/23/2019    LIPASE 151 05/23/2019    TSH 2.06 05/24/2019    T4 1.02 04/10/2014    HCG Negative 02/08/2016       Preop Vitals  BP Readings from Last 3 Encounters:   05/24/19 122/84   05/23/19 (!) 146/95   03/19/19 118/86    Pulse Readings from Last 3 Encounters:   05/24/19 109   05/23/19 110   03/19/19 71      Resp Readings from Last 3 Encounters:   05/24/19 18   05/23/19 18   10/31/18 16    SpO2 Readings from Last 3 Encounters:   05/24/19 97%   05/23/19 100%   03/19/19 98%      Temp Readings from Last 1 Encounters:   05/24/19 37.1  C (98.8  F) (Tympanic)    Ht Readings from Last 1 Encounters:   05/24/19 1.6 m (5' 3\")      Wt Readings from Last 1 Encounters:   05/24/19 103.2 kg (227 lb 8 oz)    Estimated body mass index is 40.3 kg/m  as calculated from the following:    Height as of 5/24/19: 1.6 m (5' 3\").    Weight as of 5/24/19: 103.2 kg (227 lb 8 oz).       Anesthesia Plan      History & Physical Review  History and physical reviewed and following examination; no interval change.    ASA Status:  3 .    NPO Status:  > 8 hours    Plan for General and MAC with Propofol and Intravenous induction. Maintenance will be TIVA.  Reason for MAC:  Deep or markedly invasive procedure (G8)  PONV prophylaxis:  Ondansetron (or other 5HT-3) and " Dexamethasone or Solumedrol       Postoperative Care  Postoperative pain management:  IV analgesics and Oral pain medications.      Consents  Anesthetic plan, risks, benefits and alternatives discussed with:  Patient..                 Ramana Charles CRNA, APRN CRNA

## 2019-05-28 ENCOUNTER — OFFICE VISIT - HEALTHEAST (OUTPATIENT)
Dept: SURGERY | Facility: CLINIC | Age: 49
End: 2019-05-28

## 2019-05-28 ENCOUNTER — TRANSFERRED RECORDS (OUTPATIENT)
Dept: HEALTH INFORMATION MANAGEMENT | Facility: CLINIC | Age: 49
End: 2019-05-28

## 2019-05-28 DIAGNOSIS — G47.33 OSA (OBSTRUCTIVE SLEEP APNEA): ICD-10-CM

## 2019-05-28 DIAGNOSIS — E66.01 MORBID OBESITY WITH BMI OF 40.0-44.9, ADULT (H): ICD-10-CM

## 2019-05-28 DIAGNOSIS — E66.01 OBESITY, MORBID, BMI 40.0-49.9 (H): ICD-10-CM

## 2019-05-28 DIAGNOSIS — Z71.3 NUTRITIONAL COUNSELING: ICD-10-CM

## 2019-05-28 LAB — ANA SER QL IF: NEGATIVE

## 2019-05-28 ASSESSMENT — MIFFLIN-ST. JEOR: SCORE: 1590.65

## 2019-05-29 ENCOUNTER — AMBULATORY - HEALTHEAST (OUTPATIENT)
Dept: SURGERY | Facility: CLINIC | Age: 49
End: 2019-05-29

## 2019-05-29 ENCOUNTER — TRANSFERRED RECORDS (OUTPATIENT)
Dept: HEALTH INFORMATION MANAGEMENT | Facility: CLINIC | Age: 49
End: 2019-05-29

## 2019-05-29 ENCOUNTER — MYC MEDICAL ADVICE (OUTPATIENT)
Dept: FAMILY MEDICINE | Facility: CLINIC | Age: 49
End: 2019-05-29

## 2019-05-30 ENCOUNTER — MYC MEDICAL ADVICE (OUTPATIENT)
Dept: FAMILY MEDICINE | Facility: CLINIC | Age: 49
End: 2019-05-30

## 2019-05-30 DIAGNOSIS — M35.3 PMR (POLYMYALGIA RHEUMATICA) (H): Primary | ICD-10-CM

## 2019-05-30 DIAGNOSIS — M54.6 ACUTE BILATERAL THORACIC BACK PAIN: ICD-10-CM

## 2019-05-31 ENCOUNTER — ANESTHESIA (OUTPATIENT)
Dept: GASTROENTEROLOGY | Facility: CLINIC | Age: 49
End: 2019-05-31
Payer: COMMERCIAL

## 2019-05-31 ENCOUNTER — HOSPITAL ENCOUNTER (OUTPATIENT)
Facility: CLINIC | Age: 49
Discharge: HOME | End: 2019-05-31
Attending: SURGERY | Admitting: SURGERY
Payer: COMMERCIAL

## 2019-05-31 VITALS
DIASTOLIC BLOOD PRESSURE: 88 MMHG | RESPIRATION RATE: 16 BRPM | HEIGHT: 63 IN | OXYGEN SATURATION: 98 % | WEIGHT: 223 LBS | BODY MASS INDEX: 39.51 KG/M2 | HEART RATE: 82 BPM | SYSTOLIC BLOOD PRESSURE: 124 MMHG | TEMPERATURE: 98.3 F

## 2019-05-31 DIAGNOSIS — M35.3 PMR (POLYMYALGIA RHEUMATICA) (H): ICD-10-CM

## 2019-05-31 DIAGNOSIS — M54.6 ACUTE BILATERAL THORACIC BACK PAIN: ICD-10-CM

## 2019-05-31 LAB
COLONOSCOPY: NORMAL
CRP SERPL-MCNC: 7.5 MG/L (ref 0–8)

## 2019-05-31 PROCEDURE — 25800030 ZZH RX IP 258 OP 636: Performed by: SURGERY

## 2019-05-31 PROCEDURE — 37000008 ZZH ANESTHESIA TECHNICAL FEE, 1ST 30 MIN: Performed by: SURGERY

## 2019-05-31 PROCEDURE — G0121 COLON CA SCRN NOT HI RSK IND: HCPCS | Performed by: SURGERY

## 2019-05-31 PROCEDURE — 25000125 ZZHC RX 250: Performed by: NURSE ANESTHETIST, CERTIFIED REGISTERED

## 2019-05-31 PROCEDURE — 25000128 H RX IP 250 OP 636: Performed by: NURSE ANESTHETIST, CERTIFIED REGISTERED

## 2019-05-31 PROCEDURE — 86618 LYME DISEASE ANTIBODY: CPT | Performed by: NURSE PRACTITIONER

## 2019-05-31 PROCEDURE — 36415 COLL VENOUS BLD VENIPUNCTURE: CPT | Performed by: NURSE PRACTITIONER

## 2019-05-31 PROCEDURE — 25000125 ZZHC RX 250: Performed by: SURGERY

## 2019-05-31 PROCEDURE — 45378 DIAGNOSTIC COLONOSCOPY: CPT | Performed by: SURGERY

## 2019-05-31 PROCEDURE — 86140 C-REACTIVE PROTEIN: CPT | Performed by: NURSE PRACTITIONER

## 2019-05-31 RX ORDER — OXYCODONE AND ACETAMINOPHEN 5; 325 MG/1; MG/1
1 TABLET ORAL 2 TIMES DAILY PRN
Qty: 20 TABLET | Refills: 0 | Status: SHIPPED | OUTPATIENT
Start: 2019-05-31 | End: 2019-09-12

## 2019-05-31 RX ORDER — SODIUM CHLORIDE, SODIUM LACTATE, POTASSIUM CHLORIDE, CALCIUM CHLORIDE 600; 310; 30; 20 MG/100ML; MG/100ML; MG/100ML; MG/100ML
INJECTION, SOLUTION INTRAVENOUS CONTINUOUS
Status: DISCONTINUED | OUTPATIENT
Start: 2019-05-31 | End: 2019-05-31 | Stop reason: HOSPADM

## 2019-05-31 RX ORDER — LIDOCAINE HYDROCHLORIDE 10 MG/ML
INJECTION, SOLUTION INFILTRATION; PERINEURAL PRN
Status: DISCONTINUED | OUTPATIENT
Start: 2019-05-31 | End: 2019-05-31

## 2019-05-31 RX ORDER — PREDNISONE 20 MG/1
20 TABLET ORAL DAILY
Qty: 14 TABLET | Refills: 0 | Status: SHIPPED | OUTPATIENT
Start: 2019-05-31 | End: 2019-11-26

## 2019-05-31 RX ORDER — PROPOFOL 10 MG/ML
INJECTION, EMULSION INTRAVENOUS PRN
Status: DISCONTINUED | OUTPATIENT
Start: 2019-05-31 | End: 2019-05-31

## 2019-05-31 RX ORDER — LIDOCAINE 40 MG/G
CREAM TOPICAL
Status: DISCONTINUED | OUTPATIENT
Start: 2019-05-31 | End: 2019-05-31 | Stop reason: HOSPADM

## 2019-05-31 RX ORDER — PROPOFOL 10 MG/ML
INJECTION, EMULSION INTRAVENOUS CONTINUOUS PRN
Status: DISCONTINUED | OUTPATIENT
Start: 2019-05-31 | End: 2019-05-31

## 2019-05-31 RX ORDER — ONDANSETRON 2 MG/ML
4 INJECTION INTRAMUSCULAR; INTRAVENOUS
Status: DISCONTINUED | OUTPATIENT
Start: 2019-05-31 | End: 2019-05-31 | Stop reason: HOSPADM

## 2019-05-31 RX ADMIN — PROPOFOL 200 MCG/KG/MIN: 10 INJECTION, EMULSION INTRAVENOUS at 09:09

## 2019-05-31 RX ADMIN — LIDOCAINE HYDROCHLORIDE 50 MG: 10 INJECTION, SOLUTION INFILTRATION; PERINEURAL at 09:09

## 2019-05-31 RX ADMIN — PROPOFOL 50 MG: 10 INJECTION, EMULSION INTRAVENOUS at 09:09

## 2019-05-31 RX ADMIN — SODIUM CHLORIDE, POTASSIUM CHLORIDE, SODIUM LACTATE AND CALCIUM CHLORIDE 1000 ML: 600; 310; 30; 20 INJECTION, SOLUTION INTRAVENOUS at 08:42

## 2019-05-31 RX ADMIN — LIDOCAINE HYDROCHLORIDE 1 ML: 10 INJECTION, SOLUTION EPIDURAL; INFILTRATION; INTRACAUDAL; PERINEURAL at 08:42

## 2019-05-31 ASSESSMENT — MIFFLIN-ST. JEOR: SCORE: 1592.71

## 2019-05-31 NOTE — ANESTHESIA CARE TRANSFER NOTE
Patient: Dakota Berkowitz    Procedure(s):  COLONOSCOPY    Diagnosis: screening  Diagnosis Additional Information: No value filed.    Anesthesia Type:   General, MAC     Note:  Airway :Nasal Cannula  Patient transferred to:Phase II  Handoff Report: Identifed the Patient, Identified the Reponsible Provider, Reviewed the pertinent medical history, Discussed the surgical course, Reviewed Intra-OP anesthesia mangement and issues during anesthesia, Set expectations for post-procedure period and Allowed opportunity for questions and acknowledgement of understanding      Vitals: (Last set prior to Anesthesia Care Transfer)    CRNA VITALS  5/31/2019 0905 - 5/31/2019 0935      5/31/2019             Pulse:  78    Ht Rate:  78    SpO2:  98 %    EKG:  NSR                Electronically Signed By: FORTINO Hernandez CRNA  May 31, 2019  9:36 AM

## 2019-05-31 NOTE — BRIEF OP NOTE
[facility]   Brief Operative Note    Pre-operative diagnosis: screening   Post-operative diagnosis normal colon     Procedure: Procedure(s):  COLONOSCOPY   Surgeon(s): Surgeon(s) and Role:     * Jose Wen MD - Primary   Estimated blood loss: * No values recorded between 5/31/2019 12:00 AM and 5/31/2019  9:37 AM *    Specimens: * No specimens in log *   Findings: normal colon

## 2019-05-31 NOTE — TELEPHONE ENCOUNTER
Please inform patient: refilled Prednisone, recommend 20 mg daily, will wait for labs, also recommend screening for Lyme (labs ordered)  Signed refill for Percocet, take 1 tablet twice daily as needed for severe pain/      Seble VFORTINO Rae CNP

## 2019-05-31 NOTE — H&P
"48 year old year old female here for colonoscopy for screening.    Patient Active Problem List   Diagnosis     Female pelvic pain     Breast implant rupture     Pain in breast     Obesity     Perimenopausal symptoms     Weight gain     Irritability     CARDIOVASCULAR SCREENING; LDL GOAL LESS THAN 100       Past Medical History:   Diagnosis Date     NO ACTIVE PROBLEMS        Past Surgical History:   Procedure Laterality Date     C APPENDECTOMY       COSMETIC SURGERY      breast implants     CYSTOSCOPY, SLING TRANSVAGINAL N/A 2/8/2016    Procedure: CYSTOSCOPY, SLING TRANSVAGINAL;  Surgeon: Sterling Hill MD;  Location: [facility] OR     EYE SURGERY       HC REPAIR OF NASAL SEPTUM       SURGICAL HISTORY OF -       laporoscopy due to endometriosis     SURGICAL HISTORY OF -       endometrial ablation     SURGICAL HISTORY OF -       breast augmentation     SURGICAL HISTORY OF -       benign tumor removed from her left thigh     TONSILLECTOMY       TUBAL LIGATION         @[facility]@    No current outpatient medications on file.       Allergies   Allergen Reactions     Bees Anaphylaxis     Onion Anaphylaxis     Clindamycin Rash     Provera [Medroxyprogesterone Acetate] Other (See Comments)     edema       Pt reports that she has never smoked. She has never used smokeless tobacco. She reports that she drinks alcohol. She reports that she does not use drugs.    Exam:  BP (!) 142/96 (BP Location: Right arm)   Pulse 80   Temp 98.3  F (36.8  C) (Oral)   Resp 16   Ht 1.588 m (5' 2.5\")   Wt 101.2 kg (223 lb)   SpO2 98%   BMI 40.14 kg/m      Awake, Alert OX3  Lungs - CTA bilaterally  CV - RRR, no murmurs, distal pulses intact  Abd - soft, non-distended, non-tender, +BS  Extr - No cyanosis or edema    A/P 48 year old year old female in need of colonoscopy for screening. Risks, benefits, alternatives, and complications were discussed including the possibility of perforation and the patient agreed to proceed    Jose Wen MD   "

## 2019-05-31 NOTE — TELEPHONE ENCOUNTER
[employer] message sent to patient. Placed Rx at the FP .    Shae Winston on 5/31/2019 at 9:18 AM

## 2019-05-31 NOTE — ANESTHESIA POSTPROCEDURE EVALUATION
Patient: Dakota Berkowitz    Procedure(s):  COLONOSCOPY    Diagnosis:screening  Diagnosis Additional Information: No value filed.    Anesthesia Type:  General, MAC    Note:  Anesthesia Post Evaluation    Patient location during evaluation: Bedside  Patient participation: Able to fully participate in evaluation  Level of consciousness: sleepy but conscious  Pain management: adequate  Airway patency: patent  Cardiovascular status: stable  Respiratory status: nasal cannula  Hydration status: stable  PONV: none     Anesthetic complications: None          Last vitals:  Vitals:    05/31/19 0816   BP: (!) 142/96   Pulse: 80   Resp: 16   Temp: 36.8  C (98.3  F)   SpO2: 98%         Electronically Signed By: FORTINO Hernandez CRNA  May 31, 2019  9:36 AM

## 2019-06-03 LAB — B BURGDOR IGG+IGM SER QL: 0.07 (ref 0–0.89)

## 2019-06-18 ENCOUNTER — TRANSFERRED RECORDS (OUTPATIENT)
Dept: HEALTH INFORMATION MANAGEMENT | Facility: CLINIC | Age: 49
End: 2019-06-18

## 2019-06-18 ENCOUNTER — COMMUNICATION - HEALTHEAST (OUTPATIENT)
Dept: SURGERY | Facility: CLINIC | Age: 49
End: 2019-06-18

## 2019-06-18 ENCOUNTER — OFFICE VISIT - HEALTHEAST (OUTPATIENT)
Dept: SURGERY | Facility: CLINIC | Age: 49
End: 2019-06-18

## 2019-06-18 DIAGNOSIS — E66.01 OBESITY, MORBID, BMI 40.0-49.9 (H): ICD-10-CM

## 2019-06-18 DIAGNOSIS — Z71.3 NUTRITIONAL COUNSELING: ICD-10-CM

## 2019-06-18 DIAGNOSIS — G47.33 OSA (OBSTRUCTIVE SLEEP APNEA): ICD-10-CM

## 2019-06-18 ASSESSMENT — MIFFLIN-ST. JEOR: SCORE: 1592.46

## 2019-06-19 ENCOUNTER — TRANSFERRED RECORDS (OUTPATIENT)
Dept: HEALTH INFORMATION MANAGEMENT | Facility: CLINIC | Age: 49
End: 2019-06-19

## 2019-06-19 ENCOUNTER — OFFICE VISIT - HEALTHEAST (OUTPATIENT)
Dept: SLEEP MEDICINE | Facility: CLINIC | Age: 49
End: 2019-06-19

## 2019-06-19 DIAGNOSIS — G47.33 OSA (OBSTRUCTIVE SLEEP APNEA): ICD-10-CM

## 2019-06-19 DIAGNOSIS — E66.01 MORBID OBESITY WITH BMI OF 40.0-44.9, ADULT (H): ICD-10-CM

## 2019-06-19 ASSESSMENT — ENCOUNTER SYMPTOMS
WEAKNESS: 0
EYE PAIN: 0
HEMATOCHEZIA: 0
NERVOUS/ANXIOUS: 0
ABDOMINAL PAIN: 0
ARTHRALGIAS: 0
BREAST MASS: 0
FREQUENCY: 0
HEARTBURN: 0
CONSTIPATION: 0
JOINT SWELLING: 0
SHORTNESS OF BREATH: 0
FEVER: 0
DIARRHEA: 0
HEADACHES: 0
DIZZINESS: 0
PARESTHESIAS: 0
COUGH: 0
MYALGIAS: 0
SORE THROAT: 0
HEMATURIA: 0
CHILLS: 0
DYSURIA: 0
PALPITATIONS: 0
NAUSEA: 0

## 2019-06-19 ASSESSMENT — MIFFLIN-ST. JEOR: SCORE: 1588.38

## 2019-06-20 ENCOUNTER — AMBULATORY - HEALTHEAST (OUTPATIENT)
Dept: SLEEP MEDICINE | Facility: CLINIC | Age: 49
End: 2019-06-20

## 2019-06-21 ENCOUNTER — OFFICE VISIT (OUTPATIENT)
Dept: FAMILY MEDICINE | Facility: CLINIC | Age: 49
End: 2019-06-21
Payer: COMMERCIAL

## 2019-06-21 VITALS
HEART RATE: 83 BPM | BODY MASS INDEX: 39.87 KG/M2 | RESPIRATION RATE: 16 BRPM | DIASTOLIC BLOOD PRESSURE: 84 MMHG | TEMPERATURE: 98.4 F | SYSTOLIC BLOOD PRESSURE: 124 MMHG | OXYGEN SATURATION: 98 % | WEIGHT: 225 LBS | HEIGHT: 63 IN

## 2019-06-21 DIAGNOSIS — Z13.6 CARDIOVASCULAR SCREENING; LDL GOAL LESS THAN 100: ICD-10-CM

## 2019-06-21 DIAGNOSIS — R09.81 NASAL CONGESTION: ICD-10-CM

## 2019-06-21 DIAGNOSIS — T63.441D BEE STING REACTION, ACCIDENTAL OR UNINTENTIONAL, SUBSEQUENT ENCOUNTER: ICD-10-CM

## 2019-06-21 DIAGNOSIS — L98.9 SKIN LESION: ICD-10-CM

## 2019-06-21 DIAGNOSIS — Z00.00 ROUTINE GENERAL MEDICAL EXAMINATION AT A HEALTH CARE FACILITY: Primary | ICD-10-CM

## 2019-06-21 DIAGNOSIS — N95.1 MENOPAUSAL SYNDROME (HOT FLASHES): ICD-10-CM

## 2019-06-21 PROCEDURE — 99213 OFFICE O/P EST LOW 20 MIN: CPT | Mod: 25 | Performed by: FAMILY MEDICINE

## 2019-06-21 PROCEDURE — 99396 PREV VISIT EST AGE 40-64: CPT | Performed by: FAMILY MEDICINE

## 2019-06-21 RX ORDER — EPINEPHRINE 0.3 MG/.3ML
0.3 INJECTION SUBCUTANEOUS
Qty: 0.6 ML | Refills: 3 | Status: SHIPPED | OUTPATIENT
Start: 2019-06-21 | End: 2020-07-03

## 2019-06-21 RX ORDER — FLUTICASONE PROPIONATE 50 MCG
2 SPRAY, SUSPENSION (ML) NASAL DAILY
Qty: 16 G | Refills: 11 | Status: SHIPPED | OUTPATIENT
Start: 2019-06-21 | End: 2019-11-26

## 2019-06-21 RX ORDER — CITALOPRAM HYDROBROMIDE 20 MG/1
20 TABLET ORAL DAILY
Qty: 30 TABLET | Refills: 3 | Status: SHIPPED | OUTPATIENT
Start: 2019-06-21 | End: 2019-07-24

## 2019-06-21 ASSESSMENT — PAIN SCALES - GENERAL: PAINLEVEL: SEVERE PAIN (6)

## 2019-06-21 ASSESSMENT — MIFFLIN-ST. JEOR: SCORE: 1609.72

## 2019-06-21 NOTE — PROGRESS NOTES
SUBJECTIVE:   CC: Dakota Berkowitz is an 48 year old woman who presents for preventive health visit.     Healthy Habits:    Do you get at least three servings of calcium containing foods daily (dairy, green leafy vegetables, etc.)? yes    Amount of exercise or daily activities, outside of work: 0 day(s) per week    Problems taking medications regularly No    Medication side effects: No    Have you had an eye exam in the past two years? yes    Do you see a dentist twice per year? yes    Do you have sleep apnea, excessive snoring or daytime drowsiness?yes          Today's PHQ-2 Score:   PHQ-2 ( 1999 [employer]) 6/21/2019 6/19/2019   Q1: Little interest or pleasure in doing things 0 1   Q2: Feeling down, depressed or hopeless 0 0   PHQ-2 Score 0 1   Q1: Little interest or pleasure in doing things - Several days   Q2: Feeling down, depressed or hopeless - Not at all   PHQ-2 Score - 1       Abuse: Current or Past(Physical, Sexual or Emotional)- No  Do you feel safe in your environment? Yes    Social History     Tobacco Use     Smoking status: Never Smoker     Smokeless tobacco: Never Used   Substance Use Topics     Alcohol use: Yes     Comment: occassionally on weekends     If you drink alcohol do you typically have >3 drinks per day or >7 drinks per week? No                     Reviewed orders with patient.  Reviewed health maintenance and updated orders accordingly - Yes      Mammogram Screening: Patient over age 50, mutual decision to screen reflected in health maintenance.    Pertinent mammograms are reviewed under the imaging tab.  History of abnormal Pap smear: NO - age 30-65 PAP every 5 years with negative HPV co-testing recommended  PAP / HPV Latest Ref Rng & Units 3/28/2018 3/2/2015 9/28/2009   PAP - NIL NIL NIL   HPV 16 DNA NEG:Negative Negative - -   HPV 18 DNA NEG:Negative Negative - -   OTHER HR HPV NEG:Negative Negative - -     Reviewed and updated as needed this visit by clinical staff  Tobacco  Allergies  " Meds         Reviewed and updated as needed this visit by Provider            ROS:  Review Of Systems  Skin: dermatology referral has a spot on face.  Right nasal bridge for about one year.  No bleeding.  It is red in color  Eyes: negative  Ears/Nose/Throat: negative  Respiratory: No shortness of breath, dyspnea on exertion, cough, or hemoptysis  Cardiovascular: negative  Gastrointestinal: negative  Genitourinary: negative  Musculoskeletal: negative  Neurologic: negative  Psychiatric: negative  Hematologic/Lymphatic/Immunologic: negative  Endocrine: negative  Hot flashes are an issue.  Having them daily and multiple times.  Trying otc meds and not helping.  Gabapentin did not help either.      OBJECTIVE:   /84 (BP Location: Right arm, Patient Position: Sitting, Cuff Size: Adult Large)   Pulse 83   Temp 98.4  F (36.9  C) (Tympanic)   Resp 16   Ht 1.6 m (5' 3\")   Wt 102.1 kg (225 lb)   SpO2 98%   Breastfeeding? No   BMI 39.86 kg/m    EXAM:  GENERAL: healthy, alert and no distress  EYES: Eyes grossly normal to inspection, PERRL and conjunctivae and sclerae normal  HENT: ear canals and TM's normal, nose and mouth without ulcers or lesions  NECK: no adenopathy, no asymmetry, masses, or scars and thyroid normal to palpation  RESP: lungs clear to auscultation - no rales, rhonchi or wheezes  BREAST: NE  CV: regular rate and rhythm, normal S1 S2, no S3 or S4, no murmur, click or rub, no peripheral edema and peripheral pulses strong  ABDOMEN: soft, nontender, no hepatosplenomegaly, no masses and bowel sounds normal   (female): NE  MS: no gross musculoskeletal defects noted, no edema  SKIN: no suspicious lesions or rashes, has a skin lesion on right nasal bridge, mild red about 3 mm  NEURO: Normal strength and tone, mentation intact and speech normal  PSYCH: mentation appears normal, affect normal/bright  LYMPH: Negative for masses        ASSESSMENT/PLAN:   (Z00.00) Routine general medical examination at a " "health care facility  (primary encounter diagnosis)  Comment: Discussed healthy lifestyle and preventative cares.    Plan:     (N95.1) Menopausal syndrome (hot flashes)  Comment: discussed trial of serotonin specific reuptake inhibitor for hot flashes. She is will, discussed med, side effects etc  Plan: citalopram (CELEXA) 20 MG tablet, OFFICE/OUTPT         VISIT,EST,LEVL III            (L98.9) Skin lesion  Comment: referral is done  Plan: DERMATOLOGY REFERRAL, OFFICE/OUTPT         VISIT,EST,LEVL III            (Z13.6) CARDIOVASCULAR SCREENING; LDL GOAL LESS THAN 100  Comment:   Plan: Lipid panel reflex to direct LDL Fasting            (T63.441D) Bee sting reaction, accidental or unintentional, subsequent encounter  Comment: refilled  Plan: EPINEPHrine (EPIPEN/ADRENACLICK/OR ANY BX         GENERIC EQUIV) 0.3 MG/0.3ML injection 2-pack            (R09.81) Nasal congestion  Comment: refilled   Plan: fluticasone (FLONASE) 50 MCG/ACT nasal spray              COUNSELING:   Reviewed preventive health counseling, as reflected in patient instructions       Regular exercise       Healthy diet/nutrition       Colon cancer screening    Estimated body mass index is 39.86 kg/m  as calculated from the following:    Height as of this encounter: 1.6 m (5' 3\").    Weight as of this encounter: 102.1 kg (225 lb).    Weight management plan: diet and activity     reports that she has never smoked. She has never used smokeless tobacco.      Counseling Resources:  ATP IV Guidelines  Pooled Cohorts Equation Calculator  Breast Cancer Risk Calculator  FRAX Risk Assessment  ICSI Preventive Guidelines  Dietary Guidelines for Americans, 2010  USDA's MyPlate  ASA Prophylaxis  Lung CA Screening    Chino Rayo MD  [facility]  Answers for HPI/ROS submitted by the patient on 6/19/2019   Annual Exam:  Frequency of exercise:: 1 day/week  Getting at least 3 servings of Calcium per day:: Yes  Diet:: Regular (no " restrictions)  Taking medications regularly:: Yes  Medication side effects:: Not applicable  Bi-annual eye exam:: Yes  Dental care twice a year:: Yes  Sleep apnea or symptoms of sleep apnea:: Sleep apnea  abdominal pain: No  Blood in stool: No  Blood in urine: No  chest pain: No  chills: No  congestion: No  constipation: No  cough: No  diarrhea: No  dizziness: No  ear pain: No  eye pain: No  nervous/anxious: No  fever: No  frequency: No  genital sores: No  headaches: No  hearing loss: No  heartburn: No  arthralgias: No  joint swelling: No  peripheral edema: No  mood changes: No  myalgias: No  nausea: No  dysuria: No  palpitations: No  Skin sensation changes: No  sore throat: No  urgency: No  rash: No  shortness of breath: No  visual disturbance: No  weakness: No  pelvic pain: No  vaginal bleeding: No  vaginal discharge: No  tenderness: No  breast mass: No  breast discharge: No  Additional concerns today:: Yes  Duration of exercise:: 30-45 minutes

## 2019-06-21 NOTE — PATIENT INSTRUCTIONS
Please make a fasting lab visit in the future.    I am trying citalopram 20 mg a night to see if this will help with your night sweats.    Please follow up with a phone visit in 4 weeks.    I refilled your medications that you needed to have filled.      Thank you for choosing [facility]  You may be receiving an email and/or telephone survey request from [facility] Customer Experience regarding your visit today.  Please take a few minutes to respond to the survey to let us know how we are doing.      If you have questions or concerns, please contact us via [employer] or you can contact your care team at [phone].    Our Clinic hours are:  Monday 6:40 am  to 7:00 pm  Tuesday -Friday 6:40 am to 5:00 pm    The [place] outpatient lab hours are:  Monday - Friday 6:10 am to 4:45 pm  Saturdays 7:00 am to 11:00 am  Appointments are required, call [phone]    If you have clinical questions after hours or would like to schedule an appointment,  call the clinic at [phone].    Preventive Health Recommendations  Female Ages 50 - 64    Yearly exam: See your health care provider every year in order to  o Review health changes.   o Discuss preventive care.    o Review your medicines if your doctor has prescribed any.      Get a Pap test every three years (unless you have an abnormal result and your provider advises testing more often).    If you get Pap tests with HPV test, you only need to test every 5 years, unless you have an abnormal result.     You do not need a Pap test if your uterus was removed (hysterectomy) and you have not had cancer.    You should be tested each year for STDs (sexually transmitted diseases) if you're at risk.     Have a mammogram every 1 to 2 years.    Have a colonoscopy at age 50, or have a yearly FIT test (stool test). These exams screen for colon cancer.      Have a cholesterol test every 5 years, or more often if advised.    Have a diabetes test (fasting glucose) every three years.  If you are at risk for diabetes, you should have this test more often.     If you are at risk for osteoporosis (brittle bone disease), think about having a bone density scan (DEXA).    Shots: Get a flu shot each year. Get a tetanus shot every 10 years.    Nutrition:     Eat at least 5 servings of fruits and vegetables each day.    Eat whole-grain bread, whole-wheat pasta and brown rice instead of white grains and rice.    Get adequate Calcium and Vitamin D.     Lifestyle    Exercise at least 150 minutes a week (30 minutes a day, 5 days a week). This will help you control your weight and prevent disease.    Limit alcohol to one drink per day.    No smoking.     Wear sunscreen to prevent skin cancer.     See your dentist every six months for an exam and cleaning.    See your eye doctor every 1 to 2 years.

## 2019-07-03 ENCOUNTER — HOSPITAL ENCOUNTER (OUTPATIENT)
Dept: MAMMOGRAPHY | Facility: CLINIC | Age: 49
Discharge: HOME | End: 2019-07-03
Attending: OBSTETRICS & GYNECOLOGY | Admitting: OBSTETRICS & GYNECOLOGY
Payer: COMMERCIAL

## 2019-07-03 ENCOUNTER — TELEPHONE (OUTPATIENT)
Dept: RHEUMATOLOGY | Facility: CLINIC | Age: 49
End: 2019-07-03

## 2019-07-03 DIAGNOSIS — Z12.31 VISIT FOR SCREENING MAMMOGRAM: ICD-10-CM

## 2019-07-03 PROCEDURE — 77067 SCR MAMMO BI INCL CAD: CPT

## 2019-07-03 NOTE — TELEPHONE ENCOUNTER
Patient contacted and reminded of upcoming appointment.  Patient confirmed they will be attending.  Patient instructed to bring updated medications list to appointment.      Christian Quiñonez  emt

## 2019-07-08 ENCOUNTER — TELEPHONE (OUTPATIENT)
Dept: OBGYN | Facility: CLINIC | Age: 49
End: 2019-07-08

## 2019-07-08 ENCOUNTER — TELEPHONE (OUTPATIENT)
Dept: RHEUMATOLOGY | Facility: CLINIC | Age: 49
End: 2019-07-08

## 2019-07-08 ENCOUNTER — DOCUMENTATION ONLY (OUTPATIENT)
Dept: CARE COORDINATION | Facility: CLINIC | Age: 49
End: 2019-07-08

## 2019-07-08 NOTE — TELEPHONE ENCOUNTER
Reason for Call:  Other results    Detailed comments: Pt wants results of mammogram, please call    Phone Number Patient can be reached at: Home number on file [phone] (home)    Best Time: today    Can we leave a detailed message on this number? YES    Call taken on 7/8/2019 at 10:51 AM by Cristina Linda

## 2019-07-08 NOTE — TELEPHONE ENCOUNTER
Left message requesting pt return call to clinic to discuss appt with Rheumatology.    Anna Yeager RN  Rheumatology Clinic

## 2019-07-08 NOTE — TELEPHONE ENCOUNTER
Called and spoke with pt regarding appt, she is unable to make the appt on Wednesday morning at 1100, due to work.  She work in a call center.    She is having pain in her skin, that feels worse when clothes are on.  She is occ having pain in her shoulders.  She is having the most pain in the torso.  She did feel better on steroids, but does not like to take them.  Discussed warning signs of GCA, call if any of them occur, after hours or on weekends, go to ER.    Pt has been offered an appt with Dr. Pittman on 8/14, that is the next available early morning appt.    Anna Yeager RN  [facility]

## 2019-07-13 ENCOUNTER — APPOINTMENT (OUTPATIENT)
Dept: LAB | Facility: CLINIC | Age: 49
End: 2019-07-13
Payer: COMMERCIAL

## 2019-07-24 ENCOUNTER — VIRTUAL VISIT (OUTPATIENT)
Dept: FAMILY MEDICINE | Facility: CLINIC | Age: 49
End: 2019-07-24
Payer: COMMERCIAL

## 2019-07-24 DIAGNOSIS — N95.1 MENOPAUSAL SYNDROME (HOT FLASHES): ICD-10-CM

## 2019-07-24 PROCEDURE — 99441 ZZC PHYSICIAN TELEPHONE EVALUATION 5-10 MIN: CPT | Performed by: FAMILY MEDICINE

## 2019-07-24 RX ORDER — CITALOPRAM HYDROBROMIDE 20 MG/1
30 TABLET ORAL DAILY
Qty: 135 TABLET | Refills: 3 | Status: SHIPPED | OUTPATIENT
Start: 2019-07-24 | End: 2019-11-26

## 2019-07-24 NOTE — PROGRESS NOTES
Dakota Berkowitz is a 48 year old female who is being evaluated via a billable telephone visit.      Consent has been obtained for this service by 1 care team member: yes.   See the scanned image in the medical record.  BROOKE Ibanez ([facility])    Dakota Berkowitz complains of    Chief Complaint   Patient presents with     Recheck Medication     follow up of new start citalopram for hannah-menopausal symptoms     I have reviewed and updated the patient's Past Medical History, Social History, Family History and Medication List.    ALLERGIES  Bees; Onion; Clindamycin; and Provera [medroxyprogesterone acetate]    BROOKE Ibanez (MA) (MA signature)    Medication Followup of new start Citalopram for hannah-menopausal symptoms    Taking Medication as prescribed: yes. Also started Black Cohosh    Side Effects:  None    Medication Helping Symptoms:  Yes, less severe. Still has hot flashes, but less often and not as severe.      Additional provider notes:       Assessment/Plan:  (N95.1) Menopausal syndrome (hot flashes)  Comment: the medication has helped a lot,  Minimal sweats at night and reduced symptoms during the day.  Taking at night.  No side effects.  Will do a trial of 30 mg a day and see how she does.    Plan: citalopram (CELEXA) 20 MG tablet          Counseling/Coordination of care is over 5 min in 10 min appt.          I have reviewed the note as documented above.  This accurately captures the substance of my conversation with the patient.      Total time of call between patient and provider was 7 minutes     Chino Rayo MD

## 2019-07-25 ENCOUNTER — TRANSFERRED RECORDS (OUTPATIENT)
Dept: HEALTH INFORMATION MANAGEMENT | Facility: CLINIC | Age: 49
End: 2019-07-25

## 2019-07-25 ENCOUNTER — OFFICE VISIT - HEALTHEAST (OUTPATIENT)
Dept: SURGERY | Facility: CLINIC | Age: 49
End: 2019-07-25

## 2019-07-25 DIAGNOSIS — E66.01 OBESITY, MORBID, BMI 40.0-49.9 (H): ICD-10-CM

## 2019-07-25 DIAGNOSIS — Z71.3 NUTRITIONAL COUNSELING: ICD-10-CM

## 2019-07-25 DIAGNOSIS — G47.33 OSA (OBSTRUCTIVE SLEEP APNEA): ICD-10-CM

## 2019-07-25 ASSESSMENT — MIFFLIN-ST. JEOR: SCORE: 1590.19

## 2019-08-06 ENCOUNTER — TRANSFERRED RECORDS (OUTPATIENT)
Dept: HEALTH INFORMATION MANAGEMENT | Facility: CLINIC | Age: 49
End: 2019-08-06

## 2019-08-06 ENCOUNTER — OFFICE VISIT - HEALTHEAST (OUTPATIENT)
Dept: SURGERY | Facility: CLINIC | Age: 49
End: 2019-08-06

## 2019-08-06 DIAGNOSIS — Z71.3 NUTRITIONAL COUNSELING: ICD-10-CM

## 2019-08-06 DIAGNOSIS — E66.01 OBESITY, MORBID, BMI 40.0-49.9 (H): ICD-10-CM

## 2019-08-06 DIAGNOSIS — G47.33 OSA (OBSTRUCTIVE SLEEP APNEA): ICD-10-CM

## 2019-08-06 ASSESSMENT — MIFFLIN-ST. JEOR: SCORE: 1573.41

## 2019-08-12 ENCOUNTER — COMMUNICATION - HEALTHEAST (OUTPATIENT)
Dept: SLEEP MEDICINE | Facility: CLINIC | Age: 49
End: 2019-08-12

## 2019-08-13 ENCOUNTER — TRANSFERRED RECORDS (OUTPATIENT)
Dept: HEALTH INFORMATION MANAGEMENT | Facility: CLINIC | Age: 49
End: 2019-08-13

## 2019-08-13 ENCOUNTER — OFFICE VISIT - HEALTHEAST (OUTPATIENT)
Dept: SURGERY | Facility: CLINIC | Age: 49
End: 2019-08-13

## 2019-08-13 DIAGNOSIS — E66.01 MORBID OBESITY WITH BMI OF 40.0-44.9, ADULT (H): ICD-10-CM

## 2019-08-13 DIAGNOSIS — G47.33 OSA (OBSTRUCTIVE SLEEP APNEA): ICD-10-CM

## 2019-08-13 DIAGNOSIS — M19.90 ARTHRITIS: ICD-10-CM

## 2019-08-13 ASSESSMENT — MIFFLIN-ST. JEOR: SCORE: 1588.38

## 2019-08-14 ENCOUNTER — COMMUNICATION - HEALTHEAST (OUTPATIENT)
Dept: SLEEP MEDICINE | Facility: CLINIC | Age: 49
End: 2019-08-14

## 2019-08-15 ENCOUNTER — AMBULATORY - HEALTHEAST (OUTPATIENT)
Dept: SURGERY | Facility: CLINIC | Age: 49
End: 2019-08-15

## 2019-08-20 ENCOUNTER — COMMUNICATION - HEALTHEAST (OUTPATIENT)
Dept: SLEEP MEDICINE | Facility: CLINIC | Age: 49
End: 2019-08-20

## 2019-08-26 ENCOUNTER — TRANSFERRED RECORDS (OUTPATIENT)
Dept: HEALTH INFORMATION MANAGEMENT | Facility: CLINIC | Age: 49
End: 2019-08-26

## 2019-08-26 ENCOUNTER — AMBULATORY - HEALTHEAST (OUTPATIENT)
Dept: SURGERY | Facility: CLINIC | Age: 49
End: 2019-08-26

## 2019-08-27 ENCOUNTER — COMMUNICATION - HEALTHEAST (OUTPATIENT)
Dept: SURGERY | Facility: CLINIC | Age: 49
End: 2019-08-27

## 2019-08-28 ENCOUNTER — AMBULATORY - HEALTHEAST (OUTPATIENT)
Dept: SURGERY | Facility: CLINIC | Age: 49
End: 2019-08-28

## 2019-08-28 ENCOUNTER — AMBULATORY - HEALTHEAST (OUTPATIENT)
Dept: LAB | Facility: CLINIC | Age: 49
End: 2019-08-28

## 2019-08-28 ENCOUNTER — TRANSFERRED RECORDS (OUTPATIENT)
Dept: HEALTH INFORMATION MANAGEMENT | Facility: CLINIC | Age: 49
End: 2019-08-28

## 2019-08-28 DIAGNOSIS — K21.9 ACID REFLUX: ICD-10-CM

## 2019-08-28 DIAGNOSIS — Z98.84 HISTORY OF ROUX-EN-Y GASTRIC BYPASS: ICD-10-CM

## 2019-08-28 DIAGNOSIS — Z01.818 PRE-OPERATIVE CLEARANCE: ICD-10-CM

## 2019-08-28 DIAGNOSIS — K90.9 INTESTINAL MALABSORPTION: ICD-10-CM

## 2019-08-28 DIAGNOSIS — R63.4 RAPID WEIGHT LOSS: ICD-10-CM

## 2019-08-28 LAB
ALBUMIN SERPL-MCNC: 4.1 G/DL (ref 3.5–5)
ALBUMIN UR-MCNC: NEGATIVE MG/DL
ALP SERPL-CCNC: 108 U/L (ref 45–120)
ALT SERPL W P-5'-P-CCNC: 65 U/L (ref 0–45)
ANION GAP SERPL CALCULATED.3IONS-SCNC: 13 MMOL/L (ref 5–18)
APPEARANCE UR: CLEAR
APTT PPP: 26 SECONDS (ref 24–37)
AST SERPL W P-5'-P-CCNC: 31 U/L (ref 0–40)
BACTERIA #/AREA URNS HPF: ABNORMAL HPF
BASOPHILS # BLD AUTO: 0.1 THOU/UL (ref 0–0.2)
BASOPHILS NFR BLD AUTO: 1 % (ref 0–2)
BILIRUB SERPL-MCNC: 0.4 MG/DL (ref 0–1)
BILIRUB UR QL STRIP: NEGATIVE
BUN SERPL-MCNC: 15 MG/DL (ref 8–22)
CALCIUM SERPL-MCNC: 10.1 MG/DL (ref 8.5–10.5)
CHLORIDE BLD-SCNC: 105 MMOL/L (ref 98–107)
CO2 SERPL-SCNC: 23 MMOL/L (ref 22–31)
COLOR UR AUTO: YELLOW
CREAT SERPL-MCNC: 0.88 MG/DL (ref 0.6–1.1)
EOSINOPHIL # BLD AUTO: 0.2 THOU/UL (ref 0–0.4)
EOSINOPHIL NFR BLD AUTO: 2 % (ref 0–6)
ERYTHROCYTE [DISTWIDTH] IN BLOOD BY AUTOMATED COUNT: 11.6 % (ref 11–14.5)
GFR SERPL CREATININE-BSD FRML MDRD: >60 ML/MIN/1.73M2
GLUCOSE BLD-MCNC: 81 MG/DL (ref 70–125)
GLUCOSE UR STRIP-MCNC: NEGATIVE MG/DL
HCT VFR BLD AUTO: 44.1 % (ref 35–47)
HGB BLD-MCNC: 14.8 G/DL (ref 12–16)
HGB UR QL STRIP: NEGATIVE
INR PPP: 0.96 (ref 0.9–1.1)
KETONES UR STRIP-MCNC: NEGATIVE MG/DL
LEUKOCYTE ESTERASE UR QL STRIP: ABNORMAL
LYMPHOCYTES # BLD AUTO: 2.6 THOU/UL (ref 0.8–4.4)
LYMPHOCYTES NFR BLD AUTO: 30 % (ref 20–40)
MCH RBC QN AUTO: 31.8 PG (ref 27–34)
MCHC RBC AUTO-ENTMCNC: 33.5 G/DL (ref 32–36)
MCV RBC AUTO: 95 FL (ref 80–100)
MONOCYTES # BLD AUTO: 0.5 THOU/UL (ref 0–0.9)
MONOCYTES NFR BLD AUTO: 5 % (ref 2–10)
NEUTROPHILS # BLD AUTO: 5.4 THOU/UL (ref 2–7.7)
NEUTROPHILS NFR BLD AUTO: 62 % (ref 50–70)
NITRATE UR QL: NEGATIVE
PH UR STRIP: 6 [PH] (ref 5–8)
PLATELET # BLD AUTO: 166 THOU/UL (ref 140–440)
PMV BLD AUTO: 9.5 FL (ref 7–10)
POTASSIUM BLD-SCNC: 4.5 MMOL/L (ref 3.5–5)
PROT SERPL-MCNC: 7.4 G/DL (ref 6–8)
RBC # BLD AUTO: 4.65 MILL/UL (ref 3.8–5.4)
RBC #/AREA URNS AUTO: ABNORMAL HPF
SODIUM SERPL-SCNC: 141 MMOL/L (ref 136–145)
SP GR UR STRIP: 1.01 (ref 1–1.03)
SQUAMOUS #/AREA URNS AUTO: ABNORMAL LPF
UROBILINOGEN UR STRIP-ACNC: ABNORMAL
WBC #/AREA URNS AUTO: ABNORMAL HPF
WBC: 8.6 THOU/UL (ref 4–11)

## 2019-08-29 LAB — BACTERIA SPEC CULT: NO GROWTH

## 2019-09-12 ENCOUNTER — OFFICE VISIT (OUTPATIENT)
Dept: FAMILY MEDICINE | Facility: CLINIC | Age: 49
End: 2019-09-12
Payer: COMMERCIAL

## 2019-09-12 ENCOUNTER — OFFICE VISIT (OUTPATIENT)
Dept: DERMATOLOGY | Facility: CLINIC | Age: 49
End: 2019-09-12
Attending: FAMILY MEDICINE
Payer: COMMERCIAL

## 2019-09-12 VITALS — DIASTOLIC BLOOD PRESSURE: 86 MMHG | OXYGEN SATURATION: 94 % | HEART RATE: 76 BPM | SYSTOLIC BLOOD PRESSURE: 134 MMHG

## 2019-09-12 VITALS
DIASTOLIC BLOOD PRESSURE: 86 MMHG | TEMPERATURE: 97.5 F | BODY MASS INDEX: 39.02 KG/M2 | WEIGHT: 220.2 LBS | SYSTOLIC BLOOD PRESSURE: 134 MMHG | HEART RATE: 71 BPM | RESPIRATION RATE: 16 BRPM | OXYGEN SATURATION: 97 % | HEIGHT: 63 IN

## 2019-09-12 DIAGNOSIS — L82.1 SEBORRHEIC KERATOSIS: ICD-10-CM

## 2019-09-12 DIAGNOSIS — D18.01 ANGIOMA OF SKIN: ICD-10-CM

## 2019-09-12 DIAGNOSIS — G47.33 OSA (OBSTRUCTIVE SLEEP APNEA): ICD-10-CM

## 2019-09-12 DIAGNOSIS — N95.1 MENOPAUSAL SYNDROME (HOT FLASHES): ICD-10-CM

## 2019-09-12 DIAGNOSIS — E66.812 OBESITY, CLASS II, BMI 35-39.9, ISOLATED (SEE ACTUAL BMI): ICD-10-CM

## 2019-09-12 DIAGNOSIS — Z01.818 PREOP GENERAL PHYSICAL EXAM: Primary | ICD-10-CM

## 2019-09-12 DIAGNOSIS — L81.4 LENTIGO: ICD-10-CM

## 2019-09-12 DIAGNOSIS — D22.9 NEVUS: ICD-10-CM

## 2019-09-12 DIAGNOSIS — D48.5 NEOPLASM OF UNCERTAIN BEHAVIOR OF SKIN: Primary | ICD-10-CM

## 2019-09-12 PROCEDURE — 99215 OFFICE O/P EST HI 40 MIN: CPT | Performed by: FAMILY MEDICINE

## 2019-09-12 PROCEDURE — 93000 ELECTROCARDIOGRAM COMPLETE: CPT | Performed by: FAMILY MEDICINE

## 2019-09-12 PROCEDURE — 88305 TISSUE EXAM BY PATHOLOGIST: CPT | Mod: TC | Performed by: PHYSICIAN ASSISTANT

## 2019-09-12 PROCEDURE — 88342 IMHCHEM/IMCYTCHM 1ST ANTB: CPT | Mod: TC | Performed by: PHYSICIAN ASSISTANT

## 2019-09-12 PROCEDURE — 11106 INCAL BX SKN SINGLE LES: CPT | Performed by: PHYSICIAN ASSISTANT

## 2019-09-12 PROCEDURE — 99214 OFFICE O/P EST MOD 30 MIN: CPT | Mod: 25 | Performed by: PHYSICIAN ASSISTANT

## 2019-09-12 PROCEDURE — 17110 DESTRUCTION B9 LES UP TO 14: CPT | Mod: 59 | Performed by: PHYSICIAN ASSISTANT

## 2019-09-12 ASSESSMENT — MIFFLIN-ST. JEOR: SCORE: 1587.95

## 2019-09-12 NOTE — PROGRESS NOTES
[facility]  [address] [address]  [phone]  Dept: [phone]    PRE-OP EVALUATION:  Today's date: 2019    Dakota Berkowitz (: 1970) presents for pre-operative evaluation assessment as requested by Dr. Sin.  She requires evaluation and anesthesia risk assessment prior to undergoing surgery/procedure for treatment of gastric bypass .    Fax number for surgical facility: [phone]  Primary Physician: Chino Rayo  Type of Anesthesia Anticipated: General    Patient has a Health Care Directive or Living Will:  NO    Preop Questions 2019   Who is doing your surgery? Dr Sin   What are you having done? Laproscopic DOUG-EN-Y   Date of Surgery/Procedure: 2019   Facility or Hospital where procedure/surgery will be performed: [facility]    1.  Do you have a history of Heart attack, stroke, stent, coronary bypass surgery, or other heart surgery? No   2.  Do you ever have any pain or discomfort in your chest? No   3.  Do you have a history of  Heart Failure? No   4.   Are you troubled by shortness of breath when:  walking on a level surface, or up a slight hill, or at night? No   5.  Do you currently have a cold, bronchitis or other respiratory infection? No   6.  Do you have a cough, shortness of breath, or wheezing? No   7.  Do you sometimes get pains in the calves of your legs when you walk? No   8. Do you or anyone in your family have previous history of blood clots? No   9.  Do you or does anyone in your family have a serious bleeding problem such as prolonged bleeding following surgeries or cuts? No   10. Have you ever had problems with anemia or been told to take iron pills? No   11. Have you had any abnormal blood loss such as black, tarry or bloody stools, or abnormal vaginal bleeding? No   12. Have you ever had a blood transfusion? No   13. Have you or any of your relatives ever had problems with anesthesia? No   14. Do you have sleep  apnea, excessive snoring or daytime drowsiness? YES - on CPAP - already has instructions to bring CPAP to hospital.   15. Do you have any prosthetic heart valves? No   16. Do you have prosthetic joints? No   17. Is there any chance that you may be pregnant? No         HPI:     HPI related to upcoming procedure: Patient has morbid obesity with unsuccessful weight loss from lifestyle modifications. Hence, planned surgery. Reviewed above with patient.      See problem list for active medical problems.  Problems all longstanding and stable, except as noted/documented.  See ROS for pertinent symptoms related to these conditions.      MEDICAL HISTORY:     Patient Active Problem List    Diagnosis Date Noted     SUZY (obstructive sleep apnea) 09/12/2019     Priority: Medium     Menopausal syndrome (hot flashes) 09/12/2019     Priority: Medium     CARDIOVASCULAR SCREENING; LDL GOAL LESS THAN 100 07/06/2018     Priority: Medium     Irritability 03/02/2015     Priority: Medium     Perimenopausal symptoms 06/17/2013     Priority: Medium     Weight gain 06/17/2013     Priority: Medium     Obesity 04/25/2011     Priority: Medium     Breast implant rupture 02/08/2011     Priority: Medium     Pain in breast 02/08/2011     Priority: Medium     due to rupture of breast implant       Female pelvic pain 10/13/2009     Priority: Medium     (Problem list name updated by automated process. Provider to review and confirm.)        Past Medical History:   Diagnosis Date     NO ACTIVE PROBLEMS      Past Surgical History:   Procedure Laterality Date     C APPENDECTOMY       COLONOSCOPY N/A 5/31/2019    Procedure: COLONOSCOPY;  Surgeon: Jose Wen MD;  Location: [facility]     COSMETIC SURGERY      breast implants     CYSTOSCOPY, SLING TRANSVAGINAL N/A 2/8/2016    Procedure: CYSTOSCOPY, SLING TRANSVAGINAL;  Surgeon: Sterling Hill MD;  Location: [facility]     EYE SURGERY       HC REPAIR OF NASAL SEPTUM       SURGICAL HISTORY OF -        laporoscopy due to endometriosis     SURGICAL HISTORY OF -       endometrial ablation     SURGICAL HISTORY OF -       breast augmentation     SURGICAL HISTORY OF -       benign tumor removed from her left thigh     TONSILLECTOMY       TUBAL LIGATION       Current Outpatient Medications   Medication Sig Dispense Refill     BLACK COHOSH EXTRACT PO Take by mouth daily       citalopram (CELEXA) 20 MG tablet Take 1.5 tablets (30 mg) by mouth daily 135 tablet 3     albuterol (2.5 MG/3ML) 0.083% neb solution Take 1 vial (2.5 mg) by nebulization every 6 hours as needed for shortness of breath / dyspnea or wheezing (Patient not taking: Reported on 9/12/2019) 25 vial 0     EPINEPHrine (EPIPEN/ADRENACLICK/OR ANY BX GENERIC EQUIV) 0.3 MG/0.3ML injection 2-pack Inject 0.3 mLs (0.3 mg) into the muscle once as needed for anaphylaxis 0.6 mL 3     fluticasone (FLONASE) 50 MCG/ACT nasal spray Spray 2 sprays into both nostrils daily Hold on file until needed (Patient not taking: Reported on 9/12/2019) 16 g 11     order for DME Equipment being ordered: Nebulizer 1 each 0     study - prednisoLONE acetate, MMCORC, (IDS# [id number]) 1 % ophthalmic susp Place 1-2 drops into both eyes 3 times daily       OTC products: None, except as noted above and no recent use of OTC ASA, NSAIDS or Steroids    Allergies   Allergen Reactions     Bees Anaphylaxis     Onion Anaphylaxis     Clindamycin Rash     Provera [Medroxyprogesterone Acetate] Other (See Comments)     edema      Latex Allergy: NO    Social History     Tobacco Use     Smoking status: Never Smoker     Smokeless tobacco: Never Used   Substance Use Topics     Alcohol use: Yes     Comment: occassionally on weekends     History   Drug Use No       REVIEW OF SYSTEMS:   CONSTITUTIONAL: NEGATIVE for fever, chills, change in weight  INTEGUMENTARY/SKIN: NEGATIVE for worrisome rashes, moles or lesions  EYES: NEGATIVE for vision changes or irritation  ENT/MOUTH: NEGATIVE for ear, mouth and throat  "problems  RESP: NEGATIVE for significant cough or SOB  CV: NEGATIVE for chest pain, palpitations or peripheral edema  GI: NEGATIVE for nausea, abdominal pain, heartburn, or change in bowel habits  : NEGATIVE for frequency, dysuria, or hematuria  MUSCULOSKELETAL: NEGATIVE for significant arthralgias or myalgia  NEURO: NEGATIVE for weakness, dizziness or paresthesias  ENDOCRINE: NEGATIVE for temperature intolerance, skin/hair changes  HEME: NEGATIVE for bleeding problems  PSYCHIATRIC: NEGATIVE for changes in mood or affect    EXAM:   /86   Pulse 71   Temp 97.5  F (36.4  C) (Tympanic)   Resp 16   Ht 1.6 m (5' 3\")   Wt 99.9 kg (220 lb 3.2 oz)   SpO2 97%   BMI 39.01 kg/m    GENERAL APPEARANCE:  alert and no distress; ambulatory w/o assist  EYES: pink conj, no icterus, PERRL, EOMI  HENT: ear canals and TM's normal, nose and mouth without ulcers or lesions, oropharynx clear and oral mucous membranes moist  NECK: no adenopathy, no asymmetry, masses, or scars and thyroid normal to palpation  RESP: lungs clear to auscultation - no rales, rhonchi or wheezes  CV: regular rates and rhythm, normal S1 S2, no S3 or S4, no murmur, click or rub, no peripheral edema and peripheral pulses strong  ABDOMEN: soft, nontender, no hepatosplenomegaly, no masses and bowel sounds normal  MS: no musculoskeletal defects are noted and gait is age appropriate without ataxia  SKIN: good turgor, no rash/jaundice/ecchymosis  NEURO: Normal strength and tone, sensory exam grossly normal, mentation intact and speech normal    DIAGNOSTICS:   EKG: sinus rhythm, normal axis, normal intervals, no acute ST/T changes c/w ischemia, no LVH by voltage criteria, compared to previous tracing no significant changes.    Recent Labs   Lab Test 05/23/19  1448 11/02/18  0757 05/30/18  1900   HGB 13.9 14.1 14.0    171 163     --  141   POTASSIUM 3.8  --  3.6   CR 0.84  --  0.72        IMPRESSION:   Reason for surgery/procedure: morbid " obesity  Diagnosis/reason for consult: preop evaluation, menopausal symptoms, SUZY    The proposed surgical procedure is considered INTERMEDIATE risk.    REVISED CARDIAC RISK INDEX  The patient has the following serious cardiovascular risks for perioperative complications such as (MI, PE, VFib and 3  AV Block):  No serious cardiac risks  INTERPRETATION: 0 risks: Class I (very low risk - 0.4% complication rate)    The patient has the following additional risks for perioperative complications:  The 10-year ASCVD risk score (Kari PARKER Jr., et al., 2013) is: 1.4%    Values used to calculate the score:      Age: 48 years      Sex: Female      Is Non- : No      Diabetic: No      Tobacco smoker: No      Systolic Blood Pressure: 134 mmHg      Is BP treated: No      HDL Cholesterol: 50 mg/dL      Total Cholesterol: 192 mg/dL  Morbid obesity      ICD-10-CM    1. Preop general physical exam Z01.818 EKG 12-lead complete w/read - Clinics   2. Obesity, Class II, BMI 35-39.9, isolated (see actual BMI) E66.9    3. Menopausal syndrome (hot flashes) N95.1    4. SUZY (obstructive sleep apnea) G47.33        RECOMMENDATIONS:     --Consult hospital rounder / IM to assist post-op medical management as appropriate.  Routine DVT precautions recommended.    Cardiovascular Risk  Performs 4 METs exercise without symptoms (Light housework (dusting, washing dishes), Climb a flight of stairs, Walk on level ground at 15 minutes per mile (4 miles/hour) and Shoveling) .   EKG not significantly changed compared to previous. Patient asymptomatic.       Obstructive Sleep Apnea (or suspected sleep apnea)  Patient is to bring their home CPAP with them on the day of surgery  Patient is clearly advised to use their home CPAP when released from surgery      --Patient is to take all scheduled medications on the day of surgery EXCEPT for modifications listed below.    Anticoagulant or Antiplatelet Medication Use  No ASA and NSAIDs prior to  surgery.      May hold citalopram on morning of surgery.  No OTC supplements on day of surgery.    APPROVAL GIVEN to proceed with proposed procedure, without further diagnostic evaluation       Signed Electronically by: Martínez Tavares MD    Copy of this evaluation report is provided to requesting physician.    [facility] Preop Guidelines    Revised Cardiac Risk Index

## 2019-09-12 NOTE — PATIENT INSTRUCTIONS
Do not take Ibuprofen, Aspirin or Naproxen from now until after procedure.  If you need to take something for pain, take Acetaminophen 325 mg orally 1-2 tabs every 4-6 hrs as needed for pain    Hold celexa on morning of surgery. Resume when allowed to eat.    Bring CPAP to the hospital.    Before Your Surgery      Call your surgeon if there is any change in your health. This includes signs of a cold or flu (such as a sore throat, runny nose, cough, rash or fever).    Do not smoke, drink alcohol or take over the counter medicine (unless your surgeon or primary care doctor tells you to) for the 24 hours before and after surgery.    If you take prescribed drugs: Follow your doctor s orders about which medicines to take and which to stop until after surgery.    Eating and drinking prior to surgery: follow the instructions from your surgeon    Take a shower or bath the night before surgery. Use the soap your surgeon gave you to gently clean your skin. If you do not have soap from your surgeon, use your regular soap. Do not shave or scrub the surgery site.  Wear clean pajamas and have clean sheets on your bed.

## 2019-09-12 NOTE — NURSING NOTE
Chief Complaint   Patient presents with     Skin Check       Vitals:    09/12/19 1632   BP: 134/86   Pulse: 76   SpO2: 94%     Wt Readings from Last 1 Encounters:   09/12/19 99.9 kg (220 lb 3.2 oz)       Jeannie Lee LPN.................9/12/2019

## 2019-09-12 NOTE — PROGRESS NOTES
HPI:   Chief complaints: Dakota Berkowitz is a 48 year old female who presents for Full skin cancer screening to rule out skin cancer   Last Skin Exam: few years ago      1st Baseline: no  Personal HX of Skin Cancer: no   Personal HX of Malignant Melanoma: no   Family HX of Skin Cancer / Malignant Melanoma: no  Personal HX of Atypical Moles:   no  Risk factors: history of frequent sun exposure and burns; was a ; blistering sunburns and tanning bed use in the past  New / Changing lesions:yes spot by the eye - prominent red spot  Social History: Lives in [place] and works in [employer]  On review of systems, there are no further skin complaints, patient is feeling otherwise well.  See patient intake sheet.  ROS of the following were done and are negative: Constitutional, Eyes, Ears, Nose,   Mouth, Throat, Cardiovascular, Respiratory, GI, Genitourinary, Musculoskeletal,   Psychiatric, Endocrine, Allergic/Immunologic.    PHYSICAL EXAM:   /86   Pulse 76   SpO2 94%   Skin exam performed as follows: Type 2 skin. Mood appropriate  Alert and Oriented X 3. Well developed, well nourished in no distress.  General appearance: Normal  Head including face: Normal  Eyes: conjunctiva and lids: Normal  Mouth: Lips, teeth, gums: Normal  Neck: Normal  Chest-breast/axillae: Normal  Back: Normal  Spleen and liver: Normal  Cardiovascular: Exam of peripheral vascular system by observation for swelling, varicosities, edema: Normal  Genitalia: groin, buttocks: Normal  Extremities: digits/nails (clubbing): Normal  Eccrine and Apocrine glands: Normal  Right upper extremity: Normal  Left upper extremity: Normal  Right lower extremity: Normal  Left lower extremity: Normal  Skin: Scalp and body hair: See below    Pt deferred exam of breasts, groin, buttocks: No    Other physical findings:  1. Multiple pigmented macules on extremities and trunk  2. Multiple pigmented macules on face, trunk and extremities  3. Multiple  vascular papules on trunk, arms and legs  4. Multiple scattered keratotic plaques  5. 4 mm brown irregular macule with tan center on the left scapula  6. Prominent vascular papules on the right medial canthus        Except as noted above, no other signs of skin cancer or melanoma.     ASSESSMENT/PLAN:   Benign Full skin cancer screening today. . Patient with history of none  Advised on monthly self exams and 1 year  Patient Education: Appropriate brochures given.    1. Multiple benign appearing nevi on arms, legs and trunk. Discussed ABCDEs of melanoma and sunscreen.   2. Multiple lentigos on arms, legs and trunk. Advised benign, no treatment needed.  3. Multiple scattered angiomas. Advised benign, no treatment needed.   4. Seborrheic keratosis on arms, legs and trunk. Advised benign, no treatment needed.  5. Atypical nevus on left scapula - advised. Anesthestized with lidocaine and area cleaned with betadine. Incisional biopsy of entire lesion performed and specimen placed in formalin. Patient will receive call with results.   6. Angioma/telangiectasias on the left medial canthus - cautery performed at a setting of 5.0. Pt tolerated well no complications. Advised she may need several treatments for complete resolution.   7. Dakota to follow up with Primary Care provider regarding elevated blood pressure.            Follow-up: yearly FSE.PRN sooner    1.) Patient was asked about new and changing moles. YES  2.) Patient received a complete physical skin examination: YES  3.) Patient was counseled to perform a monthly self skin examination: YES  Scribed By: Cheri Rudolph MS, PARABIA

## 2019-09-12 NOTE — LETTER
9/12/2019         RE: Dakota Berkowitz  [address] [place] [address]        Dear Colleague,    Thank you for referring your patient, Dakota Berkowitz, to the [facility]. Please see a copy of my visit note below.    HPI:   Chief complaints: Dakota Berkowitz is a 48 year old female who presents for Full skin cancer screening to rule out skin cancer   Last Skin Exam: few years ago      1st Baseline: no  Personal HX of Skin Cancer: no   Personal HX of Malignant Melanoma: no   Family HX of Skin Cancer / Malignant Melanoma: no  Personal HX of Atypical Moles:   no  Risk factors: history of frequent sun exposure and burns; was a ; blistering sunburns and tanning bed use in the past  New / Changing lesions:yes spot by the eye - prominent red spot  Social History: Lives in [place] and works in [employer]  On review of systems, there are no further skin complaints, patient is feeling otherwise well.  See patient intake sheet.  ROS of the following were done and are negative: Constitutional, Eyes, Ears, Nose,   Mouth, Throat, Cardiovascular, Respiratory, GI, Genitourinary, Musculoskeletal,   Psychiatric, Endocrine, Allergic/Immunologic.    PHYSICAL EXAM:   /86   Pulse 76   SpO2 94%   Skin exam performed as follows: Type 2 skin. Mood appropriate  Alert and Oriented X 3. Well developed, well nourished in no distress.  General appearance: Normal  Head including face: Normal  Eyes: conjunctiva and lids: Normal  Mouth: Lips, teeth, gums: Normal  Neck: Normal  Chest-breast/axillae: Normal  Back: Normal  Spleen and liver: Normal  Cardiovascular: Exam of peripheral vascular system by observation for swelling, varicosities, edema: Normal  Genitalia: groin, buttocks: Normal  Extremities: digits/nails (clubbing): Normal  Eccrine and Apocrine glands: Normal  Right upper extremity: Normal  Left upper extremity: Normal  Right lower extremity: Normal  Left lower extremity:  Normal  Skin: Scalp and body hair: See below    Pt deferred exam of breasts, groin, buttocks: No    Other physical findings:  1. Multiple pigmented macules on extremities and trunk  2. Multiple pigmented macules on face, trunk and extremities  3. Multiple vascular papules on trunk, arms and legs  4. Multiple scattered keratotic plaques  5. 4 mm brown irregular macule with tan center on the left scapula  6. Prominent vascular papules on the right medial canthus        Except as noted above, no other signs of skin cancer or melanoma.     ASSESSMENT/PLAN:   Benign Full skin cancer screening today. . Patient with history of none  Advised on monthly self exams and 1 year  Patient Education: Appropriate brochures given.    1. Multiple benign appearing nevi on arms, legs and trunk. Discussed ABCDEs of melanoma and sunscreen.   2. Multiple lentigos on arms, legs and trunk. Advised benign, no treatment needed.  3. Multiple scattered angiomas. Advised benign, no treatment needed.   4. Seborrheic keratosis on arms, legs and trunk. Advised benign, no treatment needed.  5. Atypical nevus on left scapula - advised. Anesthestized with lidocaine and area cleaned with betadine. Shave removal of entire lesion performed and specimen placed in formalin. Patient will receive call with results.   6. Angioma/telangiectasias on the left medial canthus - cautery performed at a setting of 5.0. Pt tolerated well no complications. Advised she may need several treatments for complete resolution.   7. Dakota to follow up with Primary Care provider regarding elevated blood pressure.            Follow-up: yearly FSE.PRN sooner    1.) Patient was asked about new and changing moles. YES  2.) Patient received a complete physical skin examination: YES  3.) Patient was counseled to perform a monthly self skin examination: YES  Scribed By: Cheri Rudolph, MS, PA-C        Again, thank you for allowing me to participate in the care of your patient.         Sincerely,        Cheri Powers PA-C

## 2019-09-12 NOTE — PATIENT INSTRUCTIONS
WOUND CARE INSTRUCTIONS   FOR CRYOSURGERY     Right wrist  This area treated with liquid nitrogen should form a blister (areas treated may or may not blister-skin may just turn dark and slough off). You do not need to bandage the area unless a blister forms and breaks (which may be a few days). When the blister breaks, begin daily dressing changes as follows:  1) Clean and dry the area with tap water using clean Q-tip or sterile gauze pad.   2) Apply Polysporin ointment or Bacitracin ointment over entire wound. Do NOT use Neosporin ointment.   3) Cover the wound with a band-aid or sterile non-stick gauze pad and micropore paper tape.   REPEAT THESE INSTRUCTIONS AT LEAST ONCE A DAY UNTIL THE WOUND HAS COMPLETELY HEALED.   It is an old wives tale that a wound heals better when it is exposed to air and allowed to dry out. The wound will heal faster with a better cosmetic result if it is kept moist with ointment and covered with a bandage.   Do not let the wound dry out.   IMPORTANT INFORMATION ON REVERSE SIDE   Supplies Needed:   *Cotton tipped applicators (Q-tips)   *Polysporin ointment or Bacitracin ointment (NOT NEOSPORIN)   *Band-aids, or non stick gauze pads and micropore paper tape   PATIENT INFORMATION   During the healing process you will notice a number of changes. All wounds develop a small halo of redness surrounding the wound. This means healing is occurring. Severe itching with extensive redness usually indicates sensitivity to the ointment or bandage tape used to dress the wound. You should call our office if this develops.   Swelling and/or discoloration around your surgical site is common, particularly when performed around the eye.   All wounds normally drain. The larger the wound the more drainage there will be. After 7-10 days, you will notice the wound beginning to shrink and new skin will begin to grow. The wound is healed when you can see skin has formed over the entire area. A healed wound has a  healthy, shiny look to the surface and is red to dark pink in color to normalize. Wounds may take approximately 4-6 weeks to heal. Larger wounds may take 6-8 weeks. After the wound is healed you may discontinue dressing changes.   You may experience a sensation of tightness as your wound heals. This is normal and will gradually subside.   Your healed wound may be sensitive to temperature changes. This sensitivity improves with time, but if you re having a lot of discomfort, try to avoid temperature extremes.   Patients frequently experience itching after their wound appears to have healed because of the continue healing under the skin. Plain Vaseline will help relieve the itching.             Wound Care Instructions     FOR SUPERFICIAL WOUNDS     [facility] [phone]    [facility] [phone]      back                 AFTER 24 HOURS YOU SHOULD REMOVE THE BANDAGE AND BEGIN DAILY DRESSING CHANGES AS FOLLOWS:     1) Remove Dressing.     2) Clean and dry the area with tap water using a Q-tip or sterile gauze pad.     3) Apply Vaseline, Aquaphor, Polysporin ointment or Bacitracin ointment over entire wound.  Do NOT use Neosporin ointment.     4) Cover the wound with a band-aid, or a sterile non-stick gauze pad and micropore paper tape      REPEAT THESE INSTRUCTIONS AT LEAST ONCE A DAY UNTIL THE WOUND HAS COMPLETELY HEALED.    It is an old wives tale that a wound heals better when it is exposed to air and allowed to dry out. The wound will heal faster with a better cosmetic result if it is kept moist with ointment and covered with a bandage.    **Do not let the wound dry out.**      Supplies Needed:      *Cotton tipped applicators (Q-tips)    *Polysporin Ointment or Bacitracin Ointment (NOT NEOSPORIN)    *Band-aids or non-stick gauze pads and micropore paper tape.      PATIENT INFORMATION:    During the healing process you will notice a number of changes. All wounds develop a small halo of  redness surrounding the wound.  This means healing is occurring. Severe itching with extensive redness usually indicates sensitivity to the ointment or bandage tape used to dress the wound.  You should call our office if this develops.      Swelling  and/or discoloration around your surgical site is common, particularly when performed around the eye.    All wounds normally drain.  The larger the wound the more drainage there will be.  After 7-10 days, you will notice the wound beginning to shrink and new skin will begin to grow.  The wound is healed when you can see skin has formed over the entire area.  A healed wound has a healthy, shiny look to the surface and is red to dark pink in color to normalize.  Wounds may take approximately 4-6 weeks to heal.  Larger wounds may take 6-8 weeks.  After the wound is healed you may discontinue dressing changes.    You may experience a sensation of tightness as your wound heals. This is normal and will gradually subside.    Your healed wound may be sensitive to temperature changes. This sensitivity improves with time, but if you re having a lot of discomfort, try to avoid temperature extremes.    Patients frequently experience itching after their wound appears to have healed because of the continue healing under the skin.  Plain Vaseline will help relieve the itching.        POSSIBLE COMPLICATIONS    BLEEDIN. Leave the bandage in place.  2. Use tightly rolled up gauze or a cloth to apply direct pressure over the bandage for 30  minutes.  3. Reapply pressure for an additional 30 minutes if necessary  4. Use additional gauze and tape to maintain pressure once the bleeding has stopped.

## 2019-09-16 ENCOUNTER — COMMUNICATION - HEALTHEAST (OUTPATIENT)
Dept: SURGERY | Facility: CLINIC | Age: 49
End: 2019-09-16

## 2019-09-16 ENCOUNTER — MYC MEDICAL ADVICE (OUTPATIENT)
Dept: FAMILY MEDICINE | Facility: CLINIC | Age: 49
End: 2019-09-16

## 2019-09-16 NOTE — TELEPHONE ENCOUNTER
CMA- please see her mychart note,   Have her labs/ preop, etc been sent to [facility] for her upcoming surgery?     Thanks,   Mica Huitron RNC

## 2019-09-17 ENCOUNTER — ANESTHESIA - HEALTHEAST (OUTPATIENT)
Dept: SURGERY | Facility: CLINIC | Age: 49
End: 2019-09-17

## 2019-09-18 ENCOUNTER — SURGERY - HEALTHEAST (OUTPATIENT)
Dept: SURGERY | Facility: CLINIC | Age: 49
End: 2019-09-18

## 2019-09-18 ENCOUNTER — TRANSFERRED RECORDS (OUTPATIENT)
Dept: HEALTH INFORMATION MANAGEMENT | Facility: CLINIC | Age: 49
End: 2019-09-18

## 2019-09-18 ASSESSMENT — MIFFLIN-ST. JEOR
SCORE: 1573.64
SCORE: 1557.54

## 2019-09-20 ENCOUNTER — COMMUNICATION - HEALTHEAST (OUTPATIENT)
Dept: SURGERY | Facility: CLINIC | Age: 49
End: 2019-09-20

## 2019-09-20 DIAGNOSIS — K21.9 ACID REFLUX: ICD-10-CM

## 2019-09-20 DIAGNOSIS — R63.4 RAPID WEIGHT LOSS: ICD-10-CM

## 2019-09-20 DIAGNOSIS — Z01.818 PRE-OPERATIVE CLEARANCE: ICD-10-CM

## 2019-09-20 DIAGNOSIS — K90.9 INTESTINAL MALABSORPTION: ICD-10-CM

## 2019-09-20 DIAGNOSIS — Z98.84 HISTORY OF ROUX-EN-Y GASTRIC BYPASS: ICD-10-CM

## 2019-09-20 LAB — COPATH REPORT: NORMAL

## 2019-09-24 ENCOUNTER — TRANSFERRED RECORDS (OUTPATIENT)
Dept: HEALTH INFORMATION MANAGEMENT | Facility: CLINIC | Age: 49
End: 2019-09-24

## 2019-09-24 ENCOUNTER — AMBULATORY - HEALTHEAST (OUTPATIENT)
Dept: SURGERY | Facility: CLINIC | Age: 49
End: 2019-09-24

## 2019-09-24 DIAGNOSIS — Z71.3 NUTRITIONAL COUNSELING: ICD-10-CM

## 2019-09-24 DIAGNOSIS — E66.9 OBESITY WITH BODY MASS INDEX OF 30.0-39.9: ICD-10-CM

## 2019-09-24 DIAGNOSIS — Z98.84 BARIATRIC SURGERY STATUS: ICD-10-CM

## 2019-09-27 ENCOUNTER — HOSPITAL ENCOUNTER (EMERGENCY)
Facility: CLINIC | Age: 49
Discharge: HOME | End: 2019-09-27
Attending: EMERGENCY MEDICINE | Admitting: EMERGENCY MEDICINE
Payer: COMMERCIAL

## 2019-09-27 ENCOUNTER — APPOINTMENT (OUTPATIENT)
Dept: CT IMAGING | Facility: CLINIC | Age: 49
End: 2019-09-27
Attending: EMERGENCY MEDICINE
Payer: COMMERCIAL

## 2019-09-27 VITALS
RESPIRATION RATE: 18 BRPM | HEART RATE: 67 BPM | TEMPERATURE: 97.6 F | SYSTOLIC BLOOD PRESSURE: 124 MMHG | DIASTOLIC BLOOD PRESSURE: 100 MMHG | OXYGEN SATURATION: 100 %

## 2019-09-27 DIAGNOSIS — K56.7 ILEUS (H): ICD-10-CM

## 2019-09-27 DIAGNOSIS — Z98.84 HISTORY OF ROUX-EN-Y GASTRIC BYPASS: ICD-10-CM

## 2019-09-27 DIAGNOSIS — R10.12 LEFT UPPER QUADRANT PAIN: ICD-10-CM

## 2019-09-27 LAB
ALBUMIN SERPL-MCNC: 3.5 G/DL (ref 3.4–5)
ALP SERPL-CCNC: 133 U/L (ref 40–150)
ALT SERPL W P-5'-P-CCNC: 160 U/L (ref 0–50)
ANION GAP SERPL CALCULATED.3IONS-SCNC: 9 MMOL/L (ref 3–14)
AST SERPL W P-5'-P-CCNC: 87 U/L (ref 0–45)
BASOPHILS # BLD AUTO: 0.1 10E9/L (ref 0–0.2)
BASOPHILS NFR BLD AUTO: 0.6 %
BILIRUB SERPL-MCNC: 0.7 MG/DL (ref 0.2–1.3)
BUN SERPL-MCNC: 17 MG/DL (ref 7–30)
CALCIUM SERPL-MCNC: 8.9 MG/DL (ref 8.5–10.1)
CHLORIDE SERPL-SCNC: 108 MMOL/L (ref 94–109)
CO2 SERPL-SCNC: 19 MMOL/L (ref 20–32)
CREAT SERPL-MCNC: 0.71 MG/DL (ref 0.52–1.04)
CRP SERPL-MCNC: 29.5 MG/L (ref 0–8)
DIFFERENTIAL METHOD BLD: NORMAL
EOSINOPHIL # BLD AUTO: 0.2 10E9/L (ref 0–0.7)
EOSINOPHIL NFR BLD AUTO: 1.9 %
ERYTHROCYTE [DISTWIDTH] IN BLOOD BY AUTOMATED COUNT: 12.9 % (ref 10–15)
GFR SERPL CREATININE-BSD FRML MDRD: >90 ML/MIN/{1.73_M2}
GLUCOSE SERPL-MCNC: 102 MG/DL (ref 70–99)
HCT VFR BLD AUTO: 42.4 % (ref 35–47)
HGB BLD-MCNC: 14.1 G/DL (ref 11.7–15.7)
IMM GRANULOCYTES # BLD: 0 10E9/L (ref 0–0.4)
IMM GRANULOCYTES NFR BLD: 0.3 %
LACTATE BLD-SCNC: 0.9 MMOL/L (ref 0.7–2)
LYMPHOCYTES # BLD AUTO: 1.6 10E9/L (ref 0.8–5.3)
LYMPHOCYTES NFR BLD AUTO: 19.9 %
MCH RBC QN AUTO: 31.3 PG (ref 26.5–33)
MCHC RBC AUTO-ENTMCNC: 33.3 G/DL (ref 31.5–36.5)
MCV RBC AUTO: 94 FL (ref 78–100)
MONOCYTES # BLD AUTO: 0.6 10E9/L (ref 0–1.3)
MONOCYTES NFR BLD AUTO: 8.2 %
NEUTROPHILS # BLD AUTO: 5.4 10E9/L (ref 1.6–8.3)
NEUTROPHILS NFR BLD AUTO: 69.1 %
NRBC # BLD AUTO: 0 10*3/UL
NRBC BLD AUTO-RTO: 0 /100
PLATELET # BLD AUTO: 199 10E9/L (ref 150–450)
POTASSIUM SERPL-SCNC: 3.5 MMOL/L (ref 3.4–5.3)
PROT SERPL-MCNC: 7.5 G/DL (ref 6.8–8.8)
RBC # BLD AUTO: 4.51 10E12/L (ref 3.8–5.2)
SODIUM SERPL-SCNC: 136 MMOL/L (ref 133–144)
WBC # BLD AUTO: 7.8 10E9/L (ref 4–11)

## 2019-09-27 PROCEDURE — 25000125 ZZHC RX 250: Performed by: EMERGENCY MEDICINE

## 2019-09-27 PROCEDURE — 96361 HYDRATE IV INFUSION ADD-ON: CPT | Performed by: EMERGENCY MEDICINE

## 2019-09-27 PROCEDURE — 74177 CT ABD & PELVIS W/CONTRAST: CPT

## 2019-09-27 PROCEDURE — 83605 ASSAY OF LACTIC ACID: CPT | Performed by: EMERGENCY MEDICINE

## 2019-09-27 PROCEDURE — 96374 THER/PROPH/DIAG INJ IV PUSH: CPT | Mod: 59 | Performed by: EMERGENCY MEDICINE

## 2019-09-27 PROCEDURE — 25000128 H RX IP 250 OP 636: Performed by: EMERGENCY MEDICINE

## 2019-09-27 PROCEDURE — 99285 EMERGENCY DEPT VISIT HI MDM: CPT | Mod: 25 | Performed by: EMERGENCY MEDICINE

## 2019-09-27 PROCEDURE — 99285 EMERGENCY DEPT VISIT HI MDM: CPT | Mod: Z6 | Performed by: EMERGENCY MEDICINE

## 2019-09-27 PROCEDURE — 86140 C-REACTIVE PROTEIN: CPT | Performed by: EMERGENCY MEDICINE

## 2019-09-27 PROCEDURE — 25000132 ZZH RX MED GY IP 250 OP 250 PS 637: Performed by: EMERGENCY MEDICINE

## 2019-09-27 PROCEDURE — 80053 COMPREHEN METABOLIC PANEL: CPT | Performed by: EMERGENCY MEDICINE

## 2019-09-27 PROCEDURE — 85025 COMPLETE CBC W/AUTO DIFF WBC: CPT | Performed by: EMERGENCY MEDICINE

## 2019-09-27 RX ORDER — ONDANSETRON 2 MG/ML
4 INJECTION INTRAMUSCULAR; INTRAVENOUS EVERY 30 MIN PRN
Status: DISCONTINUED | OUTPATIENT
Start: 2019-09-27 | End: 2019-09-27 | Stop reason: HOSPADM

## 2019-09-27 RX ORDER — OXYCODONE AND ACETAMINOPHEN 5; 325 MG/1; MG/1
1 TABLET ORAL ONCE
Status: COMPLETED | OUTPATIENT
Start: 2019-09-27 | End: 2019-09-27

## 2019-09-27 RX ORDER — SODIUM CHLORIDE 9 MG/ML
1000 INJECTION, SOLUTION INTRAVENOUS CONTINUOUS
Status: DISCONTINUED | OUTPATIENT
Start: 2019-09-27 | End: 2019-09-27 | Stop reason: HOSPADM

## 2019-09-27 RX ORDER — OXYCODONE AND ACETAMINOPHEN 5; 325 MG/1; MG/1
1-2 TABLET ORAL EVERY 4 HOURS PRN
Qty: 10 TABLET | Refills: 0 | Status: SHIPPED | OUTPATIENT
Start: 2019-09-27 | End: 2019-11-26

## 2019-09-27 RX ORDER — HYDROMORPHONE HYDROCHLORIDE 1 MG/ML
0.5 INJECTION, SOLUTION INTRAMUSCULAR; INTRAVENOUS; SUBCUTANEOUS
Status: DISCONTINUED | OUTPATIENT
Start: 2019-09-27 | End: 2019-09-27 | Stop reason: HOSPADM

## 2019-09-27 RX ORDER — IOPAMIDOL 755 MG/ML
100 INJECTION, SOLUTION INTRAVASCULAR ONCE
Status: COMPLETED | OUTPATIENT
Start: 2019-09-27 | End: 2019-09-27

## 2019-09-27 RX ADMIN — SODIUM CHLORIDE 1000 ML: 9 INJECTION, SOLUTION INTRAVENOUS at 02:51

## 2019-09-27 RX ADMIN — HYDROMORPHONE HYDROCHLORIDE 1 MG: 1 INJECTION, SOLUTION INTRAMUSCULAR; INTRAVENOUS; SUBCUTANEOUS at 02:52

## 2019-09-27 RX ADMIN — IOPAMIDOL 100 ML: 755 INJECTION, SOLUTION INTRAVENOUS at 03:14

## 2019-09-27 RX ADMIN — OXYCODONE HYDROCHLORIDE AND ACETAMINOPHEN 1 TABLET: 5; 325 TABLET ORAL at 04:18

## 2019-09-27 RX ADMIN — ONDANSETRON 4 MG: 2 INJECTION INTRAMUSCULAR; INTRAVENOUS at 02:52

## 2019-09-27 RX ADMIN — SODIUM CHLORIDE 67 ML: 9 INJECTION, SOLUTION INTRAVENOUS at 03:14

## 2019-09-27 ASSESSMENT — ENCOUNTER SYMPTOMS
FEVER: 0
SHORTNESS OF BREATH: 0
ABDOMINAL PAIN: 1

## 2019-09-27 NOTE — ED AVS SNAPSHOT
[facility] Emergency Department  [address] [address]  Phone:  [phone]  Fax:  [phone]                                    Dakota Berkowitz   MRN: [id number]    Department:  [facility] Emergency Department   Date of Visit:  9/27/2019           After Visit Summary Signature Page    I have received my discharge instructions, and my questions have been answered. I have discussed any challenges I see with this plan with the nurse or doctor.    ..........................................................................................................................................  Patient/Patient Representative Signature      ..........................................................................................................................................  Patient Representative Print Name and Relationship to Patient    ..................................................               ................................................  Date                                   Time    ..........................................................................................................................................  Reviewed by Signature/Title    ...................................................              ..............................................  Date                                               Time          22EPIC Rev 08/18

## 2019-09-27 NOTE — ED PROVIDER NOTES
History     Chief Complaint   Patient presents with     Abdominal Pain     gastric bypass 8 days ago- severe abd and back pain     HPI  Dakota Berkowitz is a 49 year old female who is 9 days status post Amna-en-Y gastric bypass surgery performed laparoscopically at [facility].  I reviewed the discharge summary, and it did note that patient had some issues with pain control postoperatively but was doing well at time of discharge.  She was tolerating liquid diet.  Patient has been tolerating liquid diet with some puréed foods, however this afternoon began feeling generalized illness around noon, over the past 14 hours.  Around 9 PM, 5 hours ago, patient had severe worsening of upper and left upper quadrant abdominal pain, severe in nature, nonradiating, with no alleviating factors.  No recent fever.  Denies urinary symptoms.  Does feel slightly nauseous secondary to the pain, however uncertain if she would even be able to vomit given her gastric bypass status.  No respiratory symptoms.  Denies any chest pain.  No other recent changes in health.  Has not had bowel movement since Tuesday, 2.5 days ago.    Allergies:  Allergies   Allergen Reactions     Bees Anaphylaxis     Onion Anaphylaxis     Clindamycin Rash     Provera [Medroxyprogesterone Acetate] Other (See Comments)     edema       Problem List:    Patient Active Problem List    Diagnosis Date Noted     History of Amna-en-Y gastric bypass 09/27/2019     Priority: Medium     SUZY (obstructive sleep apnea) 09/12/2019     Priority: Medium     Menopausal syndrome (hot flashes) 09/12/2019     Priority: Medium     CARDIOVASCULAR SCREENING; LDL GOAL LESS THAN 100 07/06/2018     Priority: Medium     Irritability 03/02/2015     Priority: Medium     Perimenopausal symptoms 06/17/2013     Priority: Medium     Weight gain 06/17/2013     Priority: Medium     Obesity 04/25/2011     Priority: Medium     Breast implant rupture 02/08/2011     Priority: Medium     Pain  in breast 02/08/2011     Priority: Medium     due to rupture of breast implant       Female pelvic pain 10/13/2009     Priority: Medium     (Problem list name updated by automated process. Provider to review and confirm.)          Past Medical History:    Past Medical History:   Diagnosis Date     NO ACTIVE PROBLEMS        Past Surgical History:    Past Surgical History:   Procedure Laterality Date     C APPENDECTOMY       COLONOSCOPY N/A 5/31/2019    Procedure: COLONOSCOPY;  Surgeon: Jose Wen MD;  Location: [facility]     COSMETIC SURGERY      breast implants     CYSTOSCOPY, SLING TRANSVAGINAL N/A 2/8/2016    Procedure: CYSTOSCOPY, SLING TRANSVAGINAL;  Surgeon: Sterling Hill MD;  Location: [facility]     EYE SURGERY       HC REPAIR OF NASAL SEPTUM       SURGICAL HISTORY OF -       laporoscopy due to endometriosis     SURGICAL HISTORY OF -       endometrial ablation     SURGICAL HISTORY OF -       breast augmentation     SURGICAL HISTORY OF -       benign tumor removed from her left thigh     TONSILLECTOMY       TUBAL LIGATION         Family History:    Family History   Problem Relation Age of Onset     Cancer Mother         ovarian     Cancer Paternal Grandmother         lung     Gynecology Sister         having a partial hysterectomy due to abnormal cells, leaving ovaries and cervix     Gynecology Daughter         endometriosis     Gynecology Sister         hysterectomy for endometriosis       Social History:  Marital Status:   [2]  Social History     Tobacco Use     Smoking status: Never Smoker     Smokeless tobacco: Never Used   Substance Use Topics     Alcohol use: Yes     Comment: occassionally on weekends     Drug use: No        Medications:    oxyCODONE-acetaminophen (PERCOCET) 5-325 MG tablet  albuterol (2.5 MG/3ML) 0.083% neb solution  BLACK COHOSH EXTRACT PO  citalopram (CELEXA) 20 MG tablet  EPINEPHrine (EPIPEN/ADRENACLICK/OR ANY BX GENERIC EQUIV) 0.3 MG/0.3ML injection 2-pack  fluticasone  (FLONASE) 50 MCG/ACT nasal spray  order for DME  study - prednisoLONE acetate, [facility], (IDS# [id number]) 1 % ophthalmic susp          Review of Systems   Constitutional: Negative for fever.   Respiratory: Negative for shortness of breath.    Cardiovascular: Negative for chest pain.   Gastrointestinal: Positive for abdominal pain.   All other systems reviewed and are negative.      Physical Exam   BP: 125/85  Pulse: 64  Heart Rate: 90  Temp: 97.6  F (36.4  C)  Resp: 18  SpO2: 98 %      Physical Exam  /78   Pulse 64   Temp 97.6  F (36.4  C) (Oral)   Resp 18   SpO2 94%   General: alert, interactive, ill appearing.    Head: atraumatic  Nose: no rhinorrhea or epistaxis  Ears: no external auditory canal discharge or bleeding.    Eyes: Sclera nonicteric. Conjunctiva noninjected. PERRL, EOMI  Mouth: no tonsillar erythema, edema, or exudate  Neck: supple, no palp LAD  Lungs: CTAB  CV: RRR, S1/S2; peripheral pulses palpable and symmetric  Abdomen: soft, incision sites clean, dry, and intact.  Diffuse upper abdominal pain, with most localized pain of the left upper quadrant.  Positive bowel sounds  Extremities: no cyanosis or edema  Skin: no rash or diaphoresis  Neuro:  strength 5/5 in UE and LEs bilaterally, sensation intact to light touch in UE and LEs bilaterally;       ED Course        Procedures               Critical Care time:  none               Results for orders placed or performed during the hospital encounter of 09/27/19 (from the past 24 hour(s))   Lactic acid whole blood   Result Value Ref Range    Lactic Acid 0.9 0.7 - 2.0 mmol/L   CBC with platelets differential   Result Value Ref Range    WBC 7.8 4.0 - 11.0 10e9/L    RBC Count 4.51 3.8 - 5.2 10e12/L    Hemoglobin 14.1 11.7 - 15.7 g/dL    Hematocrit 42.4 35.0 - 47.0 %    MCV 94 78 - 100 fl    MCH 31.3 26.5 - 33.0 pg    MCHC 33.3 31.5 - 36.5 g/dL    RDW 12.9 10.0 - 15.0 %    Platelet Count 199 150 - 450 10e9/L    Diff Method Automated Method     %  Neutrophils 69.1 %    % Lymphocytes 19.9 %    % Monocytes 8.2 %    % Eosinophils 1.9 %    % Basophils 0.6 %    % Immature Granulocytes 0.3 %    Nucleated RBCs 0 0 /100    Absolute Neutrophil 5.4 1.6 - 8.3 10e9/L    Absolute Lymphocytes 1.6 0.8 - 5.3 10e9/L    Absolute Monocytes 0.6 0.0 - 1.3 10e9/L    Absolute Eosinophils 0.2 0.0 - 0.7 10e9/L    Absolute Basophils 0.1 0.0 - 0.2 10e9/L    Abs Immature Granulocytes 0.0 0 - 0.4 10e9/L    Absolute Nucleated RBC 0.0    Comprehensive metabolic panel   Result Value Ref Range    Sodium 136 133 - 144 mmol/L    Potassium 3.5 3.4 - 5.3 mmol/L    Chloride 108 94 - 109 mmol/L    Carbon Dioxide 19 (L) 20 - 32 mmol/L    Anion Gap 9 3 - 14 mmol/L    Glucose 102 (H) 70 - 99 mg/dL    Urea Nitrogen 17 7 - 30 mg/dL    Creatinine 0.71 0.52 - 1.04 mg/dL    GFR Estimate >90 >60 mL/min/[1.73_m2]    GFR Estimate If Black >90 >60 mL/min/[1.73_m2]    Calcium 8.9 8.5 - 10.1 mg/dL    Bilirubin Total 0.7 0.2 - 1.3 mg/dL    Albumin 3.5 3.4 - 5.0 g/dL    Protein Total 7.5 6.8 - 8.8 g/dL    Alkaline Phosphatase 133 40 - 150 U/L     (H) 0 - 50 U/L    AST 87 (H) 0 - 45 U/L   CRP inflammation   Result Value Ref Range    CRP Inflammation 29.5 (H) 0.0 - 8.0 mg/L   CT Abdomen Pelvis w Contrast    Narrative    CT ABDOMEN PELVIS W CONTRAST  9/27/2019 3:28 AM     HISTORY: Abdominal pain, acute, generalized. Left upper quadrant  abdominal pain with recent gastric bypass performed nine days ago.    TECHNIQUE: CT abdomen and pelvis with 100 mL Isovue-370 IV. Radiation  dose for this scan was reduced using automated exposure control,  adjustment of the mA and/or kV according to patient size, or iterative  reconstruction technique.    COMPARISON: 5/23/2019.    FINDINGS:  Abdomen: There is mild atelectasis at the lung bases. Trace left  pleural effusion. The heart size is normal. The liver, spleen,  gallbladder, pancreas, adrenal glands and kidneys are normal in  appearance. There are postoperative changes  of gastric bypass. No  evidence of leak. No evidence of abscess. There is no abdominal or  pelvic lymph node enlargement.    Pelvis: A few small bowel loops in the anterior abdomen are mildly  dilated which may be postoperative ileus. These dilated loops are  mildly thick-walled and there is minimal edema in the small bowel  mesentery. No evidence of obstruction. There is a small amount of free  fluid in the pelvis. No free intraperitoneal gas. There is a small  left uterine fibroid. No adnexal mass.      Impression    IMPRESSION:  1. A few mildly dilated small bowel loops in the left anterior abdomen  in the area of surgery may be an ileus. No evidence of bowel  obstruction. These dilated bowel loops appear mildly thick-walled and  edematous.  2. No evidence of abscess.  3. Small amount of ascites.  4. Trace left pleural effusion.       Medications   0.9% sodium chloride BOLUS (0 mLs Intravenous Stopped 9/27/19 0412)     Followed by   sodium chloride 0.9% infusion (has no administration in time range)   iohexol (OMNIPAQUE) solution 50 mL (has no administration in time range)   ondansetron (ZOFRAN) injection 4 mg (4 mg Intravenous Given 9/27/19 0252)   HYDROmorphone (PF) (DILAUDID) injection 0.5 mg (has no administration in time range)   HYDROmorphone (DILAUDID) injection 1 mg (1 mg Intravenous Given 9/27/19 0252)   iopamidol (ISOVUE-370) solution 100 mL (100 mLs Intravenous Given 9/27/19 0314)   sodium chloride 0.9 % bag 500mL for CT scan flush use (67 mLs Intravenous Given 9/27/19 0314)   oxyCODONE-acetaminophen (PERCOCET) 5-325 MG per tablet 1 tablet (1 tablet Oral Given 9/27/19 0418)       Assessments & Plan (with Medical Decision Making)  49 year old female, presenting to the emergency department with concerns regarding abdominal pain, mostly left upper quadrant, and epigastric in nature, in the context of recent laparoscopic Amna-en-Y gastric bypass that was performed 9 days ago at [facility].    Patient arrives uncomfortable appearing, normal vitals, and oral contrast was initiated with plan for CT scan in addition to laboratory values to rule out postoperative complication of Amna-en-Y gastric bypass.  Labs showing CMP which is normal.  CRP is slightly elevated, nonspecific.  CBC with normal white blood cell count.  Lactate is normal.    CT scan of the abdomen/pelvis is performed.  This shows a few small dilated loops in the left upper quadrant, consistent with ileus.  No evidence of bowel obstruction.  Patient will be discharged home, and follow-up in clinic as needed.  Instructed on clear liquids over the next couple days, with decreased intake which has been recommended until resolution of pain, and patient begins experiencing more frequent bowel movements.  Encouraged to have discussion with her gastric surgery team.  Return if severe worsening of pain.  Given tablet of Percocet prior to discharge.  Patient sent home with 10 tablets Percocet.     I have reviewed the nursing notes.    I have reviewed the findings, diagnosis, plan and need for follow up with the patient.       New Prescriptions    OXYCODONE-ACETAMINOPHEN (PERCOCET) 5-325 MG TABLET    Take 1-2 tablets by mouth every 4 hours as needed for pain       Final diagnoses:   Left upper quadrant pain   Ileus (H)   History of Amna-en-Y gastric bypass       9/27/2019   [facility] EMERGENCY DEPARTMENT     Jose Madrigal MD  09/27/19 0420

## 2019-09-27 NOTE — DISCHARGE INSTRUCTIONS
Follow-up with your primary care provider as needed.    Would recommend clear liquids until you have have improvement of pain, and more frequent bowel movements.

## 2019-09-27 NOTE — ED TRIAGE NOTES
Had gastric bypass 9 days ago, tonight had sudden increase in pain at about 2100. Patient states that she feels like stomach is in vice.

## 2019-10-01 ENCOUNTER — COMMUNICATION - HEALTHEAST (OUTPATIENT)
Dept: SURGERY | Facility: CLINIC | Age: 49
End: 2019-10-01

## 2019-10-01 ENCOUNTER — TRANSFERRED RECORDS (OUTPATIENT)
Dept: HEALTH INFORMATION MANAGEMENT | Facility: CLINIC | Age: 49
End: 2019-10-01

## 2019-10-01 ENCOUNTER — OFFICE VISIT - HEALTHEAST (OUTPATIENT)
Dept: SURGERY | Facility: CLINIC | Age: 49
End: 2019-10-01

## 2019-10-01 DIAGNOSIS — Z98.84 S/P GASTRIC BYPASS: ICD-10-CM

## 2019-10-01 ASSESSMENT — MIFFLIN-ST. JEOR: SCORE: 1527.15

## 2019-10-11 ENCOUNTER — OFFICE VISIT (OUTPATIENT)
Dept: FAMILY MEDICINE | Facility: CLINIC | Age: 49
End: 2019-10-11
Payer: COMMERCIAL

## 2019-10-11 VITALS
TEMPERATURE: 98.3 F | SYSTOLIC BLOOD PRESSURE: 136 MMHG | HEART RATE: 87 BPM | BODY MASS INDEX: 36.53 KG/M2 | RESPIRATION RATE: 16 BRPM | WEIGHT: 206.2 LBS | DIASTOLIC BLOOD PRESSURE: 88 MMHG | OXYGEN SATURATION: 97 %

## 2019-10-11 DIAGNOSIS — J40 BRONCHITIS: Primary | ICD-10-CM

## 2019-10-11 PROCEDURE — 99213 OFFICE O/P EST LOW 20 MIN: CPT | Performed by: FAMILY MEDICINE

## 2019-10-11 RX ORDER — CODEINE PHOSPHATE AND GUAIFENESIN 10; 100 MG/5ML; MG/5ML
1-2 SOLUTION ORAL EVERY 4 HOURS PRN
Qty: 120 ML | Refills: 0 | Status: SHIPPED | OUTPATIENT
Start: 2019-10-11 | End: 2019-11-26

## 2019-10-11 RX ORDER — AZITHROMYCIN 250 MG/1
TABLET, FILM COATED ORAL
Qty: 6 TABLET | Refills: 0 | Status: SHIPPED | OUTPATIENT
Start: 2019-10-11 | End: 2019-11-26

## 2019-10-11 RX ORDER — PREDNISONE 20 MG/1
20 TABLET ORAL DAILY
Qty: 5 TABLET | Refills: 0 | Status: SHIPPED | OUTPATIENT
Start: 2019-10-11 | End: 2019-10-16

## 2019-10-11 NOTE — PATIENT INSTRUCTIONS
Patient Education     What Is Acute Bronchitis?  Acute bronchitis is when the airways in your lungs (bronchial tubes) become red and swollen (inflamed). It is usually caused by a viral infection. But it can also occur because of a bacteria or allergen. Symptoms include a cough that produces yellow or greenish mucus and can last for days or sometimes weeks.  Inside healthy lungs    Air travels in and out of the lungs through the airways. The linings of these airways produce sticky mucus. This mucus traps particles that enter the lungs. Tiny structures called cilia then sweep the particles out of the airways.     Healthy airway: Airways are normally open. Air moves in and out easily.      Healthy cilia: Tiny, hairlike cilia sweep mucus and particles up and out of the airways.     Lungs with bronchitis  Bronchitis often occurs with a cold or the flu virus. The airways become inflamed (red and swollen). There is a deep hacking cough from the extra mucus. Other symptoms may include:    Wheezing    Chest discomfort    Shortness of breath    Mild fever  A second infection, this time due to bacteria, may then occur. And airways irritated by allergens or smoke are more likely to get infected.        Inflamed airway: Inflammation and extra mucus narrow the airway, causing shortness of breath.      Impaired cilia: Extra mucus impairs cilia, causing congestion and wheezing. Smoking makes the problem worse.     Making a diagnosis  A physical exam, health history, and certain tests help your healthcare provider make the diagnosis.  Health history  Your healthcare provider will ask you about your symptoms.  The exam  Your provider listens to your chest for signs of congestion. He or she may also check your ears, nose, and throat.  Possible tests    A sputum test for bacteria. This requires a sample of mucus from your lungs.    A nasal or throat swab. This tests to see if you have a bacterial infection.    A chest X-ray. This is  done if your healthcare provider thinks you have pneumonia.    Tests to check for an underlying condition. Other tests may be done to check for things such as allergies, asthma, or COPD (chronic obstructive pulmonary disease). You may need to see a specialist for more lung function testing.  Treating a cough  The main treatment for bronchitis is easing symptoms. Avoiding smoke, allergens, and other things that trigger coughing can often help. If the infection is bacterial, you may be given antibiotics. During the illness, it's important to get plenty of sleep. To ease symptoms:    Don t smoke. Also avoid secondhand smoke.    Use a humidifier. Or try breathing in steam from a hot shower. This may help loosen mucus.    Drink a lot of water and juice. They can soothe the throat and may help thin mucus.    Sit up or use extra pillows when in bed. This helps to lessen coughing and congestion.    Ask your provider about using medicine. Ask about using cough medicine, pain and fever medicine, or a decongestant.  Antibiotics  Most cases of bronchitis are caused by cold or flu viruses. They don t need antibiotics to treat them, even if your mucus is thick and green or yellow. Antibiotics don t treat viral illness and antibiotics have not been shown to have any benefit in cases of acute bronchitis. Taking antibiotics when they are not needed increases your risk of getting an infection later that is antibiotic-resistant. Antibiotics can also cause severe cases of diarrhea that require other antibiotics to treat.  It is important that you accept your healthcare provider's opinion to not use antibiotics. Your provider will prescribe antibiotics if the infection is caused by bacteria. If they are prescribed:    Take all of the medicine. Take the medicine until it is used up, even if symptoms have improved. If you don t, the bronchitis may come back.    Take the medicines as directed. For instance, some medicines should be taken  with food.    Ask about side effects. Ask your provider or pharmacist what side effects are common, and what to do about them.  Follow-up care  You should see your provider again in 2 to 3 weeks. By this time, symptoms should have improved. An infection that lasts longer may mean you have a more serious problem.  Prevention    Avoid tobacco smoke. If you smoke, quit. Stay away from smoky places. Ask friends and family not to smoke around you, or in your home or car.    Get checked for allergies.    Ask your provider about getting a yearly flu shot. Also ask about pneumococcal or pneumonia shots.    Wash your hands often. This helps reduce the chance of picking up viruses that cause colds and flu.  Call your healthcare provider if:    Symptoms worsen, or you have new symptoms    Breathing problems worsen or  become severe    Symptoms don t get better within a week, or within 3 days of taking antibiotics   Date Last Reviewed: 2/1/2017 2000-2018 [employer]. [address] [place] [address], [place] [address]. All rights reserved. This information is not intended as a substitute for professional medical care. Always follow your healthcare professional's instructions.

## 2019-10-11 NOTE — PROGRESS NOTES
Subjective     Dakota Berkowitz is a 49 year old female who presents to clinic today for the following health issues:    HPI   ENT Symptoms             Symptoms: cc Present Absent Comment   Fever/Chills   x sweats   Fatigue   x    Muscle Aches   x    Eye Irritation   x    Sneezing   x    Nasal Micah/Drg   x    Sinus Pressure/Pain   x    Loss of smell   x    Dental pain   x    Sore Throat   x    Swollen Glands   x    Ear Pain/Fullness       Cough x x     Wheeze   x    Chest Pain   x    Shortness of breath  x     Rash   x    Other   x      Symptom duration:  3 weeks   Symptom severity:  modereat   Treatments tried:  OTC   Contacts:  none       49 yr old female here for a cough ongoing for the last three weeks. She reports production of yellowish sputum. No fevers or chills. Cough is persistent . Of note she had gastric by pass a few weeks ago. She reports some mild wheezing .     Patient Active Problem List   Diagnosis     Female pelvic pain     Breast implant rupture     Pain in breast     Obesity     Perimenopausal symptoms     Weight gain     Irritability     CARDIOVASCULAR SCREENING; LDL GOAL LESS THAN 100     SUZY (obstructive sleep apnea)     Menopausal syndrome (hot flashes)     History of Amna-en-Y gastric bypass     Past Surgical History:   Procedure Laterality Date     C APPENDECTOMY       COLONOSCOPY N/A 5/31/2019    Procedure: COLONOSCOPY;  Surgeon: Jose Wen MD;  Location: [facility]     COSMETIC SURGERY      breast implants     CYSTOSCOPY, SLING TRANSVAGINAL N/A 2/8/2016    Procedure: CYSTOSCOPY, SLING TRANSVAGINAL;  Surgeon: Sterling Hill MD;  Location: [facility]     EYE SURGERY       HC REPAIR OF NASAL SEPTUM       SURGICAL HISTORY OF -       laporoscopy due to endometriosis     SURGICAL HISTORY OF -       endometrial ablation     SURGICAL HISTORY OF -       breast augmentation     SURGICAL HISTORY OF -       benign tumor removed from her left thigh     TONSILLECTOMY       TUBAL LIGATION          Social History     Tobacco Use     Smoking status: Never Smoker     Smokeless tobacco: Never Used   Substance Use Topics     Alcohol use: Yes     Comment: occassionally on weekends     Family History   Problem Relation Age of Onset     Cancer Mother         ovarian     Cancer Paternal Grandmother         lung     Gynecology Sister         having a partial hysterectomy due to abnormal cells, leaving ovaries and cervix     Gynecology Daughter         endometriosis     Gynecology Sister         hysterectomy for endometriosis         Current Outpatient Medications   Medication Sig Dispense Refill     azithromycin (ZITHROMAX) 250 MG tablet Take two tabs on day 1 and 1 tab on day 2,3,4,5 6 tablet 0     BLACK COHOSH EXTRACT PO Take by mouth daily       citalopram (CELEXA) 20 MG tablet Take 1.5 tablets (30 mg) by mouth daily 135 tablet 3     EPINEPHrine (EPIPEN/ADRENACLICK/OR ANY BX GENERIC EQUIV) 0.3 MG/0.3ML injection 2-pack Inject 0.3 mLs (0.3 mg) into the muscle once as needed for anaphylaxis 0.6 mL 3     guaiFENesin-codeine (ROBITUSSIN AC) 100-10 MG/5ML solution Take 5-10 mLs by mouth every 4 hours as needed 120 mL 0     predniSONE (DELTASONE) 20 MG tablet Take 1 tablet (20 mg) by mouth daily for 5 days 5 tablet 0     albuterol (2.5 MG/3ML) 0.083% neb solution Take 1 vial (2.5 mg) by nebulization every 6 hours as needed for shortness of breath / dyspnea or wheezing (Patient not taking: Reported on 10/11/2019) 25 vial 0     fluticasone (FLONASE) 50 MCG/ACT nasal spray Spray 2 sprays into both nostrils daily Hold on file until needed (Patient not taking: Reported on 9/12/2019) 16 g 11     order for DME Equipment being ordered: Nebulizer 1 each 0     oxyCODONE-acetaminophen (PERCOCET) 5-325 MG tablet Take 1-2 tablets by mouth every 4 hours as needed for pain (Patient not taking: Reported on 10/11/2019) 10 tablet 0     study - prednisoLONE acetate, MMCORC, (IDS# [id number]) 1 % ophthalmic susp Place 1-2 drops into both eyes 3  times daily       Allergies   Allergen Reactions     Bees Anaphylaxis     Onion Anaphylaxis     Clindamycin Rash     Provera [Medroxyprogesterone Acetate] Other (See Comments)     edema     BP Readings from Last 3 Encounters:   10/11/19 136/88   09/27/19 (!) 124/100   09/12/19 134/86    Wt Readings from Last 3 Encounters:   10/11/19 93.5 kg (206 lb 3.2 oz)   09/12/19 99.9 kg (220 lb 3.2 oz)   06/21/19 102.1 kg (225 lb)                      Reviewed and updated as needed this visit by Provider  Tobacco  Allergies  Meds  Problems  Med Hx  Surg Hx  Fam Hx         Review of Systems   ROS COMP: Constitutional, HEENT, cardiovascular, pulmonary, gi and gu systems are negative, except as otherwise noted.      Objective    /88   Pulse 87   Temp 98.3  F (36.8  C) (Tympanic)   Resp 16   Wt 93.5 kg (206 lb 3.2 oz)   SpO2 97%   BMI 36.53 kg/m    Body mass index is 36.53 kg/m .  Physical Exam   GENERAL: healthy, alert and no distress  NECK: no adenopathy, no asymmetry, masses, or scars and thyroid normal to palpation  RESP: bronchial breath sounds through the lung field and decreased breath sounds bilateral and throughout  CV: regular rate and rhythm, normal S1 S2, no S3 or S4, no murmur, click or rub, no peripheral edema and peripheral pulses strong  ABDOMEN: soft, nontender, no hepatosplenomegaly, no masses and bowel sounds normal  MS: no gross musculoskeletal defects noted, no edema    Diagnostic Test Results:  none         Assessment & Plan     1. Bronchitis  Antibiotics faxed . Patient asked to rest increase fluids.   - azithromycin (ZITHROMAX) 250 MG tablet; Take two tabs on day 1 and 1 tab on day 2,3,4,5  Dispense: 6 tablet; Refill: 0  - predniSONE (DELTASONE) 20 MG tablet; Take 1 tablet (20 mg) by mouth daily for 5 days  Dispense: 5 tablet; Refill: 0  - guaiFENesin-codeine (ROBITUSSIN AC) 100-10 MG/5ML solution; Take 5-10 mLs by mouth every 4 hours as needed  Dispense: 120 mL; Refill: 0           FUTURE  APPOINTMENTS:       - Follow-up visit in one week or sooner as needed    Return in about 3 weeks (around 11/1/2019) for Routine Visit.    Ghassan Rodriguez MD  [facility]

## 2019-10-15 ENCOUNTER — ALLIED HEALTH/NURSE VISIT (OUTPATIENT)
Dept: FAMILY MEDICINE | Facility: CLINIC | Age: 49
End: 2019-10-15
Payer: COMMERCIAL

## 2019-10-15 DIAGNOSIS — Z23 NEEDS FLU SHOT: Primary | ICD-10-CM

## 2019-10-15 PROCEDURE — 90471 IMMUNIZATION ADMIN: CPT

## 2019-10-15 PROCEDURE — 90686 IIV4 VACC NO PRSV 0.5 ML IM: CPT

## 2019-10-15 PROCEDURE — 99207 ZZC NO CHARGE NURSE ONLY: CPT

## 2019-10-20 ENCOUNTER — COMMUNICATION - HEALTHEAST (OUTPATIENT)
Dept: SURGERY | Facility: CLINIC | Age: 49
End: 2019-10-20

## 2019-10-24 ENCOUNTER — AMBULATORY - HEALTHEAST (OUTPATIENT)
Dept: SURGERY | Facility: CLINIC | Age: 49
End: 2019-10-24

## 2019-10-24 DIAGNOSIS — E66.9 OBESITY WITH BODY MASS INDEX OF 30.0-39.9: ICD-10-CM

## 2019-10-24 DIAGNOSIS — Z71.3 NUTRITIONAL COUNSELING: ICD-10-CM

## 2019-10-24 DIAGNOSIS — Z98.84 BARIATRIC SURGERY STATUS: ICD-10-CM

## 2019-11-04 ENCOUNTER — COMMUNICATION - HEALTHEAST (OUTPATIENT)
Dept: SURGERY | Facility: CLINIC | Age: 49
End: 2019-11-04

## 2019-11-09 ENCOUNTER — HEALTH MAINTENANCE LETTER (OUTPATIENT)
Age: 49
End: 2019-11-09

## 2019-11-15 ENCOUNTER — COMMUNICATION - HEALTHEAST (OUTPATIENT)
Dept: SURGERY | Facility: CLINIC | Age: 49
End: 2019-11-15

## 2019-11-26 ENCOUNTER — OFFICE VISIT (OUTPATIENT)
Dept: FAMILY MEDICINE | Facility: CLINIC | Age: 49
End: 2019-11-26
Payer: COMMERCIAL

## 2019-11-26 ENCOUNTER — ANCILLARY PROCEDURE (OUTPATIENT)
Dept: GENERAL RADIOLOGY | Facility: CLINIC | Age: 49
End: 2019-11-26
Attending: FAMILY MEDICINE
Payer: COMMERCIAL

## 2019-11-26 VITALS
BODY MASS INDEX: 34.02 KG/M2 | WEIGHT: 192 LBS | SYSTOLIC BLOOD PRESSURE: 116 MMHG | HEART RATE: 68 BPM | DIASTOLIC BLOOD PRESSURE: 82 MMHG | OXYGEN SATURATION: 100 % | TEMPERATURE: 97.6 F | HEIGHT: 63 IN

## 2019-11-26 DIAGNOSIS — M25.551 HIP PAIN, RIGHT: ICD-10-CM

## 2019-11-26 DIAGNOSIS — M54.31 RIGHT SIDED SCIATICA: Primary | ICD-10-CM

## 2019-11-26 DIAGNOSIS — M53.3 SACROILIAC JOINT PAIN: ICD-10-CM

## 2019-11-26 PROCEDURE — 99214 OFFICE O/P EST MOD 30 MIN: CPT | Performed by: FAMILY MEDICINE

## 2019-11-26 PROCEDURE — 73502 X-RAY EXAM HIP UNI 2-3 VIEWS: CPT

## 2019-11-26 ASSESSMENT — MIFFLIN-ST. JEOR: SCORE: 1455.04

## 2019-11-26 NOTE — PATIENT INSTRUCTIONS
Please go to physical therapy for your right sciatica and SI joint pain.          Thank you for choosing [facility].  You may be receiving an email and/or telephone survey request from [facility] regarding your visit today.  Please take a few minutes to respond to the survey to let us know how we are doing.      If you have questions or concerns, please contact us via [employer] or you can contact your care team at [phone].    Our Clinic hours are:  Monday 6:40 am  to 7:00 pm  Tuesday -Friday 6:40 am to 5:00 pm    The [place] outpatient lab hours are:  Monday - Friday 6:10 am to 4:45 pm  Saturdays 7:00 am to 11:00 am  Appointments are required, call [phone]    If you have clinical questions after hours or would like to schedule an appointment,  call the clinic at [phone].

## 2019-11-26 NOTE — PROGRESS NOTES
"Subjective     Dakota Berkowitz is a 49 year old female who presents to clinic today for the following health issues:  Chief Complaint   Patient presents with     Hip Pain       HPI   Musculoskeletal problem/pain      Duration: 2 years (I saw in 2/2018)    Description  Location: right hip pain    Intensity:  8/10    Accompanying signs and symptoms: none    History  Previous similar problem: ongoing  Previous evaluation:  none    Precipitating or alleviating factors:  Trauma or overuse: no   Aggravating factors include: sitting, standing and climbing stairs  Therapies tried and outcome: heat and ice and injections     She did not go to PT in 2018 when I did the referral. [facility].  The last one did not help her at all  She states she has been getting them regularly to help with the SI joint.  According to them she does not have sciatica.    Patient has pain in the right buttock to the right thigh when getting her history. Seems to be worse after sitting.  .          Reviewed and updated as needed this visit by Provider         Review of Systems   Review Of Systems  Skin: negative  Eyes:   Ears/Nose/Throat:   Respiratory:   Cardiovascular:   Gastrointestinal: neg  Genitourinary: neg  Musculoskeletal: as above  Neurologic: as above  Psychiatric:   Hematologic/Lymphatic/Immunologic:   Endocrine:         Objective    /82 (Cuff Size: Adult Large)   Pulse 68   Temp 97.6  F (36.4  C) (Tympanic)   Ht 1.6 m (5' 3\")   Wt 87.1 kg (192 lb)   LMP 03/02/2018   SpO2 100%   Breastfeeding No   BMI 34.01 kg/m    Body mass index is 34.01 kg/m .  Physical Exam   Patient is pleasant, cooperative and in no acute distress.  Back:  Dakota Berkowitz had minimal difficulty moving from the chair to the exam table.  mild tenderness to palpation in the right buttock region.    Straight leg raise was neg.  Flexion at hip was 70 degrees. She had full rom of her hip    Right hip and pelvis was " "negative.        Assessment & Plan     (M25.551) Hip pain, right  (primary encounter diagnosis)  Comment: negative xray  Plan: XR Pelvis and Hip Right 1 View            (M54.31) Right sided sciatica  Comment: recommend PT  Plan: PHYSICAL THERAPY REFERRAL            (M53.3) Sacroiliac joint pain  Comment: have PT do some therapy  Plan: PHYSICAL THERAPY REFERRAL               BMI:   Estimated body mass index is 34.01 kg/m  as calculated from the following:    Height as of this encounter: 1.6 m (5' 3\").    Weight as of this encounter: 87.1 kg (192 lb).           See Patient Instructions    Return in about 6 weeks (around 1/7/2020) for If not better.   Would consider an MRI of lumbar hip to make sure there is nothing going such as any lesions/cancer etc since he has been treated with injections for nearly two years.    Chino Rayo MD  [facility]      "

## 2019-12-19 ENCOUNTER — TRANSFERRED RECORDS (OUTPATIENT)
Dept: HEALTH INFORMATION MANAGEMENT | Facility: CLINIC | Age: 49
End: 2019-12-19

## 2019-12-19 ENCOUNTER — OFFICE VISIT - HEALTHEAST (OUTPATIENT)
Dept: SURGERY | Facility: CLINIC | Age: 49
End: 2019-12-19

## 2019-12-19 DIAGNOSIS — Z98.84 BARIATRIC SURGERY STATUS: ICD-10-CM

## 2019-12-19 DIAGNOSIS — E66.9 OBESITY WITH BODY MASS INDEX OF 30.0-39.9: ICD-10-CM

## 2019-12-19 DIAGNOSIS — Z71.3 NUTRITIONAL COUNSELING: ICD-10-CM

## 2019-12-19 ASSESSMENT — MIFFLIN-ST. JEOR: SCORE: 1417.38

## 2020-01-13 ENCOUNTER — ALLIED HEALTH/NURSE VISIT (OUTPATIENT)
Dept: FAMILY MEDICINE | Facility: CLINIC | Age: 50
End: 2020-01-13
Payer: COMMERCIAL

## 2020-01-13 DIAGNOSIS — Z23 NEED FOR VACCINATION: Primary | ICD-10-CM

## 2020-01-13 PROCEDURE — 90750 HZV VACC RECOMBINANT IM: CPT

## 2020-01-13 PROCEDURE — 90471 IMMUNIZATION ADMIN: CPT

## 2020-01-13 PROCEDURE — 99207 ZZC NO CHARGE NURSE ONLY: CPT

## 2020-01-13 NOTE — NURSING NOTE
"Chief Complaint   Patient presents with     Imm/Inj     Shingrix #1.  Patient states her insurance will pay for the vaccine. Patient was on Shingrix Wait List.       Initial LMP 03/02/2018  Estimated body mass index is 34.01 kg/m  as calculated from the following:    Height as of 11/26/19: 1.6 m (5' 3\").    Weight as of 11/26/19: 87.1 kg (192 lb).    Medication Reconciliation:  unable or not appropriate to perform    Isabel Simons CMA (AAMA)    Prior to immunization administration, verified patients identity using patient s name and date of birth. Please see Immunization Activity for additional information.     Screening Questionnaire for Adult Immunization    Are you sick today?   No   Do you have allergies to medications, food, a vaccine component or latex?   Yes   Have you ever had a serious reaction after receiving a vaccination?   No   Do you have a long-term health problem with heart, lung, kidney, or metabolic disease (e.g., diabetes), asthma, a blood disorder, no spleen, complement component deficiency, a cochlear implant, or a spinal fluid leak?  Are you on long-term aspirin therapy?   No   Do you have cancer, leukemia, HIV/AIDS, or any other immune system problem?   No   Do you have a parent, brother, or sister with an immune system problem?   No   In the past 3 months, have you taken medications that affect  your immune system, such as prednisone, other steroids, or anticancer drugs; drugs for the treatment of rheumatoid arthritis, Crohn s disease, or psoriasis; or have you had radiation treatments?   No   Have you had a seizure, or a brain or other nervous system problem?   No   During the past year, have you received a transfusion of blood or blood    products, or been given immune (gamma) globulin or antiviral drug?   No   For women: Are you pregnant or is there a chance you could become       pregnant during the next month?   No   Have you received any vaccinations in the past 4 weeks?   No "     Immunization questionnaire answers were all negative.        Per orders of Dr. Coronado, injection of SHINGRIX #1 given by Isabel Simons CMA. Patient instructed to remain in clinic for 15 minutes afterwards, and to report any adverse reaction to me immediately.       Screening performed by Isabel Simons CMA on 1/13/2020 at 4:18 PM.

## 2020-02-20 ENCOUNTER — APPOINTMENT (OUTPATIENT)
Dept: CT IMAGING | Facility: CLINIC | Age: 50
End: 2020-02-20
Attending: FAMILY MEDICINE
Payer: COMMERCIAL

## 2020-02-20 ENCOUNTER — APPOINTMENT (OUTPATIENT)
Dept: GENERAL RADIOLOGY | Facility: CLINIC | Age: 50
End: 2020-02-20
Attending: FAMILY MEDICINE
Payer: COMMERCIAL

## 2020-02-20 ENCOUNTER — HOSPITAL ENCOUNTER (EMERGENCY)
Facility: CLINIC | Age: 50
Discharge: HOME | End: 2020-02-20
Attending: FAMILY MEDICINE | Admitting: FAMILY MEDICINE
Payer: COMMERCIAL

## 2020-02-20 VITALS
SYSTOLIC BLOOD PRESSURE: 148 MMHG | OXYGEN SATURATION: 99 % | TEMPERATURE: 98.1 F | HEART RATE: 79 BPM | BODY MASS INDEX: 30.11 KG/M2 | WEIGHT: 170 LBS | DIASTOLIC BLOOD PRESSURE: 83 MMHG
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
## 2020-02-20 DIAGNOSIS — R10.9 ABDOMINAL WALL PAIN: ICD-10-CM

## 2020-02-20 DIAGNOSIS — R07.89 CHEST WALL PAIN: ICD-10-CM

## 2020-02-20 DIAGNOSIS — S39.012A BACK STRAIN, INITIAL ENCOUNTER: ICD-10-CM

## 2020-02-20 DIAGNOSIS — M25.552 HIP PAIN, LEFT: ICD-10-CM

## 2020-02-20 DIAGNOSIS — S16.1XXA NECK STRAIN, INITIAL ENCOUNTER: ICD-10-CM

## 2020-02-20 LAB
ALBUMIN SERPL-MCNC: 3.4 G/DL (ref 3.4–5)
ALP SERPL-CCNC: 109 U/L (ref 40–150)
ALT SERPL W P-5'-P-CCNC: 37 U/L (ref 0–50)
ANION GAP SERPL CALCULATED.3IONS-SCNC: 2 MMOL/L (ref 3–14)
APTT PPP: 30 SEC (ref 22–37)
APTT PPP: NORMAL SEC (ref 22–37)
AST SERPL W P-5'-P-CCNC: 22 U/L (ref 0–45)
BASOPHILS # BLD AUTO: 0 10E9/L (ref 0–0.2)
BASOPHILS # BLD AUTO: NORMAL 10E9/L (ref 0–0.2)
BASOPHILS NFR BLD AUTO: 0.4 %
BASOPHILS NFR BLD AUTO: NORMAL %
BILIRUB SERPL-MCNC: 0.3 MG/DL (ref 0.2–1.3)
BUN SERPL-MCNC: 11 MG/DL (ref 7–30)
CALCIUM SERPL-MCNC: 8.2 MG/DL (ref 8.5–10.1)
CHLORIDE SERPL-SCNC: 109 MMOL/L (ref 94–109)
CO2 SERPL-SCNC: 26 MMOL/L (ref 20–32)
CREAT SERPL-MCNC: 0.65 MG/DL (ref 0.52–1.04)
DIFFERENTIAL METHOD BLD: NORMAL
DIFFERENTIAL METHOD BLD: NORMAL
EOSINOPHIL # BLD AUTO: 0.1 10E9/L (ref 0–0.7)
EOSINOPHIL # BLD AUTO: NORMAL 10E9/L (ref 0–0.7)
EOSINOPHIL NFR BLD AUTO: 0.7 %
EOSINOPHIL NFR BLD AUTO: NORMAL %
ERYTHROCYTE [DISTWIDTH] IN BLOOD BY AUTOMATED COUNT: 13.1 % (ref 10–15)
ERYTHROCYTE [DISTWIDTH] IN BLOOD BY AUTOMATED COUNT: NORMAL % (ref 10–15)
ETHANOL SERPL-MCNC: <0.01 G/DL
ETHANOL SERPL-MCNC: NORMAL G/DL
GFR SERPL CREATININE-BSD FRML MDRD: >90 ML/MIN/{1.73_M2}
GLUCOSE SERPL-MCNC: 93 MG/DL (ref 70–99)
HCT VFR BLD AUTO: 41.3 % (ref 35–47)
HCT VFR BLD AUTO: NORMAL % (ref 35–47)
HGB BLD-MCNC: 13.7 G/DL (ref 11.7–15.7)
HGB BLD-MCNC: NORMAL G/DL (ref 11.7–15.7)
IMM GRANULOCYTES # BLD: 0 10E9/L (ref 0–0.4)
IMM GRANULOCYTES # BLD: NORMAL 10E9/L (ref 0–0.4)
IMM GRANULOCYTES NFR BLD: 0.3 %
IMM GRANULOCYTES NFR BLD: NORMAL %
INR PPP: 1.17 (ref 0.86–1.14)
INR PPP: NORMAL (ref 0.86–1.14)
LYMPHOCYTES # BLD AUTO: 1.8 10E9/L (ref 0.8–5.3)
LYMPHOCYTES # BLD AUTO: NORMAL 10E9/L (ref 0.8–5.3)
LYMPHOCYTES NFR BLD AUTO: 18.4 %
LYMPHOCYTES NFR BLD AUTO: NORMAL %
MCH RBC QN AUTO: 31.4 PG (ref 26.5–33)
MCH RBC QN AUTO: NORMAL PG (ref 26.5–33)
MCHC RBC AUTO-ENTMCNC: 33.2 G/DL (ref 31.5–36.5)
MCHC RBC AUTO-ENTMCNC: NORMAL G/DL (ref 31.5–36.5)
MCV RBC AUTO: 95 FL (ref 78–100)
MCV RBC AUTO: NORMAL FL (ref 78–100)
MONOCYTES # BLD AUTO: 0.5 10E9/L (ref 0–1.3)
MONOCYTES # BLD AUTO: NORMAL 10E9/L (ref 0–1.3)
MONOCYTES NFR BLD AUTO: 5.2 %
MONOCYTES NFR BLD AUTO: NORMAL %
NEUTROPHILS # BLD AUTO: 7.5 10E9/L (ref 1.6–8.3)
NEUTROPHILS # BLD AUTO: NORMAL 10E9/L (ref 1.6–8.3)
NEUTROPHILS NFR BLD AUTO: 75 %
NEUTROPHILS NFR BLD AUTO: NORMAL %
NRBC # BLD AUTO: 0 10*3/UL
NRBC # BLD AUTO: NORMAL 10*3/UL
NRBC BLD AUTO-RTO: 0 /100
NRBC BLD AUTO-RTO: NORMAL /100
PLATELET # BLD AUTO: 177 10E9/L (ref 150–450)
PLATELET # BLD AUTO: NORMAL 10E9/L (ref 150–450)
POTASSIUM SERPL-SCNC: 3.7 MMOL/L (ref 3.4–5.3)
PROT SERPL-MCNC: 6.8 G/DL (ref 6.8–8.8)
RBC # BLD AUTO: 4.36 10E12/L (ref 3.8–5.2)
RBC # BLD AUTO: NORMAL 10E12/L (ref 3.8–5.2)
SODIUM SERPL-SCNC: 137 MMOL/L (ref 133–144)
WBC # BLD AUTO: 10 10E9/L (ref 4–11)
WBC # BLD AUTO: NORMAL 10E9/L (ref 4–11)

## 2020-02-20 PROCEDURE — 73030 X-RAY EXAM OF SHOULDER: CPT | Mod: RT

## 2020-02-20 PROCEDURE — 72170 X-RAY EXAM OF PELVIS: CPT

## 2020-02-20 PROCEDURE — 85610 PROTHROMBIN TIME: CPT | Performed by: FAMILY MEDICINE

## 2020-02-20 PROCEDURE — 25800030 ZZH RX IP 258 OP 636: Performed by: FAMILY MEDICINE

## 2020-02-20 PROCEDURE — 70450 CT HEAD/BRAIN W/O DYE: CPT

## 2020-02-20 PROCEDURE — 80320 DRUG SCREEN QUANTALCOHOLS: CPT | Performed by: FAMILY MEDICINE

## 2020-02-20 PROCEDURE — 71260 CT THORAX DX C+: CPT

## 2020-02-20 PROCEDURE — 96376 TX/PRO/DX INJ SAME DRUG ADON: CPT | Performed by: FAMILY MEDICINE

## 2020-02-20 PROCEDURE — 72131 CT LUMBAR SPINE W/O DYE: CPT

## 2020-02-20 PROCEDURE — 99285 EMERGENCY DEPT VISIT HI MDM: CPT | Mod: 25 | Performed by: FAMILY MEDICINE

## 2020-02-20 PROCEDURE — 25000128 H RX IP 250 OP 636: Performed by: FAMILY MEDICINE

## 2020-02-20 PROCEDURE — 72125 CT NECK SPINE W/O DYE: CPT

## 2020-02-20 PROCEDURE — 99285 EMERGENCY DEPT VISIT HI MDM: CPT | Mod: Z6 | Performed by: FAMILY MEDICINE

## 2020-02-20 PROCEDURE — 96374 THER/PROPH/DIAG INJ IV PUSH: CPT | Mod: 59 | Performed by: FAMILY MEDICINE

## 2020-02-20 PROCEDURE — 72128 CT CHEST SPINE W/O DYE: CPT

## 2020-02-20 PROCEDURE — 80053 COMPREHEN METABOLIC PANEL: CPT | Performed by: FAMILY MEDICINE

## 2020-02-20 PROCEDURE — 96375 TX/PRO/DX INJ NEW DRUG ADDON: CPT | Performed by: FAMILY MEDICINE

## 2020-02-20 PROCEDURE — 85730 THROMBOPLASTIN TIME PARTIAL: CPT | Performed by: FAMILY MEDICINE

## 2020-02-20 PROCEDURE — 96361 HYDRATE IV INFUSION ADD-ON: CPT | Performed by: FAMILY MEDICINE

## 2020-02-20 PROCEDURE — 25000125 ZZHC RX 250: Performed by: FAMILY MEDICINE

## 2020-02-20 PROCEDURE — 25000128 H RX IP 250 OP 636

## 2020-02-20 PROCEDURE — 85025 COMPLETE CBC W/AUTO DIFF WBC: CPT | Performed by: FAMILY MEDICINE

## 2020-02-20 RX ORDER — ONDANSETRON 2 MG/ML
INJECTION INTRAMUSCULAR; INTRAVENOUS
Status: COMPLETED
Start: 2020-02-20 | End: 2020-02-20

## 2020-02-20 RX ORDER — IOPAMIDOL 755 MG/ML
94 INJECTION, SOLUTION INTRAVASCULAR ONCE
Status: COMPLETED | OUTPATIENT
Start: 2020-02-20 | End: 2020-02-20

## 2020-02-20 RX ORDER — OXYCODONE HYDROCHLORIDE 5 MG/1
5 TABLET ORAL EVERY 6 HOURS PRN
Qty: 12 TABLET | Refills: 0 | Status: SHIPPED | OUTPATIENT
Start: 2020-02-20 | End: 2020-03-02

## 2020-02-20 RX ORDER — HYDROMORPHONE HYDROCHLORIDE 1 MG/ML
0.5 INJECTION, SOLUTION INTRAMUSCULAR; INTRAVENOUS; SUBCUTANEOUS
Status: DISCONTINUED | OUTPATIENT
Start: 2020-02-20 | End: 2020-02-21 | Stop reason: HOSPADM

## 2020-02-20 RX ORDER — ONDANSETRON 2 MG/ML
4 INJECTION INTRAMUSCULAR; INTRAVENOUS ONCE
Status: COMPLETED | OUTPATIENT
Start: 2020-02-20 | End: 2020-02-20

## 2020-02-20 RX ORDER — SODIUM CHLORIDE 9 MG/ML
1000 INJECTION, SOLUTION INTRAVENOUS CONTINUOUS
Status: DISCONTINUED | OUTPATIENT
Start: 2020-02-20 | End: 2020-02-21 | Stop reason: HOSPADM

## 2020-02-20 RX ORDER — HYDROMORPHONE HYDROCHLORIDE 1 MG/ML
INJECTION, SOLUTION INTRAMUSCULAR; INTRAVENOUS; SUBCUTANEOUS
Status: COMPLETED
Start: 2020-02-20 | End: 2020-02-20

## 2020-02-20 RX ORDER — ONDANSETRON 2 MG/ML
4 INJECTION INTRAMUSCULAR; INTRAVENOUS
Status: DISCONTINUED | OUTPATIENT
Start: 2020-02-20 | End: 2020-02-21 | Stop reason: HOSPADM

## 2020-02-20 RX ADMIN — ONDANSETRON 4 MG: 2 INJECTION INTRAMUSCULAR; INTRAVENOUS at 19:56

## 2020-02-20 RX ADMIN — IOPAMIDOL 94 ML: 755 INJECTION, SOLUTION INTRAVENOUS at 20:48

## 2020-02-20 RX ADMIN — SODIUM CHLORIDE 64 ML: 9 INJECTION, SOLUTION INTRAVENOUS at 20:49

## 2020-02-20 RX ADMIN — HYDROMORPHONE HYDROCHLORIDE 0.5 MG: 1 INJECTION, SOLUTION INTRAMUSCULAR; INTRAVENOUS; SUBCUTANEOUS at 21:28

## 2020-02-20 RX ADMIN — SODIUM CHLORIDE 1000 ML: 9 INJECTION, SOLUTION INTRAVENOUS at 19:59

## 2020-02-20 RX ADMIN — SODIUM CHLORIDE 1000 ML: 9 INJECTION, SOLUTION INTRAVENOUS at 21:28

## 2020-02-20 RX ADMIN — HYDROMORPHONE HYDROCHLORIDE 1 MG: 1 INJECTION, SOLUTION INTRAMUSCULAR; INTRAVENOUS; SUBCUTANEOUS at 19:57

## 2020-02-20 RX ADMIN — Medication 1 MG: at 19:57
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
## 2020-02-21 NOTE — ED NOTES
"Pt called reporting \"pains starting to spike up again\". RN to assess. Pt reports pain returning to 8/10, LUQ. Belly remains soft. PRN dilaudid given.   "
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
## 2020-02-21 NOTE — DISCHARGE INSTRUCTIONS
Rest, ice to traumatized areas 20 minutes out of each hour while awake for comfort for the next 2 to 3 days.  Tylenol/ibuprofen may be used as baseline pain medication.  You may use oxycodone for breakthrough pain for the next 24 to 72 hours.  As we discussed you should anticipate feeling more uncomfortable, stiff, sore tomorrow morning when you wake up and this may last up to 2 or 3 days.  After that point think should start to loosen up and he should begin to feel better.  If you develop new or concerning symptoms please return to the emergency department or things are not improving please return to the emergency department.

## 2020-02-26 ENCOUNTER — MYC MEDICAL ADVICE (OUTPATIENT)
Dept: FAMILY MEDICINE | Facility: CLINIC | Age: 50
End: 2020-02-26

## 2020-03-02 ENCOUNTER — OFFICE VISIT (OUTPATIENT)
Dept: FAMILY MEDICINE | Facility: CLINIC | Age: 50
End: 2020-03-02
Payer: COMMERCIAL

## 2020-03-02 ENCOUNTER — ANCILLARY PROCEDURE (OUTPATIENT)
Dept: GENERAL RADIOLOGY | Facility: CLINIC | Age: 50
End: 2020-03-02
Attending: NURSE PRACTITIONER
Payer: COMMERCIAL

## 2020-03-02 VITALS
SYSTOLIC BLOOD PRESSURE: 120 MMHG | RESPIRATION RATE: 16 BRPM | HEIGHT: 63 IN | HEART RATE: 68 BPM | TEMPERATURE: 97.2 F | WEIGHT: 171.3 LBS | OXYGEN SATURATION: 97 % | DIASTOLIC BLOOD PRESSURE: 76 MMHG | BODY MASS INDEX: 30.35 KG/M2

## 2020-03-02 DIAGNOSIS — S29.9XXD CHEST WALL TRAUMA, SUBSEQUENT ENCOUNTER: Primary | ICD-10-CM

## 2020-03-02 DIAGNOSIS — S29.9XXD CHEST WALL TRAUMA, SUBSEQUENT ENCOUNTER: ICD-10-CM

## 2020-03-02 DIAGNOSIS — S22.32XA CLOSED FRACTURE OF ONE RIB OF LEFT SIDE, INITIAL ENCOUNTER: ICD-10-CM

## 2020-03-02 PROCEDURE — 99214 OFFICE O/P EST MOD 30 MIN: CPT | Performed by: NURSE PRACTITIONER

## 2020-03-02 PROCEDURE — 71101 X-RAY EXAM UNILAT RIBS/CHEST: CPT | Mod: LT

## 2020-03-02 RX ORDER — OXYCODONE HYDROCHLORIDE 5 MG/1
5 TABLET ORAL EVERY 6 HOURS PRN
Qty: 15 TABLET | Refills: 0 | Status: SHIPPED | OUTPATIENT
Start: 2020-03-02 | End: 2020-07-03

## 2020-03-02 RX ORDER — CYCLOBENZAPRINE HCL 5 MG
5 TABLET ORAL 3 TIMES DAILY PRN
Qty: 30 TABLET | Refills: 1 | Status: SHIPPED | OUTPATIENT
Start: 2020-03-02 | End: 2023-07-03

## 2020-03-02 ASSESSMENT — MIFFLIN-ST. JEOR: SCORE: 1361.14

## 2020-03-02 NOTE — PATIENT INSTRUCTIONS
Tylenol 500 mg 4 times daily  Oxycodone 1 tablet as needed every 6-8 hrs  Flexeril 1 tab 3 times daily as needed for pain, do not take with Oxycodone, take it few hours apart    Xray today
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
## 2020-03-03 ENCOUNTER — COMMUNICATION - HEALTHEAST (OUTPATIENT)
Dept: SURGERY | Facility: CLINIC | Age: 50
End: 2020-03-03

## 2020-03-03 DIAGNOSIS — K90.9 INTESTINAL MALABSORPTION, UNSPECIFIED: ICD-10-CM

## 2020-03-03 DIAGNOSIS — Z98.84 HISTORY OF ROUX-EN-Y GASTRIC BYPASS: ICD-10-CM

## 2020-03-03 DIAGNOSIS — K90.9 INTESTINAL MALABSORPTION: ICD-10-CM

## 2020-03-11 DIAGNOSIS — K90.9 INTESTINAL MALABSORPTION: Primary | ICD-10-CM

## 2020-03-11 DIAGNOSIS — Z98.84 HX OF GASTRIC BYPASS: ICD-10-CM

## 2020-03-15 ENCOUNTER — MYC REFILL (OUTPATIENT)
Dept: FAMILY MEDICINE | Facility: CLINIC | Age: 50
End: 2020-03-15

## 2020-03-15 ENCOUNTER — VIRTUAL VISIT (OUTPATIENT)
Dept: FAMILY MEDICINE | Facility: OTHER | Age: 50
End: 2020-03-15

## 2020-03-15 DIAGNOSIS — J18.9 PNEUMONIA OF BOTH LUNGS DUE TO INFECTIOUS ORGANISM, UNSPECIFIED PART OF LUNG: ICD-10-CM

## 2020-03-15 NOTE — PROGRESS NOTES
"Date: 03/15/2020 15:43:37  Clinician: January Lange  Clinician NPI: [id number]  Patient: Dakota KOVACS  Patient : 1970  Patient Address: [facility] [place] [address]  Patient Phone: [phone]  Visit Protocol: URI  Patient Summary:  Dakota is a 49 year old ( : 1970 ) female who initiated a Visit for COVID-19 (Coronavirus) evaluation and screening. When asked the question \"Please sign me up to receive news, health information and promotions. \", Dakota responded \"No\".    Dakota states her symptoms started suddenly 10-13 days ago. After her symptoms started, they improved and then got worse again.   Her symptoms consist of malaise, a headache, rhinitis, a sore throat, and a cough. She is experiencing mild difficulty breathing with activities but can speak normally in full sentences. Dakota also feels feverish.   Symptom details     Nasal secretions: The color of her mucus is white.    Cough: Dakota coughs every 5-10 minutes and her cough is not more bothersome at night. Phlegm does not come into her throat when she coughs. She does not believe her cough is caused by post-nasal drip.     Sore throat: Dakota reports having severe throat pain (7-9 on a 10 point pain scale), does not have exudate on her tonsils, and can swallow liquids. The lymph nodes in her neck are not enlarged. A rash has not appeared on the skin since the sore throat started.     Temperature: Her current temperature is 99.5 degrees Fahrenheit.     Headache: She states the headache is moderate (4-6 on a 10 point pain scale).      Dakota denies having ear pain, enlarged lymph nodes, facial pain or pressure, myalgias, chills, wheezing, nasal congestion, and teeth pain. She also denies having a sinus infection within the past year, having recent facial or sinus surgery in the past 60 days, and taking antibiotic medication for the symptoms.   Precipitating events  Within the past week, Dakota has not been exposed to someone " with strep throat. She has not recently been exposed to someone with influenza. Dakota has been in close contact with the following high risk individuals: children under the age of 5.   Pertinent COVID-19 (Coronavirus) information  Dakota has not traveled internationally or to the areas where COVID-19 (Coronavirus) is widespread in the last 14 days before the start of her symptoms.   Dakota has not had close contact with a suspected or laboratory-confirmed COVID-19 patient within 14 days of symptom onset.   Dakota is not a healthcare worker and does not work in a healthcare facility.   Pertinent medical history  Dakota typically gets a yeast infection when she takes antibiotics. She has used fluconazole (Diflucan) to treat previous yeast infections. 1 dose of fluconazole (Diflucan) has typically been sufficient for symptoms to resolve in the past.   Dakota does not need a return to work/school note.   Weight: 163 lbs   Dakota does not smoke or use smokeless tobacco.   Weight: 163 lbs    MEDICATIONS: Percocet oral, ALLERGIES: Provera, clindamycin  Clinician Response:  Dear Dakota,   Dear Dakota,  Based on the information you have provided, it does not appear you need Coronavirus (COVID-19) testing. Unfortunately, based on your information we are also not able to provide a diagnosis. We feel an in-person visit with a provider is more appropriate for your condition based on your interview. We'd be honored to see you in one of our clinics or urgent cares. Please call [phone] to schedule an appointment.  We request that you bring documentation of your completed [facility] visit to your clinic appointment. Failure to do so may result in a request to repeat your [facility] visit. Documentation could include a printout from your visit or show the  the completed visit on your phone.  Why no testing?  At this time, we recommend testing primarily for those people who have typical symptoms of cough and fever and have  either traveled to a known high risk area of infection or who have been exposed to someone with Coronavirus by close contact.   For more information about COVID19 and options for caring for yourself at home, please visit the CDC website at https://www.cdc.gov/coronavirus/2019-ncov/about/steps-when-sick.html   For more options for care at [facility], please visit our website at [email or web address]     Diagnosis: Cough  Diagnosis ICD: R05

## 2020-03-16 ENCOUNTER — ALLIED HEALTH/NURSE VISIT (OUTPATIENT)
Dept: FAMILY MEDICINE | Facility: CLINIC | Age: 50
End: 2020-03-16
Payer: COMMERCIAL

## 2020-03-16 ENCOUNTER — MYC REFILL (OUTPATIENT)
Dept: FAMILY MEDICINE | Facility: CLINIC | Age: 50
End: 2020-03-16

## 2020-03-16 DIAGNOSIS — J18.9 PNEUMONIA OF BOTH LUNGS DUE TO INFECTIOUS ORGANISM, UNSPECIFIED PART OF LUNG: ICD-10-CM

## 2020-03-16 DIAGNOSIS — Z23 NEED FOR VACCINATION: Primary | ICD-10-CM

## 2020-03-16 DIAGNOSIS — Z98.84 HISTORY OF GASTRIC BYPASS: ICD-10-CM

## 2020-03-16 DIAGNOSIS — K90.9 INTESTINAL MALABSORPTION: ICD-10-CM

## 2020-03-16 DIAGNOSIS — K90.9 INTESTINAL MALABSORPTION, UNSPECIFIED TYPE: ICD-10-CM

## 2020-03-16 DIAGNOSIS — Z98.84 HX OF GASTRIC BYPASS: ICD-10-CM

## 2020-03-16 LAB
ALBUMIN SERPL-MCNC: 3.8 G/DL (ref 3.4–5)
ALP SERPL-CCNC: 128 U/L (ref 40–150)
ALT SERPL W P-5'-P-CCNC: 45 U/L (ref 0–50)
ANION GAP SERPL CALCULATED.3IONS-SCNC: 2 MMOL/L (ref 3–14)
AST SERPL W P-5'-P-CCNC: 21 U/L (ref 0–45)
BILIRUB SERPL-MCNC: 0.6 MG/DL (ref 0.2–1.3)
BUN SERPL-MCNC: 10 MG/DL (ref 7–30)
CALCIUM SERPL-MCNC: 8.9 MG/DL (ref 8.5–10.1)
CHLORIDE SERPL-SCNC: 110 MMOL/L (ref 94–109)
CHOLEST SERPL-MCNC: 171 MG/DL
CO2 SERPL-SCNC: 29 MMOL/L (ref 20–32)
CREAT SERPL-MCNC: 0.71 MG/DL (ref 0.52–1.04)
DEPRECATED CALCIDIOL+CALCIFEROL SERPL-MC: 34 UG/L (ref 20–75)
ERYTHROCYTE [DISTWIDTH] IN BLOOD BY AUTOMATED COUNT: 12.9 % (ref 10–15)
FERRITIN SERPL-MCNC: 125 NG/ML (ref 8–252)
FOLATE SERPL-MCNC: 16.7 NG/ML
GFR SERPL CREATININE-BSD FRML MDRD: >90 ML/MIN/{1.73_M2}
GLUCOSE SERPL-MCNC: 80 MG/DL (ref 70–99)
HBA1C MFR BLD: 5.4 % (ref 0–5.6)
HCT VFR BLD AUTO: 42.4 % (ref 35–47)
HDLC SERPL-MCNC: 56 MG/DL
HGB BLD-MCNC: 14 G/DL (ref 11.7–15.7)
LDLC SERPL CALC-MCNC: 82 MG/DL
MCH RBC QN AUTO: 31.3 PG (ref 26.5–33)
MCHC RBC AUTO-ENTMCNC: 33 G/DL (ref 31.5–36.5)
MCV RBC AUTO: 95 FL (ref 78–100)
NONHDLC SERPL-MCNC: 115 MG/DL
PLATELET # BLD AUTO: 184 10E9/L (ref 150–450)
POTASSIUM SERPL-SCNC: 3.6 MMOL/L (ref 3.4–5.3)
PROT SERPL-MCNC: 7.3 G/DL (ref 6.8–8.8)
PTH-INTACT SERPL-MCNC: 34 PG/ML (ref 18–80)
RBC # BLD AUTO: 4.48 10E12/L (ref 3.8–5.2)
SODIUM SERPL-SCNC: 141 MMOL/L (ref 133–144)
TRIGL SERPL-MCNC: 163 MG/DL
VIT B12 SERPL-MCNC: 1162 PG/ML (ref 193–986)
WBC # BLD AUTO: 4.6 10E9/L (ref 4–11)

## 2020-03-16 PROCEDURE — 84425 ASSAY OF VITAMIN B-1: CPT | Mod: 90 | Performed by: FAMILY MEDICINE

## 2020-03-16 PROCEDURE — 90471 IMMUNIZATION ADMIN: CPT

## 2020-03-16 PROCEDURE — 82746 ASSAY OF FOLIC ACID SERUM: CPT | Performed by: FAMILY MEDICINE

## 2020-03-16 PROCEDURE — 82607 VITAMIN B-12: CPT | Performed by: FAMILY MEDICINE

## 2020-03-16 PROCEDURE — 82306 VITAMIN D 25 HYDROXY: CPT | Performed by: FAMILY MEDICINE

## 2020-03-16 PROCEDURE — 83970 ASSAY OF PARATHORMONE: CPT | Performed by: FAMILY MEDICINE

## 2020-03-16 PROCEDURE — 83036 HEMOGLOBIN GLYCOSYLATED A1C: CPT | Performed by: FAMILY MEDICINE

## 2020-03-16 PROCEDURE — 84590 ASSAY OF VITAMIN A: CPT | Mod: 90 | Performed by: FAMILY MEDICINE

## 2020-03-16 PROCEDURE — 80061 LIPID PANEL: CPT | Performed by: FAMILY MEDICINE

## 2020-03-16 PROCEDURE — 84630 ASSAY OF ZINC: CPT | Mod: 90 | Performed by: FAMILY MEDICINE

## 2020-03-16 PROCEDURE — 99207 ZZC NO CHARGE NURSE ONLY: CPT

## 2020-03-16 PROCEDURE — 90750 HZV VACC RECOMBINANT IM: CPT

## 2020-03-16 PROCEDURE — 80053 COMPREHEN METABOLIC PANEL: CPT | Performed by: FAMILY MEDICINE

## 2020-03-16 PROCEDURE — 85027 COMPLETE CBC AUTOMATED: CPT | Performed by: FAMILY MEDICINE

## 2020-03-16 PROCEDURE — 82728 ASSAY OF FERRITIN: CPT | Performed by: FAMILY MEDICINE

## 2020-03-16 PROCEDURE — 99000 SPECIMEN HANDLING OFFICE-LAB: CPT | Performed by: FAMILY MEDICINE

## 2020-03-16 PROCEDURE — 36415 COLL VENOUS BLD VENIPUNCTURE: CPT | Performed by: FAMILY MEDICINE

## 2020-03-16 NOTE — PROGRESS NOTES
Chief Complaint   Patient presents with     Imm/Inj     shingirx #2 - covered by insurance per patient        Prior to immunization administration, verified patients identity using patient s name and date of birth. Please see Immunization Activity for additional information.     Screening Questionnaire for Adult Immunization    Are you sick today?   No   Do you have allergies to medications, food, a vaccine component or latex?   Yes   Have you ever had a serious reaction after receiving a vaccination?   No   Do you have a long-term health problem with heart, lung, kidney, or metabolic disease (e.g., diabetes), asthma, a blood disorder, no spleen, complement component deficiency, a cochlear implant, or a spinal fluid leak?  Are you on long-term aspirin therapy?   No   Do you have cancer, leukemia, HIV/AIDS, or any other immune system problem?   No   Do you have a parent, brother, or sister with an immune system problem?   No   In the past 3 months, have you taken medications that affect  your immune system, such as prednisone, other steroids, or anticancer drugs; drugs for the treatment of rheumatoid arthritis, Crohn s disease, or psoriasis; or have you had radiation treatments?   No   Have you had a seizure, or a brain or other nervous system problem?   No   During the past year, have you received a transfusion of blood or blood    products, or been given immune (gamma) globulin or antiviral drug?   No   For women: Are you pregnant or is there a chance you could become       pregnant during the next month?   No   Have you received any vaccinations in the past 4 weeks?   No     Immunization questionnaire was positive for at least one answer.  Ok per guidelines for shingrix #2.        . Patient instructed to remain in clinic for 15 minutes afterwards, and to report any adverse reaction to me immediately.       Screening performed by Kaiden Doherty CMA on 3/16/2020 at 7:51 AM.

## 2020-03-17 LAB — ZINC SERPL-MCNC: 83.5 UG/DL (ref 60–120)

## 2020-03-18 LAB
ANNOTATION COMMENT IMP: NORMAL
RETINYL PALMITATE SERPL-MCNC: <0.02 MG/L (ref 0–0.1)
VIT A SERPL-MCNC: 0.5 MG/L (ref 0.3–1.2)
VIT B1 BLD-MCNC: 139 NMOL/L (ref 70–180)

## 2020-03-18 NOTE — TELEPHONE ENCOUNTER
Nebulizer requested.    Last refill for albuterol nebs 4/10/18 for pneumonia.  Nebs not on active med list.    Contact pt via [employer]:  Why is she asking for nebulizer machine?  Lung disorders not listed on problem list.    Mayela Reyes RN

## 2020-03-19 ENCOUNTER — VIRTUAL VISIT (OUTPATIENT)
Dept: FAMILY MEDICINE | Facility: OTHER | Age: 50
End: 2020-03-19

## 2020-03-19 NOTE — PROGRESS NOTES
"Date: 2020 07:21:31  Clinician: Avinash Paredes  Clinician NPI: [id number]  Patient: Dakota KOVACS  Patient : 1970  Patient Address: [facility] [address], [place] [address]  Patient Phone: [phone]  Visit Protocol: URI  Patient Summary:  Dakota is a 49 year old ( : 1970 ) female who initiated a Visit for COVID-19 (Coronavirus) evaluation and screening. When asked the question \"Please sign me up to receive news, health information and promotions. \", Dakota responded \"No\".    Dakota states her symptoms started gradually 3-6 days ago.   Her symptoms consist of a sore throat, a cough, malaise, and a headache. She is experiencing mild difficulty breathing with activities but can speak normally in full sentences.   Symptom details     Cough: Dakota coughs almost every minute and her cough is not more bothersome at night. Phlegm does not come into her throat when she coughs. She does not believe her cough is caused by post-nasal drip.     Sore throat: Dakota reports having severe throat pain (7-9 on a 10 point pain scale), does not have exudate on her tonsils, and can swallow liquids. The lymph nodes in her neck are not enlarged. A rash has not appeared on the skin since the sore throat started.     Headache: She states the headache is moderate (4-6 on a 10 point pain scale).      Dkaota denies having nasal congestion, fever, ear pain, rhinitis, enlarged lymph nodes, facial pain or pressure, myalgias, chills, teeth pain, and wheezing. She also denies having recent facial or sinus surgery in the past 60 days, double sickening (worsening symptoms after initial improvement), and taking antibiotic medication for the symptoms.   Precipitating events  Within the past week, Dakota has not been exposed to someone with strep throat. She has not recently been exposed to someone with influenza. Dakota has been in close contact with the following high risk individuals: children under the age of 5.   " Pertinent COVID-19 (Coronavirus) information  Dakota has not traveled internationally or to the areas where COVID-19 (Coronavirus) is widespread, including cruise ship travel in the last 14 days before the start of her symptoms.   Dakota has not had a close contact with a laboratory-confirmed COVID-19 patient within 14 days of symptom onset. She also has not had a close contact with a suspected COVID-19 patient within 14 days of symptom onset.   Dakota is not a healthcare worker and does not work in a healthcare facility.   Pertinent medical history  Dakota typically gets a yeast infection when she takes antibiotics. She has used fluconazole (Diflucan) to treat previous yeast infections. 1 dose of fluconazole (Diflucan) has typically been sufficient for symptoms to resolve in the past.   Dakota does not need a return to work/school note.   Weight: 163 lbs   Dakota does not smoke or use smokeless tobacco.   Additional information as reported by the patient (free text): Symptoms are getting worse daily   Weight: 163 lbs  A synchronous phone visit was initiated by the provider for the following reason: patient has been referred for ER evaluation already    MEDICATIONS: Percocet oral, ALLERGIES: clindamycin, Provera  Clinician Response:  Dear Dakota,   Based on the information you have provided, you do have symptoms that are consistent with Coronavirus (COVID-19).  SEE ADDITIONAL NOTES BELOW  The coronavirus causes mild to severe respiratory illness with the most common symptoms including fever, cough and difficulty breathing. Unfortunately, many viruses cause similar symptoms and it can be difficult to distinguish between viruses, especially in mild cases, so we are presuming that anyone with cough or fever has coronavirus at this time.  Coronavirus/COVID-19 has reached the point of community spread in [place], meaning that we are finding the virus in people with no known exposure risk for kaylyn the virus.  Given the increasing commonness of coronavirus in the community we are no longer testing patients who are not critically ill.  If you are a health care worker, you should refer to your employee health office for instructions about returning to work.  For everyone else who has cough or fever, you should assume you are infected with coronavirus. Accordingly, you should self-quarantine for seven days from the first day your symptoms started OR 72 hours after your cough and fever completely resolve - WHICHEVER is LONGER. You should call if you find increasing shortness of breath, wheezing or sustained fever above 101.5. If you are significantly short of breath or experience chest pain you should call 911 or report to the nearest emergency department for urgent evaluation.    Isolate yourself at home.   Do Not allow any visitors  Do Not go to work or school  Do Not go to [employer],  centers, shopping, or other public places.  Do Not shake hands.  Avoid close contact with others (hugging, kissing).   Protect Others:    Cover Your Mouth and Nose with a mask, disposable tissue or wash cloth to avoid spreading germs to others.  Wash your hands and face frequently with soap and water.   If you develop significant shortness of breath that prevents you from doing normal activities, please call 911 or proceed to the nearest emergency room and alert them immediately that you have been in self-isolation for possible coronavirus.   For more information about COVID19 and options for caring for yourself at home, please visit the CDC website at https://www.cdc.gov/coronavirus/2019-ncov/about/steps-when-sick.htmlFor more options for care at [facility], please visit our website at [email or web address]     Diagnosis: Cough  Diagnosis ICD: R05  Additional Clinician Notes: Cipriano Duncan.  I hope that you feel better soon.  As we discussed use the Tussonex at bedtime only.  Can continue Robitussin and  get some Delsym.  Will send in albuterol you can use 1-2 puffs every 4-6 hours, please take slow steady deep breath when administering.  For the Tessalon you can take that up to 3 times per day for the cough.  Synchronous Triage: phone, status: completed, duration: 508 seconds  Prescription: albuterol sulfate (Proventil HFA) 90 mcg/actuation inhalation HFA aerosol inhaler 1 200 inhalation aerosol with adapter (proventil hfa or equivalent), 0 days supply. Inhale 2 puffs every 4 hours as needed. Refills: 0, Refill as needed: no, Allow substitutions: yes  Prescription: benzonatate (Tessalon Perles) 100 mg oral capsule 15 capsule, 0 days supply. Take 1 capsule by mouth 3 times per day as needed. Refills: 0, Refill as needed: no, Allow substitutions: yes  Pharmacy: [id number] - [phone] - [address] [place] [address]

## 2020-03-20 ENCOUNTER — COMMUNICATION - HEALTHEAST (OUTPATIENT)
Dept: SURGERY | Facility: CLINIC | Age: 50
End: 2020-03-20

## 2020-03-25 ENCOUNTER — OFFICE VISIT - HEALTHEAST (OUTPATIENT)
Dept: SURGERY | Facility: CLINIC | Age: 50
End: 2020-03-25

## 2020-03-25 ENCOUNTER — TRANSFERRED RECORDS (OUTPATIENT)
Dept: HEALTH INFORMATION MANAGEMENT | Facility: CLINIC | Age: 50
End: 2020-03-25

## 2020-03-25 DIAGNOSIS — K91.2 POSTOPERATIVE MALABSORPTION: ICD-10-CM

## 2020-03-25 ASSESSMENT — MIFFLIN-ST. JEOR: SCORE: 1325.3

## 2020-06-08 ENCOUNTER — MYC MEDICAL ADVICE (OUTPATIENT)
Dept: FAMILY MEDICINE | Facility: CLINIC | Age: 50
End: 2020-06-08

## 2020-06-08 DIAGNOSIS — M79.672 LEFT FOOT PAIN: ICD-10-CM

## 2020-06-08 DIAGNOSIS — R22.42 FOOT MASS, LEFT: Primary | ICD-10-CM

## 2020-06-11 ENCOUNTER — ANCILLARY PROCEDURE (OUTPATIENT)
Dept: GENERAL RADIOLOGY | Facility: CLINIC | Age: 50
End: 2020-06-11
Attending: PODIATRIST
Payer: COMMERCIAL

## 2020-06-11 ENCOUNTER — OFFICE VISIT (OUTPATIENT)
Dept: PODIATRY | Facility: CLINIC | Age: 50
End: 2020-06-11
Payer: COMMERCIAL

## 2020-06-11 ENCOUNTER — COMMUNICATION - HEALTHEAST (OUTPATIENT)
Dept: SURGERY | Facility: CLINIC | Age: 50
End: 2020-06-11

## 2020-06-11 VITALS
DIASTOLIC BLOOD PRESSURE: 93 MMHG | SYSTOLIC BLOOD PRESSURE: 145 MMHG | TEMPERATURE: 97.4 F | HEART RATE: 100 BPM | BODY MASS INDEX: 30.3 KG/M2 | WEIGHT: 171 LBS | HEIGHT: 63 IN

## 2020-06-11 DIAGNOSIS — M79.672 LEFT FOOT PAIN: ICD-10-CM

## 2020-06-11 DIAGNOSIS — M10.072 ACUTE IDIOPATHIC GOUT INVOLVING TOE OF LEFT FOOT: ICD-10-CM

## 2020-06-11 DIAGNOSIS — Z13.6 CARDIOVASCULAR SCREENING; LDL GOAL LESS THAN 100: ICD-10-CM

## 2020-06-11 DIAGNOSIS — M10.9 ACUTE GOUT INVOLVING TOE, UNSPECIFIED CAUSE, UNSPECIFIED LATERALITY: ICD-10-CM

## 2020-06-11 DIAGNOSIS — M79.672 LEFT FOOT PAIN: Primary | ICD-10-CM

## 2020-06-11 DIAGNOSIS — R22.42 FOOT MASS, LEFT: ICD-10-CM

## 2020-06-11 LAB — URATE SERPL-MCNC: 5 MG/DL (ref 2.6–6)

## 2020-06-11 PROCEDURE — 73630 X-RAY EXAM OF FOOT: CPT | Mod: LT

## 2020-06-11 PROCEDURE — 36415 COLL VENOUS BLD VENIPUNCTURE: CPT | Performed by: PODIATRIST

## 2020-06-11 PROCEDURE — 84550 ASSAY OF BLOOD/URIC ACID: CPT | Performed by: PODIATRIST

## 2020-06-11 PROCEDURE — 99203 OFFICE O/P NEW LOW 30 MIN: CPT | Performed by: PODIATRIST

## 2020-06-11 ASSESSMENT — PAIN SCALES - GENERAL: PAINLEVEL: SEVERE PAIN (7)

## 2020-06-11 ASSESSMENT — MIFFLIN-ST. JEOR: SCORE: 1359.78

## 2020-06-11 NOTE — PROGRESS NOTES
PATIENT HISTORY:  Dakota Berkowitz is a 49 year old female who presents to clinic for a painful leftleft foot .  The patient describes the pain as sharp aching.  The patient relates the pain level is moderate.  The patient relates pain is located over the big toe joint on the left.  The patient relates the pain has been present for the past several weeks.  The patient relates pain with ambulation.  The patient has tried icing with little relief.  The patient was sent by Dr. Rayo for consultation on the left foot.       REVIEW OF SYSTEMS:  Constitutional, HEENT, cardiovascular, pulmonary, GI, , musculoskeletal, neuro, skin, endocrine and psych systems are negative, except as otherwise noted.     PAST MEDICAL HISTORY:   Past Medical History:   Diagnosis Date     NO ACTIVE PROBLEMS         PAST SURGICAL HISTORY:   Past Surgical History:   Procedure Laterality Date     C APPENDECTOMY       COLONOSCOPY N/A 5/31/2019    Procedure: COLONOSCOPY;  Surgeon: Jose Wen MD;  Location: [facility]     COSMETIC SURGERY      breast implants     CYSTOSCOPY, SLING TRANSVAGINAL N/A 2/8/2016    Procedure: CYSTOSCOPY, SLING TRANSVAGINAL;  Surgeon: Sterling Hill MD;  Location: [facility]     EYE SURGERY       HC REPAIR OF NASAL SEPTUM       SURGICAL HISTORY OF -       laporoscopy due to endometriosis     SURGICAL HISTORY OF -       endometrial ablation     SURGICAL HISTORY OF -       breast augmentation     SURGICAL HISTORY OF -       benign tumor removed from her left thigh     TONSILLECTOMY       TUBAL LIGATION          MEDICATIONS:   Current Outpatient Medications:      cyclobenzaprine (FLEXERIL) 5 MG tablet, Take 1 tablet (5 mg) by mouth 3 times daily as needed for muscle spasms, Disp: 30 tablet, Rfl: 1     order for DME, Equipment being ordered: Nebulizer, Disp: 1 each, Rfl: 0     EPINEPHrine (EPIPEN/ADRENACLICK/OR ANY BX GENERIC EQUIV) 0.3 MG/0.3ML injection 2-pack, Inject 0.3 mLs (0.3 mg) into the muscle once as needed  for anaphylaxis (Patient not taking: Reported on 3/2/2020), Disp: 0.6 mL, Rfl: 3     oxyCODONE (ROXICODONE) 5 MG tablet, Take 1 tablet (5 mg) by mouth every 6 hours as needed for breakthrough pain or pain (Patient not taking: Reported on 6/11/2020), Disp: 15 tablet, Rfl: 0     study - prednisoLONE acetate, [facility]MC[facility], (IDS# [id number]) 1 % ophthalmic susp, Place 1-2 drops into both eyes 3 times daily, Disp: , Rfl:      ALLERGIES:    Allergies   Allergen Reactions     Bees Anaphylaxis     Onion Anaphylaxis     Clindamycin Rash     Provera [Medroxyprogesterone Acetate] Other (See Comments)     edema        SOCIAL HISTORY:   Social History     Socioeconomic History     Marital status:      Spouse name: Not on file     Number of children: 1     Years of education: Not on file     Highest education level: Not on file   Occupational History     Employer: [employer]   Social Needs     Financial resource strain: Not on file     Food insecurity     Worry: Not on file     Inability: Not on file     Transportation needs     Medical: Not on file     Non-medical: Not on file   Tobacco Use     Smoking status: Never Smoker     Smokeless tobacco: Never Used   Substance and Sexual Activity     Alcohol use: Yes     Comment: occassionally on weekends     Drug use: No     Sexual activity: Yes     Partners: Male     Birth control/protection: Female Surgical     Comment: Tubal   Lifestyle     Physical activity     Days per week: Not on file     Minutes per session: Not on file     Stress: Not on file   Relationships     Social connections     Talks on phone: Not on file     Gets together: Not on file     Attends Bahai service: Not on file     Active member of club or organization: Not on file     Attends meetings of clubs or organizations: Not on file     Relationship status: Not on file     Intimate partner violence     Fear of current or ex partner: Not on file     Emotionally abused: Not on file     Physically abused: Not on file  "    Forced sexual activity: Not on file   Other Topics Concern     Parent/sibling w/ CABG, MI or angioplasty before 65F 55M? No   Social History Narrative     Not on file        FAMILY HISTORY:   Family History   Problem Relation Age of Onset     Cancer Mother         ovarian     Cancer Paternal Grandmother         lung     Gynecology Sister         having a partial hysterectomy due to abnormal cells, leaving ovaries and cervix     Gynecology Daughter         endometriosis     Gynecology Sister         hysterectomy for endometriosis        EXAM:Vitals: BP (!) 145/93   Pulse 100   Temp 97.4  F (36.3  C) (Tympanic)   Ht 1.6 m (5' 3\")   Wt 77.6 kg (171 lb)   LMP 03/02/2018   BMI 30.29 kg/m    BMI= Body mass index is 30.29 kg/m .    Weight management plan: Patient was referred to their PCP to discuss a diet and exercise plan.    General appearance: Patient is alert and fully cooperative with history & exam.  No sign of distress is noted during the visit.     Psychiatric: Affect is pleasant & appropriate.  Patient appears motivated to improve health.     Respiratory: Breathing is regular & unlabored while sitting.     HEENT: Hearing is intact to spoken word.  Speech is clear.  No gross evidence of visual impairment that would impact ambulation.     Dermatologic: Skin is intact to left lower extremities without significant lesions, rash or abrasion.  No paronychia or evidence of soft tissue infection is noted.     Vascular: DP & PT pulses are intact & regular on the left.  No significant edema or varicosities noted.  CFT and skin temperature is normal to the left lower extremities.     Neurologic: Lower extremity sensation is intact to light touch.  No evidence of weakness or contracture in the left lower extremities.  No evidence of neuropathy.     Musculoskeletal: Patient is ambulatory without assistive device or brace.  No gross ankle deformity noted.  No foot or ankle joint effusion is noted.  Noted pain on " palpation with limited range of motion of the first metatarsophalangeal joint on the left.  Mild erythema and edema over the first metatarsophalangeal joint on the left.     Radiographs were evaluated including AP, lateral and medial oblique views of the left foot reveals  no cortical erosions or periosteal elevation.  All joint margins appear stable.  There is no apparent fracture or tumor formation noted.  There is no evidence of foreign body.    ASSESSMENT / PLAN:     ICD-10-CM    1. Foot mass, left  R22.42 Orthopedic & Spine  Referral     XR Foot Left G/E 3 Views   2. Left foot pain  M79.672 Orthopedic & Spine  Referral     XR Foot Left G/E 3 Views       I have explained to Dakota  about the conditions.  We discussed the nature of the condition as well as the treatment plan and expected length of recovery.  At this time, the patient will obtain a uric acid level.  The patient was given information on gout.  The patient cannot tolerate oral NSAIDS so I recommend over the counter Diclofenac cream.      Dakota verbalized agreement with and understanding of the rational for the diagnosis and treatment plan.  All questions were answered to best of my ability and the patient's satisfaction. The patient was advised to contact the clinic with any questions that may arise after the clinic visit.      Disclaimer: This note consists of symbols derived from keyboarding, dictation and/or voice recognition software. As a result, there may be errors in the script that have gone undetected. Please consider this when interpreting information found in this chart.       DEANDRE Silveira D.P.M., [email or web address].[email or web address].

## 2020-06-11 NOTE — PATIENT INSTRUCTIONS
GOUT  Gout is a disorder that results from the build-up of uric acid in the tissues or a joint. It most often affects the joint of the big toe.  CAUSES  Gout attacks are caused by deposits of crystallized uric acid in the joint. Uric acid is present in the blood and eliminated in the urine, but in people who have gout, uric acid accumulates and crystallizes in the joints. Uric acid is the result of the breakdown of purines, chemicals that are found naturally in our bodies and in food. Some people develop gout because their kidneys have difficulty eliminating normal amounts of uric acid, while others produce too much uric acid.  Gout occurs most commonly in the big toe because uric acid is sensitive to temperature changes. At cooler temperatures, uric acid turns into crystals. Since the toe is the part of the body that is farthest from the heart, it s also the coolest part of the body - and, thus, the most likely target of gout. However, gout can affect any joint in the body.  The tendency to accumulate uric acid is often inherited. Other factors that put a person at risk for developing gout include: high blood pressure, diabetes, obesity, surgery, chemotherapy, stress, and certain medications and vitamins. For example, the body s ability to remove uric acid can be negatively affected by taking aspirin, some diuretic medications ( water pills ), and the vitamin niacin (also called nicotinic acid). While gout is more common in men aged 40 to 60 years, it can occur in younger men as well as in women.  Consuming foods and beverages that contain high levels of purines can trigger an attack of gout. Some foods contain more purines than others and have been associated with an increase of uric acid, which leads to gout. You may be able to reduce your chances of getting a gout attack by limiting or avoiding shellfish, organ meats (kidney, liver, etc.), red wine, beer, and red meat.  Other Triggers include but are not limited  to:  Congestive heart failure, deep vein thrombosis, pneumonia, fasting, dehydration, trauma/surgery, psoriasis.  SYMTOMS  An attack of gout can be miserable, marked by the following symptoms:  Intense pain that comes on suddenly - often in the middle of the night or upon arising   Signs of inflammation such as redness, swelling, and warmth over the joint.   DIAGNOSIS  To diagnose gout, the foot and ankle surgeon will ask questions about your personal and family medical history, followed by an examination of the affected joint. Laboratory tests and x-rays are sometimes ordered to determine if the inflammation is caused by something other than gout.  TREATMENT  Initial treatment of an attack of gout typically includes the following:  Medications. Prescription medications or injections are used to treat the pain, swelling, and inflammation.   Dietary restrictions. Foods and beverages that are high in purines should be avoided, since purines are converted in the body to uric acid.   Fluids. Drink plenty of water and other fluids each day, while also avoiding alcoholic beverages, which cause dehydration. Cherry Juice works well to help decrease pain.  Immobilize and elevate the foot. Avoid standing and walking to give your foot a rest. Also, elevate your foot (level with or slightly above the heart) to help reduce swelling.   The symptoms of gout and the inflammatory process usually resolve in three to ten days with treatment. If gout symptoms continue despite the initial treatment, or if repeated attacks occur, see your primary care physician for maintenance treatment that may involve daily medication. In cases of repeated episodes, the underlying problem must be addressed, as the build-up of uric acid over time can cause arthritic damage to the joint.  DIET CHANGE  A gout diet reduces your intake of foods that are high in purines, which helps control your body's production of uric acid. If you're overweight or obese,  lose weight. However, avoid fasting and rapid weight loss because these can promote a gout attack. Drink plenty of fluids to help flush uric acid from your body. Also avoid high-protein diets, which can cause you to produce too much uric acid (hyperuricemia).   To follow the diet:   Limit meat, poultry and fish. Animal proteins are high in purine. Avoid or severely limit high-purine foods, such as organ meats, herring, anchovies and mackerel. Red meat (beef, pork and lamb), fatty fish and seafood (tuna, shrimp, lobster and scallops) are associated with increased risk of gout. Because all meat, poultry and fish contain purines, limit your intake to 4 to 6 ounces (113 to 170 grams) daily.   Eat more plant-based proteins. You can increase your protein by including more plant-based sources, such as beans and legumes. This switch will also help you cut down on saturated fats, which may indirectly contribute to obesity and gout.   Limit or avoid alcohol. Alcohol interferes with the elimination of uric acid from your body. Drinking beer, in particular, has been linked to gout attacks. If you're having an attack, avoid alcohol. However, when you're not having an attack, drinking one or two 5-ounce (148 milliliter) servings a day of wine is not likely to increase your risk.   Drink plenty of fluids, particularly water. Fluids can help remove uric acid from your body. Aim for eight to 16 8-ounce (237 milliliter) glasses a day.   Choose low-fat or fat-free dairy products. Some studies have shown that drinking skim or low-fat milk and eating foods made with them, such as yogurt, help reduce the risk of gout. Aim for adequate dairy intake of 16 to 24 fluid ounces (473 to 710 milliliters) daily.   Choose complex carbohydrates. Eat more whole grains and fruits and vegetables and fewer refined carbohydrates, such as white bread, cakes and candy.   Limit or avoid sugar. Too many sweets can leave you with no room for plant-based  proteins and low-fat or fat-free dairy products -- the foods you need to avoid gout. Sugary foods also tend to be high in calories, so they make it easier to eat more than you're likely to burn off. Although there's debate about whether sugar has a direct effect on uric acid levels, sweets are definitely linked to overweight and obesity.   There's also some evidence that drinking four to six cups of coffee a day lowers gout risk in men.   RESULTS  Following a gout diet can help you limit your body's uric acid production and increase its elimination. It's not likely to lower the uric acid concentration in your blood enough to treat your gout without medication, but it may help decrease the number of attacks and limit their severity. Following the gout diet and limiting your calories -- particularly if you also add in moderate daily exercise, such as brisk walking -- also can improve your overall health by helping you achieve and maintain a healthy weight.         Dakota to follow up with Primary Care provider regarding elevated blood pressure.

## 2020-06-11 NOTE — NURSING NOTE
"Chief Complaint   Patient presents with     Consult     left foot pain, hard bump on outer great toe       Initial BP (!) 145/93   Pulse 100   Temp 97.4  F (36.3  C) (Tympanic)   Ht 1.6 m (5' 3\")   Wt 77.6 kg (171 lb)   LMP 03/02/2018   BMI 30.29 kg/m   Estimated body mass index is 30.29 kg/m  as calculated from the following:    Height as of this encounter: 1.6 m (5' 3\").    Weight as of this encounter: 77.6 kg (171 lb).  Medications and allergies reviewed.      Carol SHETH MA    "

## 2020-06-11 NOTE — LETTER
6/11/2020         RE: Dakota Berkowitz  [address] [place] [address]        Dear Colleague,    Thank you for referring your patient, Dakota Berkowitz, to the [facility]. Please see a copy of my visit note below.    PATIENT HISTORY:  Dakota Berkowitz is a 49 year old female who presents to clinic for a painful leftleft foot .  The patient describes the pain as sharp aching.  The patient relates the pain level is moderate.  The patient relates pain is located over the big toe joint on the left.  The patient relates the pain has been present for the past several weeks.  The patient relates pain with ambulation.  The patient has tried icing with little relief.  The patient was sent by Dr. Rayo for consultation on the left foot.       REVIEW OF SYSTEMS:  Constitutional, HEENT, cardiovascular, pulmonary, GI, , musculoskeletal, neuro, skin, endocrine and psych systems are negative, except as otherwise noted.     PAST MEDICAL HISTORY:   Past Medical History:   Diagnosis Date     NO ACTIVE PROBLEMS         PAST SURGICAL HISTORY:   Past Surgical History:   Procedure Laterality Date     C APPENDECTOMY       COLONOSCOPY N/A 5/31/2019    Procedure: COLONOSCOPY;  Surgeon: Jose Wen MD;  Location: [facility]     COSMETIC SURGERY      breast implants     CYSTOSCOPY, SLING TRANSVAGINAL N/A 2/8/2016    Procedure: CYSTOSCOPY, SLING TRANSVAGINAL;  Surgeon: Sterling Hill MD;  Location: [facility]     EYE SURGERY       HC REPAIR OF NASAL SEPTUM       SURGICAL HISTORY OF -       laporoscopy due to endometriosis     SURGICAL HISTORY OF -       endometrial ablation     SURGICAL HISTORY OF -       breast augmentation     SURGICAL HISTORY OF -       benign tumor removed from her left thigh     TONSILLECTOMY       TUBAL LIGATION          MEDICATIONS:   Current Outpatient Medications:      cyclobenzaprine (FLEXERIL) 5 MG tablet, Take 1 tablet (5 mg) by mouth 3 times daily as  needed for muscle spasms, Disp: 30 tablet, Rfl: 1     order for DME, Equipment being ordered: Nebulizer, Disp: 1 each, Rfl: 0     EPINEPHrine (EPIPEN/ADRENACLICK/OR ANY BX GENERIC EQUIV) 0.3 MG/0.3ML injection 2-pack, Inject 0.3 mLs (0.3 mg) into the muscle once as needed for anaphylaxis (Patient not taking: Reported on 3/2/2020), Disp: 0.6 mL, Rfl: 3     oxyCODONE (ROXICODONE) 5 MG tablet, Take 1 tablet (5 mg) by mouth every 6 hours as needed for breakthrough pain or pain (Patient not taking: Reported on 6/11/2020), Disp: 15 tablet, Rfl: 0     study - prednisoLONE acetate, MMCORC, (IDS# [id number]) 1 % ophthalmic susp, Place 1-2 drops into both eyes 3 times daily, Disp: , Rfl:      ALLERGIES:    Allergies   Allergen Reactions     Bees Anaphylaxis     Onion Anaphylaxis     Clindamycin Rash     Provera [Medroxyprogesterone Acetate] Other (See Comments)     edema        SOCIAL HISTORY:   Social History     Socioeconomic History     Marital status:      Spouse name: Not on file     Number of children: 1     Years of education: Not on file     Highest education level: Not on file   Occupational History     Employer: [employer]   Social Needs     Financial resource strain: Not on file     Food insecurity     Worry: Not on file     Inability: Not on file     Transportation needs     Medical: Not on file     Non-medical: Not on file   Tobacco Use     Smoking status: Never Smoker     Smokeless tobacco: Never Used   Substance and Sexual Activity     Alcohol use: Yes     Comment: occassionally on weekends     Drug use: No     Sexual activity: Yes     Partners: Male     Birth control/protection: Female Surgical     Comment: Tubal   Lifestyle     Physical activity     Days per week: Not on file     Minutes per session: Not on file     Stress: Not on file   Relationships     Social connections     Talks on phone: Not on file     Gets together: Not on file     Attends Gnosticism service: Not on file     Active member of club  "or organization: Not on file     Attends meetings of clubs or organizations: Not on file     Relationship status: Not on file     Intimate partner violence     Fear of current or ex partner: Not on file     Emotionally abused: Not on file     Physically abused: Not on file     Forced sexual activity: Not on file   Other Topics Concern     Parent/sibling w/ CABG, MI or angioplasty before 65F 55M? No   Social History Narrative     Not on file        FAMILY HISTORY:   Family History   Problem Relation Age of Onset     Cancer Mother         ovarian     Cancer Paternal Grandmother         lung     Gynecology Sister         having a partial hysterectomy due to abnormal cells, leaving ovaries and cervix     Gynecology Daughter         endometriosis     Gynecology Sister         hysterectomy for endometriosis        EXAM:Vitals: BP (!) 145/93   Pulse 100   Temp 97.4  F (36.3  C) (Tympanic)   Ht 1.6 m (5' 3\")   Wt 77.6 kg (171 lb)   LMP 03/02/2018   BMI 30.29 kg/m    BMI= Body mass index is 30.29 kg/m .    Weight management plan: Patient was referred to their PCP to discuss a diet and exercise plan.    General appearance: Patient is alert and fully cooperative with history & exam.  No sign of distress is noted during the visit.     Psychiatric: Affect is pleasant & appropriate.  Patient appears motivated to improve health.     Respiratory: Breathing is regular & unlabored while sitting.     HEENT: Hearing is intact to spoken word.  Speech is clear.  No gross evidence of visual impairment that would impact ambulation.     Dermatologic: Skin is intact to left lower extremities without significant lesions, rash or abrasion.  No paronychia or evidence of soft tissue infection is noted.     Vascular: DP & PT pulses are intact & regular on the left.  No significant edema or varicosities noted.  CFT and skin temperature is normal to the left lower extremities.     Neurologic: Lower extremity sensation is intact to light " touch.  No evidence of weakness or contracture in the left lower extremities.  No evidence of neuropathy.     Musculoskeletal: Patient is ambulatory without assistive device or brace.  No gross ankle deformity noted.  No foot or ankle joint effusion is noted.  Noted pain on palpation with limited range of motion of the first metatarsophalangeal joint on the left.  Mild erythema and edema over the first metatarsophalangeal joint on the left.     Radiographs were evaluated including AP, lateral and medial oblique views of the left foot reveals  no cortical erosions or periosteal elevation.  All joint margins appear stable.  There is no apparent fracture or tumor formation noted.  There is no evidence of foreign body.    ASSESSMENT / PLAN:     ICD-10-CM    1. Foot mass, left  R22.42 Orthopedic & Spine  Referral     XR Foot Left G/E 3 Views   2. Left foot pain  M79.672 Orthopedic & Spine  Referral     XR Foot Left G/E 3 Views       I have explained to Dakota  about the conditions.  We discussed the nature of the condition as well as the treatment plan and expected length of recovery.  At this time, the patient will obtain a uric acid level.  The patient was given information on gout.  The patient cannot tolerate oral NSAIDS so I recommend over the counter Diclofenac cream.      Dakota verbalized agreement with and understanding of the rational for the diagnosis and treatment plan.  All questions were answered to best of my ability and the patient's satisfaction. The patient was advised to contact the clinic with any questions that may arise after the clinic visit.      Disclaimer: This note consists of symbols derived from keyboarding, dictation and/or voice recognition software. As a result, there may be errors in the script that have gone undetected. Please consider this when interpreting information found in this chart.       DEANDRE Silveira D.P.M., F.A.C.F.A.S.        Again, thank you for  allowing me to participate in the care of your patient.        Sincerely,        Cb Silveira DPM

## 2020-06-17 ENCOUNTER — COMMUNICATION - HEALTHEAST (OUTPATIENT)
Dept: SURGERY | Facility: CLINIC | Age: 50
End: 2020-06-17

## 2020-06-17 ENCOUNTER — OFFICE VISIT - HEALTHEAST (OUTPATIENT)
Dept: SURGERY | Facility: CLINIC | Age: 50
End: 2020-06-17

## 2020-06-17 DIAGNOSIS — Z71.3 NUTRITIONAL COUNSELING: ICD-10-CM

## 2020-06-17 DIAGNOSIS — E66.3 OVERWEIGHT WITH BODY MASS INDEX (BMI) OF 25 TO 25.9 IN ADULT: ICD-10-CM

## 2020-06-17 DIAGNOSIS — Z98.84 BARIATRIC SURGERY STATUS: ICD-10-CM

## 2020-06-17 ASSESSMENT — MIFFLIN-ST. JEOR: SCORE: 1286.74

## 2020-07-03 ENCOUNTER — OFFICE VISIT (OUTPATIENT)
Dept: FAMILY MEDICINE | Facility: CLINIC | Age: 50
End: 2020-07-03
Payer: COMMERCIAL

## 2020-07-03 VITALS
OXYGEN SATURATION: 99 % | RESPIRATION RATE: 16 BRPM | SYSTOLIC BLOOD PRESSURE: 124 MMHG | DIASTOLIC BLOOD PRESSURE: 86 MMHG | WEIGHT: 156.4 LBS | BODY MASS INDEX: 27.71 KG/M2 | HEIGHT: 63 IN | HEART RATE: 73 BPM | TEMPERATURE: 96.3 F

## 2020-07-03 DIAGNOSIS — Z00.00 ROUTINE GENERAL MEDICAL EXAMINATION AT A HEALTH CARE FACILITY: Primary | ICD-10-CM

## 2020-07-03 DIAGNOSIS — Z12.31 ENCOUNTER FOR SCREENING MAMMOGRAM FOR BREAST CANCER: ICD-10-CM

## 2020-07-03 DIAGNOSIS — T63.441D BEE STING REACTION, ACCIDENTAL OR UNINTENTIONAL, SUBSEQUENT ENCOUNTER: ICD-10-CM

## 2020-07-03 DIAGNOSIS — M35.3 PMR (POLYMYALGIA RHEUMATICA) (H): ICD-10-CM

## 2020-07-03 DIAGNOSIS — L98.9 SKIN LESION: ICD-10-CM

## 2020-07-03 PROCEDURE — 90471 IMMUNIZATION ADMIN: CPT | Performed by: FAMILY MEDICINE

## 2020-07-03 PROCEDURE — 90714 TD VACC NO PRESV 7 YRS+ IM: CPT | Performed by: FAMILY MEDICINE

## 2020-07-03 PROCEDURE — 99396 PREV VISIT EST AGE 40-64: CPT | Mod: 25 | Performed by: FAMILY MEDICINE

## 2020-07-03 PROCEDURE — 99213 OFFICE O/P EST LOW 20 MIN: CPT | Mod: 25 | Performed by: FAMILY MEDICINE

## 2020-07-03 RX ORDER — EPINEPHRINE 0.3 MG/.3ML
0.3 INJECTION SUBCUTANEOUS
Qty: 0.6 ML | Refills: 3 | Status: SHIPPED | OUTPATIENT
Start: 2020-07-03 | End: 2023-07-03

## 2020-07-03 ASSESSMENT — MIFFLIN-ST. JEOR: SCORE: 1293.56

## 2020-07-03 NOTE — PATIENT INSTRUCTIONS
I refilled your medications.    Please be aware that there will be an additional charge during your preventative visit due to either a new diagnosis and/or chronic disease management.    Preventative visits screen for diseases prior to they occur.  They do not cover for any new diagnosis or chronic disease management which would include medication refills, labs etc.    If you have questions regarding your coverage please check with your insurance provider.  At [facility] we need to code correctly to be in compliance with all insurance companies.      Preventive Health Recommendations  Female Ages 50 - 64    Yearly exam: See your health care provider every year in order to  o Review health changes.   o Discuss preventive care.    o Review your medicines if your doctor has prescribed any.      Get a Pap test every three years (unless you have an abnormal result and your provider advises testing more often).    If you get Pap tests with HPV test, you only need to test every 5 years, unless you have an abnormal result.     You do not need a Pap test if your uterus was removed (hysterectomy) and you have not had cancer.    You should be tested each year for STDs (sexually transmitted diseases) if you're at risk.     Have a mammogram every 1 to 2 years.    Have a colonoscopy at age 50, or have a yearly FIT test (stool test). These exams screen for colon cancer.      Have a cholesterol test every 5 years, or more often if advised.    Have a diabetes test (fasting glucose) every three years. If you are at risk for diabetes, you should have this test more often.     If you are at risk for osteoporosis (brittle bone disease), think about having a bone density scan (DEXA).    Shots: Get a flu shot each year. Get a tetanus shot every 10 years.    Nutrition:     Eat at least 5 servings of fruits and vegetables each day.    Eat whole-grain bread, whole-wheat pasta and brown rice instead of white grains and rice.    Get adequate  Calcium and Vitamin D.     Lifestyle    Exercise at least 150 minutes a week (30 minutes a day, 5 days a week). This will help you control your weight and prevent disease.    Limit alcohol to one drink per day.    No smoking.     Wear sunscreen to prevent skin cancer.     See your dentist every six months for an exam and cleaning.    See your eye doctor every 1 to 2 years.

## 2020-07-03 NOTE — NURSING NOTE
Prior to immunization administration, verified patients identity using patient s name and date of birth. Please see Immunization Activity for additional information.     Screening Questionnaire for Adult Immunization    Are you sick today?   No   Do you have allergies to medications, food, a vaccine component or latex?   Yes - not latex    Have you ever had a serious reaction after receiving a vaccination?   No   Do you have a long-term health problem with heart, lung, kidney, or metabolic disease (e.g., diabetes), asthma, a blood disorder, no spleen, complement component deficiency, a cochlear implant, or a spinal fluid leak?  Are you on long-term aspirin therapy?   No   Do you have cancer, leukemia, HIV/AIDS, or any other immune system problem?   No   Do you have a parent, brother, or sister with an immune system problem?   No   In the past 3 months, have you taken medications that affect  your immune system, such as prednisone, other steroids, or anticancer drugs; drugs for the treatment of rheumatoid arthritis, Crohn s disease, or psoriasis; or have you had radiation treatments?   No   Have you had a seizure, or a brain or other nervous system problem?   No   During the past year, have you received a transfusion of blood or blood    products, or been given immune (gamma) globulin or antiviral drug?   No   For women: Are you pregnant or is there a chance you could become       pregnant during the next month?   No   Have you received any vaccinations in the past 4 weeks?   No     Immunization questionnaire answers were all negative.        Per orders of Dr. Rayo, injection of TD given by Catalina Thompson. Patient instructed to remain in clinic for 15 minutes afterwards, and to report any adverse reaction to me immediately.       Screening performed by Catalina Thompson on 7/3/2020 at 9:27 AM.

## 2020-07-03 NOTE — PROGRESS NOTES
SUBJECTIVE:   CC: Dakota Berkowitz is an 49 year old woman who presents for preventive health visit.     Healthy Habits:     Getting at least 3 servings of Calcium per day:  Yes    Bi-annual eye exam:  Yes    Dental care twice a year:  Yes    Sleep apnea or symptoms of sleep apnea:  Daytime drowsiness    Diet:  Other (Gastric Bipass 09/18/2019 )    Frequency of exercise:  2-3 days/week    Duration of exercise:  30-45 minutes    Taking medications regularly:  Not Applicable    Barriers to taking medications:  Not applicable    Medication side effects:  Not applicable    PHQ-2 Total Score: 0    Additional concerns today:  No          Today's PHQ-2 Score:   PHQ-2 ( 1999 [employer]) 7/2/2020   Q1: Little interest or pleasure in doing things -   Q2: Feeling down, depressed or hopeless -   PHQ-2 Score -   Q1: Little interest or pleasure in doing things -   Q2: Feeling down, depressed or hopeless -   PHQ-2 Score Incomplete       Abuse: Current or Past(Physical, Sexual or Emotional)- No  Do you feel safe in your environment? Yes        Social History     Tobacco Use     Smoking status: Never Smoker     Smokeless tobacco: Never Used   Substance Use Topics     Alcohol use: Yes     Comment: occassionally on weekends     If you drink alcohol do you typically have >3 drinks per day or >7 drinks per week? No    Alcohol Use 7/3/2020   Prescreen: >3 drinks/day or >7 drinks/week? No   Prescreen: >3 drinks/day or >7 drinks/week? -   No flowsheet data found.    Reviewed orders with patient.  Reviewed health maintenance and updated orders accordingly - Yes      Mammogram Screening: Patient over age 50, mutual decision to screen reflected in health maintenance.    Pertinent mammograms are reviewed under the imaging tab.  History of abnormal Pap smear: NO - age 30-65 PAP every 5 years with negative HPV co-testing recommended  PAP / HPV Latest Ref Rng & Units 3/28/2018 3/2/2015 9/28/2009   PAP - NIL NIL NIL   HPV 16 DNA NEG:Negative  "Negative - -   HPV 18 DNA NEG:Negative Negative - -   OTHER HR HPV NEG:Negative Negative - -     Reviewed and updated as needed this visit by clinical staff  Tobacco  Allergies  Meds  Med Hx  Surg Hx  Fam Hx  Soc Hx        Reviewed and updated as needed this visit by Provider            Review of Systems  Review Of Systems  Skin: has a skin lesion or mass that seems to chronically be there however it swelled up 2 weeks ago.  It is better but went down. No discharge  Eyes: negative  Ears/Nose/Throat: negative  Respiratory: No shortness of breath, dyspnea on exertion, cough, or hemoptysis  Cardiovascular: negative  Gastrointestinal: negative  Genitourinary: negative  Musculoskeletal: negative  Neurologic: negative  Psychiatric: negative  Hematologic/Lymphatic/Immunologic: negative  Endocrine: negative       OBJECTIVE:   /86 (BP Location: Left arm, Patient Position: Sitting, Cuff Size: Adult Regular)   Pulse 73   Temp 96.3  F (35.7  C) (Tympanic)   Resp 16   Ht 1.6 m (5' 3\")   Wt 70.9 kg (156 lb 6.4 oz)   LMP 03/02/2018   SpO2 99%   BMI 27.71 kg/m    Physical Exam  GENERAL: healthy, alert and no distress  EYES: Eyes grossly normal to inspection, PERRL and conjunctivae and sclerae normal  HENT: ear canals and TM's normal, nose and mouth without ulcers or lesions  NECK: no adenopathy, no asymmetry, masses, or scars and thyroid normal to palpation  RESP: lungs clear to auscultation - no rales, rhonchi or wheezes  BREAST: NE  CV: regular rate and rhythm, normal S1 S2, no S3 or S4, no murmur, click or rub, no peripheral edema and peripheral pulses strong  ABDOMEN: soft, nontender, no hepatosplenomegaly, no masses and bowel sounds normal   (female): NE  MS: no gross musculoskeletal defects noted, no edema  SKIN: no suspicious lesions or rashes, behind right ear the skull has a prominence however over it there seems to be a soft tissue lesion. Wondering about sebaceous cyst?  NEURO: Normal strength and " "tone, mentation intact and speech normal  PSYCH: mentation appears normal, affect normal/bright  LYMPH: no cervical adenopathy        ASSESSMENT/PLAN:   (Z00.00) Routine general medical examination at a health care facility  (primary encounter diagnosis)  Comment: Discussed healthy lifestyle and preventative cares.    Plan: TD PRESERV FREE, IM (7+ YRS)            (Z12.31) Encounter for screening mammogram for breast cancer  Comment:   Plan: MA Screen Bilateral w/Louie            (M35.3) PMR (polymyalgia rheumatica) (H)  Comment: stable, not needing any medication at this time, last flare up was one year ago  Plan:     (L98.9) Skin lesion  Comment: referral is done due to mass, make sure there is nothing else going on  Plan: DERMATOLOGY REFERRAL          Bee stings.  Need to have refill of the medication.  Has not had an issue with bees.  No side effects      COUNSELING:  Reviewed preventive health counseling, as reflected in patient instructions       Regular exercise       Healthy diet/nutrition       Colon cancer screening    Estimated body mass index is 27.71 kg/m  as calculated from the following:    Height as of this encounter: 1.6 m (5' 3\").    Weight as of this encounter: 70.9 kg (156 lb 6.4 oz).         reports that she has never smoked. She has never used smokeless tobacco.      Counseling Resources:  ATP IV Guidelines  Pooled Cohorts Equation Calculator  Breast Cancer Risk Calculator  FRAX Risk Assessment  ICSI Preventive Guidelines  Dietary Guidelines for Americans, 2010  USDA's MyPlate  ASA Prophylaxis  Lung CA Screening    Chino Rayo MD  [facility]  "

## 2020-07-28 ENCOUNTER — HOSPITAL ENCOUNTER (OUTPATIENT)
Dept: MAMMOGRAPHY | Facility: CLINIC | Age: 50
Discharge: HOME | End: 2020-07-28
Attending: FAMILY MEDICINE | Admitting: FAMILY MEDICINE
Payer: COMMERCIAL

## 2020-07-28 ENCOUNTER — OFFICE VISIT (OUTPATIENT)
Dept: DERMATOLOGY | Facility: CLINIC | Age: 50
End: 2020-07-28
Attending: FAMILY MEDICINE
Payer: COMMERCIAL

## 2020-07-28 VITALS — HEART RATE: 51 BPM | OXYGEN SATURATION: 100 % | SYSTOLIC BLOOD PRESSURE: 105 MMHG | DIASTOLIC BLOOD PRESSURE: 71 MMHG

## 2020-07-28 DIAGNOSIS — Z12.31 ENCOUNTER FOR SCREENING MAMMOGRAM FOR BREAST CANCER: ICD-10-CM

## 2020-07-28 DIAGNOSIS — L82.1 SEBORRHEIC KERATOSIS: Primary | ICD-10-CM

## 2020-07-28 DIAGNOSIS — L81.4 LENTIGO: ICD-10-CM

## 2020-07-28 DIAGNOSIS — D48.5 NEOPLASM OF UNCERTAIN BEHAVIOR OF SKIN: ICD-10-CM

## 2020-07-28 PROCEDURE — 99203 OFFICE O/P NEW LOW 30 MIN: CPT | Performed by: DERMATOLOGY

## 2020-07-28 PROCEDURE — 77063 BREAST TOMOSYNTHESIS BI: CPT

## 2020-07-28 NOTE — NURSING NOTE
"Initial /71   Pulse 51   LMP 03/02/2018   SpO2 100%  Estimated body mass index is 27.71 kg/m  as calculated from the following:    Height as of 7/3/20: 1.6 m (5' 3\").    Weight as of 7/3/20: 70.9 kg (156 lb 6.4 oz). .      "

## 2020-07-28 NOTE — PROGRESS NOTES
Dakota Berkowitz is a 49 year old year old female patient here today for spot behind right ear.   .  Patient states this has been present for years.  Patient reports the following symptoms:  tender.  Patient reports the following previous treatments none.  These treatments did not work.  Patient reports the following modifying factors none.  Associated symptoms: none.  Patient has no other skin complaints today.  Remainder of the HPI, Meds, PMH, Allergies, FH, and SH was reviewed in chart.      Past Medical History:   Diagnosis Date     NO ACTIVE PROBLEMS        Past Surgical History:   Procedure Laterality Date     C APPENDECTOMY       COLONOSCOPY N/A 5/31/2019    Procedure: COLONOSCOPY;  Surgeon: Jose Wen MD;  Location: [facility]     COSMETIC SURGERY      breast implants     CYSTOSCOPY, SLING TRANSVAGINAL N/A 2/8/2016    Procedure: CYSTOSCOPY, SLING TRANSVAGINAL;  Surgeon: Sterling Hill MD;  Location: [facility]     EYE SURGERY       HC REPAIR OF NASAL SEPTUM       SURGICAL HISTORY OF -       laporoscopy due to endometriosis     SURGICAL HISTORY OF -       endometrial ablation     SURGICAL HISTORY OF -       breast augmentation     SURGICAL HISTORY OF -       benign tumor removed from her left thigh     TONSILLECTOMY       TUBAL LIGATION          Family History   Problem Relation Age of Onset     Cancer Mother         ovarian     Cancer Paternal Grandmother         lung     Gynecology Sister         having a partial hysterectomy due to abnormal cells, leaving ovaries and cervix     Colon Cancer Father      Gynecology Daughter         endometriosis     Gynecology Sister         hysterectomy for endometriosis       Social History     Socioeconomic History     Marital status:      Spouse name: Not on file     Number of children: 1     Years of education: Not on file     Highest education level: Not on file   Occupational History     Employer: [employer]   Social Needs     Financial resource strain:  Not on file     Food insecurity     Worry: Not on file     Inability: Not on file     Transportation needs     Medical: Not on file     Non-medical: Not on file   Tobacco Use     Smoking status: Never Smoker     Smokeless tobacco: Never Used   Substance and Sexual Activity     Alcohol use: Yes     Comment: occassionally on weekends     Drug use: No     Sexual activity: Yes     Partners: Male     Birth control/protection: Female Surgical     Comment: Tubal   Lifestyle     Physical activity     Days per week: Not on file     Minutes per session: Not on file     Stress: Not on file   Relationships     Social connections     Talks on phone: Not on file     Gets together: Not on file     Attends Islam service: Not on file     Active member of club or organization: Not on file     Attends meetings of clubs or organizations: Not on file     Relationship status: Not on file     Intimate partner violence     Fear of current or ex partner: Not on file     Emotionally abused: Not on file     Physically abused: Not on file     Forced sexual activity: Not on file   Other Topics Concern     Parent/sibling w/ CABG, MI or angioplasty before 65F 55M? No   Social History Narrative     Not on file       Outpatient Encounter Medications as of 7/28/2020   Medication Sig Dispense Refill     cyclobenzaprine (FLEXERIL) 5 MG tablet Take 1 tablet (5 mg) by mouth 3 times daily as needed for muscle spasms 30 tablet 1     EPINEPHrine (ANY BX GENERIC EQUIV) 0.3 MG/0.3ML injection 2-pack Inject 0.3 mLs (0.3 mg) into the muscle once as needed for anaphylaxis 0.6 mL 3     order for DME Equipment being ordered: Nebulizer 1 each 0     study - prednisoLONE acetate, MMCORC, (IDS# 5081) 1 % ophthalmic susp Place 1-2 drops into both eyes 3 times daily       No facility-administered encounter medications on file as of 7/28/2020.              Review Of Systems  Skin: As above  Eyes: negative  Ears/Nose/Throat: negative  Respiratory: No shortness of  breath, dyspnea on exertion, cough, or hemoptysis  Cardiovascular: negative  Gastrointestinal: negative  Genitourinary: negative  Musculoskeletal: negative  Neurologic: negative  Psychiatric: negative  Hematologic/Lymphatic/Immunologic: negative  Endocrine: negative      O:   NAD, WDWN, Alert & Oriented, Mood & Affect wnl, Vitals stable   Here today alone   LMP 03/02/2018    General appearance normal   Vitals stable   Alert, oriented and in no acute distress     R post auricular scalp soft movable ill-deifned nodule     Stuck on papules and brown macules on neck and face     The remainder of expanded problem focused exam was normal; the following areas were examined:  conjunctiva/lids, face, neck, lips, chest, digits/nails, RUE, LUE.      Eyes: Conjunctivae/lids:Normal     ENT: Lips, buccal mucosa, tongue: normal    MSK:Normal    Cardiovascular: peripheral edema none    Pulm: Breathing Normal    Neuro/Psych: Orientation:Alert and Orientedx3 ; Mood/Affect:normal       A/P:  1. Seborrheic keratosis, lentigo,   2. R PA scalp favor lipoma  Order ultrasound today     BENIGN LESIONS DISCUSSED WITH PATIENT:  I discussed the specifics of tumor, prognosis, and genetics of benign lesions.  I explained that treatment of these lesions would be purely cosmetic and not medically neccessary.  I discussed with patient different removal options including excision, cautery and /or laser.      Nature and genetics of benign skin lesions dicussed with patient.  Signs and Symptoms of skin cancer discussed with patient.  Patient encouraged to perform monthly skin exams.  UV precautions reviewed with patient.  Skin care regimen reviewed with patient: Eliminate harsh soaps, i.e. Dial, zest, irsih spring; Mild soaps such as Cetaphil or Dove sensitive skin, avoid hot or cold showers, aggressive use of emollients including vanicream, cetaphil or cerave discussed with patient.    Return to clinic pending ultrasound

## 2020-07-28 NOTE — LETTER
7/28/2020         RE: Dakota Berkowitz  [address] [place] [address]        Dear Colleague,    Thank you for referring your patient, Dakota Berkowitz, to the [facility]. Please see a copy of my visit note below.    Dakota Berkowitz is a 49 year old year old female patient here today for spot behind right ear.   .  Patient states this has been present for years.  Patient reports the following symptoms:  tender.  Patient reports the following previous treatments none.  These treatments did not work.  Patient reports the following modifying factors none.  Associated symptoms: none.  Patient has no other skin complaints today.  Remainder of the HPI, Meds, PMH, Allergies, FH, and SH was reviewed in chart.      Past Medical History:   Diagnosis Date     NO ACTIVE PROBLEMS        Past Surgical History:   Procedure Laterality Date     C APPENDECTOMY       COLONOSCOPY N/A 5/31/2019    Procedure: COLONOSCOPY;  Surgeon: Jose Wen MD;  Location: [facility]     COSMETIC SURGERY      breast implants     CYSTOSCOPY, SLING TRANSVAGINAL N/A 2/8/2016    Procedure: CYSTOSCOPY, SLING TRANSVAGINAL;  Surgeon: Sterling Hill MD;  Location: [facility]     EYE SURGERY       HC REPAIR OF NASAL SEPTUM       SURGICAL HISTORY OF -       laporoscopy due to endometriosis     SURGICAL HISTORY OF -       endometrial ablation     SURGICAL HISTORY OF -       breast augmentation     SURGICAL HISTORY OF -       benign tumor removed from her left thigh     TONSILLECTOMY       TUBAL LIGATION          Family History   Problem Relation Age of Onset     Cancer Mother         ovarian     Cancer Paternal Grandmother         lung     Gynecology Sister         having a partial hysterectomy due to abnormal cells, leaving ovaries and cervix     Colon Cancer Father      Gynecology Daughter         endometriosis     Gynecology Sister         hysterectomy for endometriosis       Social History     Socioeconomic History      Marital status:      Spouse name: Not on file     Number of children: 1     Years of education: Not on file     Highest education level: Not on file   Occupational History     Employer: [employer]   Social Needs     Financial resource strain: Not on file     Food insecurity     Worry: Not on file     Inability: Not on file     Transportation needs     Medical: Not on file     Non-medical: Not on file   Tobacco Use     Smoking status: Never Smoker     Smokeless tobacco: Never Used   Substance and Sexual Activity     Alcohol use: Yes     Comment: occassionally on weekends     Drug use: No     Sexual activity: Yes     Partners: Male     Birth control/protection: Female Surgical     Comment: Tubal   Lifestyle     Physical activity     Days per week: Not on file     Minutes per session: Not on file     Stress: Not on file   Relationships     Social connections     Talks on phone: Not on file     Gets together: Not on file     Attends Orthodox service: Not on file     Active member of club or organization: Not on file     Attends meetings of clubs or organizations: Not on file     Relationship status: Not on file     Intimate partner violence     Fear of current or ex partner: Not on file     Emotionally abused: Not on file     Physically abused: Not on file     Forced sexual activity: Not on file   Other Topics Concern     Parent/sibling w/ CABG, MI or angioplasty before 65F 55M? No   Social History Narrative     Not on file       Outpatient Encounter Medications as of 7/28/2020   Medication Sig Dispense Refill     cyclobenzaprine (FLEXERIL) 5 MG tablet Take 1 tablet (5 mg) by mouth 3 times daily as needed for muscle spasms 30 tablet 1     EPINEPHrine (ANY BX GENERIC EQUIV) 0.3 MG/0.3ML injection 2-pack Inject 0.3 mLs (0.3 mg) into the muscle once as needed for anaphylaxis 0.6 mL 3     order for DME Equipment being ordered: Nebulizer 1 each 0     study - prednisoLONE acetate, MMCORC, (IDS# 5081) 1 %  ophthalmic susp Place 1-2 drops into both eyes 3 times daily       No facility-administered encounter medications on file as of 7/28/2020.              Review Of Systems  Skin: As above  Eyes: negative  Ears/Nose/Throat: negative  Respiratory: No shortness of breath, dyspnea on exertion, cough, or hemoptysis  Cardiovascular: negative  Gastrointestinal: negative  Genitourinary: negative  Musculoskeletal: negative  Neurologic: negative  Psychiatric: negative  Hematologic/Lymphatic/Immunologic: negative  Endocrine: negative      O:   NAD, WDWN, Alert & Oriented, Mood & Affect wnl, Vitals stable   Here today alone   LMP 03/02/2018    General appearance normal   Vitals stable   Alert, oriented and in no acute distress     R post auricular scalp soft movable ill-deifned nodule     Stuck on papules and brown macules on neck and face     The remainder of expanded problem focused exam was normal; the following areas were examined:  conjunctiva/lids, face, neck, lips, chest, digits/nails, RUE, LUE.      Eyes: Conjunctivae/lids:Normal     ENT: Lips, buccal mucosa, tongue: normal    MSK:Normal    Cardiovascular: peripheral edema none    Pulm: Breathing Normal    Neuro/Psych: Orientation:Alert and Orientedx3 ; Mood/Affect:normal       A/P:  1. Seborrheic keratosis, lentigo,   2. R PA scalp favor lipoma  Order ultrasound today     BENIGN LESIONS DISCUSSED WITH PATIENT:  I discussed the specifics of tumor, prognosis, and genetics of benign lesions.  I explained that treatment of these lesions would be purely cosmetic and not medically neccessary.  I discussed with patient different removal options including excision, cautery and /or laser.      Nature and genetics of benign skin lesions dicussed with patient.  Signs and Symptoms of skin cancer discussed with patient.  Patient encouraged to perform monthly skin exams.  UV precautions reviewed with patient.  Skin care regimen reviewed with patient: Eliminate harsh soaps, i.e. Dial,  zest, [facility] spring; Mild soaps such as Cetaphil or Dove sensitive skin, avoid hot or cold showers, aggressive use of emollients including vanicream, cetaphil or cerave discussed with patient.    Return to clinic pending ultrasound    Again, thank you for allowing me to participate in the care of your patient.        Sincerely,        Ramana Germain MD

## 2020-07-31 ENCOUNTER — HOSPITAL ENCOUNTER (OUTPATIENT)
Dept: MAMMOGRAPHY | Facility: CLINIC | Age: 50
Discharge: HOME | End: 2020-07-31
Attending: FAMILY MEDICINE | Admitting: FAMILY MEDICINE
Payer: COMMERCIAL

## 2020-07-31 DIAGNOSIS — R92.8 ABNORMAL MAMMOGRAM: ICD-10-CM

## 2020-07-31 DIAGNOSIS — Z11.59 ENCOUNTER FOR SCREENING FOR OTHER VIRAL DISEASES: Primary | ICD-10-CM

## 2020-07-31 PROCEDURE — 76642 ULTRASOUND BREAST LIMITED: CPT | Mod: RT

## 2020-08-04 DIAGNOSIS — Z11.59 ENCOUNTER FOR SCREENING FOR OTHER VIRAL DISEASES: ICD-10-CM

## 2020-08-04 PROCEDURE — U0003 INFECTIOUS AGENT DETECTION BY NUCLEIC ACID (DNA OR RNA); SEVERE ACUTE RESPIRATORY SYNDROME CORONAVIRUS 2 (SARS-COV-2) (CORONAVIRUS DISEASE [COVID-19]), AMPLIFIED PROBE TECHNIQUE, MAKING USE OF HIGH THROUGHPUT TECHNOLOGIES AS DESCRIBED BY CMS-2020-01-R: HCPCS | Performed by: FAMILY MEDICINE

## 2020-08-05 LAB
SARS-COV-2 RNA SPEC QL NAA+PROBE: NOT DETECTED
SPECIMEN SOURCE: NORMAL

## 2020-08-07 ENCOUNTER — HOSPITAL ENCOUNTER (OUTPATIENT)
Dept: MAMMOGRAPHY | Facility: CLINIC | Age: 50
End: 2020-08-07
Attending: FAMILY MEDICINE
Payer: COMMERCIAL

## 2020-08-07 DIAGNOSIS — R92.8 ABNORMAL MAMMOGRAM: ICD-10-CM

## 2020-08-07 PROCEDURE — 25000125 ZZHC RX 250: Performed by: FAMILY MEDICINE

## 2020-08-07 PROCEDURE — 19081 BX BREAST 1ST LESION STRTCTC: CPT | Mod: RT

## 2020-08-07 PROCEDURE — 88305 TISSUE EXAM BY PATHOLOGIST: CPT | Mod: 26 | Performed by: RADIOLOGY

## 2020-08-07 PROCEDURE — 88305 TISSUE EXAM BY PATHOLOGIST: CPT | Mod: 26 | Performed by: PATHOLOGY

## 2020-08-07 PROCEDURE — 40000986 MA POST PROCEDURE RIGHT

## 2020-08-07 PROCEDURE — 88305 TISSUE EXAM BY PATHOLOGIST: CPT | Performed by: RADIOLOGY

## 2020-08-07 RX ORDER — LIDOCAINE HYDROCHLORIDE 10 MG/ML
5 INJECTION, SOLUTION INFILTRATION; PERINEURAL ONCE
Status: COMPLETED | OUTPATIENT
Start: 2020-08-07 | End: 2020-08-07

## 2020-08-07 RX ADMIN — LIDOCAINE HYDROCHLORIDE 10 ML: 10; .005 INJECTION, SOLUTION EPIDURAL; INFILTRATION; INTRACAUDAL; PERINEURAL at 10:23

## 2020-08-07 RX ADMIN — LIDOCAINE HYDROCHLORIDE 5 ML: 10 INJECTION, SOLUTION INFILTRATION; PERINEURAL at 10:22

## 2020-08-10 ENCOUNTER — TELEPHONE (OUTPATIENT)
Dept: MAMMOGRAPHY | Facility: CLINIC | Age: 50
End: 2020-08-10

## 2020-08-10 LAB — COPATH REPORT: NORMAL

## 2020-08-10 NOTE — PROGRESS NOTES
BENIGN PATH:  Pathology report reviewed with our breast radiologist Dr. Dmitri Rao, who confirmed the recent breast imaging is concordant with the final pathology results below.    I phoned Ms. Dakota Kovacs, confirmed her full name, date of birth, and informed patient of her Stereotactic Guided Right Breast Needle Biopsy (08/07/2020) results showing benign Fibrocystic Change, with rare stromal calcification.     Recommended follow up is 6 month follow up right breast diagnostic mammogram, due 2/7/2021.    Patient states no problems or concerns with her biopsy site.   Questions were answered and my phone number given if she has further questions or concerns.  I informed patient I will notify the ordering provider of the results and recommendations for follow up.  I also advised patient to notify her primary provider if any breast symptoms or changes arise.  Patient verbalized understanding and agrees with the plan of care.   Selina Hitchcock RN, BSN    Selina Hitchcock RN BSN  Breast Care Nurse Coordinator  [facility]  [phone]      Patient Name: DAKOTA KOVACS   MR#: [id number]   Specimen #: [id number]   Collected: 8/7/2020   Received: 8/7/2020   Reported: 8/10/2020 15:08   Ordering Phy(s): CLARK HOLLIS   Additional Phy(s): MARK FUENTES       SPECIMEN(S):   Right stereotactic breast needle biopsy, 8:00-9:00, 4.5-5.0 cm from   nipple, 1.0 cm size     FINAL DIAGNOSIS:   Breast, right, 8:00, 4- 5 cm from nipple, stereotactic biopsy   - Fibrocystic change, proliferative type, rare stromal calcification   identified, no evidence of malignancy     Electronically signed out by:     Adam Jauregui M.D.

## 2020-08-10 NOTE — TELEPHONE ENCOUNTER
BENIGN PATH:  Pathology report reviewed with our breast radiologist Dr. Dmitri Rao, who confirmed the recent breast imaging is concordant with the final pathology results below.    Stereotactic Guided Right Breast Needle Biopsy (08/07/2020) results showing benign Fibrocystic change, with rare stromal calcification identified.     Recommended follow up is 6 month follow up diagnostic right breast mammogram.  Patient will be due for this 2/7/2021.    I called patient and left her a voicemail message asking if she could return my call.  I am reaching out to patient to inform of biopsy results, and recommendations for follow up.  Selina Hitchcock, RN, BSN    Selina Hitchcock, RN BSN  Breast Care Nurse Coordinator  [facility]  [phone]         Patient Name: MAXIM KOVACS   MR#: [id number]   Specimen #: [id number]   Collected: 8/7/2020   Received: 8/7/2020   Reported: 8/10/2020 15:08   Ordering Phy(s): CLARK HOLLIS   Additional Phy(s): MARK FUENTES       SPECIMEN(S):   Right stereotactic breast needle biopsy, 8:00-9:00, 4.5-5.0 cm from   nipple, 1.0 cm size     FINAL DIAGNOSIS:   Breast, right, 8:00, 4- 5 cm from nipple, stereotactic biopsy   - Fibrocystic change, proliferative type, rare stromal calcification   identified, no evidence of malignancy     Electronically signed out by:     Adam Jauregui M.D.

## 2020-08-10 NOTE — TELEPHONE ENCOUNTER
Patient called me back and I informed her of right breast biopsy results, and recommendations for follow up.  See 8/7/20 Progress Notes.      Patient verbalized understanding and agrees with the plan of care.  Selina Hitchcock RN, BSN

## 2020-08-26 ENCOUNTER — HOSPITAL ENCOUNTER (OUTPATIENT)
Dept: ULTRASOUND IMAGING | Facility: CLINIC | Age: 50
Discharge: HOME | End: 2020-08-26
Attending: DERMATOLOGY | Admitting: DERMATOLOGY
Payer: COMMERCIAL

## 2020-08-26 DIAGNOSIS — D48.5 NEOPLASM OF UNCERTAIN BEHAVIOR OF SKIN: ICD-10-CM

## 2020-08-26 DIAGNOSIS — L81.4 LENTIGO: ICD-10-CM

## 2020-08-26 DIAGNOSIS — L82.1 SEBORRHEIC KERATOSIS: ICD-10-CM

## 2020-08-26 PROCEDURE — 76536 US EXAM OF HEAD AND NECK: CPT

## 2020-09-16 ENCOUNTER — COMMUNICATION - HEALTHEAST (OUTPATIENT)
Dept: SURGERY | Facility: CLINIC | Age: 50
End: 2020-09-16

## 2020-09-16 DIAGNOSIS — E78.1 HIGH TRIGLYCERIDES: ICD-10-CM

## 2020-09-16 DIAGNOSIS — Z98.84 HISTORY OF ROUX-EN-Y GASTRIC BYPASS: ICD-10-CM

## 2020-09-16 DIAGNOSIS — K90.9 INTESTINAL MALABSORPTION, UNSPECIFIED: ICD-10-CM

## 2020-09-16 DIAGNOSIS — Z98.84 BARIATRIC SURGERY STATUS: ICD-10-CM

## 2020-09-17 ENCOUNTER — AMBULATORY - HEALTHEAST (OUTPATIENT)
Dept: LAB | Facility: CLINIC | Age: 50
End: 2020-09-17

## 2020-09-17 ENCOUNTER — OFFICE VISIT - HEALTHEAST (OUTPATIENT)
Dept: SURGERY | Facility: CLINIC | Age: 50
End: 2020-09-17

## 2020-09-17 DIAGNOSIS — Z98.84 HISTORY OF ROUX-EN-Y GASTRIC BYPASS: ICD-10-CM

## 2020-09-17 DIAGNOSIS — K90.9 INTESTINAL MALABSORPTION, UNSPECIFIED: ICD-10-CM

## 2020-09-17 DIAGNOSIS — E78.1 HIGH TRIGLYCERIDES: ICD-10-CM

## 2020-09-17 DIAGNOSIS — K91.2 POSTOPERATIVE MALABSORPTION: ICD-10-CM

## 2020-09-17 LAB
ALBUMIN SERPL-MCNC: 4 G/DL (ref 3.5–5)
ALP SERPL-CCNC: 117 U/L (ref 45–120)
ALT SERPL W P-5'-P-CCNC: 26 U/L (ref 0–45)
ANION GAP SERPL CALCULATED.3IONS-SCNC: 9 MMOL/L (ref 5–18)
AST SERPL W P-5'-P-CCNC: 22 U/L (ref 0–40)
BILIRUB SERPL-MCNC: 0.4 MG/DL (ref 0–1)
BUN SERPL-MCNC: 11 MG/DL (ref 8–22)
CALCIUM SERPL-MCNC: 9.1 MG/DL (ref 8.5–10.5)
CHLORIDE BLD-SCNC: 106 MMOL/L (ref 98–107)
CHOLEST SERPL-MCNC: 180 MG/DL
CO2 SERPL-SCNC: 27 MMOL/L (ref 22–31)
CREAT SERPL-MCNC: 0.7 MG/DL (ref 0.6–1.1)
ERYTHROCYTE [DISTWIDTH] IN BLOOD BY AUTOMATED COUNT: 12 % (ref 11–14.5)
FASTING STATUS PATIENT QL REPORTED: NORMAL
FERRITIN SERPL-MCNC: 86 NG/ML (ref 10–130)
FOLATE SERPL-MCNC: 13.2 NG/ML
GFR SERPL CREATININE-BSD FRML MDRD: >60 ML/MIN/1.73M2
GLUCOSE BLD-MCNC: 64 MG/DL (ref 70–125)
HBA1C MFR BLD: 4.9 %
HCT VFR BLD AUTO: 41.3 % (ref 35–47)
HDLC SERPL-MCNC: 70 MG/DL
HGB BLD-MCNC: 14.1 G/DL (ref 12–16)
LDLC SERPL CALC-MCNC: 90 MG/DL
MCH RBC QN AUTO: 32.7 PG (ref 27–34)
MCHC RBC AUTO-ENTMCNC: 34.1 G/DL (ref 32–36)
MCV RBC AUTO: 96 FL (ref 80–100)
PLATELET # BLD AUTO: 182 THOU/UL (ref 140–440)
PMV BLD AUTO: 9 FL (ref 7–10)
POTASSIUM BLD-SCNC: 4.4 MMOL/L (ref 3.5–5)
PROT SERPL-MCNC: 6.8 G/DL (ref 6–8)
PTH-INTACT SERPL-MCNC: 70 PG/ML (ref 10–86)
RBC # BLD AUTO: 4.31 MILL/UL (ref 3.8–5.4)
SODIUM SERPL-SCNC: 142 MMOL/L (ref 136–145)
TRIGL SERPL-MCNC: 99 MG/DL
VIT B12 SERPL-MCNC: 1410 PG/ML (ref 213–816)
WBC: 5 THOU/UL (ref 4–11)

## 2020-09-17 ASSESSMENT — MIFFLIN-ST. JEOR: SCORE: 1268.6

## 2020-09-18 LAB — 25(OH)D3 SERPL-MCNC: 29.6 NG/ML (ref 30–80)

## 2020-09-20 LAB
ANNOTATION COMMENT IMP: ABNORMAL
VIT A SERPL-MCNC: 0.22 MG/L (ref 0.3–1.2)
VIT B1 PYROPHOSHATE BLD-SCNC: 113 NMOL/L (ref 70–180)
VITAMIN A (RETINYL PALMITATE): <0.02 MG/L (ref 0–0.1)

## 2020-09-23 ENCOUNTER — COMMUNICATION - HEALTHEAST (OUTPATIENT)
Dept: SURGERY | Facility: CLINIC | Age: 50
End: 2020-09-23

## 2020-10-05 LAB — ZINC SERPL-MCNC: 71 UG/DL (ref 60–120)

## 2020-10-20 ENCOUNTER — COMMUNICATION - HEALTHEAST (OUTPATIENT)
Dept: SURGERY | Facility: CLINIC | Age: 50
End: 2020-10-20

## 2020-10-20 ENCOUNTER — AMBULATORY - HEALTHEAST (OUTPATIENT)
Dept: SURGERY | Facility: CLINIC | Age: 50
End: 2020-10-20

## 2020-10-20 ENCOUNTER — TRANSFERRED RECORDS (OUTPATIENT)
Dept: HEALTH INFORMATION MANAGEMENT | Facility: CLINIC | Age: 50
End: 2020-10-20

## 2020-10-20 DIAGNOSIS — R10.11 POSTPRANDIAL RUQ PAIN: ICD-10-CM

## 2020-10-20 DIAGNOSIS — R63.4 RAPID WEIGHT LOSS: ICD-10-CM

## 2020-11-19 ENCOUNTER — VIRTUAL VISIT (OUTPATIENT)
Dept: FAMILY MEDICINE | Facility: OTHER | Age: 50
End: 2020-11-19

## 2020-11-19 DIAGNOSIS — Z20.822 ENCOUNTER FOR LABORATORY TESTING FOR COVID-19 VIRUS: Primary | ICD-10-CM

## 2020-11-19 PROCEDURE — U0003 INFECTIOUS AGENT DETECTION BY NUCLEIC ACID (DNA OR RNA); SEVERE ACUTE RESPIRATORY SYNDROME CORONAVIRUS 2 (SARS-COV-2) (CORONAVIRUS DISEASE [COVID-19]), AMPLIFIED PROBE TECHNIQUE, MAKING USE OF HIGH THROUGHPUT TECHNOLOGIES AS DESCRIBED BY CMS-2020-01-R: HCPCS | Performed by: FAMILY MEDICINE

## 2020-11-19 NOTE — PROGRESS NOTES
"Date: 2020 09:10:58  Clinician: Nile Venegas  Clinician NPI: [id number]  Patient: Dakota KOVACS  Patient : 1970  Patient Address: [address], [place] [address]  Patient Phone: [phone]  Visit Protocol: URI  Patient Summary:  Dakota is a 50 year old ( : 1970 ) female who initiated a [facility] Visit for COVID-19 (Coronavirus) evaluation and screening. When asked the question \"Please sign me up to receive news, health information and promotions. \", Dakota responded \"No\".    Dakota states her symptoms started gradually 3-4 days ago. After her symptoms started, they improved and then got worse again.   Her symptoms consist of facial pain or pressure, myalgia, chills, malaise, a sore throat, ageusia, diarrhea, a headache, a cough, nasal congestion, nausea, and anosmia. She is experiencing mild difficulty breathing with activities but can speak normally in full sentences. Dakota also feels feverish.   Symptom details     Nasal secretions: The color of her mucus is clear.    Cough: Dakota coughs a few times an hour and her cough is not more bothersome at night. Phlegm does not come into her throat when she coughs. She does not believe her cough is caused by post-nasal drip.     Sore throat: Dakota reports having severe throat pain (7-9 on a 10 point pain scale), does not have exudate on her tonsils, and can swallow liquids. She is not sure if the lymph nodes in her neck are enlarged. A rash has not appeared on the skin since the sore throat started.     Temperature: Her current temperature is 101.3 degrees Fahrenheit. Dakota has had a temperature over 100 degrees Fahrenheit for 1-2 days.     Facial pain or pressure: The facial pain or pressure does not feel worse when bending or leaning forward.     Headache: She states the headache is moderate (4-6 on a 10 point pain scale).      Dakota denies having vomiting, rhinitis, teeth pain, ear pain, and wheezing. She also denies taking " antibiotic medication in the past month, having recent facial or sinus surgery in the past 60 days, and having a sinus infection within the past year.   Precipitating events  Within the past week, Dakota has not been exposed to someone with strep throat. She has not recently been exposed to someone with influenza. Dakota has been in close contact with the following high risk individuals: children under the age of 5.   Pertinent COVID-19 (Coronavirus) information  Dakota does not work or volunteer as healthcare worker or a . In the past 14 days, Dakota has not worked or volunteered at a healthcare facility or group living setting.   In the past 14 days, she also has not lived in a congregate living setting.   Dakota has not had a close contact with a laboratory-confirmed COVID-19 patient within 14 days of symptom onset.    Since December 2019, Dakota has been tested for COVID-19 and has had upper respiratory infection (URI) or influenza-like illness.      Result of COVID-19 test: Negative     Date of her COVID-19 test: 08/04/2020     Date(s) of previous URI or influenza-like illness (free-text): Pre Surgery test     Symptoms Dakota experienced during previous URI or influenza-like illness as reported by the patient (free-text): Pre Surgery Test        Pertinent medical history  Dakota typically gets a yeast infection when she takes antibiotics. She has used fluconazole (Diflucan) to treat previous yeast infections. 1 dose of fluconazole (Diflucan) has typically been sufficient for symptoms to resolve in the past.   Dakota needs a return to work/school note.   Weight: 150 lbs   Dakota does not smoke or use smokeless tobacco.   Additional information as reported by the patient (free text): Body Aches, headache, fever, loss of taste and smell. Requesting Covid test   Weight: 150 lbs    MEDICATIONS: Percocet oral, ALLERGIES: clindamycin, Provera  Clinician Response:  Dear Dakota,   Your symptoms show that  you may have coronavirus (COVID-19). This illness can cause fever, cough and trouble breathing. Many people get a mild case and get better on their own. Some people can get very sick.  What should I do?  We would like to test you for this virus.   1. Please call [phone] to schedule your visit. Explain that you were referred by [facility] to have a COVID-19 test. Be [facility].  * If you need to schedule in [facility] please call [phone] or for [employer] [facility] employees please call [phone].  * If you need to schedule in the [place] area please call [phone]. [place] employees call [phone].  The following will serve as your written order for this COVID Test, ordered by me, for the indication of suspected COVID [Z20.828]: The test will be ordered in [employer], our electronic health record, after you are scheduled. It will show as ordered and authorized by Jalen Burgess MD.  Order: COVID-19 (Coronavirus) PCR for SYMPTOMATIC testing from [facility].   2. When it's time for your COVID test:  Stay at least 6 feet away from others. (If someone will drive you to your test, stay in the backseat, as far away from the  as you can.)   Cover your mouth and nose with a mask, tissue or washcloth.  Go straight to the testing site. Don't make any stops on the way there or back.      3.Starting now: Stay home and away from others (self-isolate) until:   You've had no fever---and no medicine that reduces fever---for one full day (24 hours). And...   Your other symptoms have gotten better. For example, your cough or breathing has improved. And...   At least 10 days have passed since your symptoms started.       During this time, don't leave the house except for testing or medical care.   Stay in your own room, even for meals. Use your own bathroom if you can.   Stay away from others in your home. No hugging, kissing or shaking hands. No visitors.  Don't go to work, school or anywhere else.     "Clean \"high touch\" surfaces often (doorknobs, counters, handles, etc.). Use a household cleaning spray or wipes. You'll find a full list of  on the EPA website: www.epa.gov/pesticide-registration/list-n-disinfectants-use-against-sars-cov-2.   Cover your mouth and nose with a mask, tissue or washcloth to avoid spreading germs.  Wash your hands and face often. Use soap and water.  Caregivers in these groups are at risk for severe illness due to COVID-19:  o People 65 years and older  o People who live in a nursing home or long-term care facility  o People with chronic disease (lung, heart, cancer, diabetes, kidney, liver, immunologic)  o People who have a weakened immune system, including those who:   Are in cancer treatment  Take medicine that weakens the immune system, such as corticosteroids  Had a bone marrow or organ transplant  Have an immune deficiency  Have poorly controlled HIV or AIDS  Are obese (body mass index of 40 or higher)  Smoke regularly   o Caregivers should wear gloves while washing dishes, handling laundry and cleaning bedrooms and bathrooms.  o Use caution when washing and drying laundry: Don't shake dirty laundry, and use the warmest water setting that you can.  o For more tips, go to www.cdc.gov/coronavirus/2019-ncov/downloads/10Things.pdf.       How can I take care of myself?   Get lots of rest. Drink extra fluids (unless a doctor has told you not to).   Take Tylenol (acetaminophen) for fever or pain. If you have liver or kidney problems, ask your family doctor if it's okay to take Tylenol.   Adults can take either:    650 mg (two 325 mg pills) every 4 to 6 hours, or...   1,000 mg (two 500 mg pills) every 8 hours as needed.    Note: Don't take more than 3,000 mg in one day. Acetaminophen is found in many medicines (both prescribed and over-the-counter medicines). Read all labels to be sure you don't take too much.   For children, check the Tylenol bottle for the right dose. The dose is " based on the child's age or weight.    If you have other health problems (like cancer, heart failure, an organ transplant or severe kidney disease): Call your specialty clinic if you don't feel better in the next 2 days.       Know when to call 911. Emergency warning signs include:    Trouble breathing or shortness of breath Pain or pressure in the chest that doesn't go away Feeling confused like you haven't felt before, or not being able to wake up Bluish-colored lips or face.  Where can I get more information?   [facility] -- About COVID-19: [email or web address]   CDC -- What to Do If You're Sick: www.cdc.gov/coronavirus/2019-ncov/about/steps-when-sick.html   CDC -- Ending Home Isolation: www.cdc.gov/coronavirus/2019-ncov/hcp/disposition-in-home-patients.html   [facility] -- Caring for Someone: www.cdc.gov/coronavirus/2019-ncov/if-you-are-sick/care-for-someone.html   [facility] -- Interim Guidance for Hospital Discharge to Home: www.[facility].[place]./diseases/coronavirus/hcp/hospdischarge.pdf   [facility] clinical trials (COVID-19 research studies): clinicalaffairs.[facility]-clinical-trials    Below are the COVID-19 hotlines at the [place] Department of [facility] ([facility]). Interpreters are available.    For health questions: Call 035-319-3845 or 1-273.856.4040 (7 a.m. to 7 p.m.) For questions about schools and childcare: Call 717-659-0507 or 1-874.777.5880 (7 a.m. to 7 p.m.)    Diagnosis: Contact with and (suspected) exposure to other viral communicable diseases  Diagnosis ICD: Z20.828

## 2020-11-21 LAB
SARS-COV-2 RNA SPEC QL NAA+PROBE: ABNORMAL
SPECIMEN SOURCE: ABNORMAL

## 2020-12-06 ENCOUNTER — HEALTH MAINTENANCE LETTER (OUTPATIENT)
Age: 50
End: 2020-12-06

## 2021-02-25 ENCOUNTER — HOSPITAL ENCOUNTER (OUTPATIENT)
Dept: MAMMOGRAPHY | Facility: CLINIC | Age: 51
Discharge: HOME | End: 2021-02-25
Attending: FAMILY MEDICINE | Admitting: FAMILY MEDICINE
Payer: COMMERCIAL

## 2021-02-25 DIAGNOSIS — Z98.890 S/P BREAST BIOPSY: ICD-10-CM

## 2021-02-25 PROCEDURE — G0279 TOMOSYNTHESIS, MAMMO: HCPCS

## 2021-05-26 ENCOUNTER — OFFICE VISIT (OUTPATIENT)
Dept: OBGYN | Facility: CLINIC | Age: 51
End: 2021-05-26
Payer: COMMERCIAL

## 2021-05-26 VITALS
OXYGEN SATURATION: 98 % | SYSTOLIC BLOOD PRESSURE: 110 MMHG | DIASTOLIC BLOOD PRESSURE: 60 MMHG | TEMPERATURE: 98.3 F | HEIGHT: 63 IN | WEIGHT: 149 LBS | HEART RATE: 75 BPM | BODY MASS INDEX: 26.4 KG/M2

## 2021-05-26 DIAGNOSIS — N95.1 MENOPAUSAL SYNDROME (HOT FLASHES): Primary | ICD-10-CM

## 2021-05-26 PROCEDURE — 99203 OFFICE O/P NEW LOW 30 MIN: CPT | Performed by: OBSTETRICS & GYNECOLOGY

## 2021-05-26 RX ORDER — ESTRADIOL 0.1 MG/D
1 PATCH TRANSDERMAL WEEKLY
Qty: 5 PATCH | Refills: 1 | Status: SHIPPED | OUTPATIENT
Start: 2021-05-26 | End: 2022-05-27

## 2021-05-26 ASSESSMENT — MIFFLIN-ST. JEOR: SCORE: 1251.02

## 2021-05-26 NOTE — PROGRESS NOTES
CC:  Follow up  from herself for HOT flashes and severe night sweats  HPI:  Dakota Berkowitz is a 50 year old female is a   .  Patient's last menstrual period was 03/02/2018.  She is status post endometrial ablation    Her symptoms of been going on for the last few months.  She has tried over-the-counter black cohosh and soy without any significant effect.  She is also suffering from headaches which are bitemporal after menopause.  Her history of migraines she notes is less severe.  He does have nocturia greater than 1.    Patients records are available and reviewed at today's visit.    Past GYN history:  No STD history       Last PAP smear:  Normal  Last TSH:   TSH   Date Value Ref Range Status   05/24/2019 2.06 0.40 - 4.00 mU/L Final    , normal?  Yes    Past Medical History:   Diagnosis Date     NO ACTIVE PROBLEMS        Past Surgical History:   Procedure Laterality Date     C APPENDECTOMY       COLONOSCOPY N/A 5/31/2019    Procedure: COLONOSCOPY;  Surgeon: Jose Wen MD;  Location: [facility]     COSMETIC SURGERY      breast implants     CYSTOSCOPY, SLING TRANSVAGINAL N/A 2/8/2016    Procedure: CYSTOSCOPY, SLING TRANSVAGINAL;  Surgeon: Sterling Hill MD;  Location: [facility]     EYE SURGERY       HC REPAIR OF NASAL SEPTUM       SURGICAL HISTORY OF -       laporoscopy due to endometriosis     SURGICAL HISTORY OF -       endometrial ablation     SURGICAL HISTORY OF -       breast augmentation     SURGICAL HISTORY OF -       benign tumor removed from her left thigh     TONSILLECTOMY       TUBAL LIGATION         Family History   Problem Relation Age of Onset     Cancer Mother         ovarian     Cancer Paternal Grandmother         lung     Gynecology Sister         having a partial hysterectomy due to abnormal cells, leaving ovaries and cervix     Colon Cancer Father      Cancer Father 80        colon     Gynecology Daughter         endometriosis     Gynecology Sister         hysterectomy for endometriosis  "      Allergies: Bees, Nsaids, Onion, Clindamycin, and Provera [medroxyprogesterone acetate]    Current Outpatient Medications   Medication Sig Dispense Refill     Calcium Carbonate-Vit D-Min (CALCIUM 1200 PO) Take 1 tablet by mouth daily       cyclobenzaprine (FLEXERIL) 5 MG tablet Take 1 tablet (5 mg) by mouth 3 times daily as needed for muscle spasms 30 tablet 1     estradiol (CLIMARA) 0.1 MG/24HR weekly patch Place 1 patch onto the skin once a week 5 patch 1     study - prednisoLONE acetate, MMCORC, (IDS# [id number]) 1 % ophthalmic susp Place 1-2 drops into both eyes 3 times daily       EPINEPHrine (ANY BX GENERIC EQUIV) 0.3 MG/0.3ML injection 2-pack Inject 0.3 mLs (0.3 mg) into the muscle once as needed for anaphylaxis (Patient not taking: Reported on 5/26/2021) 0.6 mL 3     order for DME Equipment being ordered: Nebulizer (Patient not taking: Reported on 5/26/2021) 1 each 0       ROS:  C: NEGATIVE for fever, chills, change in weight  I: NEGATIVE for worrisome rashes, moles or lesions  E: NEGATIVE for vision changes or irritation  E/M: NEGATIVE for ear, mouth and throat problems  R: NEGATIVE for significant cough or SOB  CV: NEGATIVE for chest pain, palpitations or peripheral edema  GI: NEGATIVE for nausea, abdominal pain, heartburn, or change in bowel habits  : NEGATIVE for frequency, dysuria, hematuria, vaginal discharge  M: NEGATIVE for significant arthralgias or myalgia  N: NEGATIVE for weakness, dizziness or paresthesias  E: NEGATIVE for temperature intolerance, skin/hair changes  P: NEGATIVE for changes in mood or affect    EXAM:  Blood pressure 110/60, pulse 75, temperature 98.3  F (36.8  C), height 1.594 m (5' 2.75\"), weight 67.6 kg (149 lb), last menstrual period 03/02/2018, SpO2 98 %, not currently breastfeeding.   BMI= Body mass index is 26.6 kg/m .  General - pleasant female in no acute distress.  No examination today    ASSESSMENT/PLAN:  (N95.1) Menopausal syndrome (hot flashes)  (primary encounter " diagnosis)  Comment: She has an allergy to Provera which may prevent her from using Climara-pro  Plan: estradiol (CLIMARA) 0.1 MG/24HR weekly patch        We will see how she tolerates the adhesive and what her responses to this dosage.  We could consider using Prometrium orally to protect her uterus.    She will contact me by my chart after the next month and a half.    Letter will be sent to the referring provider.    Sterling Hill MD  15 of 15 minutes spent Face to face counseling relative to the issues noted above.

## 2021-05-27 NOTE — PROGRESS NOTES
Order for sleep study received, reviewed, and approved.   Severe insomnia so Home Sleep Apnea Testing not recommended.  Comments for study:   none  Rex Baker MD  6:15 PM, 3/28/2019      Per Bariatrics Consult:   Dakota Berkowitz is a 48 y.o. year old female with  has a past medical history of Ankle pain, Arthritis, Heartburn, Knee pain, and Morbid obesity with BMI of 40.0-44.9, adult (H).,   poor functional capacity and musculoskeletal disability due to morbid obesity which satisfies NIH criteria for bariatric surgery. Her  Body mass index is 40.42 kg/m ..    Snoring: yes  Witnessed Apneas no  PND yes  [place] Score: 9  STOP BAN/8  General  Fatigue: yes  Sleep Quality:4-5 hours-insomnia

## 2021-05-27 NOTE — PROGRESS NOTES
I met with Dakota this morning while she was in the clinic for her initial bariatric consult with Dr. Lilly.  She has [facility] for her insurance and I explained her need for 6 months of SWL.  She will need to be cleared by C[facility] and complete her needs list.  She plans to attend the support group in April and had her labs drawn after her visit today

## 2021-05-28 NOTE — PROGRESS NOTES
Initial Structured Weight Loss Supervised Diet Evaluation     Assessment:  Pt. is a 48 y.o. female is being seen today for initial RD nutritional evaluation. Pt. has been unsuccessful with non-surgical weight loss methods and is interested in bariatric surgery. Today we reviewed current eating habits and level of physical activity, and instructed on the changes that are required for successful bariatric outcomes.    Pt desires bariatric surgery to improve quality life to be more active with her 2 year old grandson. Pt works in customer service.     Workflow review: labs complete, attended support completed, psych initiated.   Weight goal: at or below initial      Pt Active Problem List Diagnosis:  Patient Active Problem List   Diagnosis     Arthritis     Heartburn     Morbid obesity with BMI of 40.0-44.9, adult (H)     Knee pain     Ankle pain     Low serum vitamin B12 <400     Pt's Initial Weight: 221 lbs  Weight: (!) 228 lb (103.4 kg)  Weight loss from initial: -7  % Weight loss: -3.17 %    BMI: Body mass index is 41.7 kg/m .    Estimated RMR ([place]-St Jeor equation): 1611 calories    Food allergies, intolerances, and [employer] customs: onion allergy     Vitamins   Educated on post-op vitamin regimen: Multi Vit + iron 2x/day, calcium citrate 500-600 mg 2x/day, 6098-2069 mcg of Sublingual B-12 daily, and 5000 IU Vitamin D3 daily.    Biggest struggle with weight loss:Large portion sizes    Diet/Weight History  Method or Diet: phentermine, fad diets, etc.   # loss(-) or gain(+): none     Who does the grocery shopping for your household? Pt   Who prepares your meals at home? Pt     Diet Recall/Time: Pt wakes up at 5:00am  Breakfast: 7:30-8am- cafeteria 2 eggs, potatoes, 2 slices (20g)   Am Snack: none   Lunch: cafeteria club sandwich, cup of salad (20g)   Pm snack: none   Dinner: steak, green beans, potatoes (20g)   HS Snack: 3 cookies and milk     Per Diet recall estimated protein: 60 grams     Fats used at home:  olive oil     Fried Foods: 1 times per week    Meals per week away from home:   Sit down: 1x/month   Fast Food: 1x/month    Buffet: none   Cafeteria: daily for breakfast and lunch   Specialty Coffee: none   Ice Cream/FrozenYogurt/Bakery: none     Recommended limiting eating out to no more than 2x/week.  Patient and I reviewed the importance of eating three consistent meals per day; as well as meal timing to be spaced 4-5 hours apart.  Snack choices: 100-150 calories (1-2x/day if physically hungry), incorporating a fruit/vegetable w/ protein source.    Portion Sizes problematic? Yes per patient/diet recall  Encouraged slowing meal times down, 20-30 minutes, chewing to applesauce consistency.   To aid in proper portion control and slow meal time down discussed consuming meals off smaller plates, use toddler/children utensils and set utensils down after each bite.    Beverages (Type/Oz. per day)  Water: 100 oz (infused with berries)  Coffee:1 cup   Tea: none   Milk: 4 oz/day   Regular soda: none   Diet soda: 2 cans/day   Juice: none   Karlo-Aid/lemonade/etc: none   Alcohol: socially     Discussed the importance of adequate hydration after surgery and the goal of 64+ oz. of fluid daily.   The patient understands the importance of avoiding all carbonated, caffeinated and sweetened drinks; instead choosing 64 oz. plain water.    Fluid-meal separation:  The patient and I reviewed the anatomy of the bypass and why  fluids from a meal is so important.    Exercise  Nothing routine established; walking.    Pt. s understands that 30-60 minutes of daily activity is an important part of bariatric surgery success.   Encouraged pt. to incorporate upper body strength training exercise, even if its lifting soup cans while watching TV at night, doing pushups/sit-ups, and abdominal work.    PES statement:   1. (NI-1.3)Excessive energy intake related to Food and nutrition related knowledge deficit concerning excessive  energy/oral intake as evidenced by Intake of high caloric density foods at meals and/or snacks; large portions; Estimated intake that exceeds estimated daily energy intake; Frequent excessive fast food or restaurant intake; and BMI 41.        Intervention  Discussion:    1. Educated pt on the [employer] to Bariatric Success handout.  2. Educated pt on how to read a food label, with emphasis on low sugar and fat.   3. Reviewed lean protein sources today. Recommended consuming 15-20gm protein at 3 meals daily  than 25% of plate volume.    Instructions/Goals:     1. Include 15-20gm lean protein at each meal.  2. Increase vegetable/fruit intake, by having a vegetable or fruit with each meal daily. Recommended pt to increase vegetable/fruit intake to 4-5 servings daily.  3. Increase fluid intake to 64oz daily: choose plain or calorie/carbonation-free beverages.  4. Incorporate daily structured activity, 30-60 minutes most days of the week  5. Read food labels more consistently: keeping total fat grams <10, total sugar grams <10, fiber >3gm per serving.  6. Separate fluids 30 minutes before/after meal times.  7. Practice eating off of smaller plates/bowls, chewing to applesauce consistency, taking 20-30 minutes to eat in a calm/relaxed environment without distractions of tv/email/cell phone.    Handouts Provided:  Pt. [facility] Bariatric Care Patient Handbook      Monitor/Evaluation:  Pt. s target weight: no gain from initial visit, pt. verbalized understanding.     Has realistic expectations for weight loss: Yes  Verbalizes understanding of dietary changes post procedure: No  Verbalizes understanding of supplement needs: Yes  Verbalizes willingness to participate in physical activity: Yes  Motivation for change: average  Client s predicted compliance on a scale of 1 (low) to 10 (high): 7  RD s prediction for client success and compliance on a scale of 1 (low) to 10 (high):  7    Plan for next visit:   Bariatric plate.  Give food journal homework.  Review Bariatric plate and food journal homework.  Educate on dumping syndrome and reading food labels.  (Final Supervised Diet visit with RD) pre/post-op  diet progression, give review of surgery process.  Review [place] to Bariatric Success  Check Understanding Quiz    Time In: 1:30pm  Time Out: 2:00pm    ABN signed: Yes

## 2021-05-28 NOTE — PROGRESS NOTES
Dakota emailed Kimberly Viera a copy of her FMLA/STD paperwork.     Pt is asking for 4 weeks off after surgery.    Pt just started the surgical program. Forms will not be filled out until a the program has been completed and she is given a surgery date.    Forms have been printed and sent to HIM to keep in pt's file.    Patricia Francois, [facility]  P: [phone]  F: [phone]

## 2021-05-28 NOTE — PATIENT INSTRUCTIONS - HE
Patient is doing well with her eating behaviors.  She will take MMPI, QOLI and MAST prior to our next appt.

## 2021-05-28 NOTE — PROGRESS NOTES
Patient has no psychopathology.  She is doing well with her eating behaviors.  She will take MMPI, QOLI and MAST prior to our next appt.

## 2021-05-29 NOTE — PROGRESS NOTES
Patient is doing well with her eating behaviors. Her MMPI, QOLI and MAST were normal and show no psychopathology.

## 2021-05-29 NOTE — PATIENT INSTRUCTIONS - HE
Her MMPI, QOLI and MAST were normal and show no psychopathology.   She is stable and is a very good candidate for bariatric surgery.

## 2021-05-29 NOTE — PROGRESS NOTES
Order for Durable Medical Equipment was processed and equipment ordered.     DME provider: [facility]    Date Faxed: 5/21/2019    Ordering Provider: Rex Baker MD    PAP Order Type: New Device Order    Fax Number: IN HOUSE DME: [facility]

## 2021-05-29 NOTE — PROGRESS NOTES
Dear  Chino Rayo Md  [address] [place] [address]    Thank you for the opportunity to participate in the care of  Dakota Berkowitz.    Dakota Berkowitz is sent by Chion Rayo MD for a sleep consultation regarding snoring prior to bariatric surgery.     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    Typically in bed around 9 PM but takes around 1 hour to fall asleep.  Goes to bed at 9 PM so she will have enough time to sleep as much as she thinks she needs. Alarm goes off at 5 AM and she gets up right away.      Sleep Disordered Breathing - Has been snoring loudly for years.   We had an extensive conversation to review the results of her sleep study.  The overnight polysomnography was completed with a digital sleep system using the international 10-20 electrode placement for recording EEG, EOG, EMG from chin, ECG, respiratory effort, oximetry, body position, airflow, nasal pressure, snoring sound, pulse rate and limb movement channels.  The study was completed as a diagnostic study.  Study showed AHI 10.1/hr consistent with mild SUZY, with more events during REM.   Has nocturia once per night.   Upon waking feels tired.   Patient was counseled on the importance of driving while alert, to pull over if drowsy, or nap before getting into the vehicle if sleepy.    Movement/Behaviors - No somniloquy.  No somnambulism.  No sleep related eating.  No dream enactment behavior.   She denies bruxism.    Patient reports typical restless legs syndrome symptoms.  Reports symptoms are controlled with a shot of pickle juice.    Alcohol use - Prefers beer or wine. One night per week will have 2 - 3 drinks.    Caffeine intake - coffee 1 /day and caffeinated soft drinks 1 /day. Last caffeine intake is usually lunch.  Tobacco  exposure - none  Illicit substances - none    Lives with .  Has 4 pets, 3 dogs and 1 cat.     No known family history of snoring or Obstructive Sleep Apnea.    Past Medical History:  Past Medical History:   Diagnosis Date     Ankle pain      Arthritis     degenerative spine disease with sciatica     Heartburn      Knee pain      Morbid obesity with BMI of 40.0-44.9, adult (H)      Obstructive sleep apnea        Problem List:  Patient Active Problem List    Diagnosis Date Noted     Obstructive sleep apnea      Mild SUZY (obstructive sleep apnea) 05/19/2019     Overview Note:     5/10/2019 Polysomnography Diagnostic (wt 228 lbs)  Respiratory monitoring showed mild obstructive sleep apnea (AHI=10.1/hr)  Respiratory events were more frequent during stage REM sleep.       Low serum vitamin B12 <400 04/07/2019     Arthritis      Overview Note:     degenerative spine disease with sciatica       Heartburn      Morbid obesity with BMI of 40.0-44.9, adult (H)      Knee pain      Ankle pain         Past Surgical History:  Past Surgical History:   Procedure Laterality Date     APPENDECTOMY       BLADDER SUSPENSION       BREAST BIOPSY       OOPHORECTOMY      unilateral     PELVIC LAPAROSCOPY      endometriosis     REFRACTIVE SURGERY       SALPINGECTOMY       spine injection       TONSILLECTOMY          Meds:  No current outpatient medications on file.     No current facility-administered medications for this visit.         Allergies:  Onion; Venom-honey bee; Clindamycin; and Medroxyprogesterone     Social History:  Social History     Socioeconomic History     Marital status:      Spouse name: Not on file     Number of children: Not on file     Years of education: Not on file     Highest education level: Not on file   Occupational History     Not on file   Social Needs     Financial resource strain: Not on file     Food insecurity:     Worry: Not on file     Inability: Not on file     Transportation needs:      "Medical: Not on file     Non-medical: Not on file   Tobacco Use     Smoking status: Former Smoker     Years: 14.00     Smokeless tobacco: Never Used     Tobacco comment: age 19 to 33 social smoker   Substance and Sexual Activity     Alcohol use: Yes     Comment: Rare 2-3 maybe 3X/mo     Drug use: No     Sexual activity: Yes     Partners: Male     Birth control/protection: Surgical   Lifestyle     Physical activity:     Days per week: Not on file     Minutes per session: Not on file     Stress: Not on file   Relationships     Social connections:     Talks on phone: Not on file     Gets together: Not on file     Attends [employer] service: Not on file     Active member of club or organization: Not on file     Attends meetings of clubs or organizations: Not on file     Relationship status: Not on file     Intimate partner violence:     Fear of current or ex partner: Not on file     Emotionally abused: Not on file     Physically abused: Not on file     Forced sexual activity: Not on file   Other Topics Concern     Not on file   Social History Narrative    . On daughter 25 yo One grandson.    Working FT, Customer Service [employer]        Family History:  Family History   Problem Relation Age of Onset     Diverticulitis Mother      No Medical Problems Father      No Medical Problems Sister      No Medical Problems Sister         Review of Systems: Weight gain  A complete review of systems reviewed by me is negative with the exeption of what has been mentioned in the history of present illness.    Physical Exam:  /82 (Patient Site: Right Arm, Patient Position: Sitting, Cuff Size: Adult Large)   Pulse 86   Ht 5' 2\" (1.575 m)   Wt (!) 226 lb (102.5 kg)   SpO2 98%   BMI 41.34 kg/m    General appearance: No apparent distress, well groomed.    HEENT:   Head: Normocephalic, atraumatic.  Eyes: PERRL  Nose: Nares patent.  No exudate.  No septal deviation noted.  Mouth: Teeth: Normal   Tongue: " Normal  Oropharynx:  Mallampati Classification: IV    Tonsils: Grade 0    Uvula: Normal    Neck: Supple without bruit.      Cardiac: Regular rate and rhythm.  No murmurs.  Radial pulses are strong and symmetric.  Pulmonary: Symmetric air movement, lungs clear to auscultation bilaterally.  Musculoskeletal: No edema noted.  No clubbing or cyanosis.  Skin: Warm, dry, intact.  Neurologic: Alert and oriented to person, place and time   Gait is normal.  Psychiatric: Affect pleasant.  Mood good.     Labs/Studies:  No results found for: WBC, HGB, HCT, MCV, PLT      Chemistry        Component Value Date/Time     03/28/2019 1039    K 4.4 03/28/2019 1039     (H) 03/28/2019 1039    CO2 21 (L) 03/28/2019 1039    BUN 16 03/28/2019 1039    CREATININE 0.80 03/28/2019 1039    GLU 83 03/28/2019 1039        Component Value Date/Time    CALCIUM 9.5 03/28/2019 1039    ALKPHOS 100 03/28/2019 1039    AST 24 03/28/2019 1039    ALT 43 03/28/2019 1039    BILITOT 0.5 03/28/2019 1039        Lab Results   Component Value Date    FERRITIN 88 03/28/2019     No results found for: TSH  Lab Results   Component Value Date    HGBA1C 5.1 03/28/2019     Polysomnogram Reviewed- Date: 5/10/2019 , Location: [place]  Mild obstructive sleep apnea with an AHI of 10.1 events per hour.      Assessment and Plan:  1. Mild SUZY (obstructive sleep apnea)  We had an extensive conversation to review the results of her sleep study and to  her on the importance of treating sleep apnea.   She has been on CPAP for 1 day now and we discussed strategies to improve sleep and tolerance.  Adjusted starting pressure for patient comfort to 8 cmH2O.    2. Morbid obesity with BMI of 40.0-44.9, adult (H)  We discussed the link between obesity, sleep apnea, and health outcomes.  Patient was encouraged to decrease caloric intake and increase activity levels to try to move towards a normal weight.  She was encouraged to discuss further strategies with her primary  care provider.      Patient verbalized understanding of these issues, agrees with the plan and all questions were answered today. Patient was given an opportuntity to voice any other symptoms or concerns not listed above. Patient did not have any other symptoms or concerns.      Patient told to return in 8 weeks.    MD ESTUARDO Arriola Board Certified in Internal Medicine and Sleep Medicine  [facility].

## 2021-05-29 NOTE — PROGRESS NOTES
Psych cleared.  Workflow updated, still needs clearance from RD and three more months of structured weight loss. Not sure if she has gone to support group yet, but will check the sign-in. She also still needs a mammogram and colonoscopy. Senait Brooke RN

## 2021-05-29 NOTE — PROGRESS NOTES
Follow Up Surgical Weight Loss Supervised Diet Evaluation  Assessment:  Pt presents for follow up supervised diet visit with RD.    This patient is a 48 y.o.  She is being seen today for follow-up nutritional evaluation. Dakota Berkowitz has been unsuccessful with non-surgical weight loss methods and is interested in bariatric surgery. Today we reviewed the patients current eating habits and level of physical activity, and instructed on the changes that are required for successful bariatric outcomes.    Workflow review: labs complete, psych cleared, attended support group.   Weight goal: at or below initial      Pt Active Problem List Diagnosis:     Patient Active Problem List   Diagnosis     Arthritis     Heartburn     Morbid obesity with BMI of 40.0-44.9, adult (H)     Knee pain     Ankle pain     Low serum vitamin B12 <400     Mild SUZY (obstructive sleep apnea)     Obstructive sleep apnea     Pt's Initial Weight: 221 lbs  Weight: (!) 226 lb 8 oz (102.7 kg)  Weight loss from initial: -5.5  % Weight loss: -2.49 %    Body mass index is 41.43 kg/m .    Calculated RMR ([place]-St Jeor equation): 1611 calories    Progress made since last visit: Water increased and more aware of protein intake. She's noticed struggle with  fluids and slow meal pace.     Concerns: Poor protein intake at breakfast, large portion sizes at dinner, not  fluids, no routine exercise established.     Diet Recall/Time:   Breakfast: HB egg yolk, sausage (10g)   Am Snack: none or yogurt   Lunch: chicken, vegetables (20g)   Pm snack:none   Dinner: chicken, potatoes, carrots (20g)   HS Snack: none     Protein: 50 grams     Meals per week away from home: none      Recommended limiting eating out to no more than 2x/week.  Patient and I reviewed the importance of eating three consistent meals per day; as well as meal timing to be spaced 4-5 hours apart.  Snack choices: 100-150 calories (1-2x/day if physically hungry), incorporating  a fruit/vegetable w/ protein source.    Meal Duration:15 minutes  Encouraged slowing meal times down, 20-30 minutes, chewing to applesauce consistency.     Portion Sizes problematic? Yes Per patient/diet recall   To aid in proper portion control and slow meal time down discussed consuming meals off smaller plates, use toddler/children utensils and set utensils down after each bite.    Protein, vegetables/fruits, carbohydrates:   The patient and I discussed the importance of including lean/low fat protein at each meal and limiting carbohydrate intake to less than 25% of plate volume.     Vitamins   Post-op vitamin regimen: Multi Vit + iron 2x/day, calcium citrate 500-600 mg 2x/day, 7495-3358 mcg of Sublingual B-12 daily, and 5000 IU Vitamin D3 daily.    Beverages (Type/Oz. per day)  Water: 100 oz   Coffee: 1 cup   Tea: none   Milk: none   Regular soda: none   Diet soda: 1-3 cans/week   Juice: none   Karlo-Aid/lemonade/etc: none   Alcohol: not discussed     Discussed the importance of adequate hydration after surgery and the goal of 64+ oz of fluid daily.   The patient understands the importance of avoiding all carbonated, caffeinated and sweetened drinks; and instead choose 64 oz plain water.    Fluid-meal separation:   Pt is not working on  fluids 30min before and 30 minutes after meals.  Fluids are not  30min before and 30 minutes after meals.    Exercise  ADLs,being more active throughout the day.     Pt's understands that 30-60 minutes of daily activity is an important part of bariatric surgery success.   Encouraged pt to incorporate upper body strength training exercise, even if its lifting soup cans while watching TV at night, doing pushups/sit-ups, and abdominal work.    PES statement:   1. (NI-1.3)Excessive energy intake related to Food and nutrition related knowledge deficit concerning excessive energy/oral intake as evidenced by Intake of high caloric density foods at meals and/or snacks;  large portions; Estimated intake that exceeds estimated daily energy intake;  and BMI 41.          Intervention:  Discussion:   1. Reviewed the Keys to Bariatric Success handout.  2. Gave food journal homework, to be completed for f/u appointment.  3. Bariatric Plate: The patient and I discussed the importance of including lean/low  fat protein at each meal and limiting carbohydrate intake to less  than 25% of plate volume.    Instructions/Goals:   1. Include 15-20gm protein at each meal.  2. Increase vegetable/fruit intake, by having a vegetable or fruit with each meal daily. Recommended pt to increase vegetable/fruit intake to 4-5 servings daily.  3. Increase fluid intake to 64oz daily: choose plain or calorie/carbonation-free beverages.  4. Incorporate daily structured activity, 30-60 minutes most days of the week  5. Fill out food journal and bring to next month's visit.  6. Practice plate method: 1/2 plate lean/low fat protein source, vegetable/fruit, <25% of plate complex carbohydrates.  7. Read food labels more consistently: keeping total fat grams <10, total sugar grams <10, fiber >3gm per serving.  8. Separate fluids 30 minutes before/after meal times.  9. Practice eating off of smaller plates/bowls, chewing to applesauce consistency, taking 20-30 minutes to eat in a calm/relaxed environment without distractions of tv/email/cell phone.    Goal established by patient:  1.Treadmill 3x/week.     Handouts provided:  Pt. [facility] Bariatric Care Patient Handbook  Bariatric Plate  Food journal      Monitor/Evaluation:     Pt understands the importance of not gaining any weight from initial recorded weight.      Has realistic expectations for weight loss: Yes  Verbalizes understanding of dietary changes post procedure: No  Verbalizes understanding of supplement needs: Yes  Verbalizes willingness to participate in physical activity: Yes  Motivation for change: average  Client s predicted compliance on a  scale of 1 (low) to 10 (high): 67  RD s prediction for client success and compliance on a scale of 1 (low) to 10 (high):  7    Plan for next visit:   Review Bariatric plate and food journal homework.  Educate on dumping syndrome and reading food labels.  (Final Supervised Diet visit with RD) pre/post-op  diet progression, give review of surgery process.  Review [place] to Bariatric Success  Check Understanding Quiz    Time In: 7:00am  Time Out: 7:30am      ABN signed: Yes

## 2021-05-29 NOTE — PROGRESS NOTES
Follow Up Surgical Weight Loss Supervised Diet Evaluation  Assessment:  Pt presents for follow up supervised diet visit with RD.    This patient is being seen today for follow-up nutritional evaluation. Dakota Berkowitz has been unsuccessful with non-surgical weight loss methods and is interested in bariatric surgery. Today we reviewed the patients current eating habits and level of physical activity, and instructed on the changes that are required for successful bariatric outcomes.    Workflow review: labs complete, psych cleared, attended support group.   Weight goal: at or below initial      Pt Active Problem List Diagnosis:     Patient Active Problem List   Diagnosis     Arthritis     Heartburn     Morbid obesity with BMI of 40.0-44.9, adult (H)     Knee pain     Ankle pain     Low serum vitamin B12 <400     Mild SUZY (obstructive sleep apnea)     Obstructive sleep apnea       Pt's Initial Weight: 221 lbs  Weight: (!) 226 lb 14.4 oz (102.9 kg)  Weight loss from initial: -5.9  % Weight loss: -2.67 %    Body mass index is 41.5 kg/m .    Calculated RMR ([place]-St Jeor equation): 1611 calories    Progress made since last visit: Pt been on steroids past month r/t RA. Otherwise, pt reports increased activity and being more aware of the foods she was eating. Food journal completed, but forgot.     Concerns: caffeine intake, fast meal pace.     Diet Recall/Time:   Breakfast: cottage cheese and chicken, peaches (30g)   Am Snack: none   Lunch: salad with vegetables, blue cheese, chicken (20g)   Pm snack:none   Dinner: pasta with meat sauce, garlic bread (20g)   HS Snack: none     Protein: 70 grams     Meals per week away from home: 0-1x/month      Meal Duration:15 minutes  Encouraged slowing meal times down, 20-30 minutes, chewing to applesauce consistency.     Portion Sizes problematic? No Per patient/diet recall   To aid in proper portion control and slow meal time down discussed consuming meals off smaller plates, use  toddler/children utensils and set utensils down after each bite.    Protein, vegetables/fruits, carbohydrates:   The patient and I discussed the importance of including lean/low fat protein at each meal and limiting carbohydrate intake to less than 25% of plate volume.     Vitamins   Post-op vitamin regimen: Multi Vit + iron 2x/day, calcium citrate 500-600 mg 2x/day, 8310-6085 mcg of Sublingual B-12 daily, and 5000 IU Vitamin D3 daily.    Beverages (Type/Oz. per day)  Water: 100 oz   Coffee: 1 cup   Diet soda: 1 can per day       Discussed the importance of adequate hydration after surgery and the goal of 64+ oz of fluid daily.   The patient understands the importance of avoiding all carbonated, caffeinated and sweetened drinks; and instead choose 64 oz plain water.    Fluid-meal separation:   Pt is working on  fluids 30min before and 30 minutes after meals.  Fluids are  30min before and 30 minutes after meals.    Exercise  Pt exercise two days a week on treadmill for 30 minutes.     Pt's understands that 30-60 minutes of daily activity is an important part of bariatric surgery success.   Encouraged pt to incorporate upper body strength training exercise, even if its lifting soup cans while watching TV at night, doing pushups/sit-ups, and abdominal work.    PES statement:   1. (NI-1.3)Excessive energy intake related to Food and nutrition related knowledge deficit concerning excessive energy/oral intake as evidenced by Intake of high caloric density foods/beverages (soda) at meals and/or snacks; large portion;  Estimated intake that exceeds estimated daily energy intake;  and BMI 41.       Intervention:  Discussion:   1. Reviewed the Keys to Bariatric Success handout.  2. Dumping syndrome: choosing foods with less than 5-10gm fat/sugar per serving to avoid dumping syndrome for [facility] pts.  3. Reviewed Bariatric plate and food journal homework.    Instructions/Goals:   1. Include 15-20gm protein at  each meal.  2. Increase vegetable/fruit intake, by having a vegetable or fruit with each meal daily. Recommended pt to increase vegetable/fruit intake to 4-5 servings daily.  3. Increase fluid intake to 64oz daily: choose plain or calorie/carbonation-free beverages.  4. Incorporate daily structured activity, 30-60 minutes most days of the week  5. Practice plate method: 1/2 plate lean/low fat protein source, vegetable/fruit, <25% of plate complex carbohydrates.  6. Read food labels more consistently: keeping total fat grams <10, total sugar grams <10, fiber >3gm per serving.  7. Separate fluids 30 minutes before/after meal times.  8. Practice eating off of smaller plates/bowls, chewing to applesauce consistency, taking 20-30 minutes to eat in a calm/relaxed environment without distractions of tv/email/cell phone.    Handouts provided:  Pt. [facility] Bariatric Care Patient Handbook      Monitor/Evaluation:     Pt understands the importance of not gaining any weight from initial recorded weight.      Has realistic expectations for weight loss: Yes  Verbalizes understanding of dietary changes post procedure: No  Verbalizes understanding of supplement needs: Yes  Verbalizes willingness to participate in physical activity: Yes  Motivation for change: average  Client s predicted compliance on a scale of 1 (low) to 10 (high): 7  RD s prediction for client success and compliance on a scale of 1 (low) to 10 (high):  7      Plan for next visit:   (Final Supervised Diet visit with RD) pre/post-op  diet progression, give review of surgery process.  Review [place] to Bariatric Success  Check Understanding Quiz    Time In: 7:30am  Time Out: 8:00am      ABN signed: Yes

## 2021-05-29 NOTE — PROGRESS NOTES
Order for Durable Medical Equipment was processed and equipment ordered.     DME provider: [facility]    Date Faxed: 6/20/2019    Ordering Provider: Rex Baker MD    PAP Order Type: Pressure adjustment  (done in clinic by provider)    Fax Number: IN HOUSE DME: [id number]

## 2021-05-30 NOTE — PROGRESS NOTES
Follow Up Surgical Weight Loss Supervised Diet Evaluation  Assessment:  Pt presents for follow up supervised diet visit with RD.    This patient is being seen today for follow-up nutritional evaluation. Dakota Berkowitz has been unsuccessful with non-surgical weight loss methods and is interested in bariatric surgery. Today we reviewed the patients current eating habits and level of physical activity, and instructed on the changes that are required for successful bariatric outcomes.    Workflow review: psych cleared, labs completed, attended support group. SWL 5 of 6.   Weight goal: at or below initial      Pt Active Problem List Diagnosis:     Patient Active Problem List   Diagnosis     Arthritis     Heartburn     Morbid obesity with BMI of 40.0-44.9, adult (H)     Knee pain     Ankle pain     Low serum vitamin B12 <400     Mild SUZY (obstructive sleep apnea)     Pt's Initial Weight: 221 lbs  Weight: (!) 226 lb 6.4 oz (102.7 kg)  Weight loss from initial: -5.4  % Weight loss: -2.44 %    Body mass index is 41.41 kg/m .    Calculated RMR ([place]-St Jeor equation): 1611 calories    Progress made since last visit: Pt working on consistency and monitoring portion sizes. Over past month switched to decaf coffee. Pt also starting CPAP machine.     Concerns: Above initial weight.     Diet Recall/Time:   Breakfast: cottage cheese, peach (30g)   Am Snack: none   Lunch: lunch meat and cheese roll up, vegetables (20g)   Pm snack: 2 tangerines   Dinner: gyros with chicken (20g)   HS Snack: none     Protein: 70 grams     Meals per week away from home: 0-1x/week      Recommended limiting eating out to no more than 2x/week.  Patient and I reviewed the importance of eating three consistent meals per day; as well as meal timing to be spaced 4-5 hours apart.  Snack choices: 100-150 calories (1-2x/day if physically hungry), incorporating a fruit/vegetable w/ protein source.    Meal Duration:30 minutes  Encouraged slowing meal times  down, 20-30 minutes, chewing to applesauce consistency.     Portion Sizes problematic? No Per patient/diet recall   To aid in proper portion control and slow meal time down discussed consuming meals off smaller plates, use toddler/children utensils and set utensils down after each bite.    Protein, vegetables/fruits, carbohydrates:   The patient and I discussed the importance of including lean/low fat protein at each meal and limiting carbohydrate intake to less than 25% of plate volume.     Vitamins   Post-op vitamin regimen: Multi Vit + iron 2x/day, calcium citrate 500-600 mg 2x/day, 7249-5203 mcg of Sublingual B-12 daily, and 5000 IU Vitamin D3 daily.    Beverages (Type/Oz. per day)  Water: 100 oz     Discussed the importance of adequate hydration after surgery and the goal of 64+ oz of fluid daily.   The patient understands the importance of avoiding all carbonated, caffeinated and sweetened drinks; and instead choose 64 oz plain water.    Fluid-meal separation:   Pt is working on  fluids 30min before and 30 minutes after meals.  Fluids are  30min before and 30 minutes after meals.    Exercise  1x/week treadmill.     Pt's understands that 30-60 minutes of daily activity is an important part of bariatric surgery success.   Encouraged pt to incorporate upper body strength training exercise, even if its lifting soup cans while watching TV at night, doing pushups/sit-ups, and abdominal work.    PES statement:   1. (NC-3.3.5) Obese, class III, BMI ?40 related to physical inactivity as evidenced by Infrequent, low-duration and or low intensity physical activity; and Large amounts of sedentary activities.     Intervention:  Discussion:   1. Reviewed the Keys to Bariatric Success handout.  2. Set goal to exercise 2x/week.   3. Educated on pre/post-op diet progression, post-op vitamin regimen, gave review of surgery process.    Instructions/Goals:   1. Include 15-20gm protein at each meal.  2. Increase  vegetable/fruit intake, by having a vegetable or fruit with each meal daily. Recommended pt to increase vegetable/fruit intake to 4-5 servings daily.  3. Increase fluid intake to 64oz daily: choose plain or calorie/carbonation-free beverages.  4. Incorporate daily structured activity, 30-60 minutes most days of the week  5. Practice plate method: 1/2 plate lean/low fat protein source, vegetable/fruit, <25% of plate complex carbohydrates.  6. Read food labels more consistently: keeping total fat grams <10, total sugar grams <10, fiber >3gm per serving.  7. Separate fluids 30 minutes before/after meal times.  8. Practice eating off of smaller plates/bowls, chewing to applesauce consistency, taking 20-30 minutes to eat in a calm/relaxed environment without distractions of tv/email/cell phone.    Handouts provided:  [facility]ric [facility] Patient Handbook      Monitor/Evaluation:     Pt understands the importance of not gaining any weight from initial recorded weight.      Has realistic expectations for weight loss: Yes  Verbalizes understanding of dietary changes post procedure: Yes  Verbalizes understanding of supplement needs: Yes  Verbalizes willingness to participate in physical activity: Yes  Motivation for change: average  Client s predicted compliance on a scale of 1 (low) to 10 (high): 7  RD s prediction for client success and compliance on a scale of 1 (low) to 10 (high):  7    Pt has made the necessary changes, and is knowledgeable and well-informed of the dietary and physical activity requirements that are necessary for successful bariatric outcomes. This Pt is an appropriate candidate for surgery from a nutrition standpoint at this time. The patient understands that surgery is a tool, and not a cure, and our aftercare program must be followed.    Time In: 7:30am  Time Out: 7:45am      ABN signed: Yes

## 2021-05-31 NOTE — TELEPHONE ENCOUNTER
FRANCES completed and signed. Faxed to F: [phone], [employer].    Forms sent to HIM for scanning.    Patricia Francois [facility]  P: [phone]  F: [phone]

## 2021-05-31 NOTE — PROGRESS NOTES
Patient attended group class to review pre-op instructions and dietary plan for upcoming surgery.    Pt will begin 2-week pre-op liquid diet 9/4/2019, will do clear liquids the day before surgery. Pt is scheduled for Amna-En-Y on 9/18/2019, and will then follow 1 week post-op liquid diet. Pt will f/u with RD 1-week post-op for further diet advancement.    Educated pt on 2-week pre-op and 1-week post-op liquid diets. Discussed appropriate liquids and demonstrated portions for each of the food groupings during each diet phase. Reviewed appropriate calories/protein/fluid goals during 2-week pre-op liquid diet. Educated on correct vitamins/minerals to take after surgery in correct dosage and frequency. Provided grocery list and sample menu plan for each diet stage, as well as unflavored protein powder samples.       Discussed admission process and hospital course.  Pharmacy information packet given and explained. Patient was given exercises to work on post-op for maintaining muscle mass and strengthening that was created by [facility].  Bariatric quiz given to patient for a review on their own.  Discharge instructions, information card and follow up appointments given and reviewed with pt at this time and patient verbalized understanding. She will have her H&P and EKG on 8/30/2019 and do her  pre-op testing at [place] today. Prescriptions for Omeprazole and Actigall sent to the patient's pharmacy to be started after surgery.      Senait Brooke RN, CBN

## 2021-05-31 NOTE — PROGRESS NOTES
HPI: Dakota Berkowitz is a 48 y.o. female here today for consideration of metabolic and bariatric surgery. She is referred by Dr. Rayo.  She has struggled with obesity for most of her life.  She has tried multiple weight loss programs in the past including use of phentermine.  She is able lose maybe 10 to 20 pounds at any given time but then regained that weight back plus more.  She has been diagnosed with obstructive sleep apnea and is trying to use her CPAP machine but is struggling with this a bit.  She was also recently having a flare of back pain and some hypersensitivity and was noted to have a CRP of 103.  There was concern that she might have some type of chronic inflammatory disease though her rheumatoid markers, ESR and IgG antibodies were all negative.  She is trying to get in to see a rheumatologist to look at this more carefully.  It should be noted that her markedly elevated CRP improved dramatically with a short course of steroids.  She is able to ambulate without too much difficulty and denies any significant issues with hip or knee or ankle pain.  She has had multiple laparoscopic surgeries for GYN issues but no other major abdominal surgery.      Allergies:Onion; Venom-honey bee; Clindamycin; and Medroxyprogesterone    Past Medical History:   Diagnosis Date     Ankle pain      Arthritis     degenerative spine disease with sciatica     Heartburn      Knee pain      Morbid obesity with BMI of 40.0-44.9, adult (H)      Obstructive sleep apnea        Past Surgical History:   Procedure Laterality Date     APPENDECTOMY       BLADDER SUSPENSION       BREAST BIOPSY       OOPHORECTOMY      unilateral     PELVIC LAPAROSCOPY      endometriosis     REFRACTIVE SURGERY       SALPINGECTOMY       spine injection       TONSILLECTOMY         CURRENT MEDS:  Current Outpatient Medications   Medication Sig Dispense Refill     predniSONE (DELTASONE) 2.5 MG tablet Take 2.5 mg by mouth daily.       No current  "facility-administered medications for this visit.          Family History   Problem Relation Age of Onset     Diverticulitis Mother      No Medical Problems Father      No Medical Problems Sister      No Medical Problems Sister         reports that she has quit smoking. She quit after 14.00 years of use. She has never used smokeless tobacco. She reports that she drinks alcohol. She reports that she does not use drugs.    Review of Systems -  A 12 point ROS was reviewed and except for what is listed in the HPI above, all others are negative  PSYCHIATRIC: She has undergone a lifestyle assessment and has been deemed a good candidate for bariatric surgery by the psychologist.    BP (!) 176/104   Pulse 73   Resp 18   Ht 5' 2\" (1.575 m)   Wt (!) 226 lb (102.5 kg)   SpO2 100%   Breastfeeding? No   BMI 41.34 kg/m    Wt Readings from Last 3 Encounters:   08/13/19 (!) 226 lb (102.5 kg)   08/06/19 (!) 222 lb 11.2 oz (101 kg)   07/25/19 (!) 226 lb 6.4 oz (102.7 kg)     Body mass index is 41.34 kg/m .    EXAM:  GENERAL: This is a well-developed 48 y.o. female who appears her stated age  HEAD & NECK: Grossly normal.  No palpable thyroid lesions  CARDIAC: RRR without murmur  CHEST/LUNG:  Clear to auscultation  ABDOMEN: Obese.  Nontender.  No hernias or masses appreciated.  LYMPHATIC:  No significant adenopathy appreciated.    EXTREMITIES: Grossly normal.  No evidence of chronic venous stasis.    NEUROLOGIC: Focally intact  INTEGUMENT: No open lesions or ulcers  PSYCHIATRIC: Normal affect. She has a good grasp on the nature of her obesity and the treatment options.    LABS:  No results found for: WBC, HGB, HCT, MCV, PLT  INR/Prothrombin Time      Lab Results   Component Value Date    HGBA1C 5.1 03/28/2019     Lab Results   Component Value Date    ALT 43 03/28/2019    AST 24 03/28/2019    ALKPHOS 100 03/28/2019    BILITOT 0.5 03/28/2019       Assessment/Plan: 48 y.o. female who is an excellent candidate for bariatric and " metabolic surgery.  She is really most interested in the laparoscopic Amna-en-Y gastric bypass.  I did discuss with her the slight potential need for steroids down the road if she truly is diagnosed with some type of chronic inflammatory disease but she felt that this is really what she wanted and given the uncertainty of her diagnosis and the fact that her symptoms are only limited to her lower back think would be okay to proceed with a Amna-en-Y gastric bypass.      I went over the surgery in detail with her.  I went over the nature of the operation and some of the potential consequences of the surgery.  I went over the expected hospital course and discussed laparoscopic versus open surgery, understanding that we will plan on doing this laparoscopically with the possibility of having to convert to an open operation.  I went over some of the risks and complications of the operation including, but not limited to, DVT, pulmonary emboli, pneumonia, postoperative bleeding, wound infection, staple line leak, intra-abdominal sepsis, and possible death.  I also went over some of the potential nutritional concerns such as vitamin B-12, iron, vitamin D, vitamin A, calcium and protein deficiencies.  I will also went over the need for lifelong nutritional surveillance.  The patient understands and wants to proceed with surgery.  We will submit for prior authorization.      Juan Sin MD  [facility] Department of Surgery

## 2021-05-31 NOTE — PROGRESS NOTES
I have submitted a prior authorization request on behalf of this patient to [facility] for approval of a LRNY with Dr. Juan Sin.     REF [id number]  Faxed to [phone]    Donte DE LA CRUZ

## 2021-05-31 NOTE — PATIENT INSTRUCTIONS - HE
Your surgery is scheduled for 9/18/2019 at 9:30 am.  Please arrive at 7:30 am.    Medication Recommendations    Acetaminophen (Brand Name: Tylenol or MPAP)   You will likely be sent home on Tylenol to go with your other pain medications after surgery.  It is ok to cut/crush regular or extra strength tablets.  Make sure tablet is not long acting or extended release.  Do not take more than 3000mg in 24 hours.  If you decide to use liquid Tylenol at home, we recommend you use Adult strength because you will have to take less liquid to get the same effect.  Dilute the medication 1:1 with water before taking the liquid version to prevent dumping or stomach upset.    Citalopram (Brand Name: Celexa)   Ok to cut/crush. However, this medication may not be absorbed as well after Amna-en-Y gastric bypass surgery.  Watch for changes in symptom control.  Talk with primary doctor if you notice any changes in how well this medication is working after surgery. *Take a.m. of surgery with a sip of water*    Multivitamin with Iron   If not already taking, start chewable MVI with at least 18mg of iron and 10-15 mg of zinc twice a day at least 2 weeks prior to surgery and resume the day after surgery for life. Do not take the morning of surgery.    Omeprazole (Brand Name: Prilosec)   If not already taking, you will get a prescription to start when you get home from the hospital.  Tablets cannot be cut or crushed.  If a capsule, entire capsule contents may be sprinkled on a spoonful of applesauce and taken immediately without chewing.  If only on a full liquid diet, dump capsule contents into a spoonful of liquid and take without chewing.  May swallow whole again starting 6 weeks after surgery but can try swallowing sooner if having issues with opening into food.  Continue after surgery for at least 3 months.    Prednisone   Ok to cut/crush if needed.  Notify the bariatric team if you need to take as needed prior to surgery.    Ursodiol  (Brand name: Actigall)  Start two weeks after surgery.  Swallow whole with warm water, do not cut, crush, or open capsule up.  This is a medication that will help to prevent gallstones from forming during the first 6 months post-op of rapid weight loss.  Prescription was sent to your pharmacy.    Vitamin B12   If not already taking, start sublingual (under the tongue) vitamin B12 1,000 mcg at least 2 weeks prior to surgery and resume the day after surgery for life. Do not take the morning of surgery.        [facility]   Laparoscopic Amna-en-Y Gastric Bypass, Gastric Sleeve & Single Anastomosis Duodenal Switch  Home Discharge Instructions      Coughing and Deep Breathing   Use your incentive spirometer frequently after you get home. Continued coughing and deep breathing help prevent complications such as fevers and pneumonia. If you are fever free after one week, stop using it, and discard it.    Incision and Dressing   All incisional coverings can get wet so you may continue to shower at home.  If you have Band-Aids, they should come off while in the hospital.  Remove all steri-strips one week from your surgery date unless told otherwise by the surgeon.  If you have gauze covered by a clear dressing, remove 2-3 days after surgery as directed by the surgeon.     Notify the clinic or your surgeon if:  You develop a fever orally of 101.5 or greater, see any unusual bright red or green infection-like drainage; see redness or swelling around the incision; have left shoulder pain or pain that does not go away with your narcotic; increasing anxiety or feeling that something is just  not right;  a persistent rapid heart rate (greater than or equal to 120 beats per minute) lasting longer than 15 minutes; progressive rapid breathing; increasing shortness of breath; continuous (non-stop) hiccups lasting longer than 15 minutes; persistent nausea; if you are unable to keep liquids down; have frothy vomiting; difficulty  swallowing; excessive bloating; swelling, warmth, discoloration or pain in your lower legs; dark, bright red, black, or tarry bowel movements; or anything you are concerned might be an urgent problem or need reassurance about.    Dehydration/Nausea/Vomiting  Vomiting is not normal after surgery and can be caused by drinking with meals, eating large portions, not chewing thoroughly and drinking large sips.  If you continue to have nausea and vomiting despite following our recommendations, call the clinic.  Nausea can be caused by dehydration so make sure you are drinking the suggested amount of fluids.  Symptoms of dehydration include dark colored urine, lack of energy, nausea, dizziness, headache and a bad taste in your mouth.  If you notice any of these symptoms, please don t delay in calling the clinic.  Early identification gives us more options for treatment.    Bowel Movements  Consider that your stools might be loose since you are currently only taking in fluids. Diarrhea may be caused by dumping syndrome if you had Gastric Bypass, so make sure you aren t drinking liquids containing excess sugar.  Otherwise, call the clinic if you have 3 or more diarrhea stools per day. If constipation is a problem, it is ok to use a Dulcolax  rectal suppository, Miralax or Milk of Magnesia . Other helpful ways to avoid constipation include: increasing your fluids; stop taking narcotic medications; and increasing your activity. Adding Benefiber  daily to your liquids once you have had a stool may help keep you regular until there is more natural fiber in your diet. You may also have foul smelling gas.    Risk for Blood Clots  For the next 6 weeks, if you are in any vehicle longer than 30 minutes, stop every 30 minutes, and walk for at least 3 minutes before continuing your journey. Traveling and/or flying are not recommended for 6 weeks.  If leaving the country within the first 3-6 months after surgery, check with your surgeon  first.    Activity  Do not lift greater than 20 lbs. for 2 weeks unless told differently by your surgeon. After two weeks, let your body be your guide. You may drive only after you have been completely off of narcotics for a minimum of 24 hours. Continue to shower as you have in the hospital. NO BATHING IN THE BATHTUB, SWIMMING, etc. for 2-4 weeks.  (You need to be sure your incisions are well healed to prevent infection.)    Pain Control  You will go home with a prescription for pain medication. If your pain does not go away with this medication, please notify your surgeon. If the pain requires medication, but not something as strong as the prescription, you may try Tylenol  (acetaminophen) cut  <    inch or crushed.   DO NOT USE PRESCRIBED OR OVER THE COUNTER NONSTEROIDAL ANTI-  INFLAMMATORY MEDICATION LIKE MOTRIN, ADVIL, IBUPROFEN OR ASPIRIN.    Acid Reducer and Gallbladder Medication  It is important to reduce the amount of acid in your new stomach for a couple months after surgery while it is healing.  We will prescribe an acid reducer that you should take daily.  It is important for you to let us know if you have any symptoms of heartburn or reflux.      For those of you who still have a gallbladder, we will also prescribe a medication called Actigall (ursodiol) and we recommend taking it twice a day for 6 months.  It is only effective if you take it twice a day.  You will start taking this two weeks after surgery.    ER Needs  If you need to go to the Emergency Room, we prefer you go to the [facility] ER.  Be sure to inform the ER staff that you are a bariatric surgery patient, and ask them to notify your surgeon that you are there.    Remember to refer to your bariatric program handbook and keep follow up appointments for long-term success. You will need regular labs to check your nutritional status and it is very important to take ALL your supplements and protein religiously,    For urgent  concerns on evenings, weekends & holidays, call the clinic and the message will direct you to the surgeon on call.    [facility]: [phone]  Bariatric Nurse Line: [phone]

## 2021-05-31 NOTE — TELEPHONE ENCOUNTER
Called and left voicemail.  Reviewed [employer].  Everything looks good (fit, usage, residual AHI, leak).    Asked her to provide more informationabout her symptoms:  Inhale vs exhale, too much or too little pressure/airflow, is mask not fitting well per her expectations?    Rex Baker MD  6:00 PM, 8/23/2019

## 2021-05-31 NOTE — PROGRESS NOTES
Follow Up Surgical Weight Loss Supervised Diet Evaluation  Assessment:  Pt presents for follow up supervised diet visit with RD.    This patient is being seen today for follow-up nutritional evaluation. Dakota Berkowitz has been unsuccessful with non-surgical weight loss methods and is interested in bariatric surgery. Today we reviewed the patients current eating habits and level of physical activity, and instructed on the changes that are required for successful bariatric outcomes.    Workflow review: All cleared; SWL 6 of 6  Weight goal: at or below initial     Pt Active Problem List Diagnosis:     Patient Active Problem List   Diagnosis     Arthritis     Heartburn     Morbid obesity with BMI of 40.0-44.9, adult (H)     Knee pain     Ankle pain     Low serum vitamin B12 <400     Mild SUZY (obstructive sleep apnea)         Pt's Initial Weight: 221 lbs  Weight: (!) 222 lb 11.2 oz (101 kg)  Weight loss from initial: -1.7  % Weight loss: -0.77 %    Body mass index is 40.73 kg/m .    Calculated RMR ([place]-St Jeor equation): 1611 calories    Progress made since last visit: Pt reports starting to take bariatric vitamins. She feels she's been consistent with her eating and exercise routine. Still have difficulty with CPAP; follow up appointment at sleep clinic scheduled.     Diet Recall/Time:   Breakfast: cottage cheese, fruit (20g)   Am Snack: none   Lunch: lunch meat, cheese, vegetables (20g)  Pm snack:beef jerky (10g)   Dinner: meat, vegetables, starch (20g)   HS Snack: none     Protein: 70 grams     Meals per week away from home: 0-1x/week        Meal Duration:20 minutes  Encouraged slowing meal times down, 20-30 minutes, chewing to applesauce consistency.     Portion Sizes problematic? No Per patient/diet recall   To aid in proper portion control and slow meal time down discussed consuming meals off smaller plates, use toddler/children utensils and set utensils down after each bite.    Protein, vegetables/fruits,  carbohydrates:   The patient and I discussed the importance of including lean/low fat protein at each meal and limiting carbohydrate intake to less than 25% of plate volume.     Vitamins   Post-op vitamin regimen: Multi Vit + iron 2x/day, calcium citrate 500-600 mg 2x/day, 3090-3969 mcg of Sublingual B-12 daily, and 5000 IU Vitamin D3 daily.    Beverages (Type/Oz. per day)  Water:  oz     Discussed the importance of adequate hydration after surgery and the goal of 64+ oz of fluid daily.   The patient understands the importance of avoiding all carbonated, caffeinated and sweetened drinks; and instead choose 64 oz plain water.    Fluid-meal separation:   Pt is working on  fluids 30min before and 30 minutes after meals.  Fluids are  30min before and 30 minutes after meals.    Exercise  Treadmill 1x/week.     Pt's understands that 30-60 minutes of daily activity is an important part of bariatric surgery success.   Encouraged pt to incorporate upper body strength training exercise, even if its lifting soup cans while watching TV at night, doing pushups/sit-ups, and abdominal work.    PES statement:   1.  (NC-3.3.5) Obese, class III, BMI ?40 related to physical inactivity as evidenced by Infrequent, low-duration and or low intensity physical activity; and Large amounts of sedentary activities.     Intervention:  Discussion:   1. Reviewed nutrition guidelines for surgery.  2. Discussed the benefits of routine exercise for weight loss.   3. Educated on pre/post-op diet progression, post-op vitamin regimen, gave review of surgery process.    Instructions/Goals:   1. Include 15-20gm protein at each meal.  2. Increase vegetable/fruit intake, by having a vegetable or fruit with each meal daily. Recommended pt to increase vegetable/fruit intake to 4-5 servings daily.  3. Increase fluid intake to 64oz daily: choose plain or calorie/carbonation-free beverages.  4. Incorporate daily structured activity,  30-60 minutes most days of the week  5. Practice plate method: 1/2 plate lean/low fat protein source, vegetable/fruit, <25% of plate complex carbohydrates.  6. Read food labels more consistently: keeping total fat grams <10, total sugar grams <10, fiber >3gm per serving.  7. Separate fluids 30 minutes before/after meal times.  8. Practice eating off of smaller plates/bowls, chewing to applesauce consistency, taking 20-30 minutes to eat in a calm/relaxed environment without distractions of tv/email/cell phone.    Monitor/Evaluation:     Pt understands the importance of not gaining any weight from initial recorded weight.      Has realistic expectations for weight loss: Yes  Verbalizes understanding of dietary changes post procedure: Yes  Verbalizes understanding of supplement needs: Yes  Verbalizes willingness to participate in physical activity: Yes  Motivation for change: high  Client s predicted compliance on a scale of 1 (low) to 10 (high): 7  RD s prediction for client success and compliance on a scale of 1 (low) to 10 (high):  7    Pt has made the necessary changes, and is knowledgeable and well-informed of the dietary and physical activity requirements that are necessary for successful bariatric outcomes. This Pt is an appropriate candidate for surgery from a nutrition standpoint at this time. The patient understands that surgery is a tool, and not a cure, and our aftercare program must be followed.    Time In: 11:00am  Time Out: 11:15am      RIKY signed: Yes

## 2021-05-31 NOTE — PROGRESS NOTES
[place] paperwork received today. Forms filled out today.    Per pt, she would like the day before to prepare for surgery. She also requested more than 2 weeks off but I explained to the pt that I am allowed to request 2 weeks off and anymore can be approved and determined by Dr. Sin at her post-op visit.    Leave start date: 09/17/19     Date of Surgery: 09/18/19     Post-op Visit: 10/01/19    Leave end date: 10/02/19    Forms filled included dates above.    Forms will be signed when PK is in clinic next.    Pt verbalized understanding.    Patricia Francois [facility]  P: [phone]  F: [phone]

## 2021-05-31 NOTE — TELEPHONE ENCOUNTER
Dr Baker,    Having feeling can't breath, wake up often.  Question not enough air or nose piece?    Thank you.

## 2021-05-31 NOTE — TELEPHONE ENCOUNTER
Dr Baker  Patient is having issues with cpap waking up after 4 hours, and have issues claustrophobic- etc.      Please advise.    Thank you

## 2021-06-01 NOTE — TELEPHONE ENCOUNTER
Post-Op Phone Call  [facility]    Surgeon: Juan Sin M.D.  Date of Surgery: 9/18/2019  Discharge Date: 9/19/2019    Date/Time Called:   Date: 9/20/2019 Time: 2:36 PM   Attempt: First    Patient unavailable, message left to call [facility] with questions/problems? No    Pain Control:  Intensity: Moderate (4 - 5)  Duration/Location/Explain: Abdominal pain  What makes it better/worse? Sleep makes it better    Medications:  Narcotic Use - Yes  Drug type: Oxycodone  Frequency: A couple of times a day.    Non-prescription pain control: Gabapentin and Tylenol.    Other medications currently taking: See med list.    Complete Multivitamin + Iron BID? Yes    Vitamin B12? sublingual daily    Incisions:  Drainage? clean and dry  Comment: Bandaids off today.    Intake/Output:  Fluid Intake(oz/day)? 20 oz so far today.  Fluid type? water    Heartburn? No  Counseling: Will start Omeprazole as soon as she gets it from pharmacy.  Nausea? No  Vomiting? No  Explain:     Voiding Frequency? 4 or more/day     Voiding-Color/Amount? Good.    Flatus? Yes    Bowel Movement?No, will do Miralax if needed.    Are you using your incentive spirometer? If yes, how often? 3+/day    Any fever type symptoms? No  Explain:     Walking activity?   Frequency/Type: Up and around the house.    In Preparation for Surgery:  On a scale of 1-5, with 5 being the highest, how well did the pre-op class prepare you for what actually happened in the hospital? 5  If you were unable to give us a 5, what could we have done to earn a 5?     Is there anything that you wish you would have known prior to surgery that you did not know? If yes, what? No.    On a scale of 1-5, with 5 being the highest, how was the service while you were in the hospital? 5  If you were unable to give us a 5, what could we have done to earn a 5?     Is/was there anyone in particular; nurse, aide, hospital staff, that did a great job and you would like us to recognize? If  yes, whom. Vandana Antony KG and the Nursing Assistant on night shift.   What did they do? They were fabulous!!      Would you recommend [facility] to others? Yes  If no, can you explain why?     Would you recommend [facility] to others?Yes  If no, can you explain why?      Thank you for your time. Please do not hesitate to call us with any questions or concerns.    Call completed by:   Bridgett Leahy RN, [facility]  P [phone]  F [phone]

## 2021-06-01 NOTE — PROGRESS NOTES
HPI: Pt is here for follow up of a laparoscopic Amna-en-Y gastric bypass. She was seen in the ER on 9/27/19 for what she describes as severe abdominal pain that was pretty much across her low abdomen radiating around to the left side.  She had a CT scan that was unremarkable as was her blood work.  She was discharged home.  She states that both her pain and some nausea she was having are gradually improving What she has developed is a significant upper respiratory infection with persistent cough.     Current Outpatient Medications   Medication Sig Dispense Refill     acetaminophen (TYLENOL) 500 MG tablet Take 2 tablets (1,000 mg total) by mouth every 6 (six) hours.  0     albuterol (PROVENTIL) 2.5 mg /3 mL (0.083 %) nebulizer solution Take 2.5 mg by nebulization every 6 (six) hours as needed for wheezing or shortness of breath.       biotin 10,000 mcg cap Take 1 tablet by mouth daily.       citalopram (CELEXA) 20 MG tablet Take 30 mg by mouth every evening.              cyanocobalamin, vitamin B-12, (NASCOBAL) 500 mcg/spray Spry 1 spray, nasally, weekly.  Alternating nostrils 4 Bottle 11     cyanocobalamin, vitamin B-12, 1,000 mcg Subl Place 1,000 mcg under the tongue daily.       EPINEPHrine (EPIPEN/ADRENACLICK/AUVI-Q) 0.3 mg/0.3 mL injection Inject 0.3 mg as directed as needed for anaphylaxis. Inject into thigh.       fluticasone propionate (FLONASE) 50 mcg/actuation nasal spray Apply 2 sprays into each nostril daily as needed.              gabapentin (NEURONTIN) 250 mg/5 mL solution Take 5 mL (250 mg total) by mouth 3 (three) times a day. 50 mL 0     omeprazole (PRILOSEC) 20 MG capsule Take 1 capsule (20 mg total) by mouth daily. Open capsule and sprinkle on food. 90 capsule 0     oxyCODONE (ROXICODONE) 5 MG immediate release tablet Take 1 tablet (5 mg total) by mouth every 4 (four) hours as needed for pain. 15 tablet 0     pediatric multivitamin (FLINTSTONES) Chew chewable tablet Chew 1 tablet 2 (two) times a  "day.       prednisoLONE acetate (PRED-FORTE) 1 % ophthalmic suspension Administer 1-2 drops to both eyes 3 (three) times a day as needed (redness, pain (autoimmune related)).              predniSONE (DELTASONE) 2.5 MG tablet Take 2.5 mg by mouth daily as needed .       [START ON 10/2/2019] ursodiol (ACTIGALL) 300 mg capsule Take 1 capsule (300 mg total) by mouth 2 (two) times a day. Start 2 wks after surgery. Do not open capsule. Swallow whole with warm water. 180 capsule 1     metoclopramide (REGLAN) 10 MG tablet Take 1 tablet (10 mg total) by mouth 3 (three) times a day with meals. 12 tablet 0     No current facility-administered medications for this visit.          /77   Pulse 98   Temp 98.7  F (37.1  C) (Oral)   Resp 16   Ht 5' 3\" (1.6 m)   Wt 209 lb (94.8 kg)   SpO2 98%   Breastfeeding? No   BMI 37.02 kg/m    Wt Readings from Last 3 Encounters:   10/01/19 209 lb (94.8 kg)   09/18/19 219 lb 4 oz (99.5 kg)   08/13/19 (!) 226 lb (102.5 kg)     Body mass index is 37.02 kg/m .    EXAM:  GENERAL:Appears okay except for some coughing.  ABDOMEN: Incisions healing well      Assessment/Plan: Pt s/p lap scopic Amna-en-Y gastric bypass.  She had some abdominal pain but that is getting better and CT scan and lab work done several days ago looks unremarkable.  Like her biggest issue at this point is her upper respiratory infection and hopefully that will resolve.  I did start her on some Reglan 10 mg 3 times daily because of some of her nausea.  I told her to use this as needed but I suspect her nausea will continue to improve. She will f/u with us at [facility] in 3 months.    Juan Sin MD  [facility] Department of Surgery  "

## 2021-06-01 NOTE — ANESTHESIA PROCEDURE NOTES
Spinal Block    Start time: 9/18/2019 7:22 AM  End time: 9/18/2019 7:27 AM  Reason for block: post-op pain management and primary anesthetic    Staffing:  Performing  Anesthesiologist: Carol Silva MD    Preanesthetic Checklist  Completed: patient identified, risks, benefits, and alternatives discussed, timeout performed, consent obtained, airway assessed, oxygen available, suction available, emergency drugs available and hand hygiene performed  Spinal Block  Patient position: sitting  Prep: ChloraPrep  Patient monitoring: heart rate, cardiac monitor, continuous pulse ox and blood pressure  Location: L3-4  Injection technique: single-shot  Needle type: pencil-tip (Sprotte)   Needle gauge: 24 G    Assessment  Sensory level: NA, intrathecal narcotic only.    Additional Notes:  Facile block, single attempt, single pass.  Lot [id number] [place]

## 2021-06-01 NOTE — PROGRESS NOTES
Time in: 10:00a   /Time out: 10:45a     BMI: There is no height or weight on file to calculate BMI.   Pt presents for 1 week post op dietitian follow up. Reports tolerating full liquids well. Denies n/v, constipation/diarrhea, or significant pain. Taking MVI and SL B12 appropriately. Taking 32 oz fluid/day. Educated pt on pureed and soft to bariatric regular diets. Provided grocery list and sample meal plan for each diet stage.  Pt will begin pureed diet on 9/26 and advance as tolerated to softs/bariatric regular on 10/11. Instructed pt to begin 500 mg calcium citrate BID and 5000IU Vitamin D3 on 10/11. Pt to follow up with RD at 3 months post op.

## 2021-06-01 NOTE — ANESTHESIA PREPROCEDURE EVALUATION
Anesthesia Evaluation      Patient summary reviewed     Airway   Mallampati: III  Neck ROM: full   Pulmonary - normal exam   (+) asthma  sleep apnea on CPAP, ,                          Cardiovascular   Exercise tolerance: > or = 4 METS  Rhythm: regular  Rate: normal,         Neuro/Psych      Endo/Other    (+) obesity (BMI 38.22),      GI/Hepatic/Renal            Dental - normal exam                        Anesthesia Plan  Planned anesthetic: general endotracheal and ITN  Intrathecal block in Preop  Magnesium infusion  Precedex intraop  Ketamine with induction  zofran 4  Decadron 10    ASA 2     Anesthetic plan and risks discussed with: patient and spouse  Anesthesia plan special considerations: video-assisted, increased risk of difficult airway, antiemetics,   Post-op plan: routine recovery

## 2021-06-01 NOTE — ANESTHESIA CARE TRANSFER NOTE
Last vitals:   Vitals:    09/18/19 0931   BP: 129/62   Pulse: 71   Resp: 20   Temp: 36.4  C (97.6  F)   SpO2: 100%     Patient's level of consciousness is drowsy  Spontaneous respirations: yes  Maintains airway independently: yes  Dentition unchanged: yes  Oropharynx: oropharynx clear of all foreign objects    QCDR Measures:  ASA# 20 - Surgical Safety Checklist: WHO surgical safety checklist completed prior to induction    PQRS# 430 - Adult PONV Prevention: 4558F - Pt received => 2 anti-emetic agents (different classes) preop & intraop  ASA# 8 - Peds PONV Prevention: NA - Not pediatric patient, not GA or 2 or more risk factors NOT present  PQRS# 424 - Isabel-op Temp Management: 4559F - At least one body temp DOCUMENTED => 35.5C or 95.9F within required timeframe  PQRS# 426 - PACU Transfer Protocol: - Transfer of care checklist used  ASA# 14 - Acute Post-op Pain: ASA14B - Patient did NOT experience pain >= 7 out of 10

## 2021-06-02 ENCOUNTER — MYC MEDICAL ADVICE (OUTPATIENT)
Dept: OBGYN | Facility: CLINIC | Age: 51
End: 2021-06-02

## 2021-06-02 VITALS — HEIGHT: 62 IN | WEIGHT: 221 LBS | BODY MASS INDEX: 40.67 KG/M2

## 2021-06-02 VITALS — WEIGHT: 228 LBS | BODY MASS INDEX: 41.7 KG/M2

## 2021-06-02 VITALS — HEIGHT: 62 IN | BODY MASS INDEX: 41.96 KG/M2 | WEIGHT: 228 LBS

## 2021-06-02 DIAGNOSIS — Z78.0 MENOPAUSE: Primary | ICD-10-CM

## 2021-06-02 RX ORDER — ESTRADIOL 0.1 MG/D
1 FILM, EXTENDED RELEASE TRANSDERMAL
Qty: 8 PATCH | Refills: 3 | Status: SHIPPED | OUTPATIENT
Start: 2021-06-03 | End: 2022-05-27

## 2021-06-03 VITALS
TEMPERATURE: 98.7 F | WEIGHT: 209 LBS | RESPIRATION RATE: 16 BRPM | BODY MASS INDEX: 37.03 KG/M2 | HEART RATE: 98 BPM | SYSTOLIC BLOOD PRESSURE: 121 MMHG | HEIGHT: 63 IN | OXYGEN SATURATION: 98 % | DIASTOLIC BLOOD PRESSURE: 77 MMHG

## 2021-06-03 VITALS — WEIGHT: 226 LBS | BODY MASS INDEX: 41.34 KG/M2

## 2021-06-03 VITALS — BODY MASS INDEX: 41.75 KG/M2 | WEIGHT: 226.9 LBS | HEIGHT: 62 IN

## 2021-06-03 VITALS — BODY MASS INDEX: 38.85 KG/M2 | WEIGHT: 219.25 LBS | HEIGHT: 63 IN

## 2021-06-03 VITALS — BODY MASS INDEX: 41.59 KG/M2 | HEIGHT: 62 IN | WEIGHT: 226 LBS

## 2021-06-03 VITALS — BODY MASS INDEX: 41.68 KG/M2 | WEIGHT: 226.5 LBS | HEIGHT: 62 IN

## 2021-06-03 VITALS — WEIGHT: 226.4 LBS | BODY MASS INDEX: 41.66 KG/M2 | HEIGHT: 62 IN

## 2021-06-03 VITALS — WEIGHT: 222.7 LBS | BODY MASS INDEX: 40.98 KG/M2 | HEIGHT: 62 IN

## 2021-06-03 VITALS — HEIGHT: 62 IN | BODY MASS INDEX: 41.59 KG/M2 | WEIGHT: 226 LBS

## 2021-06-03 NOTE — TELEPHONE ENCOUNTER
Ida called and needs clarification on Dakota's letter. Please give her a call at: [phone].    Patricia Francois [facility]  P: [phone] I F: [phone]

## 2021-06-03 NOTE — TELEPHONE ENCOUNTER
Called Ida back at [phone] to discuss the pt's letter.  She was just asking about a duration and I told her that I will be discussing this with her doctor to come up with a plan to help her with this issue.  She will say 2-4 weeks for the time being and assess at a later date.    Bridgett Leahy RN, CBN  [facility]  P [phone]  F [phone]

## 2021-06-03 NOTE — TELEPHONE ENCOUNTER
I sent her a prescription for a better patch.   I am not sure if she got the message.   Sterling Hill MD    Lab: -10

## 2021-06-04 VITALS — HEIGHT: 63 IN | BODY MASS INDEX: 32.74 KG/M2 | WEIGHT: 184.8 LBS

## 2021-06-04 VITALS — BODY MASS INDEX: 29.15 KG/M2 | WEIGHT: 164.5 LBS | HEIGHT: 63 IN

## 2021-06-04 VITALS — BODY MASS INDEX: 27.64 KG/M2 | HEIGHT: 63 IN | WEIGHT: 156 LBS

## 2021-06-04 VITALS
SYSTOLIC BLOOD PRESSURE: 122 MMHG | DIASTOLIC BLOOD PRESSURE: 80 MMHG | HEIGHT: 63 IN | BODY MASS INDEX: 26.93 KG/M2 | WEIGHT: 152 LBS

## 2021-06-04 NOTE — PROGRESS NOTES
"Post-op Surgical Weight Loss Diet Evaluation     Assessment:  Pt presents for 3 months post-op RD visit, s/p RNY on 9/18/19 with Dr. Sin. Today we reviewed current eating habits and level of physical activity, and instructed on the changes that are required for successful bariatric outcomes.    Patient Progress: Tolerating foods and fluids well; no concerns.    Pt's Initial Weight: 221 lbs  Weight: 184 lb 12.8 oz (83.8 kg)  Weight loss from initial: 36.2  % Weight loss: 16.38 %      Body mass index is 32.74 kg/m .     Concerns: snacking between meals, no exercise established.      Vitamins   Multi Vit with Iron: yes  Calcium Citrate: yes  B12: yes  D3: yes  Biotin     Do you experience hunger? Yes   Do you have \"dumping\" syndrome?No     How often?n/a   With what foods: n/a  Do you experience any reflux or discomfort with eating? No   -Are you still taking omeprazole? Is this new since you stopped taking it?No  Nausea: no  Vomiting: no  Diarrhea: no  Constipation: yes- taking stool softener; BM every 4-5 days.   Hair loss:not discussed     Diet Recall/Time:   Protein shake (30g)   Breakfast: 2 eggs (14g)   Am Snack: handful nuts or dried fruit   Lunch: chicken breast and cheese (20g)   Pm snack: none   Dinner: meat, vegetable (15g)   HS Snack: none     Estimated protein intake: 40-50 grams    Estimated portion size per meals:1/2 cup/meal    Healthy Fats: nuts     Meals per week away from home: rare   Recommended limiting eating out to no more than 2x/week.    Meal Duration:20 minutes  Encouraged slowing meal times down, 20-30 minutes, chewing to applesauce consistency.     Fluid-meal separation:  Fluids are  30min before and 30 minutes after meals.  The patient and I reviewed the anatomy of the bypass and why  fluids from a meal is so important.    Fluid Intake  Water: 64 oz     Exercise  Nothing routine established. Pt has gym membership.       PES statement:      (NC-1.4) Altered GI Function " "related to Alteration in gastrointestinal tract structure and/or function/ Decreased functional length of the GI tract as evidenced by Weight loss of 16% initial body weight; Gastric bypass surgery.  Intervention    Discussion  1. Discussed 3 months  Post-Op Nutritional Guidelines for RNY.  2. Recommended to consume 15-20gm protein at 3 meals daily, along with protein supplement/\"planned protein containing snack\" of 15-30gm protein, to reach goal of 60-80 gm protein daily.  3. Educated on post-op vitamin regimen: Multi Vit + iron 2x/day, calcium citrate 400-600 mg 2x/day, 5267-4499 mcg of Sublingual B-12 daily, and 5000 IU Vitamin D3 daily (MVI and calcium can be taken at the same time BID)    Instructions  1. Include 15-20gm protein at each meal, along with protein supplement/\"planned protein containing snack\" of 15-30gm protein, to reach goal of 60-80 gm protein daily.  2. Increase fluid intake to 64oz daily: choose plain or calorie/carbonation-free beverages.  3. Incorporate daily structured activity, 30-60 minutes most days of the week  4. Recommended pt to start taking: Multi Vit + iron 2x/day, calcium citrate 400-600 mg 2x/day, 2040-5294 mcg of Sublingual B-12 daily, and 5000 IU Vitamin D3 daily. (MVI and calcium can be taken at the same time)  5. Read food labels more consistently: keeping total fat grams <10, total sugar grams <10, fiber >3gm per serving.  6. Increase vegetable/fruit intake, by having a vegetable or fruit with each meal daily.  7. Practice plate method: 1/2 plate lean/low fat protein source, vegetable/fruit, <25% of plate complex carbohydrates.  8. Separate fluids 30 minutes before/after meal times.  9. Practice eating off of smaller plates/bowls, chewing to applesauce consistency, taking 20-30 minutes to eat in a calm/relaxed environment without distractions of tv/email/cell phone.    Handouts provided:  3 months  Post-Op Nutritional Guidelines for RNY    Monitor/Evaluation    Pt to follow " up for 6 months  post-op visit with bariatrician       Time In: 8:45am  Time Out: 9:00am      ABN signed: Yes

## 2021-06-06 NOTE — TELEPHONE ENCOUNTER
Patient called and still doesn't have her labs from the mail yet.  They are likely still in transit even though they were mailed out on March 4th but will also fax them to [facility] so that the patient can get on the schedule.      Senait Brooke RN

## 2021-06-06 NOTE — TELEPHONE ENCOUNTER
6 months post op lab orders placed for patient in preparation for appointment with  in late March.    Bridgett Leahy RN, CBN  [facility]  P [phone]  F [phone]

## 2021-06-07 NOTE — PROGRESS NOTES
"Dakota Berkowitz is a 49 y.o. female who is being evaluated via a billable telephone visit.      The patient has been notified of following:     \"This telephone visit will be conducted via a call between you and your physician/provider. We have found that certain health care needs can be provided without the need for a physical exam.  This service lets us provide the care you need with a short phone conversation.  If a prescription is necessary we can send it directly to your pharmacy.  If lab work is needed we can place an order for that and you can then stop by our lab to have the test done at a later time.    If during the course of the call the physician/provider feels a telephone visit is not appropriate, you will not be charged for this service.\"     Dakota Berkowitz complains of    Chief Complaint   Patient presents with     Follow-up     6 month s/p RYGB       I have reviewed and updated the patient's Past Medical History, Social History, Family History and Medication List.    ALLERGIES  Nsaids (non-steroidal anti-inflammatory drug); Onion; Venom-honey bee; Clindamycin; and Medroxyprogesterone    Bariatric Follow Up Visit with a History of Previous Bariatric Surgery     Date of visit: 3/25/2020  Physician: Kika Lilly MD  Primary Care Provider:  Chino Rayo MD Andrea BILL Woo   49 y.o.  female    Date of Surgery: 9/18/2019  Initial Weight: 221#  Initial BMI: 40.42  Today's Weight:   Wt Readings from Last 1 Encounters:   03/25/20 164 lb 8 oz (74.6 kg)     Body mass index is 29.14 kg/m .      Assessment and Plan     Assessment: Dakota is a 49 y.o. year old female who is 6 months s/p  Amna en Y Gastric Bypass with Dr. January Berkowitz feels as if she has achieved the goals she hoped to accomplish through bariatric surgery and weight loss.    Encounter Diagnosis   Name Primary?     Postoperative malabsorption Yes         Current Outpatient Medications:      acetaminophen (TYLENOL) 500 " MG tablet, Take 2 tablets (1,000 mg total) by mouth every 6 (six) hours., Disp: , Rfl: 0     albuterol (PROVENTIL) 2.5 mg /3 mL (0.083 %) nebulizer solution, Take 2.5 mg by nebulization every 6 (six) hours as needed for wheezing or shortness of breath., Disp: , Rfl:      benzonatate (TESSALON) 100 MG capsule, Take 100 mg by mouth 3 (three) times a day as needed for cough., Disp: , Rfl:      biotin 10,000 mcg cap, Take 1 tablet by mouth daily., Disp: , Rfl:      cyanocobalamin, vitamin B-12, 1,000 mcg Subl, Place 1,000 mcg under the tongue daily., Disp: , Rfl:      EPINEPHrine (EPIPEN/ADRENACLICK/AUVI-Q) 0.3 mg/0.3 mL injection, Inject 0.3 mg as directed as needed for anaphylaxis. Inject into thigh., Disp: , Rfl:      pediatric multivitamin (FLINTSTONES) Chew chewable tablet, Chew 1 tablet 2 (two) times a day., Disp: , Rfl:      prednisoLONE acetate (PRED-FORTE) 1 % ophthalmic suspension, Administer 1-2 drops to both eyes 3 (three) times a day as needed (redness, pain (autoimmune related)).    , Disp: , Rfl:      predniSONE (DELTASONE) 2.5 MG tablet, Take 2.5 mg by mouth daily as needed ., Disp: , Rfl:      citalopram (CELEXA) 20 MG tablet, Take 30 mg by mouth every evening.    , Disp: , Rfl:      cyanocobalamin, vitamin B-12, (NASCOBAL) 500 mcg/spray Spry, 1 spray, nasally, weekly.  Alternating nostrils, Disp: 4 Bottle, Rfl: 11     fluticasone propionate (FLONASE) 50 mcg/actuation nasal spray, Apply 2 sprays into each nostril daily as needed.    , Disp: , Rfl:      gabapentin (NEURONTIN) 250 mg/5 mL solution, Take 5 mL (250 mg total) by mouth 3 (three) times a day., Disp: 50 mL, Rfl: 0     metoclopramide (REGLAN) 10 MG tablet, Take 1 tablet (10 mg total) by mouth 3 (three) times a day with meals., Disp: 12 tablet, Rfl: 0     omeprazole (PRILOSEC) 20 MG capsule, Take 1 capsule (20 mg total) by mouth daily. Open capsule and sprinkle on food., Disp: 90 capsule, Rfl: 0     oxyCODONE (ROXICODONE) 5 MG immediate release  "tablet, Take 1 tablet (5 mg total) by mouth every 4 (four) hours as needed for pain., Disp: 15 tablet, Rfl: 0     ursodiol (ACTIGALL) 300 mg capsule, Take 1 capsule (300 mg total) by mouth 2 (two) times a day. Start 2 wks after surgery. Do not open capsule. Swallow whole with warm water., Disp: 180 capsule, Rfl: 1    Plan: Count protein for a couple of days to be sure you are getting 60gm protein. This will help hair regrowth. OK to omit second calcium or both.    Return in about 3 months (around 6/25/2020).    Bariatric Surgery Review     Interim History/LifeChanges: 57# down. Has a new home gym. Quarantined for Coronavirus. Cough and transient fever. Has been seen twice virtually. On Tessalon and albuterol.     Patient Concerns: follow up. Weight loss, Some rashes and loose skin  Appetite (1-10): OK  GERD: no    Medication changes: stopped her citalopram, no pain meds,     Vitamin Intake:   B-12   SL   MVI  2/d [place]'s   Vitamin D  5,000U   Calcium   one or two     Other  biotin              LABS: \"Reviewed  Excellent (ate so trigs a little up)  Nausea no  Vomiting no  Constipation minor  Diarrhea no  Rashes yes  Hair Loss yes  Calf tenderness no  Breathing difficulty no  Reactive Hypoglycemia yes but avoids  Light Headedness no   Moods post menopausal mood swings    12 point ROS as above and otherwise negative      Habits:  Alcohol: has tried wine  Tobacco: no  Caffeine no  NSAIDS no  Exercise Routine: new gym at home  3 meals/day yes  Protein first typically  60 grams/day  Water Separate from meals yes  Calorie Containing Beverages no  Restaurant eating/wk none  Sleeping using melatonin, getting 5-7 hours  Stress high-  CPAP: NA  Contraception: PM  DEXA:at 2 yrs post op    Social History     Social History     Socioeconomic History     Marital status:      Spouse name: Not on file     Number of children: Not on file     Years of education: Not on file     Highest education level: Not on file "   Occupational History     Not on file   Social Needs     Financial resource strain: Not on file     Food insecurity     Worry: Not on file     Inability: Not on file     Transportation needs     Medical: Not on file     Non-medical: Not on file   Tobacco Use     Smoking status: Former Smoker     Years: 14.00     Smokeless tobacco: Never Used     Tobacco comment: age 19 to 33 social smoker   Substance and Sexual Activity     Alcohol use: Yes     Comment: occasionally on weekends     Drug use: No     Sexual activity: Yes     Partners: Male     Birth control/protection: Surgical   Lifestyle     Physical activity     Days per week: Not on file     Minutes per session: Not on file     Stress: Not on file   Relationships     Social connections     Talks on phone: Not on file     Gets together: Not on file     Attends Worship service: Not on file     Active member of club or organization: Not on file     Attends meetings of clubs or organizations: Not on file     Relationship status: Not on file     Intimate partner violence     Fear of current or ex partner: Not on file     Emotionally abused: Not on file     Physically abused: Not on file     Forced sexual activity: Not on file   Other Topics Concern     Not on file   Social History Narrative    . On daughter 25 yo One grandson.    Working FT, [employer]       Past Medical History     Past Medical History:   Diagnosis Date     Ankle pain      Arthritis     degenerative spine disease with sciatica     Breast implant rupture      Endometriosis      Heartburn      Irritability      Knee pain      Menopausal syndrome (hot flashes)      Obesity, Class II, BMI 35-39.9      Obstructive sleep apnea     CPAP     Pelvic pain      Problem List     Patient Active Problem List   Diagnosis     Arthritis     Heartburn     Morbid obesity with BMI of 40.0-44.9, adult (H)     Knee pain     Ankle pain     Low serum vitamin B12 <400     Mild SUZY  (obstructive sleep apnea)     S/P gastric bypass     Breast implant rupture     Deviated nasal septum     Encounter for screening for cardiovascular disorders     Female pelvic pain     Hip pain, right     History of Amna-en-Y gastric bypass     Irritability     Menopausal syndrome (hot flashes)     Pain in breast     Perimenopausal symptoms     Stridor     Weight gain     Medications     Current Outpatient Medications   Medication Sig     acetaminophen (TYLENOL) 500 MG tablet Take 2 tablets (1,000 mg total) by mouth every 6 (six) hours.     albuterol (PROVENTIL) 2.5 mg /3 mL (0.083 %) nebulizer solution Take 2.5 mg by nebulization every 6 (six) hours as needed for wheezing or shortness of breath.     benzonatate (TESSALON) 100 MG capsule Take 100 mg by mouth 3 (three) times a day as needed for cough.     biotin 10,000 mcg cap Take 1 tablet by mouth daily.     cyanocobalamin, vitamin B-12, 1,000 mcg Subl Place 1,000 mcg under the tongue daily.     EPINEPHrine (EPIPEN/ADRENACLICK/AUVI-Q) 0.3 mg/0.3 mL injection Inject 0.3 mg as directed as needed for anaphylaxis. Inject into thigh.     pediatric multivitamin (FLINTSTONES) Chew chewable tablet Chew 1 tablet 2 (two) times a day.     prednisoLONE acetate (PRED-FORTE) 1 % ophthalmic suspension Administer 1-2 drops to both eyes 3 (three) times a day as needed (redness, pain (autoimmune related)).            predniSONE (DELTASONE) 2.5 MG tablet Take 2.5 mg by mouth daily as needed .     citalopram (CELEXA) 20 MG tablet Take 30 mg by mouth every evening.            cyanocobalamin, vitamin B-12, (NASCOBAL) 500 mcg/spray Spry 1 spray, nasally, weekly.  Alternating nostrils     fluticasone propionate (FLONASE) 50 mcg/actuation nasal spray Apply 2 sprays into each nostril daily as needed.            gabapentin (NEURONTIN) 250 mg/5 mL solution Take 5 mL (250 mg total) by mouth 3 (three) times a day.     metoclopramide (REGLAN) 10 MG tablet Take 1 tablet (10 mg total) by mouth 3  "(three) times a day with meals.     omeprazole (PRILOSEC) 20 MG capsule Take 1 capsule (20 mg total) by mouth daily. Open capsule and sprinkle on food.     oxyCODONE (ROXICODONE) 5 MG immediate release tablet Take 1 tablet (5 mg total) by mouth every 4 (four) hours as needed for pain.     ursodiol (ACTIGALL) 300 mg capsule Take 1 capsule (300 mg total) by mouth 2 (two) times a day. Start 2 wks after surgery. Do not open capsule. Swallow whole with warm water.     Surgical History     Past Surgical History  She has a past surgical history that includes Bladder suspension; Oophorectomy; Salpingectomy; Refractive surgery; Tonsillectomy; Breast biopsy; Appendectomy; spine injection; Colonoscopy; Eye surgery; Nasal septum surgery; Endometrial ablation; benign tumor removal; Tubal ligation; BREAST IMPLANTS; CYSTO, SLING TRANSVAGINAL (02/2016); Pelvic laparoscopy; and pr lap gastric bypass/jayson-en-y (N/A, 9/18/2019).    Objective-Exam     Constitutional:  Ht 5' 3\" (1.6 m)   Wt 164 lb 8 oz (74.6 kg)   BMI 29.14 kg/m    Height: 5' 3\" (1.6 m) (3/25/2020  8:10 AM)  Initial Weight: 221 lbs (12/19/2019  8:00 AM)  Weight: 164 lb 8 oz (74.6 kg) (3/25/2020  8:10 AM)  Weight loss from initial: 36.2 (12/19/2019  8:00 AM)  % Weight loss: 16.38 % (12/19/2019  8:00 AM)  BMI (Calculated): 29.1 (3/25/2020  8:10 AM)  SpO2: 98 % (10/1/2019  9:46 AM)  Waist Circumference (In): 47 Inches (3/28/2019  9:01 AM)  Hip Circumference (In): 51 Inches (3/28/2019  9:01 AM)  Neck Circumference (In): 14.5 Inches (3/28/2019  9:01 AM)    General:  Pleasant and in no acute distress   Resp: cough bronchial    Counseling     We reviewed the important post op bariatric recommendations:  -eating 3 meals daily  -eating protein first, getting >60gm protein daily  -eating slowly, chewing food well  -avoiding/limiting calorie containing beverages  -drinking water 15-30 minutes before or after meals  -choosing wheat, not white with breads, crackers, pastas, neelima, " bagels, tortillas, rice  -limiting restaurant or cafeteria eating to twice a week or less    We discussed the importance of restorative sleep and stress management in maintaining a healthy weight.  We discussed the National Weight Control Registry healthy weight maintenance strategies and ways to optimize metabolism.  We discussed the importance of physical activity including cardiovascular and strength training in maintaining a healthier weight.    We discussed the importance of life-long vitamin supplementation and life-long  follow-up.    Dakota was reminded that, to avoid marginal ulcers she should avoid tobacco at all, alcohol in excess, caffeine in excess, and NSAIDS (unless indicated for cardioprotection or othewise and opposed by a PPI).    Kika Lilly MD, [facility].  3/25/2020  8:13 AM      No images are attached to the encounter.          Assessment/Plan:    1. Postoperative malabsorption        Cough  Hair loss  Rash      I have reviewed the note as documented above.  This accurately captures the substance of my conversation with the patient.      Phone call contact time    Call Started at: 9:03  Call Ended at: 8:31      Signature:Kika Lilly MD

## 2021-06-08 NOTE — TELEPHONE ENCOUNTER
I called Dakota and encouraged her to remain well hydrated, avoid dehydration and things that dehydrate her (alcohol, caffeine, sweating without replacing water loss) and choose lean proteins and moderation with red meat. She has looked up foods that cause and prevent gout. If gout flares continue despite these measures, allopurinol may be indicated. She has some omeprazole at home and will take that with an ibuprofen less than 3X/wk and with food if needed. Topical creams are OK.

## 2021-06-08 NOTE — PROGRESS NOTES
"Dakota Berkowitz is a 49 y.o. female who is being evaluated via a billable telephone visit.      The patient has been notified of following:     \"This telephone visit will be conducted via a call between you and your physician/provider. We have found that certain health care needs can be provided without the need for a physical exam.  This service lets us provide the care you need with a short phone conversation.  If a prescription is necessary we can send it directly to your pharmacy.  If lab work is needed we can place an order for that and you can then stop by our lab to have the test done at a later time.    If during the course of the call the physician/provider feels a telephone visit is not appropriate, you will not be charged for this service.\"     Dakota Berkowitz complains of  No chief complaint on file.      I have reviewed and updated the patient's Past Medical History, Social History, Family History and Medication List.    ALLERGIES  Nsaids (non-steroidal anti-inflammatory drug); Onion; Venom-honey bee; Clindamycin; and Medroxyprogesterone    Additional provider notes:     Post-op Surgical Weight Loss Diet Evaluation     Assessment:  Pt presents for 9 month post-op RD visit, s/p RYGB on 9-18-19 with Dr. Sin. Today we reviewed current eating habits and level of physical activity, and instructed on the changes that are required for successful bariatric outcomes.    Patient Progress: patient states she is doing well, tolerating foods and fluids and is feeling good about her progress this far    Pt's Initial Weight: 221 lbs  Weight: 156 lb (70.8 kg) (156 lb)  Weight loss from initial: 65  % Weight loss: 29.41 %      Body mass index is 27.63 kg/m .      Vitamins   Multi Vit with Iron: yes  Calcium Citrate: yes  B12: yes  D3: yes    Do you experience hunger? yes  Do you have \"dumping\" syndrome?no   Do you experience any reflux or discomfort with eating? no  Nausea: no  Vomiting: no  Diarrhea: " "occasionally  Constipation: occasionally  Hair loss:yes- biotin    Diet Recall/Time:   Breakfast: protein shake and yogurt  Am Snack: string cheese and 1/2 cup nuts  Lunch: 1/2 chicken breast and mixed veggies  Pm snack:none  Dinner: spaghetti with zucchini noodles and meat sauce   HS Snack: none    Proteins/Veg/Fruits/CHO (NOT well tolerated): none    Estimated protein intake: 60-80 grams    Estimated portion size per meals:3/4-1 cup/meal    Meal Duration:20 minutes    Fluid-meal separation:  Fluids are  30min before and 30 minutes after meals.    Fluid Intake  Water: 64+oz  Caffeine: one cup of coffee in am  Alcohol: none  Carbonation: none  Milk: none  Juice: none    Exercise: not much- tries to get up around snack time to move a bit- walking 1/4 mile driveway down and back, hand weights and plans on swimming       PES statement:      (NC-1.4) Altered GI Function related to Alteration in gastrointestinal tract structure and/or function/ Decreased functional length of the GI tract as evidenced by Weight loss of 29.41% initial body weight; Gastric bypass surgery  Intervention    Discussion  1. Discussed 9 month Post-Op Nutritional Guidelines for RYGB  2. Recommended to consume 15-20gm protein at 3 meals daily, along with protein supplement/\"planned protein containing snack\" of 15-30gm protein, to reach goal of 60-80 gm protein daily.  3. Educated on post-op vitamin regimen: Multi Vit + iron 2x/day, calcium citrate 400-600 mg 2x/day, 4869-7426 mcg of Sublingual B-12 daily, and 5000 IU Vitamin D3 daily (MVI and calcium can be taken at the same time BID)  4. Reviewed lean protein sources  5. Bariatric Plate Method-  including lean/low fat protein at each meal, including a vegetable/fruit, and limiting carbohydrate intake to less than 25% of plate volume.   Instructions  1. Include 15-20gm protein at each meal, along with protein supplement/\"planned protein containing snack\" of 15-30gm protein, to reach goal " of 60-80 gm protein daily.  2. Increase fluid intake to 64oz daily: choose plain or calorie/carbonation-free beverages.  3. Incorporate daily structured activity, 30-60 minutes most days of the week  4. Recommended pt to start taking: Multi Vit + iron 2x/day, calcium citrate 400-600 mg 2x/day, 3774-2986 mcg of Sublingual B-12 daily, and 5000 IU Vitamin D3 daily. (MVI and calcium can be taken at the same time)  5. Read food labels more consistently: keeping total fat grams <10, total sugar grams <10, fiber >3gm per serving.  6. Increase vegetable/fruit intake, by having a vegetable or fruit with each meal daily.  7. Practice plate method: 1/2 plate lean/low fat protein source, vegetable/fruit, <25% of plate complex carbohydrates.  8. Separate fluids 30 minutes before/after meal times.  9. Practice eating off of smaller plates/bowls, chewing to applesauce consistency, taking 20-30 minutes to eat in a calm/relaxed environment without distractions of tv/email/cell phone.    Personal Goals:  1. Limit self to no more than 35g protein per meal    Handouts provided:  9 month Post-Op Nutritional Guidelines for RYGB  7 min exercise     Assessment/Plan:    Pt to follow up for 1 year  post-op visit with bariatrician     Phone call duration: 30 minutes    Michelle Lenz RD

## 2021-06-11 NOTE — PROGRESS NOTES
Bariatric Follow Up Visit with a History of Previous Bariatric Surgery     Date of visit: 9/17/2020  Physician: Kika Lilly MD  Primary Care Provider:  Chino Rayo MD Andrea M Schiebel   50 y.o.  female    Date of Surgery: 9/18/2019  Initial Weight: 221#  Initial BMI: 40.42  Today's Weight:   Wt Readings from Last 1 Encounters:   09/17/20 152 lb (68.9 kg)     Body mass index is 26.93 kg/m .      Assessment and Plan     Assessment: Dakota is a 50 y.o. year old female who is 1 yr s/p  Amna en Y Gastric Bypass with Dr. January Berkowitz feels as if she has achieved the goals she hoped to accomplish through bariatric surgery and weight loss.    Encounter Diagnosis   Name Primary?     Postoperative malabsorption Yes         Current Outpatient Medications:      acetaminophen (TYLENOL) 500 MG tablet, Take 2 tablets (1,000 mg total) by mouth every 6 (six) hours., Disp: , Rfl: 0     albuterol (PROVENTIL) 2.5 mg /3 mL (0.083 %) nebulizer solution, Take 2.5 mg by nebulization every 6 (six) hours as needed for wheezing or shortness of breath., Disp: , Rfl:      biotin 10,000 mcg cap, Take 1 tablet by mouth daily., Disp: , Rfl:      calcium cit/mag/D3/Zn//benny (CALCIUM CITRATE PLUS ORAL), Take by mouth., Disp: , Rfl:      cyanocobalamin, vitamin B-12, 1,000 mcg Subl, Place 1,000 mcg under the tongue daily., Disp: , Rfl:      cyclobenzaprine (FLEXERIL) 5 MG tablet, Take 5 mg by mouth as needed., Disp: , Rfl:      EPINEPHrine (EPIPEN/ADRENACLICK/AUVI-Q) 0.3 mg/0.3 mL injection, Inject 0.3 mg as directed as needed for anaphylaxis. Inject into thigh., Disp: , Rfl:      fluticasone propionate (FLONASE) 50 mcg/actuation nasal spray, Apply 2 sprays into each nostril daily as needed.    , Disp: , Rfl:      omeprazole (PRILOSEC) 20 MG capsule, Take 1 capsule (20 mg total) by mouth daily. Open capsule and sprinkle on food., Disp: 90 capsule, Rfl: 0     pediatric multivitamin (FLINTSTONES) Chew chewable tablet,  Chew 1 tablet 2 (two) times a day., Disp: , Rfl:      prednisoLONE acetate (PRED-FORTE) 1 % ophthalmic suspension, Administer 1-2 drops to both eyes 3 (three) times a day as needed (redness, pain (autoimmune related)).    , Disp: , Rfl:      predniSONE (DELTASONE) 2.5 MG tablet, Take 2.5 mg by mouth daily as needed ., Disp: , Rfl:     Plan: Continue vitamins with consistency. Await lab results. Protect sleep. Maintain structured mealtimes, sleep and exercise as well as socialization.    Return in about 6 months (around 3/17/2021).    Bariatric Surgery Review     Interim History/LifeChanges: Maintaining a 69# weight loss. Taking vitamins with consistency. No longer working at [employer]. New job. Working from home.    Patient Concerns: doing well  Appetite (1-10): OK  GERD: no omeprazole    Medication changes: no    Vitamin Intake:   B-12   SL   MVI  2/d   Vitamin D  5,000U   Calcium   no     Other                LABS: drawn and pending    Nausea no  Vomiting no  Constipation no  Diarrhea no  Rashes yes-  Hair Loss no  Calf tenderness no  Breathing difficulty no  Reactive Hypoglycemia yes-a wave of nausea  Light Headedness no   Moods good and stable    12 point ROS as above and otherwise negative      Habits:  Alcohol: occ  Tobacco: no  Caffeine 1/d  NSAIDS no  Exercise Routine: walking every other day 45 minutes  3 meals/day yes  Protein first yes  60 grams/day  Water Separate from meals yes  Calorie Containing Beverages OJ occ  Restaurant eating/wk no  Sleeping not enough-maybe 4 hours uninterrupted, 1.5 hour more perimenopause  Stress moderate  CPAP: no  Contraception: PM  DEXA:no    Social History     Social History     Socioeconomic History     Marital status:      Spouse name: Not on file     Number of children: Not on file     Years of education: Not on file     Highest education level: Not on file   Occupational History     Not on file   Social Needs     Financial resource strain: Not on file      Food insecurity     Worry: Not on file     Inability: Not on file     Transportation needs     Medical: Not on file     Non-medical: Not on file   Tobacco Use     Smoking status: Former Smoker     Years: 14.00     Smokeless tobacco: Never Used     Tobacco comment: age 19 to 33 social smoker   Substance and Sexual Activity     Alcohol use: Yes     Comment: occasionally on weekends     Drug use: No     Sexual activity: Yes     Partners: Male     Birth control/protection: Surgical   Lifestyle     Physical activity     Days per week: Not on file     Minutes per session: Not on file     Stress: Not on file   Relationships     Social connections     Talks on phone: Not on file     Gets together: Not on file     Attends Anabaptism service: Not on file     Active member of club or organization: Not on file     Attends meetings of clubs or organizations: Not on file     Relationship status: Not on file     Intimate partner violence     Fear of current or ex partner: Not on file     Emotionally abused: Not on file     Physically abused: Not on file     Forced sexual activity: Not on file   Other Topics Concern     Not on file   Social History Narrative    . On daughter 23 yo One grandson.    Working FT, [employer]       Past Medical History     Past Medical History:   Diagnosis Date     Ankle pain      Arthritis     degenerative spine disease with sciatica     Breast implant rupture      Endometriosis      Heartburn      Irritability      Knee pain      Menopausal syndrome (hot flashes)      Obesity, Class II, BMI 35-39.9      Obstructive sleep apnea     CPAP     Pelvic pain      Problem List     Patient Active Problem List   Diagnosis     Arthritis     Heartburn     Morbid obesity with BMI of 40.0-44.9, adult (H)     Knee pain     Ankle pain     Low serum vitamin B12 <400     Mild SUZY (obstructive sleep apnea)     S/P gastric bypass     Breast implant rupture     Deviated nasal septum      Encounter for screening for cardiovascular disorders     Female pelvic pain     Hip pain, right     History of Amna-en-Y gastric bypass     Irritability     Menopausal syndrome (hot flashes)     Pain in breast     Perimenopausal symptoms     Stridor     Weight gain     Acute gout involving toe     Medications     Current Outpatient Medications   Medication Sig     acetaminophen (TYLENOL) 500 MG tablet Take 2 tablets (1,000 mg total) by mouth every 6 (six) hours.     albuterol (PROVENTIL) 2.5 mg /3 mL (0.083 %) nebulizer solution Take 2.5 mg by nebulization every 6 (six) hours as needed for wheezing or shortness of breath.     biotin 10,000 mcg cap Take 1 tablet by mouth daily.     calcium cit/mag/D3/Zn//benny (CALCIUM CITRATE PLUS ORAL) Take by mouth.     cyanocobalamin, vitamin B-12, 1,000 mcg Subl Place 1,000 mcg under the tongue daily.     cyclobenzaprine (FLEXERIL) 5 MG tablet Take 5 mg by mouth as needed.     EPINEPHrine (EPIPEN/ADRENACLICK/AUVI-Q) 0.3 mg/0.3 mL injection Inject 0.3 mg as directed as needed for anaphylaxis. Inject into thigh.     fluticasone propionate (FLONASE) 50 mcg/actuation nasal spray Apply 2 sprays into each nostril daily as needed.            omeprazole (PRILOSEC) 20 MG capsule Take 1 capsule (20 mg total) by mouth daily. Open capsule and sprinkle on food.     pediatric multivitamin (FLINTSTONES) Chew chewable tablet Chew 1 tablet 2 (two) times a day.     prednisoLONE acetate (PRED-FORTE) 1 % ophthalmic suspension Administer 1-2 drops to both eyes 3 (three) times a day as needed (redness, pain (autoimmune related)).            predniSONE (DELTASONE) 2.5 MG tablet Take 2.5 mg by mouth daily as needed .     Surgical History     Past Surgical History  She has a past surgical history that includes Bladder suspension; Oophorectomy; Salpingectomy; Refractive surgery; Tonsillectomy; Breast biopsy; Appendectomy; spine injection; Colonoscopy; Eye surgery; Nasal septum surgery; Endometrial  "ablation; benign tumor removal; Tubal ligation; BREAST IMPLANTS; CYSTO, SLING TRANSVAGINAL (02/2016); Pelvic laparoscopy; and pr lap gastric bypass/jayson-en-y (N/A, 9/18/2019).    Objective-Exam     Constitutional:  /80 (Patient Site: Left Arm, Patient Position: Sitting, Cuff Size: Adult Regular)   Ht 5' 3\" (1.6 m)   Wt 152 lb (68.9 kg)   Breastfeeding No   BMI 26.93 kg/m    Height: 5' 3\" (1.6 m) (9/17/2020  8:41 AM)  Initial Weight: 221 lbs (9/17/2020  8:41 AM)  Weight: 152 lb (68.9 kg) (9/17/2020  8:41 AM)  Weight loss from initial: 69 (9/17/2020  8:41 AM)  % Weight loss: 31.22 % (9/17/2020  8:41 AM)  BMI (Calculated): 26.9 (9/17/2020  8:41 AM)  SpO2: 98 % (10/1/2019  9:46 AM)  Waist Circumference (In): 34.75 Inches (9/17/2020  8:41 AM)  Hip Circumference (In): 41.75 Inches (9/17/2020  8:41 AM)  Neck Circumference (In): 13 Inches (9/17/2020  8:41 AM)    General:  Pleasant and in no acute distress   Eyes:  EOMI  ENT:  Airway 2+  Moist mucous membranes  Neck:  Supple, No LAD, No thyromegaly, No carotid bruits appreciated  Respiratory: Normal respiratory effort, no cough, wheezes or crackles  CV:  Regular rate and Rhythm,nmurmurs, pulses 2+, no calf tenderness, no LE edema  Gastrointestinal: Abdomen NT/ND, BS+  Musculoskeletal: muscle mass WNL  Skin: color tan hair full, incisions nicely healed  Neurological: No tremor, normal gait  Psychiatric: alert and oriented X3, mood and affect normal    Counseling     We reviewed the important post op bariatric recommendations:  -eating 3 meals daily  -eating protein first, getting >60gm protein daily  -eating slowly, chewing food well  -avoiding/limiting calorie containing beverages  -drinking water 15-30 minutes before or after meals  -choosing wheat, not white with breads, crackers, pastas, neelima, bagels, tortillas, rice  -limiting restaurant or cafeteria eating to twice a week or less    We discussed the importance of restorative sleep and stress management in " maintaining a healthy weight.  We discussed the National Weight Control Registry healthy weight maintenance strategies and ways to optimize metabolism.  We discussed the importance of physical activity including cardiovascular and strength training in maintaining a healthier weight.    We discussed the importance of life-long vitamin supplementation and life-long  follow-up.    Dakota was reminded that, to avoid marginal ulcers she should avoid tobacco at all, alcohol in excess, caffeine in excess, and NSAIDS (unless indicated for cardioprotection or othewise and opposed by a PPI).    Kika Lilly MD, FAAFP  [facility].  9/17/2020  8:55 AM      No images are attached to the encounter.   30 minutes spent with patient. >50% in counseling and coordination of care.

## 2021-06-18 NOTE — PATIENT INSTRUCTIONS - HE
Patient Instructions by Kika Mercado MD at 9/17/2020  8:45 AM     Author: Kika Mercado MD Service: -- Author Type: Physician    Filed: 9/17/2020  9:16 AM Encounter Date: 9/17/2020 Status: Signed    : Kika Mercado MD (Physician)       [facility]  Nutritional Guidelines  Gastric Bypass 12 Months Post Op    General Guidelines and Helpful Hints:    Eat 3 meals per day + protein supplement(s). No snacks between meals.  o Do not skip meals.  This can cause overeating at the next meal and will prevent adequate protein and nutritional intake.    Aim for 60-80 grams of protein per day.  o Always eat your protein first. This assists with optimal nutrition and helps you stay full longer.  o Depending on your portion size, you may need to drink approved protein supplement between meals to achieve protein goals. Follow recommendations of your Dietitian.     Eat your protein first, and then follow with fiber.   o It is not necessary to count your fiber, but 15-20 grams per day is recommended.    o Add fiber by including fruits, vegetables, whole grains, and beans.     Portions should be about 1 cup per meal. Use measuring cups to be accurate.    Continue to use saucer/salad plates, infant/toddler silverware to keep portion sizes small and take small bites.    Eat S-L-O-W-L-Y to make each meal last 20-30 minutes. Always stop eating when satisfied.    Continue to use caution with foods containing skins, peels or membranes. Chew well!    Aim for 64 oz. of calorie-free fluids daily.  o Continue to avoid caffeine and carbonation. If you choose to drink alcohol, do so in moderation.   o Remember to avoid drinking during meals, 15-30 minutes before and 30 minutes after.    Exercise is roth for continued weight loss and weight maintenance. Aim for 30-60 minutes of physical activity most days of the week. Include cardiovascular and strength training.    If having trouble tolerating meat, try  using a crock-pot, tinfoil tent, steamer or other moist cooking method to create tender meats. Add broth or low-fat gravy to help meat stay moist.     Avoid high sugar and high fat foods to prevent dumping syndrome.  o Check nutrition labels for less than 10 grams of sugar and less than 10 grams of fat per serving.    Continue Taking Vitamins/Minerals:  o 1556-0181 mcg of Sublingual B-12 daily  o 1 Multivitamin with Iron twice daily (chewable or swallow tabs)  o 500-600 mg Calcium Citrate twice daily (chewable or swallow tabs)  o 5000 IU Vitamin D3 daily    Sample Grocery List    Protein:    Fat free Greek or light yogurt (less than 10 grams sugar)    Fat free or low-fat cottage cheese    String cheese or reduced fat cheese slices    Tuna, salmon, crab, egg, or chicken salad made with light or fat free mayonnaise    Egg or Egg Substitute    Lean/extra lean turkey, beef, bison, venison (ground, sirloin, round, flank)    Pork loin or tenderloin (grilled, baked, broiled)    Fish such as salmon, tuna, trout, tilapia, etc. (grilled, baked, broiled)    Tender cuts of lean (skinless) turkey or chicken    Lean deli meats: turkey, lean ham, chicken, lean roast beef    Beans such as kidney, garbanzo, black, richards, or low-fat/fat free refried beans    Peanut butter (natural preferred). Limit to 1 Tbsp. per day.    Low-fat meatloaf (made with lean ground beef or turkey)    Sloppy Joes made with low-sugar ketchup and lean ground beef or turkey    Soy or vegetable protein (i.e. vegan crumbles, soy/veggie burger, tofu)    Hummus    Vegetables:    Fresh: cooked or raw (as tolerated)    Frozen vegetables    Canned vegetables (low sodium or no salt added, rinse before cooking/eating)    (Ok to have skins/peels/membranes/seeds - just chew well)    Fruits:    Fresh fruit    Frozen fruit (no sugar added)    Canned fruit (packed in its own juice, NOT syrup)    (Ok to have skins/peels/membranes/seeds - just chew  well)    Starch:    Unsweetened whole-grain hot cereal (or high fiber cold cereal, dry)    Toasted whole wheat bread or Macy Thins    Whole grain crackers    Baked/boiled/mashed potato/sweet potato    Cooked whole grain pasta, brown rice, or other cooked whole grains    Starchy vegetables: corn, peas, winter squash      Protein Supplement:     Ready to drink protein shake with:  o 15-30 grams protein per serving  o Less than 10 grams total carbohydrate per serving     Protein powder mixed with:  o  Skim or 1% milk  o Low fat or fat free Lactaid milk, plain or no sugar added soymilk  o Water     Fats: (use in moderation)    1 teaspoon of soft tub margarine    1 teaspoon olive oil, canola oil, or peanut oil    1 tablespoon of low-fat charles or salad dressing     Sample Menu for 12 months after Gastric Bypass    You do NOT need to eat/drink the full portion sizes listed below  Always stop when you are satisfied    Breakfast   cup 1% cottage cheese     cup diced peaches   Lunch   slice whole grain bread/toast with 1 tsp. light charles  2 oz. sliced lean turkey, ham, or chicken    cup carrots   Supplement Approved protein supplement (as needed between meals)   Dinner   cup 93% lean ground beef mixed with 2 Tbsp. marinara sauce     cup green beans    cup whole grain pasta     Breakfast   cup egg substitute or 2 egg whites, scrambled   1 oz low fat shredded cheese    cup sautéed chopped vegetables mixed in   Lunch 1 cup chili made with lean ground beef or turkey   Supplement 6 oz light Greek yogurt (as needed)   Dinner 3 oz  grilled, broiled, or baked lemon pepper salmon  2 Tbsp. grilled asparagus  2 Tbsp. baked sweet potato     Breakfast   cup whole grain oatmeal made with skim milk and unflavored protein powder added    cup blueberries       Lunch 3 oz  meatloaf made with lean ground turkey    cup steamed broccoli   Dinner 3 oz pork loin made in a crock pot seasoned with a spice rub    cup cooked carrots   Supplement  Approved protein supplement (as needed between meals)     Breakfast   cup egg scramble made with egg substitute and turkey sausage    whole grain English muffin with 1 teaspoon low sugar jelly   Lunch 3 oz seasoned, skinless grilled chicken     cup grilled vegetables   Dinner 2 oz lean beef    cup brown rice    cup strawberries   Supplement 1 string cheese (as needed)     Breakfast 6 ounces light Greek yogurt    cup unsweetened mixed berries   Lunch 3 oz shrimp, with low-sugar cocktail sauce for dipping    cup pea pods   Supplement   cup fat free cottage cheese (as needed)   Dinner 3 oz tender turkey breast (made in crock pot for extra moisture)    of a small whole wheat dinner roll     Breakfast     cup low fat cottage cheese    cup strawberries   Lunch   cup black bean soup  4 whole grain crackers   Supplement 1 cup skim milk with scoop of protein powder added (as needed)   Dinner 3 oz. grilled tilapia with lemon pepper seasoning    cup grilled bell peppers     Breakfast 2 ounces turkey sausage julia    whole wheat English muffin   Supplement Approved protein supplement (as needed between meals)   Lunch 3 oz lean ham, turkey, or chicken     cup tomatoes   Dinner 2 oz. sirloin steak    cup mixed vegetables    cup brown rice

## 2021-06-19 NOTE — LETTER
Letter by Bridgett Leahy RN at      Author: Bridgett Leahy RN Service: -- Author Type: --    Filed:  Encounter Date: 11/4/2019 Status: Signed         November 4, 2019     Patient: Dakota Berkowitz   YOB: 1970   Date of Visit: 11/4/2019       To Whom It May Concern:    Due to her current health condition, pt is experiencing difficulties with having bowel movements.  This situation can be unpredictable and during these times, she will need the immediate and frequent use of bathroom facilities.  When having these issues, pt will need to work from home to accommodate her needs.      If you have any questions or concerns, please don't hesitate to call.    Sincerely,        Kika Lilly MD        Electronically signed by Bridgett Leahy RN

## 2021-06-19 NOTE — LETTER
Letter by Bridgett Leahy RN at      Author: Bridgett Leahy RN Service: -- Author Type: --    Filed:  Encounter Date: 11/15/2019 Status: Signed         November 15, 2019     Patient: Dakota Berkowitz   YOB: 1970   Date of Visit: 11/15/2019       To Whom It May Concern:    Due to her current health condition, pt is experiencing difficulties with having bowel movements on a regular basis.  This situation can be unpredictably and during these times, she will need the immediate and frequent use of bathroom facilities.  When having these issues, pt will need to work from home to accommodate her needs.  The duration of this request is for 3 months and at that time, we will re-evaluate.       If you have any questions or concerns, please don't hesitate to call.    Sincerely,        Kika Lilly MD        Electronically signed by Bridgett Leahy RN

## 2021-06-19 NOTE — LETTER
Letter by Senait Brooke RN at      Author: Senait Brooke RN Service: -- Author Type: --    Filed:  Encounter Date: 10/1/2019 Status: Signed         October 1, 2019     Patient: Dakota Berkowitz   YOB: 1970   Date of Visit: 10/1/2019       To Whom It May Concern:    It is my medical opinion that Dakota Berkowitz may return to work on 10/21/2019 following her surgery and recovery.    If you have any questions or concerns, please don't hesitate to call.    Sincerely,        Electronically signed by Juan Sin MD        Retinal hemorrhage

## 2021-06-27 NOTE — PROGRESS NOTES
Progress Notes by Kika Mercado MD at 3/28/2019  9:30 AM     Author: Kika Mercado MD Service: -- Author Type: Physician    Filed: 3/28/2019 10:18 AM Encounter Date: 3/28/2019 Status: Signed    : Kika Mercado MD (Physician)       Outpatient Bariatric Medicine Consultation   Indication: Medical Bariatric Consultation to [facility]  Primary Provider: Chino Rayo MD  Requesting Physician: Dr. Sin  Type of Bariatric Surgery: Amna en Y Gastric Bypass      Impression    Dakota Berkowitz is a 48 y.o. year old female with  has a past medical history of Ankle pain, Arthritis, Heartburn, Knee pain, and Morbid obesity with BMI of 40.0-44.9, adult (H).,   poor functional capacity and musculoskeletal disability due to morbid obesity which satisfies NIH criteria for bariatric surgery. Her  Body mass index is 40.42 kg/m ..  Dakota Berkowitz hopes to achieve less pain, more energy and feel better about herself following surgery and significant weight loss.    Bariatric Recommendations   Bariatric therapy is indicated for Dakota as a means of modifying her obesity related co-morbidities.  Therapeutic lifestyle changes have not lead to significant and or durable weight loss.  Surgical bariatric therapy is most likely to induce significant weight loss, promote long-term weight maintenance and lead to clinical improvement and/or resolution of her weight related co-morbidities.     Bariatric Surgery Requirements   Medical Nutrition Therapy including comprehensive evaluation, guidance and clearance is required.  Bariatric Psychological Assessment and clearance is required.  Bariatric Laboratory studies are indicated and were ordered today.  Routine Healthcare Maintenance must be current prior to bariatric surgery.  Colonoscopy: due  Mammogram: due June 2019  Pap: 3/2018 due 2021  Support Group Attendance one time is required prior to surgery, encouraged thereafter.  Lifelong vitamin  supplementation is required.  Lifelong follow up is indicated.  Physical Activity Plan is indicated, was discussed and will be reinforced each visit.  Non-Smoking status must be achieved a minimum of 60 days prior to surgery and she should remain a non-smoker indefinitely following bariatric surgery.     Perioperative Recommendations   CARDIAC: Cardiac consultation and clearance will be required of patients with significant cardiac disease and/or multiple cardiac risk factors.  PULMONARY: Pre-operative therapy with CPAP/BIPAP is indicated for a minimum of one month for patients with sleep apnea. Complete tobacco abstinence for two months pre-operatively and indefinitely thereafter is required.   RENAL: Diuretics will be discontinued 2 weeks before surgery at the time of liquid diet if the patient is on them at that time.  ENDOCRINE: Optimizing perioperative glycemic control is indicated. Our goal is an AIC of 8 or less at the time of surgery for optimal healing.   GASTROINTESTINAL: Evaluation of the esophagus and stomach by EGD and/or UGI series will be considered in patients with severe GERD, previous weight loss surgery, or other indication.  GYNECOLOGIC: For patients on Estrogen- Estrogen will be discontinued 4 weeks prior to surgery and resumed 4 weeks after surgery unless otherwise advised. Reliable contraception is required post-operatively for 1 year for women of childbearing age. DEPOT PROVERA is contraindicated due to its association with weight gain. Post-operative birth control plan is PM  MUSCULOSKELETAL/RHEUMATOLOGIC: NSAIDS are contraindicated following surgery and lifelong abstinence is indicated. When indicated for cardioprotection or otherwise, patients should use an enteric coated ASA and concomitant proton pump inhibitor.  HEMATOLOGIC: Risks of deep venous thromboembolism have been assessed. Patients with history of DVT/PE or current anti-coagulation will be placed on the High Risk DVT Prophylaxis  Protocol. Objections (if any) to receiving blood products if necessary have been documented.  DENTAL: Reasonable and functional dentition is indicated in order to chew food to applesauce consistency post-operatively.  NUTRITIONAL: Pre and post-operative nutritional and lifestyle modification guidance is indicated. Pre-operative weight reduction can reduce liver volume, improving technical aspects of surgery.    History Surrounding Consultation   Struggles with weight started at age 33  Her weight at age 16 was 111#  She has had several past supervised and unsupervised weight loss attempts  The most weight lost was: 30#  Unfortunately there was not durable weight maintenance.  History of bulimia, anorexia, or binge eating disorder? no  If Present has eating disorder been in remission at least 3 years? NA  Night time eating? no    Dietary History   Meals per day: 3  Snacks: too many  Typical Snack: anything, chips, cookied  Who does the grocery shopping? She does  Who does the cooking? She does  A typical meal includes: B: eggs and steele or instant oatmeal L: protein with salad at cafe at work  D: rotisserie chicken and peas  Regular Pop: diet  Juice: OJ  Caffeine: 1 large c/d with creamer  Amount of restaurant eating per week: 0-1 plus cafeteria  Eating a the table with the TV off? couch    Physical Activity Pattern   Current physical activity routine includes: none. Used to walk on her treadmill. Used to go to the gym [employer]     Limitations from being physically active on a regular basis includes: pain, fatigue, time    She describes her general health as: fair    Past Medical History     Past Medical History:   Diagnosis Date   ? Ankle pain    ? Arthritis     degenerative spine disease with sciatica   ? Heartburn    ? Knee pain    ? Morbid obesity with BMI of 40.0-44.9, adult (H)      Dyslipidemia: no  Obesity Hypoventilation: no  DM2: no DM1: no DX:  no Most recent AIC: NA  Neuropathy: back radiating to  "hip  Nephropathy: no  Retinopathy: no  Glaucoma:no  IFG or \"pre-DM\": no  MI: no  CVA:no  CHF: no  Heart Valves: native  Previous cardiac testing includes: EKG, remote while in the hospital  Cancers: no  Kidney Disease: no  Colitis: no  Crohn's: no  PUD: no  HX UGI/EGD:no  Asthma: no  Back Pain:yes  DDD: yes  Gout: no  Severe Headaches: tension  Seizures: no If so, last seizure: no  Pseudotumor: no  PCOS: no  Menstrual Irregularity: NA  Menorrhagia: NA  Infertility: no  Thyroid problems: TSH has been elevated  Thyroid medications: no  Glaucoma: no  HIV positive: no  MRSA/VRE history: no  History of Blood transfusion: no  Anemia: no    Medications     No current outpatient medications on file.     No current facility-administered medications for this visit.        Allergies    Onion; Venom-honey bee; Clindamycin; and Medroxyprogesterone    Past Surgical History     Past Surgical History:   Procedure Laterality Date   ? APPENDECTOMY     ? BLADDER SUSPENSION     ? BREAST BIOPSY     ? OOPHORECTOMY      unilateral   ? PELVIC LAPAROSCOPY      endometriosis   ? REFRACTIVE SURGERY     ? SALPINGECTOMY     ? spine injection     ? TONSILLECTOMY       History of problems with anesthesia: no  History of Malignant Hyperthermia: NO    Gynecologic History     Menarche: [age]  Regular: yes  Currently: none  Problems getting pregnant: no  MD Involvement: no If so, explanation/Diagnosis: no  : 1  Para: 1001  C-S: 0  Vaginal deliveries: 1  SAB:0  EAB: 0  Gestational DM: no  Gestational HTN: no  Preeclampsia: no  Current Birth Control: PM    Family History     family history includes Diverticulitis in her mother; No Medical Problems in her father, sister, and sister.    Social History     Social History     Socioeconomic History   ? Marital status:      Spouse name: Not on file   ? Number of children: Not on file   ? Years of education: Not on file   ? Highest education level: Not on file   Occupational History   ? Not on " file   Social Needs   ? Financial resource strain: Not on file   ? Food insecurity:     Worry: Not on file     Inability: Not on file   ? Transportation needs:     Medical: Not on file     Non-medical: Not on file   Tobacco Use   ? Smoking status: Former Smoker     Years: 14.00   ? Smokeless tobacco: Never Used   ? Tobacco comment: age 19 to 33 social smoker   Substance and Sexual Activity   ? Alcohol use: Yes     Comment: Rare 2-3 maybe 3X/mo   ? Drug use: No   ? Sexual activity: Yes     Partners: Male     Birth control/protection: Surgical   Lifestyle   ? Physical activity:     Days per week: Not on file     Minutes per session: Not on file   ? Stress: Not on file   Relationships   ? Social connections:     Talks on phone: Not on file     Gets together: Not on file     Attends Tenriism service: Not on file     Active member of club or organization: Not on file     Attends meetings of clubs or organizations: Not on file     Relationship status: Not on file   ? Intimate partner violence:     Fear of current or ex partner: Not on file     Emotionally abused: Not on file     Physically abused: Not on file     Forced sexual activity: Not on file   Other Topics Concern   ? Not on file   Social History Narrative    . On daughter 23 yo One grandson.    Working FT, Customer Service [employer]       Psychiatric History   Diagnoses: dysthymia  Treated by: nothing  Psychiatric Hospitalizations: no  Suicide attempts: no  ECT: no  Panic attacks: no  History of Abuse: no    Palliative Medicine History   Involvement in a pain clinic: [facility]    Dental History   Missing teeth: no  Pending Dental Work: no  Regular Dental Visits: yes  Dentures/Partials: no    Review of Systems   Snoring: yes  Witnessed Apneas no  PND yes  Topmost Score: 9  STOP BAN/8  General  Fatigue: yes  Sleep Quality:4-5 hours-insomnia  HEENT  Visual changes: no  Cardiovascular  Murmur: no  Elevated BP: yes today  Chest Pain with  "Exertion: no  Dyspnea with Exertion: yes  Palpitations: no  Lower Extremity Edema: sometimes  Syncope: no  Pulmonary  Shortness of breath at rest: no  Wheezing: no  CPAP use: no  Gastrointestinal  Trouble swallowing:no  Heartburn: yes  Abdominal pain: no  Hematochezia: no  Urologic  Hesitancy: no  Urgency: no  USI had surgery  Genitourinary  ED: NA  Menorrhagia: no  Dysmenorrhea: no  Neurologic  Severe headache:yes  Paresthesias: yes  Psychiatric  Moods Stable: yes  Hallucinations: no  Rheumatologic  Myalgias: yes  Arthralgias: yes  Endocrine  Polydipsia: no  Polyuria: no  Galactorrhea: no  Heat intolerance: no  Hirsutism: no  Musculoskeletal  Joint pain:yes  Falls: no  Use of cane, crutch or motorized scooter: no  Hematologic  Abnormal Bleeding or Clotting: no  Dermatologic  Skin Tags: no  Striae: no  Furuncless: no  Acne: no  Intertrigo: no  Lower Leg ulcers: no    Physical Exam   Vitals: BP (!) 141/97   Pulse 85   Resp 18   Ht 5' 2\" (1.575 m)   Wt 221 lb (100.2 kg)   SpO2 97%   Breastfeeding? No   BMI 40.42 kg/m    Height:   Ht Readings from Last 1 Encounters:   03/28/19 5' 2\" (1.575 m)     Weight:   Wt Readings from Last 1 Encounters:   03/28/19 221 lb (100.2 kg)     Height: 5' 2\" (1.575 m) (3/28/2019  9:01 AM)  Initial Weight: 221 lbs (3/28/2019  9:01 AM)  Weight: 221 lb (100.2 kg) (3/28/2019  9:01 AM)  Weight loss from initial: 0 (3/28/2019  9:01 AM)  % Weight loss: 0 % (3/28/2019  9:01 AM)  BMI (Calculated): 40.4 (3/28/2019  9:01 AM)  SpO2: 97 % (3/28/2019  9:01 AM)  Waist Circumference (In): 47 Inches (3/28/2019  9:01 AM)  Hip Circumference (In): 51 Inches (3/28/2019  9:01 AM)  Neck Circumference (In): 14.5 Inches (3/28/2019  9:01 AM)    Body mass index is 40.42 kg/m .    General Appearance  No acute distress. Obesity: central  Alert: yes  Sleepy: no  Ambulatory without a device: yes  ALY  PERRLAEAGLE Melampati Score: 3+  Neck  Stout: 14.5\" No carotid bruits  Cardiovascular  Rhythm regular Rate " Regular  Murmur: no  Pulmonary  [place] Score: 9  Lungs clear to ascultation  Abdomen  Soft, NT/ND No rebound, no guarding  No rashes.   Post surgical Scars: laparoscopic  Extremities:  Pitting edema: no  Palpable distal pulses: 2+  Varicose veins: no  Neurologic  Tremors: no  Psychiatric  Thought Content Organized  Mood appears stable  Endocrine  Moon Facies: NO  Dorsal Thoracic Prominence: NO  Skin tags: no  Acanthosis nigricans: no  Purple Striae: no  Dermatologic  Intertrigo: no    Counseling/Education   We reviewed the important post op bariatric recommendations:  -eating 3 meals daily  -eating protein first, getting >60gm protein daily 80gm if duodenal switch  -eating slowly, chewing food well  -avoiding/limiting calorie containing beverages  -drinking water 15-30 minutes before or after meals  -choosing wheat, not white with breads, crackers, pastas, neelima, bagels, tortillas, rice  -limiting restaurant or cafeteria eating to twice a week or less    We discussed the importance of restorative sleep and stress management in maintaining a healthy weight.  We discussed the National Weight Control Registry healthy weight maintenance strategies and ways to optimize metabolism.  We discussed the importance of physical activity including cardiovascular and strength training in maintaining a healthier weight.  We discussed the importance of lifelong vitamin supplementation and lifelong follow-up.    Dakota Berkowitz was reminded that, postoperatively,  to avoid marginal ulcers she should avoid tobacco at all, alcohol in excess, caffeine in excess, and NSAIDS (unless indicated for cardioprotection or othewise and opposed by a PPI).     She was reminded that after bariatric surgery alcohol will affect her differently and she should not drive after consuming even one alcoholic beverage.  Thank you for the opportunity to participate in the care of your patient.  Kika Lilly MD, FAAFP            60 minutes spent  with patient. >50% in counseling.                                                Answers for HPI/ROS submitted by the patient on 3/26/2019   BARIATRIC NEW PATIENT  Interested in Surgery?: Yes  Izard About?: Primary Care Doctor  Barriers to learning:: No  Attended Seminar?: Yes  PCP:: [facility] [place]  Specialist:: None  Mental Health Provider:: None  Present Ht:: 62  Present Wt:: 225  Age Overweight:: 28  Age 100lbs:: 33  Wt 18yrs:: 111  Wt 5yrs ago:: 185  # of Wt Loss Efforts:: 3  Prescribed Wt Loss Meds?: Yes  Prescribed Meds:: PHENTERMINE  OTC Wt Loss Meds?: Yes  OTC Meds:: Keto diet, Liposene, [place], hydroxycut, Forskolin  Surgeon Choice:: undecided  Procedure Choice:: Amna-en Y gastric by-pass  Program1:: [facility]  Program2:: [facility]  Program3:: [facility]

## 2021-07-03 NOTE — ADDENDUM NOTE
Addendum Note by Bridgett Parker, RN at 3/11/2020 11:37 AM     Author: Bridgett Parker, RN Service: -- Author Type: Registered Nurse    Filed: 3/11/2020 11:37 AM Encounter Date: 3/3/2020 Status: Signed    : Bridgett Parker, RN (Registered Nurse)    Addended by: BRIDGETT PARKER on: 3/11/2020 11:37 AM        Modules accepted: Orders

## 2021-09-17 ENCOUNTER — LAB (OUTPATIENT)
Dept: FAMILY MEDICINE | Facility: CLINIC | Age: 51
End: 2021-09-17
Attending: PHYSICIAN ASSISTANT
Payer: COMMERCIAL

## 2021-09-17 DIAGNOSIS — Z20.822 ENCOUNTER FOR LABORATORY TESTING FOR COVID-19 VIRUS: ICD-10-CM

## 2021-09-17 LAB — SARS-COV-2 RNA RESP QL NAA+PROBE: NEGATIVE

## 2021-09-17 PROCEDURE — U0005 INFEC AGEN DETEC AMPLI PROBE: HCPCS

## 2021-09-17 PROCEDURE — U0003 INFECTIOUS AGENT DETECTION BY NUCLEIC ACID (DNA OR RNA); SEVERE ACUTE RESPIRATORY SYNDROME CORONAVIRUS 2 (SARS-COV-2) (CORONAVIRUS DISEASE [COVID-19]), AMPLIFIED PROBE TECHNIQUE, MAKING USE OF HIGH THROUGHPUT TECHNOLOGIES AS DESCRIBED BY CMS-2020-01-R: HCPCS

## 2021-09-26 ENCOUNTER — HEALTH MAINTENANCE LETTER (OUTPATIENT)
Age: 51
End: 2021-09-26

## 2022-05-07 ENCOUNTER — HEALTH MAINTENANCE LETTER (OUTPATIENT)
Age: 52
End: 2022-05-07

## 2022-05-26 ENCOUNTER — VIRTUAL VISIT (OUTPATIENT)
Dept: FAMILY MEDICINE | Facility: CLINIC | Age: 52
End: 2022-05-26
Payer: COMMERCIAL

## 2022-05-26 ENCOUNTER — E-VISIT (OUTPATIENT)
Dept: FAMILY MEDICINE | Facility: CLINIC | Age: 52
End: 2022-05-26
Payer: COMMERCIAL

## 2022-05-26 DIAGNOSIS — L98.9 SKIN LESION: Primary | ICD-10-CM

## 2022-05-26 DIAGNOSIS — M35.3 PMR (POLYMYALGIA RHEUMATICA) (H): ICD-10-CM

## 2022-05-26 DIAGNOSIS — L70.9 ACNE, UNSPECIFIED ACNE TYPE: Primary | ICD-10-CM

## 2022-05-26 PROCEDURE — 99207 PR NON-BILLABLE SERV PER CHARTING: CPT | Performed by: FAMILY MEDICINE

## 2022-05-26 PROCEDURE — 99213 OFFICE O/P EST LOW 20 MIN: CPT | Mod: 95 | Performed by: FAMILY MEDICINE

## 2022-05-26 NOTE — PROGRESS NOTES
Dakota is a 51 year old who is being evaluated via a billable telephone visit.      What phone number would you like to be contacted at? [phone]  How would you like to obtain your AVS? MyChart    Assessment & Plan     Skin lesion  Patient reports a cyst like lesion on her anterior chest wall says it has been there for years. When she squeezes it some times she gets pus.  - Adult Dermatology Referral; Future       PMR (polymyalgia rheumatica) (H)  Stable- no new symptoms  [id number]}     FUTURE APPOINTMENTS:       - Follow-up visit in one month     Return in about 1 month (around 6/26/2022), or if symptoms worsen or fail to improve, for Follow up.    Ghassan Rodriguez MD  [facility]    Subjective   Dakota is a 51 year old who presents for the following health issues     History of Present Illness       Reason for visit:  Derm prob  Symptom onset:  More than a month  Symptoms include:  She has a pore in the middle of her chest (black head) that gets irritated and fills up. She squeezes it and gets puss and it will be ok for about a week but then it will come back. Hard lump  Symptom intensity:  Mild  Symptom progression:  Staying the same  Had these symptoms before:  Yes  Has tried/received treatment for these symptoms:  No  What makes it worse:  None  What makes it better:  Has tried acne treatments and it doens't help    She eats 2-3 servings of fruits and vegetables daily.She consumes 0 sweetened beverage(s) daily.She exercises with enough effort to increase her heart rate 9 or less minutes per day.  She exercises with enough effort to increase her heart rate 3 or less days per week.   She is taking medications regularly.     Chief Complaint   Patient presents with     Derm Problem     Wants a referral for derm           Review of Systems   Constitutional, HEENT, cardiovascular, pulmonary, gi and gu systems are negative, except as otherwise noted.      Objective           Vitals:  No  vitals were obtained today due to virtual visit.    Physical Exam   healthy, alert and no distress  PSYCH: Alert and oriented times 3; coherent speech, normal   rate and volume, able to articulate logical thoughts, able   to abstract reason, no tangential thoughts, no hallucinations   or delusions  Her affect is normal  RESP: No cough, no audible wheezing, able to talk in full sentences  Remainder of exam unable to be completed due to telephone visits            Phone call duration: 3 minutes

## 2022-06-08 ENCOUNTER — OFFICE VISIT (OUTPATIENT)
Dept: DERMATOLOGY | Facility: CLINIC | Age: 52
End: 2022-06-08
Attending: FAMILY MEDICINE
Payer: COMMERCIAL

## 2022-06-08 DIAGNOSIS — D23.9 DERMAL NEVUS: ICD-10-CM

## 2022-06-08 DIAGNOSIS — L82.1 SEBORRHEIC KERATOSIS: ICD-10-CM

## 2022-06-08 DIAGNOSIS — L98.9 SKIN LESION: ICD-10-CM

## 2022-06-08 DIAGNOSIS — L82.0 INFLAMED SEBORRHEIC KERATOSIS: ICD-10-CM

## 2022-06-08 DIAGNOSIS — L72.0 EPIDERMAL CYST: ICD-10-CM

## 2022-06-08 DIAGNOSIS — L81.6 POIKILODERMA: Primary | ICD-10-CM

## 2022-06-08 DIAGNOSIS — D18.01 ANGIOMA OF SKIN: ICD-10-CM

## 2022-06-08 PROCEDURE — 17110 DESTRUCTION B9 LES UP TO 14: CPT | Performed by: DERMATOLOGY

## 2022-06-08 PROCEDURE — 99214 OFFICE O/P EST MOD 30 MIN: CPT | Mod: 25 | Performed by: DERMATOLOGY

## 2022-06-08 PROCEDURE — 12031 INTMD RPR S/A/T/EXT 2.5 CM/<: CPT | Mod: 59 | Performed by: DERMATOLOGY

## 2022-06-08 PROCEDURE — 11401 EXC TR-EXT B9+MARG 0.6-1 CM: CPT | Mod: 59 | Performed by: DERMATOLOGY

## 2022-06-08 ASSESSMENT — PAIN SCALES - GENERAL: PAINLEVEL: NO PAIN (0)

## 2022-06-08 NOTE — PROGRESS NOTES
Dakota Berkowitz , a 51 year old year old female patient, I was asked to see by Dr. Rodriguez for spot on chest.  Patient states this has been present for a while.  Patient reports the following symptoms:  Tender.   .  Patient reports the following previous treatments none.  Patient reports the following modifying factors none.  Associated symptoms: spot son face and r ankle  .  Patient has no other skin complaints today.  Remainder of the HPI, Meds, PMH, Allergies, FH, and SH was reviewed in chart.      Past Medical History:   Diagnosis Date     Ankle pain      Arthritis     degenerative spine disease with sciatica     Breast implant rupture      Endometriosis      Heartburn      Irritability      Knee pain      Menopausal syndrome (hot flashes)      NO ACTIVE PROBLEMS      Obesity, Class II, BMI 35-39.9      Obstructive sleep apnea     CPAP     Pelvic pain      S/P gastric bypass 9/18/2019       Past Surgical History:   Procedure Laterality Date     APPENDECTOMY       BIOPSY BREAST       BLADDER SUSPENSION       COLONOSCOPY N/A 5/31/2019    Procedure: COLONOSCOPY;  Surgeon: Jose Wen MD;  Location: [facility]     COLONOSCOPY       COSMETIC SURGERY      breast implants     CYSTOSCOPY, SLING TRANSVAGINAL N/A 2/8/2016    Procedure: CYSTOSCOPY, SLING TRANSVAGINAL;  Surgeon: Sterling Hill MD;  Location: [facility]     ENDOMETRIAL ABLATION       EYE SURGERY       EYE SURGERY       HC REPAIR OF NASAL SEPTUM       NASAL SEPTUM SURGERY       OOPHORECTOMY      unilateral     OTHER SURGICAL HISTORY      spine injection     OTHER SURGICAL HISTORY      benign tumor removalleft thigh      OTHER SURGICAL HISTORY      BREAST IMPLANTS     OTHER SURGICAL HISTORY  02/2016    CYSTO, SLING TRANSVAGINAL     PELVIC LAPAROSCOPY      due to endometriosis     SD LAP GASTRIC BYPASS/DOUG-EN-Y N/A 9/18/2019    Procedure: LAPAROSCOPIC DOUG EN Y GASTRIC BYPASS;  Surgeon: Juan Sin MD;  Location: [facility];  Service: General      REFRACTIVE SURGERY       SALPINGECTOMY       SURGICAL HISTORY OF -       laporoscopy due to endometriosis     SURGICAL HISTORY OF -       endometrial ablation     SURGICAL HISTORY OF -       breast augmentation     SURGICAL HISTORY OF -       benign tumor removed from her left thigh     TONSILLECTOMY       TONSILLECTOMY       TUBAL LIGATION       TUBAL LIGATION       ZZC APPENDECTOMY          Family History   Problem Relation Age of Onset     Cancer Mother         ovarian     Cancer Paternal Grandmother         lung     Gynecology Sister         having a partial hysterectomy due to abnormal cells, leaving ovaries and cervix     Colon Cancer Father      Cancer Father 80        colon     Gynecology Daughter         endometriosis     Gynecology Sister         hysterectomy for endometriosis     Diverticulitis Mother      No Known Problems Father      No Known Problems Sister      No Known Problems Sister        Social History     Socioeconomic History     Marital status:      Spouse name: Not on file     Number of children: 1     Years of education: Not on file     Highest education level: Not on file   Occupational History     Employer: [employer]   Tobacco Use     Smoking status: Never Smoker     Smokeless tobacco: Never Used   Substance and Sexual Activity     Alcohol use: Yes     Comment: occassionally on weekends     Drug use: No     Sexual activity: Yes     Partners: Male     Birth control/protection: Female Surgical     Comment: Tubal   Other Topics Concern     Parent/sibling w/ CABG, MI or angioplasty before 65F 55M? No   Social History Narrative     Not on file     Social Determinants of Health     Financial Resource Strain: Not on file   Food Insecurity: Not on file   Transportation Needs: Not on file   Physical Activity: Not on file   Stress: Not on file   Social Connections: Not on file   Intimate Partner Violence: Not on file   Housing Stability: Not on file       Outpatient Encounter  Medications as of 6/8/2022   Medication Sig Dispense Refill     Calcium Carbonate-Vit D-Min (CALCIUM 1200 PO) Take 1 tablet by mouth daily       cyclobenzaprine (FLEXERIL) 5 MG tablet Take 1 tablet (5 mg) by mouth 3 times daily as needed for muscle spasms 30 tablet 1     EPINEPHrine (ANY BX GENERIC EQUIV) 0.3 MG/0.3ML injection 2-pack Inject 0.3 mLs (0.3 mg) into the muscle once as needed for anaphylaxis 0.6 mL 3     study - prednisoLONE acetate, MMCORC, (IDS# 5081) 1 % ophthalmic susp Place 1-2 drops into both eyes 3 times daily       No facility-administered encounter medications on file as of 6/8/2022.             Review Of Systems  Skin: As above  Eyes: negative  Ears/Nose/Throat: negative  Respiratory: No shortness of breath, dyspnea on exertion, cough, or hemoptysis  Cardiovascular: negative  Gastrointestinal: negative  Genitourinary: negative  Musculoskeletal: negative  Neurologic: negative  Psychiatric: negative  Hematologic/Lymphatic/Immunologic: negative  Endocrine: negative      O:   NAD, WDWN, Alert & Oriented, Mood & Affect wnl, Vitals stable   Here today alone   LMP 03/02/2018    General appearance mariel ii   Vitals stable   Alert, oriented and in no acute distress   R ankle inflamed seborrheic keratosis x2  Mid chest 8mm nodule with comedone  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 Seborrheic keratosis, lentigo, angioma, dermal nevus  2. R ankle inflamed seborrheic keratosis x2  LN2:  Treated with LN2 for 5s for 1-2 cycles. Warned risks of blistering, pain, pigment change, scarring, and  incomplete resolution.  Advised patient to return if lesions do not completely resolve.  Wound care sheet given.  3. Poikiloderma  ipl discussed with patient schedule prn   4. Cyst on chest  Excision discussed with patient   It was a pleasure speaking to Dakota Berkowitz today.  Previous clinic  notes and pertinent laboratory tests were reviewed prior to Dakota Berkowitz's visit.  Nature of benign skin lesions dicussed with patient.  Patient encouraged to perform monthly skin exams.  UV precautions reviewed with patient.  Return to clinic next appt    PROCEDURE NOTE  Mid chest cyst   EXCISION OF CYST AND int: After thorough discussion of PGACAC, consent obtained, anesthesia and prep, the margins of the cyst were identified and an incision was made encompassing the cyst. The incisions were made through the skin and down to and including the subcutaneous tissue. The cyst was removed en bloc and submitted for frozen pathologic review. The wound edges were undermined until adequate tissue mobility was obtained. hemostasis was achieved. The wound edges were then closed in a layered fashion, being careful not to leave any dead space. Postoperative length was 1.1 cm.   EBL minimal; complications none; wound care routine. The patient was discharged in good condition and will return in one week for wound evaluation.

## 2022-06-08 NOTE — LETTER
6/8/2022         RE: Dakota Berkowitz  [address] [place] [address]        Dear Colleague,    Thank you for referring your patient, Dakota Berkowitz, to the [facility]. Please see a copy of my visit note below.    Dakota Berkowitz , a 51 year old year old female patient, I was asked to see by Dr. Rodriguez for spot on chest.  Patient states this has been present for a while.  Patient reports the following symptoms:  Tender.   .  Patient reports the following previous treatments none.  Patient reports the following modifying factors none.  Associated symptoms: spot son face and r ankle  .  Patient has no other skin complaints today.  Remainder of the HPI, Meds, PMH, Allergies, FH, and SH was reviewed in chart.      Past Medical History:   Diagnosis Date     Ankle pain      Arthritis     degenerative spine disease with sciatica     Breast implant rupture      Endometriosis      Heartburn      Irritability      Knee pain      Menopausal syndrome (hot flashes)      NO ACTIVE PROBLEMS      Obesity, Class II, BMI 35-39.9      Obstructive sleep apnea     CPAP     Pelvic pain      S/P gastric bypass 9/18/2019       Past Surgical History:   Procedure Laterality Date     APPENDECTOMY       BIOPSY BREAST       BLADDER SUSPENSION       COLONOSCOPY N/A 5/31/2019    Procedure: COLONOSCOPY;  Surgeon: Jose Wen MD;  Location: [facility]     COLONOSCOPY       COSMETIC SURGERY      breast implants     CYSTOSCOPY, SLING TRANSVAGINAL N/A 2/8/2016    Procedure: CYSTOSCOPY, SLING TRANSVAGINAL;  Surgeon: Sterling Hill MD;  Location: [facility]     ENDOMETRIAL ABLATION       EYE SURGERY       EYE SURGERY       HC REPAIR OF NASAL SEPTUM       NASAL SEPTUM SURGERY       OOPHORECTOMY      unilateral     OTHER SURGICAL HISTORY      spine injection     OTHER SURGICAL HISTORY      benign tumor removalleft thigh      OTHER SURGICAL HISTORY      BREAST IMPLANTS     OTHER SURGICAL HISTORY  02/2016     CYSTO, SLING TRANSVAGINAL     PELVIC LAPAROSCOPY      due to endometriosis     [place] LAP GASTRIC BYPASS/DOUG-EN-Y N/A 9/18/2019    Procedure: LAPAROSCOPIC DOUG EN Y GASTRIC BYPASS;  Surgeon: Juan Sin MD;  Location[facility];  Service: General     REFRACTIVE SURGERY       SALPINGECTOMY       SURGICAL HISTORY OF -       laporoscopy due to endometriosis     SURGICAL HISTORY OF -       endometrial ablation     SURGICAL HISTORY OF -       breast augmentation     SURGICAL HISTORY OF -       benign tumor removed from her left thigh     TONSILLECTOMY       TONSILLECTOMY       TUBAL LIGATION       TUBAL LIGATION       ZZC APPENDECTOMY          Family History   Problem Relation Age of Onset     Cancer Mother         ovarian     Cancer Paternal Grandmother         lung     Gynecology Sister         having a partial hysterectomy due to abnormal cells, leaving ovaries and cervix     Colon Cancer Father      Cancer Father 80        colon     Gynecology Daughter         endometriosis     Gynecology Sister         hysterectomy for endometriosis     Diverticulitis Mother      No Known Problems Father      No Known Problems Sister      No Known Problems Sister        Social History     Socioeconomic History     Marital status:      Spouse name: Not on file     Number of children: 1     Years of education: Not on file     Highest education level: Not on file   Occupational History     Employer: [employer]   Tobacco Use     Smoking status: Never Smoker     Smokeless tobacco: Never Used   Substance and Sexual Activity     Alcohol use: Yes     Comment: occassionally on weekends     Drug use: No     Sexual activity: Yes     Partners: Male     Birth control/protection: Female Surgical     Comment: Tubal   Other Topics Concern     Parent/sibling w/ CABG, MI or angioplasty before 65F 55M? No   Social History Narrative     Not on file     Social Determinants of Health     Financial Resource Strain: Not on file    Food Insecurity: Not on file   Transportation Needs: Not on file   Physical Activity: Not on file   Stress: Not on file   Social Connections: Not on file   Intimate Partner Violence: Not on file   Housing Stability: Not on file       Outpatient Encounter Medications as of 6/8/2022   Medication Sig Dispense Refill     Calcium Carbonate-Vit D-Min (CALCIUM 1200 PO) Take 1 tablet by mouth daily       cyclobenzaprine (FLEXERIL) 5 MG tablet Take 1 tablet (5 mg) by mouth 3 times daily as needed for muscle spasms 30 tablet 1     EPINEPHrine (ANY BX GENERIC EQUIV) 0.3 MG/0.3ML injection 2-pack Inject 0.3 mLs (0.3 mg) into the muscle once as needed for anaphylaxis 0.6 mL 3     study - prednisoLONE acetate, MMCORC, (IDS# 5081) 1 % ophthalmic susp Place 1-2 drops into both eyes 3 times daily       No facility-administered encounter medications on file as of 6/8/2022.             Review Of Systems  Skin: As above  Eyes: negative  Ears/Nose/Throat: negative  Respiratory: No shortness of breath, dyspnea on exertion, cough, or hemoptysis  Cardiovascular: negative  Gastrointestinal: negative  Genitourinary: negative  Musculoskeletal: negative  Neurologic: negative  Psychiatric: negative  Hematologic/Lymphatic/Immunologic: negative  Endocrine: negative      O:   NAD, WDWN, Alert & Oriented, Mood & Affect wnl, Vitals stable   Here today alone   LMP 03/02/2018    General appearance mariel ii   Vitals stable   Alert, oriented and in no acute distress   R ankle inflamed seborrheic keratosis x2  Mid chest 8mm nodule with comedone  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 Seborrheic keratosis, lentigo, angioma, dermal nevus  2. R ankle inflamed seborrheic keratosis x2  LN2:  Treated with LN2 for 5s for 1-2 cycles. Warned risks of blistering, pain, pigment change, scarring, and incomplete resolution.  Advised patient to return if lesions do not completely resolve.  Wound care sheet given.  3. Poikiloderma  ipl discussed with patient schedule prn   4. Cyst on chest  Excision discussed with patient   It was a pleasure speaking to Dakota Berkowitz today.  Previous clinic  notes and pertinent laboratory tests were reviewed prior to Dakota Berkowitz's visit.  Nature of benign skin lesions dicussed with patient.  Patient encouraged to perform monthly skin exams.  UV precautions reviewed with patient.  Return to clinic next appt    PROCEDURE NOTE  Mid chest cyst   EXCISION OF CYST AND int: After thorough discussion of PGACAC, consent obtained, anesthesia and prep, the margins of the cyst were identified and an incision was made encompassing the cyst. The incisions were made through the skin and down to and including the subcutaneous tissue. The cyst was removed en bloc and submitted for frozen pathologic review. The wound edges were undermined until adequate tissue mobility was obtained. hemostasis was achieved. The wound edges were then closed in a layered fashion, being careful not to leave any dead space. Postoperative length was 1.1 cm.   EBL minimal; complications none; wound care routine. The patient was discharged in good condition and will return in one week for wound evaluation.        Again, thank you for allowing me to participate in the care of your patient.        Sincerely,        Ramana Germain MD

## 2022-06-08 NOTE — PATIENT INSTRUCTIONS
Sutured Wound Care     [facility]: [phone]  [facility]: [phone]    Mid Chest    No strenuous activity for 48 hours. Resume moderate activity in 48 hours. No heavy exercising until you are seen for follow up in one week.     Take Tylenol as needed for discomfort.                         Do not drink alcoholic beverages for 48 hours.     Keep the pressure bandage in place for 24 hours. If the bandage becomes blood tinged or loose, reinforce it with gauze and tape.        (Refer to the reverse side of this page for management of bleeding).    Remove pressure bandage in 24 hours     Leave the flat bandage in place until your follow up appointment.    Keep the bandage dry. Wash around it carefully.    If the tape becomes soiled or starts to come off, reinforce it with additional paper tape.    Do not smoke for 3 weeks; smoking is detrimental to wound healing.    It is normal to have swelling and bruising around the surgical site. The bruising will fade in approximately 10-14 days. Elevate the area to reduce swelling.    Numbness, itchiness and sensitivity to temperature changes can occur after surgery and may take up to 18 months to normalize.    POSSIBLE COMPLICATIONS    BLEEDING:    Leave the bandage in place.  Use tightly rolled up gauze or a cloth to apply direct pressure over the bandage for 20   minutes.  Reapply pressure for an additional 20 minutes if necessary  Call the office or go to the nearest emergency room if pressure fails to stop the bleeding.  Use additional gauze and tape to maintain pressure once the bleeding has stopped.    PAIN:    Post operative pain should slowly get better, never worse.  A severe increase in pain may indicate a problem. Call the office if this occurs.  In case of emergency phone:Dr Germain [phone]       ONE WEEK AFTER SURGERY:  -Remove bandage 6/15/2022  -Clean and dry the area with plain tap water using a Q-tip or sterile gauze pad  -Reapply  steri strips down incision and cover with paper tape  -Leave bandage in place for 1  week  -Remove bandage on         6/22/22                  WOUND CARE INSTRUCTIONS  for  ONE WEEK AFTER SURGERY    Leave flat bandage on your skin for one week after today s bandage change.  In one week when you remove the bandage, you may resume your regular skin care routine, including washing with mild soap and water, applying moisturizer, make-up and sunscreen.    If there are any open or bleeding areas at the incision/graft site you should begin to cover the area with a bandage daily as follows:    Clean and dry the area with plain tap water using a Q-tip or sterile gauze pad.  Apply Polysporin or Bacitracin ointment to the open area.  Cover the wound with a band-aid or a sterile non-stick gauze pad and micropore paper tape.     SIGNS OF INFECTION  - If you notice any of these signs of infection, call your doctor right away: expanding redness around the wound.  - Yellow or greenish-colored pus or cloudy wound drainage.    - Red streaking spreading from the wound.  - Increased swelling, tenderness, or pain around the wound.   - Fever.    Please remember that yellow and clear drainage from a wound can be normal and related to normal wound healing.  Isolated drainage from a wound without a combination of the above features does not indicate infection.       *Once the bandages are removed, the scar will be red and firm (especially in the lip/chin area). This is normal and will fade in time. It might take 6-12 months for this to happen.     *Massaging the area will help the scar soften and fade quicker. Begin to massage the area one month after the bandages have been removed. To massage apply pressure directly and firmly over the scar with the fingertips and move in a circular motion. Massage the area for a few minutes several times a day. Continue to massage the site for several months.    *Approximately 6-8 weeks after surgery it  is not uncommon to see the formation of  tender pimple-like  bump along the scar. This is normal. As the scar continues to mature and the stitches underneath the skin begin to dissolve, this might occur. Do not pick or squeeze, this will resolve on it s own. Should one break open producing a small amount of drainage, apply Polysporin or Bacitracin ointment a few times a day until the wound is completely healed.    *Numbness in the surgical area is expected. It might take 12-18 months for the feeling to return to normal. During this time sensations of itchiness, tingling and occasional sharp pains might be noted. These feelings are normal and will subside once the nerves have completely healed.     IN CASE OF EMERGENCY: Dr Germain [phone]   If you were seen in [place] call: [phone]  If you were seen in [place] call: [phone]   WOUND CARE INSTRUCTIONS   FOR CRYOSURGERY   This area treated with liquid nitrogen should form a blister (areas treated may or may not blister-skin may just turn dark and slough off). You do not need to bandage the area unless a blister forms and breaks (which may be a few days). When the blister breaks, begin daily dressing changes as follows:  1) Clean and dry the area with tap water using clean Q-tip or sterile gauze pad.   2) Apply Polysporin ointment or Bacitracin ointment over entire wound. Do NOT use Neosporin ointment.   3) Cover the wound with a band-aid or sterile non-stick gauze pad and micropore paper tape.   REPEAT THESE INSTRUCTIONS AT LEAST ONCE A DAY UNTIL THE WOUND HAS COMPLETELY HEALED.   It is an old wives tale that a wound heals better when it is exposed to air and allowed to dry out. The wound will heal faster with a better cosmetic result if it is kept moist with ointment and covered with a bandage.   Do not let the wound dry out.   IMPORTANT INFORMATION ON REVERSE SIDE   Supplies Needed:   *Cotton tipped applicators (Q-tips)   *Polysporin ointment or  Bacitracin ointment (NOT NEOSPORIN)   *Band-aids, or non stick gauze pads and micropore paper tape   PATIENT INFORMATION   During the healing process you will notice a number of changes. All wounds develop a small halo of redness surrounding the wound. This means healing is occurring. Severe itching with extensive redness usually indicates sensitivity to the ointment or bandage tape used to dress the wound. You should call our office if this develops.   Swelling and/or discoloration around your surgical site is common, particularly when performed around the eye.   All wounds normally drain. The larger the wound the more drainage there will be. After 7-10 days, you will notice the wound beginning to shrink and new skin will begin to grow. The wound is healed when you can see skin has formed over the entire area. A healed wound has a healthy, shiny look to the surface and is red to dark pink in color to normalize. Wounds may take approximately 4-6 weeks to heal. Larger wounds may take 6-8 weeks. After the wound is healed you may discontinue dressing changes.   You may experience a sensation of tightness as your wound heals. This is normal and will gradually subside.   Your healed wound may be sensitive to temperature changes. This sensitivity improves with time, but if you re having a lot of discomfort, try to avoid temperature extremes.   Patients frequently experience itching after their wound appears to have healed because of the continue healing under the skin. Plain Vaseline will help relieve the itching.

## 2022-06-08 NOTE — NURSING NOTE
Chief Complaint   Patient presents with     Derm Problem     Spot under right eye, chest and right ankle       There were no vitals filed for this visit.  Wt Readings from Last 1 Encounters:   05/26/21 67.6 kg (149 lb)       Sonia Woodward LPN .................6/8/2022

## 2022-09-27 ENCOUNTER — OFFICE VISIT (OUTPATIENT)
Dept: DERMATOLOGY | Facility: CLINIC | Age: 52
End: 2022-09-27

## 2022-09-27 DIAGNOSIS — I78.1 TELANGIECTASIA: Primary | ICD-10-CM

## 2022-09-27 PROCEDURE — 99207 PR NO CHARGE LOS: CPT | Performed by: DERMATOLOGY

## 2022-09-27 PROCEDURE — 96999 UNLISTED SPEC DERM SVC/PX: CPT | Performed by: DERMATOLOGY

## 2022-09-27 NOTE — PROGRESS NOTES
Dakota Berkowitz is an extremely pleasant 52 year old year old female patient here today for ipl for vessels on right nose.  Patient has no other skin complaints today.  Remainder of the HPI, Meds, PMH, Allergies, FH, and SH was reviewed in chart.      Past Medical History:   Diagnosis Date     Ankle pain      Arthritis     degenerative spine disease with sciatica     Breast implant rupture      Endometriosis      Heartburn      Irritability      Knee pain      Menopausal syndrome (hot flashes)      NO ACTIVE PROBLEMS      Obesity, Class II, BMI 35-39.9      Obstructive sleep apnea     CPAP     Pelvic pain      S/P gastric bypass 9/18/2019       Past Surgical History:   Procedure Laterality Date     APPENDECTOMY       BIOPSY BREAST       BLADDER SUSPENSION       COLONOSCOPY N/A 5/31/2019    Procedure: COLONOSCOPY;  Surgeon: Jose Wen MD;  Location: [facility]     COLONOSCOPY       COSMETIC SURGERY      breast implants     CYSTOSCOPY, SLING TRANSVAGINAL N/A 2/8/2016    Procedure: CYSTOSCOPY, SLING TRANSVAGINAL;  Surgeon: Sterling Hill MD;  Location: [facility]     ENDOMETRIAL ABLATION       EYE SURGERY       EYE SURGERY       HC REPAIR OF NASAL SEPTUM       NASAL SEPTUM SURGERY       OOPHORECTOMY      unilateral     OTHER SURGICAL HISTORY      spine injection     OTHER SURGICAL HISTORY      benign tumor removalleft thigh      OTHER SURGICAL HISTORY      BREAST IMPLANTS     OTHER SURGICAL HISTORY  02/2016    CYSTO, SLING TRANSVAGINAL     PELVIC LAPAROSCOPY      due to endometriosis     [place] LAP GASTRIC BYPASS/DOUG-EN-Y N/A 9/18/2019    Procedure: LAPAROSCOPIC DOUG EN Y GASTRIC BYPASS;  Surgeon: Juan Sin MD;  Location: [facility];  Service: General     REFRACTIVE SURGERY       SALPINGECTOMY       SURGICAL HISTORY OF -       laporoscopy due to endometriosis     SURGICAL HISTORY OF -       endometrial ablation     SURGICAL HISTORY OF -       breast augmentation     SURGICAL HISTORY OF -       benign  tumor removed from her left thigh     TONSILLECTOMY       TONSILLECTOMY       TUBAL LIGATION       TUBAL LIGATION       ZZC APPENDECTOMY          Family History   Problem Relation Age of Onset     Cancer Mother         ovarian     Cancer Paternal Grandmother         lung     Gynecology Sister         having a partial hysterectomy due to abnormal cells, leaving ovaries and cervix     Colon Cancer Father      Cancer Father 80        colon     Gynecology Daughter         endometriosis     Gynecology Sister         hysterectomy for endometriosis     Diverticulitis Mother      No Known Problems Father      No Known Problems Sister      No Known Problems Sister        Social History     Socioeconomic History     Marital status:      Spouse name: Not on file     Number of children: 1     Years of education: Not on file     Highest education level: Not on file   Occupational History     Employer: [employer]   Tobacco Use     Smoking status: Never Smoker     Smokeless tobacco: Never Used   Substance and Sexual Activity     Alcohol use: Yes     Comment: occassionally on weekends     Drug use: No     Sexual activity: Yes     Partners: Male     Birth control/protection: Female Surgical     Comment: Tubal   Other Topics Concern     Parent/sibling w/ CABG, MI or angioplasty before 65F 55M? No   Social History Narrative     Not on file     Social Determinants of Health     Financial Resource Strain: Not on file   Food Insecurity: Not on file   Transportation Needs: Not on file   Physical Activity: Not on file   Stress: Not on file   Social Connections: Not on file   Intimate Partner Violence: Not on file   Housing Stability: Not on file       Outpatient Encounter Medications as of 9/27/2022   Medication Sig Dispense Refill     Calcium Carbonate-Vit D-Min (CALCIUM 1200 PO) Take 1 tablet by mouth daily       cyclobenzaprine (FLEXERIL) 5 MG tablet Take 1 tablet (5 mg) by mouth 3 times daily as needed for muscle spasms 30  tablet 1     EPINEPHrine (ANY BX GENERIC EQUIV) 0.3 MG/0.3ML injection 2-pack Inject 0.3 mLs (0.3 mg) into the muscle once as needed for anaphylaxis 0.6 mL 3     study - prednisoLONE acetate, MMCORC, (IDS# 5081) 1 % ophthalmic susp Place 1-2 drops into both eyes 3 times daily       No facility-administered encounter medications on file as of 9/27/2022.             O:   NAD, WDWN, Alert & Oriented, Mood & Affect wnl, Vitals stable   Here today alone   LMP 03/02/2018    General appearance normal   Vitals stable   Alert, oriented and in no acute distress     R orbit and NSW telangiectasia       Eyes: Conjunctivae/lids:Normal     ENT: Lips, buccal mucosa, tongue: normal    MSK:Normal    Cardiovascular: peripheral edema none    Pulm: Breathing Normal    Neuro/Psych: Orientation:Alert and Orientedx3 ; Mood/Affect:normal       A/P:  1. Telangiectasia   ipl discussed with patient   Burning, no resolution wound healing discussed with patient   After eye protection had been placed, analgesia obtained with cooling, ipl laser was used at a fluence of 18J 560nm 3.5/20 to treat R NSW and r orbit 14 spots. An excellent clinical response was obtained and the patient confirmed that all sites had been treated.  The patient was given wound care instructions and will return in 4 weeks.  It was a pleasure speaking to Dakota Berkowitz today.

## 2022-09-27 NOTE — LETTER
9/27/2022         RE: Dakota Berkowitz  [address] [place] [address]        Dear Colleague,    Thank you for referring your patient, Dakota Berkowitz, to the [facility]. Please see a copy of my visit note below.    Dakota Berkowitz is an extremely pleasant 52 year old year old female patient here today for ipl for vessels on right nose.  Patient has no other skin complaints today.  Remainder of the HPI, Meds, PMH, Allergies, FH, and SH was reviewed in chart.      Past Medical History:   Diagnosis Date     Ankle pain      Arthritis     degenerative spine disease with sciatica     Breast implant rupture      Endometriosis      Heartburn      Irritability      Knee pain      Menopausal syndrome (hot flashes)      NO ACTIVE PROBLEMS      Obesity, Class II, BMI 35-39.9      Obstructive sleep apnea     CPAP     Pelvic pain      S/P gastric bypass 9/18/2019       Past Surgical History:   Procedure Laterality Date     APPENDECTOMY       BIOPSY BREAST       BLADDER SUSPENSION       COLONOSCOPY N/A 5/31/2019    Procedure: COLONOSCOPY;  Surgeon: Jose Wen MD;  Location: [facility]     COLONOSCOPY       COSMETIC SURGERY      breast implants     CYSTOSCOPY, SLING TRANSVAGINAL N/A 2/8/2016    Procedure: CYSTOSCOPY, SLING TRANSVAGINAL;  Surgeon: Sterling Hill MD;  Location: [facility]     ENDOMETRIAL ABLATION       EYE SURGERY       EYE SURGERY       HC REPAIR OF NASAL SEPTUM       NASAL SEPTUM SURGERY       OOPHORECTOMY      unilateral     OTHER SURGICAL HISTORY      spine injection     OTHER SURGICAL HISTORY      benign tumor removalleft thigh      OTHER SURGICAL HISTORY      BREAST IMPLANTS     OTHER SURGICAL HISTORY  02/2016    CYSTO, SLING TRANSVAGINAL     PELVIC LAPAROSCOPY      due to endometriosis     [place] LAP GASTRIC BYPASS/DOUG-EN-Y N/A 9/18/2019    Procedure: LAPAROSCOPIC DOUG EN Y GASTRIC BYPASS;  Surgeon: Juan Sin MD;  Location: [facility];  Service:  General     REFRACTIVE SURGERY       SALPINGECTOMY       SURGICAL HISTORY OF -       laporoscopy due to endometriosis     SURGICAL HISTORY OF -       endometrial ablation     SURGICAL HISTORY OF -       breast augmentation     SURGICAL HISTORY OF -       benign tumor removed from her left thigh     TONSILLECTOMY       TONSILLECTOMY       TUBAL LIGATION       TUBAL LIGATION       ZZC APPENDECTOMY          Family History   Problem Relation Age of Onset     Cancer Mother         ovarian     Cancer Paternal Grandmother         lung     Gynecology Sister         having a partial hysterectomy due to abnormal cells, leaving ovaries and cervix     Colon Cancer Father      Cancer Father 80        colon     Gynecology Daughter         endometriosis     Gynecology Sister         hysterectomy for endometriosis     Diverticulitis Mother      No Known Problems Father      No Known Problems Sister      No Known Problems Sister        Social History     Socioeconomic History     Marital status:      Spouse name: Not on file     Number of children: 1     Years of education: Not on file     Highest education level: Not on file   Occupational History     Employer: [employer]   Tobacco Use     Smoking status: Never Smoker     Smokeless tobacco: Never Used   Substance and Sexual Activity     Alcohol use: Yes     Comment: occassionally on weekends     Drug use: No     Sexual activity: Yes     Partners: Male     Birth control/protection: Female Surgical     Comment: Tubal   Other Topics Concern     Parent/sibling w/ CABG, MI or angioplasty before 65F 55M? No   Social History Narrative     Not on file     Social Determinants of Health     Financial Resource Strain: Not on file   Food Insecurity: Not on file   Transportation Needs: Not on file   Physical Activity: Not on file   Stress: Not on file   Social Connections: Not on file   Intimate Partner Violence: Not on file   Housing Stability: Not on file       Outpatient Encounter  Medications as of 9/27/2022   Medication Sig Dispense Refill     Calcium Carbonate-Vit D-Min (CALCIUM 1200 PO) Take 1 tablet by mouth daily       cyclobenzaprine (FLEXERIL) 5 MG tablet Take 1 tablet (5 mg) by mouth 3 times daily as needed for muscle spasms 30 tablet 1     EPINEPHrine (ANY BX GENERIC EQUIV) 0.3 MG/0.3ML injection 2-pack Inject 0.3 mLs (0.3 mg) into the muscle once as needed for anaphylaxis 0.6 mL 3     study - prednisoLONE acetate, MMCORC, (IDS# 5081) 1 % ophthalmic susp Place 1-2 drops into both eyes 3 times daily       No facility-administered encounter medications on file as of 9/27/2022.             O:   NAD, WDWN, Alert & Oriented, Mood & Affect wnl, Vitals stable   Here today alone   LMP 03/02/2018    General appearance normal   Vitals stable   Alert, oriented and in no acute distress     R orbit and NSW telangiectasia       Eyes: Conjunctivae/lids:Normal     ENT: Lips, buccal mucosa, tongue: normal    MSK:Normal    Cardiovascular: peripheral edema none    Pulm: Breathing Normal    Neuro/Psych: Orientation:Alert and Orientedx3 ; Mood/Affect:normal       A/P:  1. Telangiectasia   ipl discussed with patient   Burning, no resolution wound healing discussed with patient   After eye protection had been placed, analgesia obtained with cooling, ipl laser was used at a fluence of 18J 560nm 3.5/20 to treat R NSW and r orbit 14 spots. An excellent clinical response was obtained and the patient confirmed that all sites had been treated.  The patient was given wound care instructions and will return in 4 weeks.  It was a pleasure speaking to Dakota Berkowitz today.        Again, thank you for allowing me to participate in the care of your patient.        Sincerely,        Ramana Germain MD

## 2022-09-29 ENCOUNTER — TELEPHONE (OUTPATIENT)
Dept: DERMATOLOGY | Facility: CLINIC | Age: 52
End: 2022-09-29

## 2022-09-29 NOTE — TELEPHONE ENCOUNTER
[facility] Call Center    Phone Message    May a detailed message be left on voicemail: yes     Reason for Call: Appointment Intake    Referring Provider Name: Dr. Ramana Germain  Diagnosis and/or Symptoms: IPL follow-up    Pt Dakota would like to schedule a follow-up laser appointment within the next 30 days/ Please call to schedule [phone]    Action Taken: Other: [facility]    Travel Screening: Not Applicable

## 2022-10-25 ENCOUNTER — OFFICE VISIT (OUTPATIENT)
Dept: DERMATOLOGY | Facility: CLINIC | Age: 52
End: 2022-10-25

## 2022-10-25 DIAGNOSIS — D18.01 HEMANGIOMA OF SKIN: Primary | ICD-10-CM

## 2022-10-25 PROCEDURE — 96999 UNLISTED SPEC DERM SVC/PX: CPT | Performed by: DERMATOLOGY

## 2022-10-25 PROCEDURE — 99207 PR NO CHARGE LOS: CPT | Performed by: DERMATOLOGY

## 2022-10-25 ASSESSMENT — PAIN SCALES - GENERAL: PAINLEVEL: NO PAIN (0)

## 2022-10-25 NOTE — PROGRESS NOTES
Dakota Berkowitz is an extremely pleasant 52 year old year old female patient here today for angioma on right nasal sidewall.  Other sites cleared withipl.  Patient has no other skin complaints today.  Remainder of the HPI, Meds, PMH, Allergies, FH, and SH was reviewed in chart.      Past Medical History:   Diagnosis Date     Ankle pain      Arthritis     degenerative spine disease with sciatica     Breast implant rupture      Endometriosis      Heartburn      Irritability      Knee pain      Menopausal syndrome (hot flashes)      NO ACTIVE PROBLEMS      Obesity, Class II, BMI 35-39.9      Obstructive sleep apnea     CPAP     Pelvic pain      S/P gastric bypass 9/18/2019       Past Surgical History:   Procedure Laterality Date     APPENDECTOMY       BIOPSY BREAST       BLADDER SUSPENSION       COLONOSCOPY N/A 5/31/2019    Procedure: COLONOSCOPY;  Surgeon: Jose Wen MD;  Location: [facility]     COLONOSCOPY       COSMETIC SURGERY      breast implants     CYSTOSCOPY, SLING TRANSVAGINAL N/A 2/8/2016    Procedure: CYSTOSCOPY, SLING TRANSVAGINAL;  Surgeon: Sterling Hill MD;  Location: [facility]     ENDOMETRIAL ABLATION       EYE SURGERY       EYE SURGERY       HC REPAIR OF NASAL SEPTUM       NASAL SEPTUM SURGERY       OOPHORECTOMY      unilateral     OTHER SURGICAL HISTORY      spine injection     OTHER SURGICAL HISTORY      benign tumor removalleft thigh      OTHER SURGICAL HISTORY      BREAST IMPLANTS     OTHER SURGICAL HISTORY  02/2016    CYSTO, SLING TRANSVAGINAL     PELVIC LAPAROSCOPY      due to endometriosis     [place] LAP GASTRIC BYPASS/DOUG-EN-Y N/A 9/18/2019    Procedure: LAPAROSCOPIC DOUG EN Y GASTRIC BYPASS;  Surgeon: Juan Sin MD;  Location: [facility] [place];  Service: General     REFRACTIVE SURGERY       SALPINGECTOMY       SURGICAL HISTORY OF -       laporoscopy due to endometriosis     SURGICAL HISTORY OF -       endometrial ablation     SURGICAL HISTORY OF -       breast augmentation      SURGICAL HISTORY OF -       benign tumor removed from her left thigh     TONSILLECTOMY       TONSILLECTOMY       TUBAL LIGATION       TUBAL LIGATION       ZZC APPENDECTOMY          Family History   Problem Relation Age of Onset     Cancer Mother         ovarian     Cancer Paternal Grandmother         lung     Gynecology Sister         having a partial hysterectomy due to abnormal cells, leaving ovaries and cervix     Colon Cancer Father      Cancer Father 80        colon     Gynecology Daughter         endometriosis     Gynecology Sister         hysterectomy for endometriosis     Diverticulitis Mother      No Known Problems Father      No Known Problems Sister      No Known Problems Sister        Social History     Socioeconomic History     Marital status:      Spouse name: Not on file     Number of children: 1     Years of education: Not on file     Highest education level: Not on file   Occupational History     Employer: [employer]   Tobacco Use     Smoking status: Never     Smokeless tobacco: Never   Substance and Sexual Activity     Alcohol use: Yes     Comment: occassionally on weekends     Drug use: No     Sexual activity: Yes     Partners: Male     Birth control/protection: Female Surgical     Comment: Tubal   Other Topics Concern     Parent/sibling w/ CABG, MI or angioplasty before 65F 55M? No   Social History Narrative     Not on file     Social Determinants of Health     Financial Resource Strain: Not on file   Food Insecurity: Not on file   Transportation Needs: Not on file   Physical Activity: Not on file   Stress: Not on file   Social Connections: Not on file   Intimate Partner Violence: Not on file   Housing Stability: Not on file       Outpatient Encounter Medications as of 10/25/2022   Medication Sig Dispense Refill     Calcium Carbonate-Vit D-Min (CALCIUM 1200 PO) Take 1 tablet by mouth daily       cyclobenzaprine (FLEXERIL) 5 MG tablet Take 1 tablet (5 mg) by mouth 3 times daily as  needed for muscle spasms 30 tablet 1     EPINEPHrine (ANY BX GENERIC EQUIV) 0.3 MG/0.3ML injection 2-pack Inject 0.3 mLs (0.3 mg) into the muscle once as needed for anaphylaxis 0.6 mL 3     study - prednisoLONE acetate, MMCORC, (IDS# 5081) 1 % ophthalmic susp Place 1-2 drops into both eyes 3 times daily       No facility-administered encounter medications on file as of 10/25/2022.             O:   NAD, WDWN, Alert & Oriented, Mood & Affect wnl, Vitals stable   Here today alone   LMP 03/02/2018    General appearance normal   Vitals stable   Alert, oriented and in no acute distress   R NSw red papule       Eyes: Conjunctivae/lids:Normal     ENT: Lips, buccal mucosa, tongue: normal    MSK:Normal    Cardiovascular: peripheral edema none    Pulm: Breathing Normal    Neuro/Psych: Orientation:Alert and Orientedx3 ; Mood/Affect:normal       A/P:  1. Angioma  Cautery After consent, anesthesia with LE and prep, cautery performed.  No complications and routine wound care.    Cosmetic destruction  It was a pleasure speaking to Dakota Berkowitz today.  Previous clinic notes and pertinent laboratory tests were reviewed prior to Dakota Berkowitz's visit.

## 2022-10-25 NOTE — LETTER
10/25/2022         RE: Dakota Berkowitz  [address] [place] [address]        Dear Colleague,    Thank you for referring your patient, Dakota Berkowitz, to the [facility]. Please see a copy of my visit note below.    Dakota Berkowitz is an extremely pleasant 52 year old year old female patient here today for angioma on right nasal sidewall.  Other sites cleared withipl.  Patient has no other skin complaints today.  Remainder of the HPI, Meds, PMH, Allergies, FH, and SH was reviewed in chart.      Past Medical History:   Diagnosis Date     Ankle pain      Arthritis     degenerative spine disease with sciatica     Breast implant rupture      Endometriosis      Heartburn      Irritability      Knee pain      Menopausal syndrome (hot flashes)      NO ACTIVE PROBLEMS      Obesity, Class II, BMI 35-39.9      Obstructive sleep apnea     CPAP     Pelvic pain      S/P gastric bypass 9/18/2019       Past Surgical History:   Procedure Laterality Date     APPENDECTOMY       BIOPSY BREAST       BLADDER SUSPENSION       COLONOSCOPY N/A 5/31/2019    Procedure: COLONOSCOPY;  Surgeon: Jose Wen MD;  Location: [facility]     COLONOSCOPY       COSMETIC SURGERY      breast implants     CYSTOSCOPY, SLING TRANSVAGINAL N/A 2/8/2016    Procedure: CYSTOSCOPY, SLING TRANSVAGINAL;  Surgeon: Sterling Hill MD;  Location: [facility]     ENDOMETRIAL ABLATION       EYE SURGERY       EYE SURGERY       HC REPAIR OF NASAL SEPTUM       NASAL SEPTUM SURGERY       OOPHORECTOMY      unilateral     OTHER SURGICAL HISTORY      spine injection     OTHER SURGICAL HISTORY      benign tumor removalleft thigh      OTHER SURGICAL HISTORY      BREAST IMPLANTS     OTHER SURGICAL HISTORY  02/2016    CYSTO, SLING TRANSVAGINAL     PELVIC LAPAROSCOPY      due to endometriosis     [place] LAP GASTRIC BYPASS/DOUG-EN-Y N/A 9/18/2019    Procedure: LAPAROSCOPIC DOUG EN Y GASTRIC BYPASS;  Surgeon: Juan Sin MD;  Location: [facility];  Service: General     REFRACTIVE SURGERY       SALPINGECTOMY       SURGICAL HISTORY OF -       laporoscopy due to endometriosis     SURGICAL HISTORY OF -       endometrial ablation     SURGICAL HISTORY OF -       breast augmentation     SURGICAL HISTORY OF -       benign tumor removed from her left thigh     TONSILLECTOMY       TONSILLECTOMY       TUBAL LIGATION       TUBAL LIGATION       ZZC APPENDECTOMY          Family History   Problem Relation Age of Onset     Cancer Mother         ovarian     Cancer Paternal Grandmother         lung     Gynecology Sister         having a partial hysterectomy due to abnormal cells, leaving ovaries and cervix     Colon Cancer Father      Cancer Father 80        colon     Gynecology Daughter         endometriosis     Gynecology Sister         hysterectomy for endometriosis     Diverticulitis Mother      No Known Problems Father      No Known Problems Sister      No Known Problems Sister        Social History     Socioeconomic History     Marital status:      Spouse name: Not on file     Number of children: 1     Years of education: Not on file     Highest education level: Not on file   Occupational History     Employer: [employer]   Tobacco Use     Smoking status: Never     Smokeless tobacco: Never   Substance and Sexual Activity     Alcohol use: Yes     Comment: occassionally on weekends     Drug use: No     Sexual activity: Yes     Partners: Male     Birth control/protection: Female Surgical     Comment: Tubal   Other Topics Concern     Parent/sibling w/ CABG, MI or angioplasty before 65F 55M? No   Social History Narrative     Not on file     Social Determinants of Health     Financial Resource Strain: Not on file   Food Insecurity: Not on file   Transportation Needs: Not on file   Physical Activity: Not on file   Stress: Not on file   Social Connections: Not on file   Intimate Partner Violence: Not on file   Housing Stability: Not on file        Outpatient Encounter Medications as of 10/25/2022   Medication Sig Dispense Refill     Calcium Carbonate-Vit D-Min (CALCIUM 1200 PO) Take 1 tablet by mouth daily       cyclobenzaprine (FLEXERIL) 5 MG tablet Take 1 tablet (5 mg) by mouth 3 times daily as needed for muscle spasms 30 tablet 1     EPINEPHrine (ANY BX GENERIC EQUIV) 0.3 MG/0.3ML injection 2-pack Inject 0.3 mLs (0.3 mg) into the muscle once as needed for anaphylaxis 0.6 mL 3     study - prednisoLONE acetate, MMCORC, (IDS# 5081) 1 % ophthalmic susp Place 1-2 drops into both eyes 3 times daily       No facility-administered encounter medications on file as of 10/25/2022.             O:   NAD, WDWN, Alert & Oriented, Mood & Affect wnl, Vitals stable   Here today alone   LMP 03/02/2018    General appearance normal   Vitals stable   Alert, oriented and in no acute distress   R NSw red papule       Eyes: Conjunctivae/lids:Normal     ENT: Lips, buccal mucosa, tongue: normal    MSK:Normal    Cardiovascular: peripheral edema none    Pulm: Breathing Normal    Neuro/Psych: Orientation:Alert and Orientedx3 ; Mood/Affect:normal       A/P:  1. Angioma  Cautery After consent, anesthesia with LE and prep, cautery performed.  No complications and routine wound care.    Cosmetic destruction  It was a pleasure speaking to Dakota Berkowitz today.  Previous clinic notes and pertinent laboratory tests were reviewed prior to Dakota Berkowitz's visit.        Again, thank you for allowing me to participate in the care of your patient.        Sincerely,        Ramana Germain MD

## 2022-12-06 NOTE — ANESTHESIA POSTPROCEDURE EVALUATION
I spoke to patient daughter Esme, patient is bed ridden. I offered a virtual video visit. She asks us to talk to other daughter ,Delia her number is [phone]. Patient is living with Delia. Will schedule a virtual appointment.     Patient: Dakota Berkowitz  LAPAROSCOPIC DOUG EN Y GASTRIC BYPASS  Anesthesia type: general    Patient location: PACU  Last vitals:   Vitals Value Taken Time   /68 9/18/2019 11:54 AM   Temp 36.4  C (97.6  F) 9/18/2019 11:54 AM   Pulse 69 9/18/2019 11:54 AM   Resp 12 9/18/2019 11:54 AM   SpO2 94 % 9/18/2019 11:54 AM     Post vital signs: stable  Level of consciousness: awake and responds to simple questions  Post-anesthesia pain: pain controlled  Post-anesthesia nausea and vomiting: no  Pulmonary: unassisted, return to baseline  Cardiovascular: stable and blood pressure at baseline  Hydration: adequate  Anesthetic events: no    QCDR Measures:  ASA# 11 - Isabel-op Cardiac Arrest: ASA11B - Patient did NOT experience unanticipated cardiac arrest  ASA# 12 - Isabel-op Mortality Rate: ASA12B - Patient did NOT die  ASA# 13 - PACU Re-Intubation Rate: ASA13B - Patient did NOT require a new airway mgmt  ASA# 10 - Composite Anes Safety: ASA10A - No serious adverse event    Additional Notes:

## 2023-02-09 ENCOUNTER — MYC MEDICAL ADVICE (OUTPATIENT)
Dept: FAMILY MEDICINE | Facility: CLINIC | Age: 53
End: 2023-02-09
Payer: COMMERCIAL

## 2023-02-10 NOTE — TELEPHONE ENCOUNTER
Called patient regarding [employer] message. She reports thoughts of harming herself on 2/6-2/7/2023. She did not have a plan. Denies thought of harming herself today. She had some issues of depression in the past but never to this point. She is requesting a referral for help. She was told to call 911 or go to the emergency department if she has thoughts of actually harming herself. Also given 988 number for Suicide and Crises Lifeline. She was unable to take further information as she was driving. Will send information in [employer] message for other crises numbers.  Should patient be seen by PCP or does she only need a referral?  Please advise.    Thank you,  Michelle Haro RN

## 2023-02-13 NOTE — TELEPHONE ENCOUNTER
Patient should be seen either in person for virtually to discuss what is going on and to get a referral.  Sincerely,  Chino Rayo MD

## 2023-02-13 NOTE — TELEPHONE ENCOUNTER
Reached out to patient via phone to assist with scheduling a same-day visit with an available provider. Patient was instructed to return call to [facility] main line at [phone] to speak with an RN.  Cedrick Medina RN  [facility]

## 2023-04-23 ENCOUNTER — HEALTH MAINTENANCE LETTER (OUTPATIENT)
Age: 53
End: 2023-04-23

## 2023-05-10 ENCOUNTER — TELEPHONE (OUTPATIENT)
Dept: FAMILY MEDICINE | Facility: CLINIC | Age: 53
End: 2023-05-10
Payer: COMMERCIAL

## 2023-05-10 NOTE — TELEPHONE ENCOUNTER
Patient Quality Outreach    Patient is due for the following:   Breast Cancer Screening - Mammogram  Cervical Cancer Screening - PAP Needed  Physical Preventive Adult Physical    Next Steps:   Patient has upcoming appointment, these items will be addressed at that time.  Mammo is scheduled for 5-12-13.  Physical is scheduled with Dr. Rayo on 7-10-23.    Type of outreach:    Chart review performed, no outreach needed.    Next Steps:  Reach out within 90 days via Will check back after July appt to see if patient was seen..    Max number of attempts reached: No. Will try again in 90 days if patient still on fail list.    Questions for provider review:    None           Inge Hatch, [facility]  Chart routed to none, letter sent.

## 2023-05-15 ENCOUNTER — NURSE TRIAGE (OUTPATIENT)
Dept: NURSING | Facility: CLINIC | Age: 53
End: 2023-05-15
Payer: COMMERCIAL

## 2023-05-15 ENCOUNTER — OFFICE VISIT (OUTPATIENT)
Dept: URGENT CARE | Facility: URGENT CARE | Age: 53
End: 2023-05-15
Payer: COMMERCIAL

## 2023-05-15 VITALS
TEMPERATURE: 98.5 F | WEIGHT: 162 LBS | SYSTOLIC BLOOD PRESSURE: 125 MMHG | BODY MASS INDEX: 28.93 KG/M2 | DIASTOLIC BLOOD PRESSURE: 83 MMHG | OXYGEN SATURATION: 100 % | HEART RATE: 73 BPM

## 2023-05-15 DIAGNOSIS — H10.33 ACUTE CONJUNCTIVITIS OF BOTH EYES, UNSPECIFIED ACUTE CONJUNCTIVITIS TYPE: ICD-10-CM

## 2023-05-15 DIAGNOSIS — R05.1 ACUTE COUGH: ICD-10-CM

## 2023-05-15 DIAGNOSIS — R07.0 THROAT PAIN: Primary | ICD-10-CM

## 2023-05-15 LAB
DEPRECATED S PYO AG THROAT QL EIA: NEGATIVE
GROUP A STREP BY PCR: NOT DETECTED

## 2023-05-15 PROCEDURE — 99213 OFFICE O/P EST LOW 20 MIN: CPT | Performed by: PHYSICIAN ASSISTANT

## 2023-05-15 PROCEDURE — 87651 STREP A DNA AMP PROBE: CPT | Performed by: PHYSICIAN ASSISTANT

## 2023-05-15 RX ORDER — BENZONATATE 200 MG/1
200 CAPSULE ORAL 3 TIMES DAILY PRN
Qty: 30 CAPSULE | Refills: 0 | Status: SHIPPED | OUTPATIENT
Start: 2023-05-15 | End: 2023-07-03

## 2023-05-15 RX ORDER — POLYMYXIN B SULFATE AND TRIMETHOPRIM 1; 10000 MG/ML; [USP'U]/ML
1 SOLUTION OPHTHALMIC EVERY 4 HOURS
Qty: 5 ML | Refills: 0 | Status: SHIPPED | OUTPATIENT
Start: 2023-05-15 | End: 2023-05-22

## 2023-05-15 NOTE — TELEPHONE ENCOUNTER
Nurse Triage SBAR    Situation:   -URI, eye, ear pain    Background:   -Patient calling, It is okay to leave a detailed message at this number.     Assessment:   -possible pink eye, eye is swollen  -large amount of crusts  -cough  -teste negative for covid  -has stand RX with eye doctor for prednisone  -ear pain occasional   -throat pain  -temp is 98.2F    Recommendation:   See in Office Today  See PCP Within 24 Hours  -go to  or [facility]    DARION OGDEN RN on 5/15/2023 at 8:34 AM      Reason for Disposition    Weak immune system (e.g., HIV positive, cancer chemo, splenectomy, organ transplant, chronic steroids)    Earache    Additional Information    Negative: Eye exposure to chemical or fumes    Negative: Redness of white of eye (sclera), but no pus or only a small amount of brief pus    Negative: SEVERE pain (e.g., excruciating)    Negative: Patient sounds very sick or weak to the triager    Negative: MODERATE eye pain (e.g., interferes with normal activities)    Negative: Looking at light causes MODERATE to SEVERE eye pain (i.e., photophobia)    Negative: Blurred vision    Negative: Cloudy spot or sore seen on the cornea (clear part of the eye)    Negative: Eyelids are very swollen (shut or almost)    Negative: MILD eye pain or discomfort and wears contacts    Negative: Eyelid (outer) is very red and painful (or tender to touch)    Negative: Fever > 103 F (39.4 C)    Negative: Discharge from penis    Negative: New or abnormal vaginal discharge    Negative: Using antibiotic eyedrops > 3 days but pus persists    Negative: Lots of yellow or green nasal discharge    Negative: SEVERE difficulty breathing (e.g., struggling for each breath, speaks in single words)    Negative: Sounds like a life-threatening emergency to the triager    Negative: [1] Difficulty breathing AND [2] not from stuffy nose (e.g., not relieved by cleaning out the nose)    Negative: Runny nose is caused by pollen or other allergies    Negative:  Cough is main symptom    Negative: Severe sore throat    Negative: Fever > 104 F (40 C)    Negative: Patient sounds very sick or weak to the triager    Negative: [1] Fever > 101 F (38.3 C) AND [2] age > 60 years    Negative: [1] Fever > 100.0 F (37.8 C) AND [2] bedridden (e.g., nursing home patient, CVA, chronic illness, recovering from surgery)    Negative: [1] Fever > 100.0 F (37.8 C) AND [2] diabetes mellitus or weak immune system (e.g., HIV positive, cancer chemo, splenectomy, organ transplant, chronic steroids)    Negative: Fever present > 3 days (72 hours)    Negative: [1] Fever returns after gone for over 24 hours AND [2] symptoms worse or not improved    Negative: [1] Sinus pain (not just congestion) AND [2] fever    Protocols used: EYE - PUS OR [id number], COMMON COLD-A-AH

## 2023-05-15 NOTE — LETTER
May 15, 2023      Dakota KHAN Woo  [address] [place] [address]        To Whom It May Concern:    Dakota Berkowitz  was seen and treated in clinic and missed work 5/15/23 and 5//16/23.        Sincerely,            Arlen Prather PA-C

## 2023-05-15 NOTE — PROGRESS NOTES
Assessment & Plan     Throat pain  Rapid strep negative. This is likely viral. Continue with supportive care. Get plenty of rest and push fluids. Can use Tylenol and/or ibuprofen as needed for pain and/or fever control. Discussed return to school/work guidelines. Return to clinic if symptoms worsen or do not improve; otherwise follow up as needed       - Streptococcus A Rapid Screen w/Reflex to PCR - Clinic Collect  - Group A Streptococcus PCR Throat Swab    Acute conjunctivitis of both eyes, unspecified acute conjunctivitis type  This is likely viral.  Can try over the counter Visine A drops as needed to help with the redness. Wash hands frequently and avoid touching eyes and face. Gave written Rx for polymyxin B/trimethoprim ophthalmic drops to use if purulent drainage develops.      - trimethoprim-polymyxin b (POLYTRIM) 55447-4.1 UNIT/ML-% ophthalmic solution; Place 1 drop into both eyes every 4 hours for 7 days    Acute cough    - benzonatate (TESSALON) 200 MG capsule; Take 1 capsule (200 mg) by mouth 3 times daily as needed for cough                 Return in about 1 week (around 5/22/2023), or if symptoms worsen or fail to improve.    NIKA Tenorio [facility] [place]              Subjective   Chief Complaint   Patient presents with     Cough     Cough, right ear pain, eyes are sealed shut and drain all night long, says it is yellow discharge, congestion, sore throat, started with left eye about 5 days ago, right eye started today. May have a fever. Covid neg.          HPI     URI Adult    Onset of symptoms was 5 day(s) ago.  Course of illness is changing.    Severity moderate  Current and Associated symptoms: cough, sore throat, right ear pain, eye drainage   Treatment measures tried include Tylenol/Ibuprofen.  Predisposing factors include negative home COVID test.                  Review of Systems   Constitutional, HEENT, cardiovascular, pulmonary, gi and gu systems are  negative, except as otherwise noted.      Objective    /83   Pulse 73   Temp 98.5  F (36.9  C) (Tympanic)   Wt 73.5 kg (162 lb)   LMP 03/02/2018   SpO2 100%   BMI 28.93 kg/m    Body mass index is 28.93 kg/m .  Physical Exam  Constitutional:       Appearance: She is well-developed.   HENT:      Head: Normocephalic.      Right Ear: Tympanic membrane and ear canal normal.      Left Ear: Tympanic membrane and ear canal normal.   Eyes:      Conjunctiva/sclera:      Right eye: Right conjunctiva is injected. No exudate.     Left eye: Left conjunctiva is injected. No exudate.  Cardiovascular:      Rate and Rhythm: Normal rate.      Heart sounds: Normal heart sounds.   Pulmonary:      Effort: Pulmonary effort is normal.      Breath sounds: Normal breath sounds.   Skin:     General: Skin is warm and dry.      Findings: No rash.   Psychiatric:         Behavior: Behavior normal.

## 2023-06-02 ENCOUNTER — HEALTH MAINTENANCE LETTER (OUTPATIENT)
Age: 53
End: 2023-06-02

## 2023-06-13 ENCOUNTER — TELEPHONE (OUTPATIENT)
Dept: FAMILY MEDICINE | Facility: CLINIC | Age: 53
End: 2023-06-13
Payer: COMMERCIAL

## 2023-06-13 NOTE — TELEPHONE ENCOUNTER
Reason for Call:  Appointment Request    Patient requesting this type of appt:  Preventive     Requested provider: Chino Rayo    Reason patient unable to be scheduled: Not within requested timeframe    When does patient want to be seen/preferred time: looking for still in july    Comments: would like to talk to provider or someone on the care team. Out of town and would like to talk about reschedule since they waited 3 months.    Could we send this information to you in [place] or would you prefer to receive a phone call?:   Patient would prefer a phone call   Okay to leave a detailed message?: Yes at Cell number on file:    Telephone Information:   Mobile [phone]       Call taken on 6/13/2023 at 2:17 PM by Uyen Daily

## 2023-06-26 ASSESSMENT — ENCOUNTER SYMPTOMS
DIARRHEA: 0
HEMATOCHEZIA: 0
ARTHRALGIAS: 0
MYALGIAS: 1
PARESTHESIAS: 0
FREQUENCY: 0
NERVOUS/ANXIOUS: 0
CHILLS: 0
HEARTBURN: 0
SORE THROAT: 0
SHORTNESS OF BREATH: 0
HEADACHES: 0
NAUSEA: 0
HEMATURIA: 0
DIZZINESS: 0
COUGH: 0
DYSURIA: 0
WEAKNESS: 0
BREAST MASS: 0
FEVER: 0
ABDOMINAL PAIN: 0
CONSTIPATION: 0
EYE PAIN: 0
JOINT SWELLING: 0
PALPITATIONS: 0

## 2023-07-03 ENCOUNTER — OFFICE VISIT (OUTPATIENT)
Dept: FAMILY MEDICINE | Facility: CLINIC | Age: 53
End: 2023-07-03
Payer: COMMERCIAL

## 2023-07-03 VITALS
WEIGHT: 160.4 LBS | HEART RATE: 83 BPM | OXYGEN SATURATION: 98 % | SYSTOLIC BLOOD PRESSURE: 138 MMHG | RESPIRATION RATE: 16 BRPM | DIASTOLIC BLOOD PRESSURE: 86 MMHG | TEMPERATURE: 98.2 F | BODY MASS INDEX: 28.42 KG/M2 | HEIGHT: 63 IN

## 2023-07-03 DIAGNOSIS — Z12.31 ENCOUNTER FOR SCREENING MAMMOGRAM FOR BREAST CANCER: ICD-10-CM

## 2023-07-03 DIAGNOSIS — Z13.1 SCREENING FOR DIABETES MELLITUS: ICD-10-CM

## 2023-07-03 DIAGNOSIS — Z13.6 CARDIOVASCULAR SCREENING; LDL GOAL LESS THAN 100: ICD-10-CM

## 2023-07-03 DIAGNOSIS — M35.3 PMR (POLYMYALGIA RHEUMATICA) (H): ICD-10-CM

## 2023-07-03 DIAGNOSIS — Z12.31 VISIT FOR SCREENING MAMMOGRAM: ICD-10-CM

## 2023-07-03 DIAGNOSIS — Z00.00 ROUTINE GENERAL MEDICAL EXAMINATION AT A HEALTH CARE FACILITY: Primary | ICD-10-CM

## 2023-07-03 DIAGNOSIS — T63.441D BEE STING REACTION, ACCIDENTAL OR UNINTENTIONAL, SUBSEQUENT ENCOUNTER: ICD-10-CM

## 2023-07-03 PROCEDURE — 99213 OFFICE O/P EST LOW 20 MIN: CPT | Mod: 25 | Performed by: FAMILY MEDICINE

## 2023-07-03 PROCEDURE — 99396 PREV VISIT EST AGE 40-64: CPT | Performed by: FAMILY MEDICINE

## 2023-07-03 RX ORDER — CYCLOBENZAPRINE HCL 5 MG
5 TABLET ORAL 3 TIMES DAILY PRN
Qty: 50 TABLET | Refills: 3 | Status: SHIPPED | OUTPATIENT
Start: 2023-07-03

## 2023-07-03 RX ORDER — EPINEPHRINE 0.3 MG/.3ML
0.3 INJECTION SUBCUTANEOUS
Qty: 0.6 ML | Refills: 3 | Status: SHIPPED | OUTPATIENT
Start: 2023-07-03

## 2023-07-03 ASSESSMENT — ENCOUNTER SYMPTOMS
CONSTIPATION: 0
NAUSEA: 0
HEMATOCHEZIA: 0
HEADACHES: 0
WEAKNESS: 0
CHILLS: 0
DIARRHEA: 0
NERVOUS/ANXIOUS: 0
BREAST MASS: 0
FEVER: 0
JOINT SWELLING: 0
HEARTBURN: 0
HEMATURIA: 0
ABDOMINAL PAIN: 0
DIZZINESS: 0
SORE THROAT: 0
PALPITATIONS: 0
MYALGIAS: 1
PARESTHESIAS: 0
DYSURIA: 0
COUGH: 0
FREQUENCY: 0
SHORTNESS OF BREATH: 0
EYE PAIN: 0
ARTHRALGIAS: 0

## 2023-07-03 NOTE — PROGRESS NOTES
SUBJECTIVE:   CC: Dakota is an 52 year old who presents for preventive health visit.       7/3/2023     3:12 PM   Additional Questions   Roomed by Elizabeth KHAN     Healthy Habits:     Getting at least 3 servings of Calcium per day:  Yes    Bi-annual eye exam:  NO    Dental care twice a year:  Yes    Sleep apnea or symptoms of sleep apnea:  None    Diet:  Other    Frequency of exercise:  2-3 days/week    Duration of exercise:  30-45 minutes    Taking medications regularly:  Yes    Medication side effects:  Not applicable    Additional concerns today:  No      Today's PHQ-2 Score:       7/3/2023     3:05 PM   PHQ-2 ( 1999 [employer])   Q1: Little interest or pleasure in doing things 1   Q2: Feeling down, depressed or hopeless 1   PHQ-2 Score 2   Q1: Little interest or pleasure in doing things Several days   Q2: Feeling down, depressed or hopeless Several days   PHQ-2 Score 2           Have you ever done Advance Care Planning? (For example, a Health Directive, POLST, or a discussion with a medical provider or your loved ones about your wishes): No, advance care planning information given to patient to review.  Patient declined advance care planning discussion at this time.    Social History     Tobacco Use     Smoking status: Never     Smokeless tobacco: Never   Substance Use Topics     Alcohol use: Yes     Comment: occassionally on weekends             6/26/2023     9:41 AM   Alcohol Use   Prescreen: >3 drinks/day or >7 drinks/week? Yes   AUDIT SCORE  5     Reviewed orders with patient.  Reviewed health maintenance and updated orders accordingly - Yes      Breast Cancer Screening:    FHS-7:       6/26/2023     9:42 AM   Breast CA Risk Assessment (FHS-7)   Did any of your first-degree relatives have breast or ovarian cancer? Yes   Did any of your relatives have bilateral breast cancer? No   Did any man in your family have breast cancer? No   Did any woman in your family have breast and ovarian cancer? No   Did any woman in  "your family have breast cancer before age 50 y? No   Do you have 2 or more relatives with breast and/or ovarian cancer? No   Do you have 2 or more relatives with breast and/or bowel cancer? No       Mammogram Screening: Recommended annual mammography  Pertinent mammograms are reviewed under the imaging tab.    History of abnormal Pap smear: NO - age 30-65 PAP every 5 years with negative HPV co-testing recommended      Latest Ref Rng & Units 3/28/2018     4:50 PM 3/28/2018     4:30 PM 3/2/2015    12:00 AM   PAP / HPV   PAP (Historical)   NIL  NIL    HPV 16 DNA NEG^Negative Negative      HPV 18 DNA NEG^Negative Negative      Other HR HPV NEG^Negative Negative        Reviewed and updated as needed this visit by clinical staff   Tobacco  Allergies  Meds              Reviewed and updated as needed this visit by Provider                     Review of Systems   Constitutional: Negative for chills and fever.   HENT: Negative for congestion, ear pain, hearing loss and sore throat.    Eyes: Negative for pain and visual disturbance.   Respiratory: Negative for cough and shortness of breath.    Cardiovascular: Negative for chest pain, palpitations and peripheral edema.   Gastrointestinal: Negative for abdominal pain, constipation, diarrhea, heartburn, hematochezia and nausea.   Breasts:  Negative for tenderness, breast mass and discharge.   Genitourinary: Negative for dysuria, frequency, genital sores, hematuria, pelvic pain, urgency, vaginal bleeding and vaginal discharge.   Musculoskeletal: Positive for myalgias. Negative for arthralgias and joint swelling.   Skin: Negative for rash.   Neurological: Negative for dizziness, weakness, headaches and paresthesias.   Psychiatric/Behavioral: Negative for mood changes. The patient is not nervous/anxious.           OBJECTIVE:   /86   Pulse 83   Temp 98.2  F (36.8  C) (Tympanic)   Resp 16   Ht 1.594 m (5' 2.75\")   Wt 72.8 kg (160 lb 6.4 oz)   LMP 03/02/2018   SpO2 98%  "  BMI 28.64 kg/m    Physical Exam  GENERAL: healthy, alert and no distress  EYES: Eyes grossly normal to inspection, PERRL and conjunctivae and sclerae normal  HENT: ear canals and TM's normal, nose and mouth without ulcers or lesions  NECK: no adenopathy, no asymmetry, masses, or scars and thyroid normal to palpation  RESP: lungs clear to auscultation - no rales, rhonchi or wheezes  BREAST: normal without masses, tenderness or nipple discharge and no palpable axillary masses or adenopathy  CV: regular rate and rhythm, normal S1 S2, no S3 or S4, no murmur, click or rub, no peripheral edema and peripheral pulses strong  ABDOMEN: soft, nontender, no hepatosplenomegaly, no masses and bowel sounds normal  MS: no gross musculoskeletal defects noted, no edema  SKIN: no suspicious lesions or rashes  NEURO: Normal strength and tone, mentation intact and speech normal  PSYCH: mentation appears normal, affect normal/bright  LYMPH: anterior cervical: no adenopathy  posterior cervical: no adenopathy        ASSESSMENT/PLAN:   (Z00.00) Routine general medical examination at a health care facility  (primary encounter diagnosis)  Comment: Discussed healthy lifestyle and preventative cares.    Plan:     (M35.3) PMR (polymyalgia rheumatica) (H)  Comment: stable using medication prn  Plan: cyclobenzaprine (FLEXERIL) 5 MG tablet,         OFFICE/OUTPT VISIT,KATHY WASHINGTONL III            (Z12.31) Encounter for screening mammogram for breast cancer  Comment:   Plan: MA Screen Bilateral w/Louie            (Z12.31) Visit for screening mammogram  Comment:   Plan:     (T63.441D) Bee sting reaction, accidental or unintentional, subsequent encounter  Comment:   Plan: EPINEPHrine (ANY BX GENERIC EQUIV) 0.3 MG/0.3ML        injection 2-pack            (Z13.1) Screening for diabetes mellitus  Comment:   Plan: Glucose            (Z13.6) CARDIOVASCULAR SCREENING; LDL GOAL LESS THAN 100  Comment:   Plan: Lipid panel reflex to direct LDL Fasting               Patient has been advised of split billing requirements and indicates understanding: Yes      COUNSELING:  Reviewed preventive health counseling, as reflected in patient instructions       Regular exercise       Healthy diet/nutrition       Vision screening       Colorectal Cancer Screening        She reports that she has never smoked. She has never used smokeless tobacco.      Chino Rayo MD  [facility]

## 2023-07-03 NOTE — PATIENT INSTRUCTIONS
Please make a fasting lab visit.    Please see OB/GYN as planned.    I refilled your cyclobenzaprine medication for your muscle aches.    Please get your mammogram done.    Please be aware that there will be an additional charge during your preventative visit due to either a new diagnosis and/or chronic disease management.    Preventative visits screen for diseases prior to they occur.  They do not cover for any new diagnosis or chronic disease management which would include medication refills, labs etc.    If you have questions regarding your coverage please check with your insurance provider.  At [facility] we need to code correctly to be in compliance with all insurance companies.        Thank you for choosing [facility].  You may be receiving an email and/or telephone survey request from [facility] regarding your visit today.  Please take a few minutes to respond to the survey to let us know how we are doing.      If you have questions or concerns, please contact us via [facility] or you can contact your care team at [phone] option [phone].    Our Clinic hours are:  Monday - Thursday 7am-6pm  Friday 7am-5pm    The [place] outpatient lab hours are:  Monday - Friday 7am-4:30pm    Appointments are required, call [phone]    If you have clinical questions after hours or would like to schedule an appointment,  call the clinic at [phone].    Preventive Health Recommendations  Female Ages 50 - 64    Yearly exam: See your health care provider every year in order to  Review health changes.   Discuss preventive care.    Review your medicines if your doctor has prescribed any.    Get a Pap test every three years (unless you have an abnormal result and your provider advises testing more often).  If you get Pap tests with HPV test, you only need to test every 5 years, unless you have an abnormal result.   You do not need a Pap test if your uterus was removed (hysterectomy) and you have not had  cancer.  You should be tested each year for STDs (sexually transmitted diseases) if you're at risk.   Have a mammogram every 1 to 2 years.  Have a colonoscopy at age 50, or have a yearly FIT test (stool test). These exams screen for colon cancer.    Have a cholesterol test every 5 years, or more often if advised.  Have a diabetes test (fasting glucose) every three years. If you are at risk for diabetes, you should have this test more often.   If you are at risk for osteoporosis (brittle bone disease), think about having a bone density scan (DEXA).    Shots: Get a flu shot each year. Get a tetanus shot every 10 years.    Nutrition:   Eat at least 5 servings of fruits and vegetables each day.  Eat whole-grain bread, whole-wheat pasta and brown rice instead of white grains and rice.  Get adequate Calcium and Vitamin D.     Lifestyle  Exercise at least 150 minutes a week (30 minutes a day, 5 days a week). This will help you control your weight and prevent disease.  Limit alcohol to one drink per day.  No smoking.   Wear sunscreen to prevent skin cancer.   See your dentist every six months for an exam and cleaning.  See your eye doctor every 1 to 2 years.

## 2023-07-24 ENCOUNTER — LAB (OUTPATIENT)
Dept: LAB | Facility: CLINIC | Age: 53
End: 2023-07-24
Payer: COMMERCIAL

## 2023-07-24 DIAGNOSIS — Z13.1 SCREENING FOR DIABETES MELLITUS: ICD-10-CM

## 2023-07-24 DIAGNOSIS — Z13.6 CARDIOVASCULAR SCREENING; LDL GOAL LESS THAN 100: ICD-10-CM

## 2023-07-24 LAB
CHOLEST SERPL-MCNC: 190 MG/DL
FASTING STATUS PATIENT QL REPORTED: YES
GLUCOSE SERPL-MCNC: 85 MG/DL (ref 70–99)
HDLC SERPL-MCNC: 88 MG/DL
LDLC SERPL CALC-MCNC: 82 MG/DL
NONHDLC SERPL-MCNC: 102 MG/DL
TRIGL SERPL-MCNC: 98 MG/DL

## 2023-07-24 PROCEDURE — 82947 ASSAY GLUCOSE BLOOD QUANT: CPT

## 2023-07-24 PROCEDURE — 36415 COLL VENOUS BLD VENIPUNCTURE: CPT

## 2023-07-24 PROCEDURE — 80061 LIPID PANEL: CPT

## 2023-08-09 ENCOUNTER — OFFICE VISIT (OUTPATIENT)
Dept: OBGYN | Facility: CLINIC | Age: 53
End: 2023-08-09
Payer: COMMERCIAL

## 2023-08-09 VITALS
HEART RATE: 66 BPM | TEMPERATURE: 97.1 F | RESPIRATION RATE: 16 BRPM | BODY MASS INDEX: 28.28 KG/M2 | DIASTOLIC BLOOD PRESSURE: 83 MMHG | WEIGHT: 159.6 LBS | SYSTOLIC BLOOD PRESSURE: 123 MMHG | HEIGHT: 63 IN

## 2023-08-09 DIAGNOSIS — N95.1 MENOPAUSAL SYNDROME (HOT FLASHES): ICD-10-CM

## 2023-08-09 DIAGNOSIS — Z01.419 ENCOUNTER FOR GYNECOLOGICAL EXAMINATION WITHOUT ABNORMAL FINDING: Primary | ICD-10-CM

## 2023-08-09 PROCEDURE — G0145 SCR C/V CYTO,THINLAYER,RESCR: HCPCS | Performed by: OBSTETRICS & GYNECOLOGY

## 2023-08-09 PROCEDURE — 87624 HPV HI-RISK TYP POOLED RSLT: CPT | Performed by: OBSTETRICS & GYNECOLOGY

## 2023-08-09 PROCEDURE — 99396 PREV VISIT EST AGE 40-64: CPT | Performed by: OBSTETRICS & GYNECOLOGY

## 2023-08-09 RX ORDER — ESTRADIOL 1 MG/1
TABLET ORAL
Qty: 30 TABLET | Refills: 0 | Status: SHIPPED | OUTPATIENT
Start: 2023-08-09 | End: 2023-08-25

## 2023-08-09 NOTE — PROGRESS NOTES
[facility] [place] SUBJECTIVE:   CC: Dakota Berkowitz is an 52 year old woman who presents for preventive health visit.       Patient has been advised of split billing requirements and indicates understanding: Yes  Healthy Habits:  Do you get at least three servings of calcium containing foods daily (dairy, green leafy vegetables, etc.)? yes  Amount of exercise or daily activities, outside of work: 3 day(s) per week  Problems taking medications regularly No  Medication side effects: Yes   Have you had an eye exam in the past two years? yes  Do you see a dentist twice per year? yes  Do you have sleep apnea, excessive snoring or daytime drowsiness?no      Hot flashes    Today's PHQ-2 Score:       7/3/2023     3:05 PM 5/26/2022     3:22 PM   PHQ-2 ( 1999 [employer])   Q1: Little interest or pleasure in doing things 1 0   Q2: Feeling down, depressed or hopeless 1 0   PHQ-2 Score 2 0   Q1: Little interest or pleasure in doing things Several days Not at all   Q2: Feeling down, depressed or hopeless Several days Not at all   PHQ-2 Score 2 0       Abuse: Current or Past(Physical, Sexual or Emotional)- No  Do you feel safe in your environment? Yes        Social History     Tobacco Use    Smoking status: Never    Smokeless tobacco: Never   Substance Use Topics    Alcohol use: Yes     Comment: occassionally on weekends     If you drink alcohol do you typically have >3 drinks per day or >7 drinks per week? No                     Reviewed orders with patient.  Reviewed health maintenance and updated orders accordingly - Yes  BP Readings from Last 3 Encounters:   08/09/23 123/83   07/03/23 138/86   05/15/23 125/83    Wt Readings from Last 3 Encounters:   08/09/23 72.4 kg (159 lb 9.6 oz)   07/03/23 72.8 kg (160 lb 6.4 oz)   05/15/23 73.5 kg (162 lb)                  Patient Active Problem List   Diagnosis    Breast implant rupture    Obesity    Weight gain    Irritability    CARDIOVASCULAR SCREENING; LDL GOAL  LESS THAN 100    SUZY (obstructive sleep apnea)    Menopausal syndrome (hot flashes)    History of Doug-en-Y gastric bypass    PMR (polymyalgia rheumatica) (H)     Past Surgical History:   Procedure Laterality Date    ABDOMEN SURGERY  9/18/2019    RNY    APPENDECTOMY      BIOPSY BREAST      BLADDER SUSPENSION      COLONOSCOPY N/A 05/31/2019    Procedure: COLONOSCOPY;  Surgeon: Jose Wen MD;  Location: [facility]    COLONOSCOPY      COSMETIC SURGERY      breast implants    CYSTOSCOPY, SLING TRANSVAGINAL N/A 02/08/2016    Procedure: CYSTOSCOPY, SLING TRANSVAGINAL;  Surgeon: Sterling Hill MD;  Location: [facility]    ENDOMETRIAL ABLATION      EYE SURGERY      HC REPAIR OF NASAL SEPTUM      NASAL SEPTUM SURGERY      OOPHORECTOMY      unilateral    OTHER SURGICAL HISTORY      spine injection    OTHER SURGICAL HISTORY      benign tumor removalleft thigh     OTHER SURGICAL HISTORY      BREAST IMPLANTS    OTHER SURGICAL HISTORY  02/01/2016    CYSTO, SLING TRANSVAGINAL    PELVIC LAPAROSCOPY      due to endometriosis    NH LAP GASTRIC BYPASS/DOUG-EN-Y N/A 09/18/2019    Procedure: LAPAROSCOPIC DOUG EN Y GASTRIC BYPASS;  Surgeon: Juan Sin MD;  Location: [facility];  Service: General    REFRACTIVE SURGERY      SALPINGECTOMY      SURGICAL HISTORY OF -       laporoscopy due to endometriosis    SURGICAL HISTORY OF -       endometrial ablation    SURGICAL HISTORY OF -       breast augmentation    SURGICAL HISTORY OF -       benign tumor removed from her left thigh    TONSILLECTOMY      TUBAL LIGATION      ZZC APPENDECTOMY         Social History     Tobacco Use    Smoking status: Never    Smokeless tobacco: Never   Substance Use Topics    Alcohol use: Yes     Comment: occassionally on weekends     Family History   Problem Relation Age of Onset    Cancer Mother         ovarian    Diverticulitis Mother     Cancer Paternal Grandmother         lung    Gynecology Sister         having a partial hysterectomy due to  abnormal cells, leaving ovaries and cervix    Colon Cancer Father     Cancer Father 80        colon    Prostate Cancer Father     Gynecology Daughter         endometriosis    Gynecology Sister         hysterectomy for endometriosis    No Known Problems Sister     No Known Problems Sister          Current Outpatient Medications   Medication Sig Dispense Refill    Calcium Carbonate-Vit D-Min (CALCIUM 1200 PO) Take 1 tablet by mouth daily      cyclobenzaprine (FLEXERIL) 5 MG tablet Take 1 tablet (5 mg) by mouth 3 times daily as needed for muscle spasms 50 tablet 3    EPINEPHrine (ANY BX GENERIC EQUIV) 0.3 MG/0.3ML injection 2-pack Inject 0.3 mLs (0.3 mg) into the muscle once as needed for anaphylaxis 0.6 mL 3    estradiol (ESTRACE) 1 MG tablet 1 tab sublingually daily in the evening 30 tablet 0    study - prednisoLONE acetate, MMCORC, (IDS# 5081) 1 % ophthalmic susp Place 1-2 drops into both eyes 3 times daily       Allergies   Allergen Reactions    Bees Anaphylaxis    Nsaids      Patient is s/p gastric bypass and can not have NSAIDs that are administered orally secondary to high risk of ulceration. She is able to have NSAIDs via IV, IM, and rectal routes for acute pain issues.    Onion Anaphylaxis    Clindamycin Rash    Provera [Medroxyprogesterone Acetate] Other (See Comments)     edema     Recent Labs   Lab Test 07/24/23  0955 09/17/20  0822 03/16/20  0723 02/20/20  2059 08/28/19  1509 05/24/19  0834 05/23/19  1448 03/28/19  1039 11/02/18  0757   A1C  --  4.9 5.4  --   --   --   --  5.1  --    LDL 82 90 82  --   --   --   --   --   --    HDL 88 70 56  --   --   --   --   --   --    TRIG 98 99 163*  --   --   --   --   --   --    ALT  --  26 45 37   < >  --    < >   < >  --    CR  --  0.70 0.71 0.65   < >  --    < >   < >  --    GFRESTIMATED  --  >60 >90 >90   < >  --    < >   < >  --    GFRESTBLACK  --  >60 >90 >90   < >  --    < >   < >  --    POTASSIUM  --  4.4 3.6 3.7   < >  --    < >   < >  --    TSH  --   --    --   --   --  2.06  --   --  1.84    < > = values in this interval not displayed.        FHS-7:       6/26/2023     9:42 AM   Breast CA Risk Assessment (FHS-7)   Did any of your first-degree relatives have breast or ovarian cancer? Yes   Did any of your relatives have bilateral breast cancer? No   Did any man in your family have breast cancer? No   Did any woman in your family have breast and ovarian cancer? No   Did any woman in your family have breast cancer before age 50 y? No   Do you have 2 or more relatives with breast and/or ovarian cancer? No   Do you have 2 or more relatives with breast and/or bowel cancer? No     click delete button to remove this line now  Mammogram Screening: Recommended annual mammography  Pertinent mammograms are reviewed under the imaging tab.    Pertinent mammograms are reviewed under the imaging tab.  History of abnormal Pap smear: NO - age 30- 65 PAP every 3 years recommended      Latest Ref Rng & Units 3/28/2018     4:50 PM 3/28/2018     4:30 PM 3/2/2015    12:00 AM   PAP / HPV   PAP (Historical)   NIL  NIL    HPV 16 DNA NEG^Negative Negative      HPV 18 DNA NEG^Negative Negative      Other HR HPV NEG^Negative Negative        Reviewed and updated as needed this visit by clinical staff                  Reviewed and updated as needed this visit by Provider                 Past Medical History:   Diagnosis Date    Ankle pain     Arthritis     degenerative spine disease with sciatica    Breast implant rupture     Cancer (H)     Endometriosis     Heartburn     Irritability     Knee pain     Menopausal syndrome (hot flashes)     Obesity, Class II, BMI 35-39.9     Obstructive sleep apnea     CPAP    Pelvic pain     S/P gastric bypass 09/18/2019      Past Surgical History:   Procedure Laterality Date    ABDOMEN SURGERY  9/18/2019    RNY    APPENDECTOMY      BIOPSY BREAST      BLADDER SUSPENSION      COLONOSCOPY N/A 05/31/2019    Procedure: COLONOSCOPY;  Surgeon: Jose Wen MD;   Location: [facility]    COLONOSCOPY      COSMETIC SURGERY      breast implants    CYSTOSCOPY, SLING TRANSVAGINAL N/A 2016    Procedure: CYSTOSCOPY, SLING TRANSVAGINAL;  Surgeon: Sterling Hill MD;  Location: [facility]    ENDOMETRIAL ABLATION      EYE SURGERY      HC REPAIR OF NASAL SEPTUM      NASAL SEPTUM SURGERY      OOPHORECTOMY      unilateral    OTHER SURGICAL HISTORY      spine injection    OTHER SURGICAL HISTORY      benign tumor removalleft thigh     OTHER SURGICAL HISTORY      BREAST IMPLANTS    OTHER SURGICAL HISTORY  2016    CYSTO, SLING TRANSVAGINAL    PELVIC LAPAROSCOPY      due to endometriosis    IL LAP GASTRIC BYPASS/DOUG-EN-Y N/A 2019    Procedure: LAPAROSCOPIC DOUG EN Y GASTRIC BYPASS;  Surgeon: Juan Sin MD;  Location: [facility];  Service: General    REFRACTIVE SURGERY      SALPINGECTOMY      SURGICAL HISTORY OF -       laporoscopy due to endometriosis    SURGICAL HISTORY OF -       endometrial ablation    SURGICAL HISTORY OF -       breast augmentation    SURGICAL HISTORY OF -       benign tumor removed from her left thigh    TONSILLECTOMY      TUBAL LIGATION      ZZC APPENDECTOMY       OB History    Para Term  AB Living   1 1 0 0 0 0   SAB IAB Ectopic Multiple Live Births   0 0 0 0 0      # Outcome Date GA Lbr Diego/2nd Weight Sex Delivery Anes PTL Lv   1 Para                ROS:  CONSTITUTIONAL: NEGATIVE for fever, chills, change in weight  INTEGUMENTARY/SKIN: NEGATIVE for worrisome rashes, moles or lesions  EYES: NEGATIVE for vision changes or irritation  ENT: NEGATIVE for ear, mouth and throat problems  RESP: NEGATIVE for significant cough or SOB  BREAST: NEGATIVE for masses, tenderness or discharge  CV: NEGATIVE for chest pain, palpitations or peripheral edema  GI: NEGATIVE for nausea, abdominal pain, heartburn, or change in bowel habits  : NEGATIVE for unusual urinary or vaginal symptoms. No vaginal bleeding.  MUSCULOSKELETAL: NEGATIVE for  significant arthralgias or myalgia  NEURO: NEGATIVE for weakness, dizziness or paresthesias  ENDOCRINE: hot flashes and night sweats  HEME/ALLERGY/IMMUNE: NEGATIVE for bleeding problems  PSYCHIATRIC: NEGATIVE for changes in mood or affect     OBJECTIVE:   LMP 03/02/2018   EXAM:  GENERAL APPEARANCE: healthy, alert and no distress  EYES: Eyes grossly normal to inspection, PERRL and conjunctivae and sclerae normal  NECK: no adenopathy, no asymmetry, masses, or scars and thyroid normal to palpation  RESP: lungs clear to auscultation - no rales, rhonchi or wheezes  CV: regular rate and rhythm, normal S1 S2, no S3 or S4, no murmur, click or rub, no peripheral edema and peripheral pulses strong  ABDOMEN: soft, nontender, no hepatosplenomegaly, no masses and bowel sounds normal   (female): normal female external genitalia, normal urethral meatus, vaginal mucosal atrophy noted, normal cervix, adnexae, and uterus without masses or abnormal discharge  MS: no musculoskeletal defects are noted and gait is age appropriate without ataxia  SKIN: no suspicious lesions or rashes  NEURO: Normal strength and tone, sensory exam grossly normal, mentation intact and speech normal  PSYCH: mentation appears normal and affect normal/bright    Diagnostic Test Results:  Labs reviewed in Epic  none     ASSESSMENT/PLAN:   (Z01.419) Encounter for gynecological examination without abnormal finding  (primary encounter diagnosis)  Comment:   Plan: Pap screen with HPV - recommended age 30 - 65         years, estradiol (ESTRACE) 1 MG tablet            (N95.1) Menopausal syndrome (hot flashes)  Comment: failed with estradiol patches  Plan: estradiol (ESTRACE) 1 MG tablet        Sublingually @ dinner time    Patient has been advised of split billing requirements and indicates understanding: Yes  COUNSELING:   Reviewed preventive health counseling, as reflected in patient instructions       Regular exercise       Healthy diet/nutrition        "(Isabel)menopause management    Estimated body mass index is 28.64 kg/m  as calculated from the following:    Height as of 7/3/23: 1.594 m (5' 2.75\").    Weight as of 7/3/23: 72.8 kg (160 lb 6.4 oz).        She reports that she has never smoked. She has never used smokeless tobacco.      Counseling Resources:  ATP IV Guidelines  Pooled Cohorts Equation Calculator  Breast Cancer Risk Calculator  BRCA-Related Cancer Risk Assessment: FHS-7 Tool  FRAX Risk Assessment  ICSI Preventive Guidelines  Dietary Guidelines for Americans, 2010  USDA's MyPlate  ASA Prophylaxis  Lung CA Screening    Sterling Hill MD  [facility] [place]  "

## 2023-08-11 LAB
BKR LAB AP GYN ADEQUACY: NORMAL
BKR LAB AP GYN INTERPRETATION: NORMAL
BKR LAB AP HPV REFLEX: NORMAL
BKR LAB AP PREVIOUS ABNORMAL: NORMAL
PATH REPORT.COMMENTS IMP SPEC: NORMAL
PATH REPORT.COMMENTS IMP SPEC: NORMAL
PATH REPORT.RELEVANT HX SPEC: NORMAL

## 2023-08-14 LAB
HUMAN PAPILLOMA VIRUS 16 DNA: NEGATIVE
HUMAN PAPILLOMA VIRUS 18 DNA: NEGATIVE
HUMAN PAPILLOMA VIRUS FINAL DIAGNOSIS: NORMAL
HUMAN PAPILLOMA VIRUS OTHER HR: NEGATIVE

## 2023-08-25 ENCOUNTER — MYC MEDICAL ADVICE (OUTPATIENT)
Dept: OBGYN | Facility: CLINIC | Age: 53
End: 2023-08-25
Payer: COMMERCIAL

## 2023-08-25 DIAGNOSIS — N95.1 MENOPAUSAL SYNDROME (HOT FLASHES): ICD-10-CM

## 2023-08-25 DIAGNOSIS — Z01.419 ENCOUNTER FOR GYNECOLOGICAL EXAMINATION WITHOUT ABNORMAL FINDING: ICD-10-CM

## 2023-08-25 RX ORDER — ESTRADIOL 1 MG/1
TABLET ORAL
Qty: 30 TABLET | Refills: 0 | Status: SHIPPED | OUTPATIENT
Start: 2023-08-25

## 2023-08-25 NOTE — TELEPHONE ENCOUNTER
Pt requesting rx be resent as previous rx was cancelled - pt was not able to  in time.   Rx for estrace resent to pt's preferred pharmacy - sent as ordered by Dr. Hill  Pt notified via Cedrick Alonso RN  [facility]

## 2023-10-06 ENCOUNTER — TELEPHONE (OUTPATIENT)
Dept: FAMILY MEDICINE | Facility: CLINIC | Age: 53
End: 2023-10-06
Payer: COMMERCIAL

## 2023-10-06 NOTE — LETTER
October 6, 2023    To  Dakota Berkowitz  [address] [place] [address]    Your team at [facility] cares about your health. We have reviewed your chart and based on our findings; we are making the following recommendations to better manage your health.     You are in particular need of attention regarding the following:     Schedule Annual MAMMOGRAPHY. [facility] scheduling number is [phone] or schedule in [facility] (self referral).  1 in 8 women will develop invasive breast cancer during her lifetime and it is the most common non-skin cancer in [place] Women. EARLY detection, new treatments, and a better understanding of the disease have increased survival rates- the 5 year survival rate in the 1960's was 63% and today it is close to 90%.    If you have already completed these items, please contact the clinic via phone or   [employer] so your care team can review and update your records. Thank you for   choosing [facility] for your healthcare needs. For any questions,   concerns, or to schedule an appointment please contact our clinic.    Healthy Regards,      Your [facility] Care Team

## 2023-10-06 NOTE — TELEPHONE ENCOUNTER
Patient Quality Outreach    Patient is due for the following:   Breast Cancer Screening - Mammogram    Next Steps:   No follow up needed at this time.    Type of outreach:    Sent letter.      Questions for provider review:    None           Cookie Chiang, CMA

## 2023-12-01 NOTE — TELEPHONE ENCOUNTER
----- Message from Bridgett Leahy RN sent at 12/19/2019 10:48 AM CST -----  Regarding: HE labs  6 month s/p RNY with Sund on 3/25. Labs internal.      PROCEDURES:  Biopsy of lipoma of chest wall 01-Dec-2023 10:21:00 Surgical Removal of Multiple Lipomas, Chest wall x2, Left upper extremity x1, Right upper extremity x 3 Quincy Edwards

## 2023-12-12 ENCOUNTER — HOSPITAL ENCOUNTER (EMERGENCY)
Facility: CLINIC | Age: 53
Discharge: HOME | End: 2023-12-12
Attending: FAMILY MEDICINE | Admitting: FAMILY MEDICINE
Payer: COMMERCIAL

## 2023-12-12 VITALS
TEMPERATURE: 97.3 F | SYSTOLIC BLOOD PRESSURE: 149 MMHG | HEART RATE: 77 BPM | DIASTOLIC BLOOD PRESSURE: 94 MMHG | RESPIRATION RATE: 32 BRPM | OXYGEN SATURATION: 94 %

## 2023-12-12 DIAGNOSIS — T30.0 BURN INJURY: ICD-10-CM

## 2023-12-12 LAB
ANION GAP SERPL CALCULATED.3IONS-SCNC: 16 MMOL/L (ref 7–15)
BUN SERPL-MCNC: 10.1 MG/DL (ref 6–20)
CALCIUM SERPL-MCNC: 8.6 MG/DL (ref 8.6–10)
CHLORIDE SERPL-SCNC: 110 MMOL/L (ref 98–107)
CREAT SERPL-MCNC: 0.68 MG/DL (ref 0.51–0.95)
DEPRECATED HCO3 PLAS-SCNC: 18 MMOL/L (ref 22–29)
EGFRCR SERPLBLD CKD-EPI 2021: >90 ML/MIN/1.73M2
GLUCOSE SERPL-MCNC: 89 MG/DL (ref 70–99)
POTASSIUM SERPL-SCNC: 4 MMOL/L (ref 3.4–5.3)
SODIUM SERPL-SCNC: 144 MMOL/L (ref 135–145)

## 2023-12-12 PROCEDURE — 99285 EMERGENCY DEPT VISIT HI MDM: CPT | Performed by: FAMILY MEDICINE

## 2023-12-12 PROCEDURE — 82310 ASSAY OF CALCIUM: CPT | Performed by: FAMILY MEDICINE

## 2023-12-12 PROCEDURE — 250N000011 HC RX IP 250 OP 636: Performed by: FAMILY MEDICINE

## 2023-12-12 PROCEDURE — 99291 CRITICAL CARE FIRST HOUR: CPT | Mod: 25

## 2023-12-12 PROCEDURE — 250N000013 HC RX MED GY IP 250 OP 250 PS 637: Performed by: FAMILY MEDICINE

## 2023-12-12 PROCEDURE — 96374 THER/PROPH/DIAG INJ IV PUSH: CPT

## 2023-12-12 PROCEDURE — 36415 COLL VENOUS BLD VENIPUNCTURE: CPT | Performed by: FAMILY MEDICINE

## 2023-12-12 PROCEDURE — 96376 TX/PRO/DX INJ SAME DRUG ADON: CPT

## 2023-12-12 RX ORDER — HYDROMORPHONE HYDROCHLORIDE 1 MG/ML
0.5 INJECTION, SOLUTION INTRAMUSCULAR; INTRAVENOUS; SUBCUTANEOUS ONCE
Status: COMPLETED | OUTPATIENT
Start: 2023-12-12 | End: 2023-12-12

## 2023-12-12 RX ORDER — OXYCODONE HYDROCHLORIDE 5 MG/1
10 TABLET ORAL ONCE
Status: COMPLETED | OUTPATIENT
Start: 2023-12-12 | End: 2023-12-12

## 2023-12-12 RX ORDER — HYDROMORPHONE HYDROCHLORIDE 1 MG/ML
0.5 INJECTION, SOLUTION INTRAMUSCULAR; INTRAVENOUS; SUBCUTANEOUS
Status: DISPENSED | OUTPATIENT
Start: 2023-12-12 | End: 2023-12-12

## 2023-12-12 RX ORDER — ACETAMINOPHEN 500 MG
1000 TABLET ORAL ONCE
Status: COMPLETED | OUTPATIENT
Start: 2023-12-12 | End: 2023-12-12

## 2023-12-12 RX ORDER — OXYCODONE HYDROCHLORIDE 5 MG/1
5 TABLET ORAL EVERY 6 HOURS PRN
Qty: 10 TABLET | Refills: 0 | Status: SHIPPED | OUTPATIENT
Start: 2023-12-12

## 2023-12-12 RX ADMIN — HYDROMORPHONE HYDROCHLORIDE 0.5 MG: 1 INJECTION, SOLUTION INTRAMUSCULAR; INTRAVENOUS; SUBCUTANEOUS at 19:46

## 2023-12-12 RX ADMIN — HYDROMORPHONE HYDROCHLORIDE 0.5 MG: 1 INJECTION, SOLUTION INTRAMUSCULAR; INTRAVENOUS; SUBCUTANEOUS at 20:58

## 2023-12-12 RX ADMIN — HYDROMORPHONE HYDROCHLORIDE 0.5 MG: 1 INJECTION, SOLUTION INTRAMUSCULAR; INTRAVENOUS; SUBCUTANEOUS at 20:09

## 2023-12-12 RX ADMIN — OXYCODONE HYDROCHLORIDE 10 MG: 5 TABLET ORAL at 20:09

## 2023-12-12 RX ADMIN — ACETAMINOPHEN 1000 MG: 500 TABLET, FILM COATED ORAL at 20:09

## 2023-12-12 ASSESSMENT — ACTIVITIES OF DAILY LIVING (ADL): ADLS_ACUITY_SCORE: 35

## 2023-12-13 ENCOUNTER — PATIENT OUTREACH (OUTPATIENT)
Dept: FAMILY MEDICINE | Facility: CLINIC | Age: 53
End: 2023-12-13
Payer: COMMERCIAL

## 2023-12-13 NOTE — TELEPHONE ENCOUNTER
[facility] called, and explained patient is all scheduled for an appointment with them.    Thank you,  Michelle KHAN [facility]  Specialty [facility]
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
## 2023-12-28 ENCOUNTER — OFFICE VISIT (OUTPATIENT)
Dept: URGENT CARE | Facility: URGENT CARE | Age: 53
End: 2023-12-28
Payer: COMMERCIAL

## 2023-12-28 VITALS
WEIGHT: 161.8 LBS | OXYGEN SATURATION: 100 % | BODY MASS INDEX: 28.89 KG/M2 | SYSTOLIC BLOOD PRESSURE: 143 MMHG | HEART RATE: 58 BPM | TEMPERATURE: 97.6 F | DIASTOLIC BLOOD PRESSURE: 79 MMHG

## 2023-12-28 DIAGNOSIS — H01.001 BLEPHARITIS OF RIGHT UPPER EYELID, UNSPECIFIED TYPE: Primary | ICD-10-CM

## 2023-12-28 PROCEDURE — 99213 OFFICE O/P EST LOW 20 MIN: CPT | Performed by: FAMILY MEDICINE

## 2023-12-28 RX ORDER — FLUCONAZOLE 150 MG/1
150 TABLET ORAL ONCE
Qty: 1 TABLET | Refills: 0 | Status: SHIPPED | OUTPATIENT
Start: 2023-12-28 | End: 2023-12-28

## 2023-12-28 RX ORDER — NEOMYCIN POLYMYXIN B SULFATES AND DEXAMETHASONE 3.5; 10000; 1 MG/ML; [USP'U]/ML; MG/ML
SUSPENSION/ DROPS OPHTHALMIC
Qty: 5 ML | Refills: 0 | Status: SHIPPED | OUTPATIENT
Start: 2023-12-28

## 2023-12-28 NOTE — PROGRESS NOTES
(H01.001) Blepharitis of right upper eyelid, unspecified type  (primary encounter diagnosis)  Comment:     Plan: amoxicillin-clavulanate (AUGMENTIN) 875-125 MG         tablet, fluconazole (DIFLUCAN) 150 MG tablet,         neomycin-polymixin-dexAMETHasone (MAXITROL) 0.1        % ophthalmic suspension    Patient advised to use drops and antibiotic as directed.  Warm pack.  Have follow-up if not improving or if condition worsens.            CHIEF COMPLAINT    Check redness right eye.      HISTORY    Patient has some swelling and redness of right eye, particularly right upper lid.  Has had this 2 to 3 days.  She has been warm packing.  Not improving.    EXAM  BP (!) 143/79   Pulse 58   Temp 97.6  F (36.4  C) (Tympanic)   Wt 73.4 kg (161 lb 12.8 oz)   LMP 03/02/2018   SpO2 100%   BMI 28.89 kg/m      Mild to moderate redness and swelling right upper lid, extending slightly to the lower lid.  Conjunctiva not particularly inflamed.  Left eye is quiet.  Pupils are equal.  No facial swelling.

## 2024-01-22 ENCOUNTER — TELEPHONE (OUTPATIENT)
Dept: OBGYN | Facility: CLINIC | Age: 54
End: 2024-01-22
Payer: COMMERCIAL

## 2024-01-22 NOTE — TELEPHONE ENCOUNTER
Panel Management Review      Health Maintenance List    Health Maintenance   Topic Date Due    URINE DRUG SCREEN  Never done    HEPATITIS B IMMUNIZATION (1 of 3 - 3-dose series) Never done    HIV SCREENING  Never done    HEPATITIS C SCREENING  Never done    MAMMO SCREENING  02/25/2022    INFLUENZA VACCINE (1) 09/01/2023    COVID-19 Vaccine (3 - 2023-24 season) 09/01/2023    PHQ-2 (once per calendar year)  01/01/2024    YEARLY PREVENTIVE VISIT  08/09/2024    HPV TEST  08/09/2026    PAP  08/09/2026    ADVANCE CARE PLANNING  07/03/2028    LIPID  07/24/2028    COLORECTAL CANCER SCREENING  05/31/2029    DTAP/TDAP/TD IMMUNIZATION (3 - Td or Tdap) 07/03/2030    ZOSTER IMMUNIZATION  Completed    Pneumococcal Vaccine: Pediatrics (0 to 5 Years) and At-Risk Patients (6 to 64 Years)  Aged Out    IPV IMMUNIZATION  Aged Out    HPV IMMUNIZATION  Aged Out    MENINGITIS IMMUNIZATION  Aged Out    RSV MONOCLONAL ANTIBODY  Aged Out       Composite cancer screening  Chart review shows that this patient is due/due soon for the following Mammogram  Lab Results   Component Value Date    PAP NIL 03/28/2018     Past Surgical History:   Procedure Laterality Date    ABDOMEN SURGERY  9/18/2019    [facility]    APPENDECTOMY      BIOPSY BREAST      BLADDER SUSPENSION      COLONOSCOPY N/A 05/31/2019    Procedure: COLONOSCOPY;  Surgeon: Jose Wen MD;  Location: [facility]    COLONOSCOPY      COSMETIC SURGERY      breast implants    CYSTOSCOPY, SLING TRANSVAGINAL N/A 02/08/2016    Procedure: CYSTOSCOPY, SLING TRANSVAGINAL;  Surgeon: Sterling Hill MD;  Location: [facility]    ENDOMETRIAL ABLATION      EYE SURGERY      HC REPAIR OF NASAL SEPTUM      NASAL SEPTUM SURGERY      OOPHORECTOMY      unilateral    OTHER SURGICAL HISTORY      spine injection    OTHER SURGICAL HISTORY      benign tumor removalleft thigh     OTHER SURGICAL HISTORY      BREAST IMPLANTS    OTHER SURGICAL HISTORY  02/01/2016    CYSTO, SLING TRANSVAGINAL    PELVIC LAPAROSCOPY      due  to endometriosis    UT LAP GASTRIC BYPASS/DOUG-EN-Y N/A 09/18/2019    Procedure: LAPAROSCOPIC DOUG EN Y GASTRIC BYPASS;  Surgeon: Juan Sin MD;  Location: [facility];  Service: General    REFRACTIVE SURGERY      SALPINGECTOMY      SURGICAL HISTORY OF -       laporoscopy due to endometriosis    SURGICAL HISTORY OF -       endometrial ablation    SURGICAL HISTORY OF -       breast augmentation    SURGICAL HISTORY OF -       benign tumor removed from her left thigh    TONSILLECTOMY      TUBAL LIGATION      ZZC APPENDECTOMY         Is hysterectomy listed in surgical history? No   Is mastectomy listed in surgical history? No     Summary:    Patient is due/failing the following:   Mammogram    Action needed: Patient needs office visit for mammogram.    Type of outreach:  Sent [employer] message.      Staff Signature:  Kelsie Mccall MA

## 2024-01-22 NOTE — LETTER
January 22, 2024      Dakota Berkowitz  [address] [place] [address]        Dear Dakota,       To ensure we are providing the best quality care, we have reviewed your chart and see that you are due for:    Breast Cancer Screening:    Please call [facility]  at [phone] to schedule a Mammogram.    You may call Dept: [phone] if you have any questions. If you have completed the mammogram outside of [facility], please have the results forwarded to our office Fax # [phone]. We will update the chart for your primary Physician to review before your next annual physical.            Sincerely,        Carol Serrato MD

## 2024-02-07 ENCOUNTER — HOSPITAL ENCOUNTER (OUTPATIENT)
Dept: MAMMOGRAPHY | Facility: CLINIC | Age: 54
Discharge: HOME | End: 2024-02-07
Attending: FAMILY MEDICINE | Admitting: FAMILY MEDICINE
Payer: COMMERCIAL

## 2024-02-07 DIAGNOSIS — Z12.31 ENCOUNTER FOR SCREENING MAMMOGRAM FOR BREAST CANCER: ICD-10-CM

## 2024-02-07 PROCEDURE — 77063 BREAST TOMOSYNTHESIS BI: CPT

## 2024-02-12 ENCOUNTER — NURSE TRIAGE (OUTPATIENT)
Dept: INTERNAL MEDICINE | Facility: CLINIC | Age: 54
End: 2024-02-12
Payer: COMMERCIAL

## 2024-02-12 NOTE — TELEPHONE ENCOUNTER
Discussed symptoms further with patient she has history of sciatica and feels that her pain is manageable and she can wait for office visit given she has no red flag symptoms. She prefers to have in office visit with  given  is no longer practicing. First available apt with  scheduled for 2/15/24. Patient educated to call back to clinic if symptoms worsen or she develops any red flag symptoms. Patient verbalizes understanding and is in agreement with plan.     Cristina Hernandez, RN

## 2024-02-12 NOTE — TELEPHONE ENCOUNTER
Nurse Triage SBAR    Is this a 2nd Level Triage? YES, LICENSED PRACTITIONER REVIEW IS REQUIRED    Situation: Pt has right sided back pain.    Background:     Assessment: Pt has pain at right side of back just below the ribs.  Pain is constant for 2 months.  Sometimes it is dull and sometimes sharp.  Pain is moderate.  No fever  No trouble urinating.  No abdominal pain  Has occasional nausea.  Last BM was yesterday and normal.    Protocol Recommended Disposition:   Be seen in UC/ED or office with provider approval.    Does the patient meet one of the following criteria for ADS visit consideration? 16+ years old, with an [facility] PCP     TIP  Providers, please consider if this condition is appropriate for management at one of our Acute and Diagnostic Services sites.     If patient is a good candidate, please use dotphrase <dot>triageresponse and select Refer to ADS to document.    Reason for Disposition   Pain radiates into groin, scrotum    Additional Information   Negative: Passed out (i.e., fainted, collapsed and was not responding)   Negative: Shock suspected (e.g., cold/pale/clammy skin, too weak to stand, low BP, rapid pulse)   Negative: Sounds like a life-threatening emergency to the triager   Negative: Major injury to the back (e.g., MVA, fall > 10 feet or 3 meters, penetrating injury, etc.)   Negative: Pain in the upper back over the ribs (rib cage) that radiates (travels) into the chest   Negative: Pain in the upper back over the ribs (rib cage) and worsened by coughing (or clearly increases with breathing)   Negative: Back pain during pregnancy   Negative: SEVERE back pain of sudden onset and age > 60 years   Negative: SEVERE abdominal pain (e.g., excruciating)   Negative: Abdominal pain and age > 60 years   Negative: Unable to urinate (or only a few drops) and bladder feels very full   Negative: Loss of bladder or bowel control (urine or bowel incontinence; wetting self, leaking stool) of new-onset    "Negative: Numbness (loss of sensation) in groin or rectal area    Answer Assessment - Initial Assessment Questions  1. ONSET: \"When did the pain begin?\"       2 months ago.  2. LOCATION: \"Where does it hurt?\" (upper, mid or lower back)      Right side of back below rib cage radiates to right groin sometimes.  3. SEVERITY: \"How bad is the pain?\"  (e.g., Scale 1-10; mild, moderate, or severe)    - MILD (1-3): Doesn't interfere with normal activities.     - MODERATE (4-7): Interferes with normal activities or awakens from sleep.     - SEVERE (8-10): Excruciating pain, unable to do any normal activities.       Moderate  4. PATTERN: \"Is the pain constant?\" (e.g., yes, no; constant, intermittent)       Constant discomfort and sometimes sharp pains.  5. RADIATION: \"Does the pain shoot into your legs or somewhere else?\"      Goes to right groin at times.  6. CAUSE:  \"What do you think is causing the back pain?\"       I don't know.  7. BACK OVERUSE:  \"Any recent lifting of heavy objects, strenuous work or exercise?\"      NO  8. MEDICINES: \"What have you taken so far for the pain?\" (e.g., nothing, acetaminophen, NSAIDS)      NOthing  9. NEUROLOGIC SYMPTOMS: \"Do you have any weakness, numbness, or problems with bowel/bladder control?\"      No  10. OTHER SYMPTOMS: \"Do you have any other symptoms?\" (e.g., fever, abdomen pain, burning with urination, blood in urine)        Nausea at times  11. PREGNANCY: \"Is there any chance you are pregnant?\" \"When was your last menstrual period?\"        No    Protocols used: Back Pain-A-[place]    "

## 2024-02-12 NOTE — TELEPHONE ENCOUNTER
Referral to Acute and Diagnostic Services    [phone] ([place]) [place] [address] [place] [address]    Transition to Acute & Diagnostic Services Clinic has been discussed with patient, and she agrees with next level of care.   Patient understands that evaluation/treatment at [facility] typically takes significantly longer than in clinic/urgent care (>2 hours).  The [facility] has been contacted by provider/staff to confirm patient acceptance.         Special issues:      None                     The following provider has assessed this patient for intervention at [facility], and directed the patient for referral: Physician No Ref-Primary

## 2024-07-17 ENCOUNTER — E-VISIT (OUTPATIENT)
Dept: URGENT CARE | Facility: CLINIC | Age: 54
End: 2024-07-17
Payer: COMMERCIAL

## 2024-07-17 DIAGNOSIS — R21 RASH: Primary | ICD-10-CM

## 2024-07-17 PROCEDURE — 99207 PR NON-BILLABLE SERV PER CHARTING: CPT | Performed by: PHYSICIAN ASSISTANT

## 2024-07-17 NOTE — PATIENT INSTRUCTIONS
Dear Dakota Berkowitz?     After reviewing your responses, I am unable to make a diagnosis that can be treated online.    You will not be charged for this eVisit.     We are dedicated to helping you achieve your best health and would like to see you in one of our many clinic locations - a primary care provider would be ideal for your concern.    Please use happn to schedule a visit with a provider or call [phone] ([phone]) to schedule at any of our locations.    Thanks for choosing?us?as your health care partner,?   ?   Lisa Rodriges PA-C?

## 2024-11-20 ENCOUNTER — ANCILLARY PROCEDURE (OUTPATIENT)
Dept: GENERAL RADIOLOGY | Facility: CLINIC | Age: 54
End: 2024-11-20
Attending: PEDIATRICS
Payer: COMMERCIAL

## 2024-11-20 ENCOUNTER — OFFICE VISIT (OUTPATIENT)
Dept: ORTHOPEDICS | Facility: CLINIC | Age: 54
End: 2024-11-20
Payer: COMMERCIAL

## 2024-11-20 DIAGNOSIS — S59.902A ELBOW INJURY, LEFT, INITIAL ENCOUNTER: Primary | ICD-10-CM

## 2024-11-20 DIAGNOSIS — M70.22 OLECRANON BURSITIS OF LEFT ELBOW: ICD-10-CM

## 2024-11-20 RX ORDER — METHYLPREDNISOLONE 4 MG/1
TABLET ORAL
Qty: 21 TABLET | Refills: 0 | Status: SHIPPED | OUTPATIENT
Start: 2024-11-20

## 2024-11-20 NOTE — PROGRESS NOTES
ASSESSMENT & PLAN    Dakota was seen today for injury.    Diagnoses and all orders for this visit:    Elbow injury, left, initial encounter  -     XR Elbow LT G/E 3 vw  -     methylPREDNISolone (MEDROL DOSEPAK) 4 MG tablet therapy pack; Follow Package Directions    Olecranon bursitis of left elbow  -     methylPREDNISolone (MEDROL DOSEPAK) 4 MG tablet therapy pack; Follow Package Directions      This issue is acute and unchanged        ICD-10-CM    1. Elbow injury, left, initial encounter  S59.902A XR Elbow LT G/E 3 vw     methylPREDNISolone (MEDROL DOSEPAK) 4 MG tablet therapy pack      2. Olecranon bursitis of left elbow  M70.22 methylPREDNISolone (MEDROL DOSEPAK) 4 MG tablet therapy pack          We discussed these other possible diagnosis: Likely resolving bursitis, continues to be irritated    Plan:  - Today's Plan of Care:  Medrol Dose Pack (I reviewed the mechanism of action as well as risk/benefit profile of medications. Questions answered.)    Compression sleeve or pad, limit leaning on the elbow    Discussed topical massage: lidocaine, Voltaren/diclofenac    Discussed activity considerations and other supportive care including Ice/Heat, OTC and other topical medications as needed.    -We also discussed other future treatment options:  Referral to Occupational Therapy  MRI if no improvement    Follow Up: 6 - 8 weeks    Concerning signs and symptoms were reviewed and all questions were answered at this time.    Kim Oneil MD [facility]  Sports Medicine Physician  [facility]    -----  Chief Complaint   Patient presents with    Left Elbow - Injury       SUBJECTIVE  Dakota Berkowitz is a/an 54 year old female who is seen as a self referral for evaluation of left elbow pain.     The patient is seen by themselves.  The patient is Left handed    Onset: 8/11/24, 9/11/24, patient describes injury as tripped, fell, hit the elbow directly TWICE. She did the same injury around 1 month later  Location  "of Pain: left elbow; olecranon process  Worsened by: touching, pressure, sleeping  Better with: unsure   Treatments tried: Tylenol, ibuprofen  Associated symptoms: swelling and Pain that goes up and down the arm, shooting pains  - initially had more bruising and swelling, this has resolved  - no has a \"boggy\" feeling, zinging pain at times, when bumped, in the morning    Orthopedic/Surgical history: NO  Social History/Occupation: working at a desk       REVIEW OF SYSTEMS:  Review of Systems    OBJECTIVE:  [facility] 03/02/2018    General: healthy, alert and in no distress  Skin: no suspicious lesions or rash.  CV: distal perfusion intact   Resp: normal respiratory effort without conversational dyspnea   Psych: normal mood and affect  Gait: NORMAL  Neuro: Normal light sensory exam of upper extremity    Left Elbow exam:    Inspection:     no ecchymosis       no edema or effusion    Tender:     olecranon left    Non-Tender:      remainder of the elbow bilaterally    ROM:      full with flexion, extension, forearm supination and pronation left    Strength:     flexion 5/5 left       extension 5/5 left       forearm supination 5/5 left       forearm pronation 5/5 left    Sensation:     intact throughout hand       RADIOLOGY:  Final results and radiologist's interpretation, available in the [facility] record.  Images were reviewed with the patient in the office today.  My personal interpretation of the performed imaging:   3 XR views of left elbow reviewed: no acute bony abnormality, no significant degenerative change  - will follow official read    Review of the result(s) of each unique test - XR         "

## 2024-11-20 NOTE — PATIENT INSTRUCTIONS
We discussed these other possible diagnosis: Likely resolving bursitis, continues to be irritated    Plan:  - Today's Plan of Care:  Medrol Dose Pack (I reviewed the mechanism of action as well as risk/benefit profile of medications. Questions answered.)    Compression sleeve or pad, limit leaning on the elbow    Discussed topical massage: lidocaine, Voltaren/diclofenac    Discussed activity considerations and other supportive care including Ice/Heat, OTC and other topical medications as needed.    -We also discussed other future treatment options:  Referral to Occupational Therapy  MRI if no improvement    Follow Up: 6 - 8 weeks    If you have any further questions for your physician or physician s care team you can call [phone].

## 2024-11-20 NOTE — LETTER
11/20/2024      Dakota Berkowitz  [address] [place] [address]      Dear Colleague,    Thank you for referring your patient, Dakota Berkowitz, to the [facility] [place]. Please see a copy of my visit note below.    ASSESSMENT & PLAN    Dakota was seen today for injury.    Diagnoses and all orders for this visit:    Elbow injury, left, initial encounter  -     XR Elbow LT G/E 3 vw  -     methylPREDNISolone (MEDROL DOSEPAK) 4 MG tablet therapy pack; Follow Package Directions    Olecranon bursitis of left elbow  -     methylPREDNISolone (MEDROL DOSEPAK) 4 MG tablet therapy pack; Follow Package Directions      This issue is acute and unchanged        ICD-10-CM    1. Elbow injury, left, initial encounter  S59.902A XR Elbow LT G/E 3 vw     methylPREDNISolone (MEDROL DOSEPAK) 4 MG tablet therapy pack      2. Olecranon bursitis of left elbow  M70.22 methylPREDNISolone (MEDROL DOSEPAK) 4 MG tablet therapy pack          We discussed these other possible diagnosis: Likely resolving bursitis, continues to be irritated    Plan:  - Today's Plan of Care:  Medrol Dose Pack (I reviewed the mechanism of action as well as risk/benefit profile of medications. Questions answered.)    Compression sleeve or pad, limit leaning on the elbow    Discussed topical massage: lidocaine, Voltaren/diclofenac    Discussed activity considerations and other supportive care including Ice/Heat, OTC and other topical medications as needed.    -We also discussed other future treatment options:  Referral to Occupational Therapy  MRI if no improvement    Follow Up: 6 - 8 weeks    Concerning signs and symptoms were reviewed and all questions were answered at this time.    Kim Oneil MD [facility]  Sports Medicine Physician  [facility]    -----  Chief Complaint   Patient presents with     Left Elbow - Injury       SUBJECTIVE  Dakota Berkowitz is a/an 54 year old female who is seen as a self  "referral for evaluation of left elbow pain.     The patient is seen by themselves.  The patient is Left handed    Onset: 8/11/24, 9/11/24, patient describes injury as tripped, fell, hit the elbow directly TWICE. She did the same injury around 1 month later  Location of Pain: left elbow; olecranon process  Worsened by: touching, pressure, sleeping  Better with: unsure   Treatments tried: Tylenol, ibuprofen  Associated symptoms: swelling and Pain that goes up and down the arm, shooting pains  - initially had more bruising and swelling, this has resolved  - no has a \"boggy\" feeling, zinging pain at times, when bumped, in the morning    Orthopedic/Surgical history: NO  Social History/Occupation: working at a desk       REVIEW OF SYSTEMS:  Review of Systems    OBJECTIVE:  [facility] 03/02/2018    General: healthy, alert and in no distress  Skin: no suspicious lesions or rash.  CV: distal perfusion intact   Resp: normal respiratory effort without conversational dyspnea   Psych: normal mood and affect  Gait: NORMAL  Neuro: Normal light sensory exam of upper extremity    Left Elbow exam:    Inspection:     no ecchymosis       no edema or effusion    Tender:     olecranon left    Non-Tender:      remainder of the elbow bilaterally    ROM:      full with flexion, extension, forearm supination and pronation left    Strength:     flexion 5/5 left       extension 5/5 left       forearm supination 5/5 left       forearm pronation 5/5 left    Sensation:     intact throughout hand       RADIOLOGY:  Final results and radiologist's interpretation, available in the [facility] health record.  Images were reviewed with the patient in the office today.  My personal interpretation of the performed imaging:   3 XR views of left elbow reviewed: no acute bony abnormality, no significant degenerative change  - will follow official read    Review of the result(s) of each unique test - XR             Again, thank you for allowing me to participate in the " care of your patient.        Sincerely,        Kim Oneil MD

## 2025-01-03 ENCOUNTER — ANCILLARY PROCEDURE (OUTPATIENT)
Dept: GENERAL RADIOLOGY | Facility: CLINIC | Age: 55
End: 2025-01-03
Attending: FAMILY MEDICINE
Payer: COMMERCIAL

## 2025-01-03 DIAGNOSIS — M89.319 CLAVICLE ENLARGEMENT: ICD-10-CM

## 2025-01-03 PROCEDURE — 73000 X-RAY EXAM OF COLLAR BONE: CPT | Mod: TC | Performed by: STUDENT IN AN ORGANIZED HEALTH CARE EDUCATION/TRAINING PROGRAM

## 2025-01-08 ENCOUNTER — HOSPITAL ENCOUNTER (OUTPATIENT)
Dept: ULTRASOUND IMAGING | Facility: CLINIC | Age: 55
Discharge: HOME | End: 2025-01-08
Attending: FAMILY MEDICINE
Payer: COMMERCIAL

## 2025-01-08 DIAGNOSIS — M89.319 CLAVICLE ENLARGEMENT: ICD-10-CM

## 2025-01-08 DIAGNOSIS — R10.9 RIGHT FLANK PAIN: ICD-10-CM

## 2025-01-08 PROCEDURE — 76882 US LMTD JT/FCL EVL NVASC XTR: CPT | Mod: RT

## 2025-01-08 PROCEDURE — 76775 US EXAM ABDO BACK WALL LIM: CPT

## 2025-01-09 NOTE — RESULT ENCOUNTER NOTE
Please inform patient that test result( ultrasound) was within normal parameters.   Thank you.     Ghassan Rodriguez M.D.

## 2025-03-07 NOTE — TELEPHONE ENCOUNTER
Copied from CRM #[id number]. Topic:  Schedule Appointment -  Schedule Primary Care  >> Mar 7, 2025  3:21 PM Kanchan ORTIZ wrote:  jovany called requesting to schedule a primary care visit with a Clinician. Checked insurance.  Looked for any active requests, recalls, service to orders, scheduling tickets prior to scheduling. The decision tree DT PC Was used for scheduling (an)     Non-Acute need. Not scheduled and followed instructions to message or transfer. Sent a message. Selected 'Wrap up CRM' and created new Telephone Encounter after clicking 'Convert to Clinical Call'. Selected reason for call 'Appointment'. Sent 'Appointment' template and routed as routine priority per Clinician KB page to appropriate clinician pool.    -- DO NOT REPLY / DO NOT REPLY ALL --  -- This inbox is not monitored. If this was sent to the wrong provider or department, reroute message to P E[facility] Reroute pool. --  -- Message is from [facility]agement [facility] Operations ([facility]) --  Reason for Appointment Message: TCM post hospital follow up - PCP unavailable in 14- calendar days    Reason for Visit: follow up from hospital discharged 03/06/2025    Is the patient currently scheduled? No    Preferred time to be seen: as soon as possible within 14days    Caller Information       Contact Date/Time Type Contact Phone/Fax    03/07/2025 03:19 PM CST Phone (Incoming) jovany [phone]            Alternative phone number: [phone]    Can a detailed message be left?  Yes - Voicemail   Patient has been advised the message will be addressed within 2-3 business days            Per Mammogram impression:  FINDINGS:  Right breast: Moderate nodular background enhancement is present. At  the approximate 8:00 position and 5.6 cm from the nipple there is a  small irregular shaped enhancing lesion that measures 0.7 cm maximal  dimension. Kinetics are not impressive. Further assessment with  diagnostic mammography and ultrasound is recommended at this time.     Left breast: Mild-moderate nodular background enhancement is present.  No pathological findings are seen.     IMPRESSION: There is an indeterminant lesion in the right breast which  requires additional imaging, as described above.    Please advise on results. Would you like to contact patient or have us in regards to findings?     Thank you,   Asha RODRIGUEZ RN   [facility]

## 2025-09-02 SDOH — HEALTH STABILITY: PHYSICAL HEALTH: ON AVERAGE, HOW MANY DAYS PER WEEK DO YOU ENGAGE IN MODERATE TO STRENUOUS EXERCISE (LIKE A BRISK WALK)?: 3 DAYS

## 2025-09-02 SDOH — HEALTH STABILITY: PHYSICAL HEALTH: ON AVERAGE, HOW MANY MINUTES DO YOU ENGAGE IN EXERCISE AT THIS LEVEL?: 60 MIN

## 2025-09-03 ENCOUNTER — OFFICE VISIT (OUTPATIENT)
Dept: FAMILY MEDICINE | Facility: CLINIC | Age: 55
End: 2025-09-03
Payer: COMMERCIAL

## 2025-09-03 VITALS
WEIGHT: 157.8 LBS | OXYGEN SATURATION: 100 % | HEIGHT: 63 IN | TEMPERATURE: 96.8 F | DIASTOLIC BLOOD PRESSURE: 83 MMHG | RESPIRATION RATE: 18 BRPM | HEART RATE: 65 BPM | SYSTOLIC BLOOD PRESSURE: 130 MMHG | BODY MASS INDEX: 27.96 KG/M2

## 2025-09-03 DIAGNOSIS — R03.0 ELEVATED BLOOD PRESSURE READING WITHOUT DIAGNOSIS OF HYPERTENSION: ICD-10-CM

## 2025-09-03 DIAGNOSIS — Z00.00 ROUTINE GENERAL MEDICAL EXAMINATION AT A HEALTH CARE FACILITY: Primary | ICD-10-CM

## 2025-09-03 PROCEDURE — 99396 PREV VISIT EST AGE 40-64: CPT | Performed by: FAMILY MEDICINE

## 2025-09-03 PROCEDURE — 3079F DIAST BP 80-89 MM HG: CPT | Performed by: FAMILY MEDICINE

## 2025-09-03 PROCEDURE — 3075F SYST BP GE 130 - 139MM HG: CPT | Performed by: FAMILY MEDICINE

## 2025-09-03 PROCEDURE — 1126F AMNT PAIN NOTED NONE PRSNT: CPT | Performed by: FAMILY MEDICINE

## 2025-09-03 ASSESSMENT — PAIN SCALES - GENERAL: PAINLEVEL_OUTOF10: NO PAIN (0)

## (undated) RX ORDER — LIDOCAINE HYDROCHLORIDE 10 MG/ML
INJECTION, SOLUTION EPIDURAL; INFILTRATION; INTRACAUDAL; PERINEURAL
Status: DISPENSED
Start: 2019-05-31